# Patient Record
Sex: MALE | Race: WHITE | NOT HISPANIC OR LATINO | Employment: OTHER | URBAN - METROPOLITAN AREA
[De-identification: names, ages, dates, MRNs, and addresses within clinical notes are randomized per-mention and may not be internally consistent; named-entity substitution may affect disease eponyms.]

---

## 2017-01-19 PROBLEM — R03.0 ELEVATED BLOOD PRESSURE READING: Status: ACTIVE | Noted: 2017-01-19

## 2017-03-29 ENCOUNTER — OFFICE VISIT (OUTPATIENT)
Dept: PSYCHIATRY | Facility: CLINIC | Age: 67
End: 2017-03-29
Payer: MEDICARE

## 2017-03-29 VITALS
WEIGHT: 136 LBS | HEART RATE: 73 BPM | SYSTOLIC BLOOD PRESSURE: 157 MMHG | HEIGHT: 67 IN | DIASTOLIC BLOOD PRESSURE: 78 MMHG | BODY MASS INDEX: 21.35 KG/M2

## 2017-03-29 DIAGNOSIS — F41.1 GENERALIZED ANXIETY DISORDER: ICD-10-CM

## 2017-03-29 DIAGNOSIS — F41.9 ANXIETY: ICD-10-CM

## 2017-03-29 DIAGNOSIS — F32.89 DEPRESSIVE DISORDER, NOT ELSEWHERE CLASSIFIED: Primary | ICD-10-CM

## 2017-03-29 PROCEDURE — 99214 OFFICE O/P EST MOD 30 MIN: CPT | Mod: ,,, | Performed by: NURSE PRACTITIONER

## 2017-03-29 PROCEDURE — 99999 PR PBB SHADOW E&M-EST. PATIENT-LVL III: CPT | Mod: PBBFAC,,, | Performed by: NURSE PRACTITIONER

## 2017-03-29 PROCEDURE — 99213 OFFICE O/P EST LOW 20 MIN: CPT | Mod: PBBFAC | Performed by: NURSE PRACTITIONER

## 2017-03-29 PROCEDURE — 90833 PSYTX W PT W E/M 30 MIN: CPT | Mod: ,,, | Performed by: NURSE PRACTITIONER

## 2017-03-29 RX ORDER — ALPRAZOLAM 1 MG/1
1 TABLET ORAL 3 TIMES DAILY
Qty: 90 TABLET | Refills: 5 | Status: ON HOLD | OUTPATIENT
Start: 2017-03-29 | End: 2017-09-11

## 2017-03-29 NOTE — MR AVS SNAPSHOT
[facility] - Psychiatry  [address]  [facility] [address]  Phone: [phone]  Fax: [phone]                  Chaz Stuart   3/29/2017 8:00 AM   Office Visit    Description:  Male : 1950   Provider:  Dario Newberry NP   Department:  [facility] - Psychiatry           Reason for Visit     Mood           Diagnoses this Visit        Comments    Depressive disorder, not elsewhere classified    -  Primary     Generalized anxiety disorder         Anxiety                To Do List           Goals (5 Years of Data)     None       These Medications        Disp Refills Start End    alprazolam (XANAX) 1 MG tablet 90 tablet 5 3/29/2017     Take 1 tablet (1 mg total) by mouth 3 (three) times daily. - Oral    Pharmacy: [facility] [address] [address] #: [phone]         [facility] On Call     [facility] On Call Nurse Care Line -  Assistance  Registered nurses in the [facility] On Call Center provide clinical advisement, health education, appointment booking, and other advisory services.  Call for this free service at [phone].             Medications           Message regarding Medications     Verify the changes and/or additions to your medication regime listed below are the same as discussed with your clinician today.  If any of these changes or additions are incorrect, please notify your healthcare provider.             Verify that the below list of medications is an accurate representation of the medications you are currently taking.  If none reported, the list may be blank. If incorrect, please contact your healthcare provider. Carry this list with you in case of emergency.           Current Medications     albuterol (VENTOLIN HFA) 90 mcg/actuation inhaler Inhale 2 puffs into the lungs every 6 (six) hours as needed.    albuterol-ipratropium 2.5mg-0.5mg/3mL (DUO-NEB) 0.5 mg-3 mg(2.5 mg base)/3 mL nebulizer solution Take 3 mLs by nebulization  "every 6 (six) hours as needed for Wheezing or Shortness of Breath.    alprazolam (XANAX) 1 MG tablet Take 1 tablet (1 mg total) by mouth 3 (three) times daily.    aspirin (ECOTRIN) 81 MG EC tablet Take 1 tablet (81 mg total) by mouth once daily.    atorvastatin (LIPITOR) 20 MG tablet Take 1 tablet (20 mg total) by mouth every evening.    cetirizine (ZYRTEC) 10 MG tablet Take 1 tablet (10 mg total) by mouth once daily.    dutasteride (AVODART) 0.5 mg capsule Take 1 capsule (0.5 mg total) by mouth once daily.    SYMBICORT 160-4.5 mcg/actuation HFAA Inhale 2 puffs into the lungs 2 (two) times daily.           Clinical Reference Information           Your Vitals Were     BP Pulse Height Weight BMI    157/78 73 5' 7" (1.702 m) 61.7 kg (136 lb) 21.3 kg/m2      Blood Pressure          Most Recent Value    BP  (!)  157/78      Allergies as of 3/29/2017     No Known Allergies      Immunizations Administered on Date of Encounter - 3/29/2017     None      Instructions    [facility] - DEPARTMENT OF PSYCHIATRY   PATIENT INFORMATION    We appreciate the opportunity to participate in your medical care and hope the following protocols will make it easier for you to receive quality treatment in our department.    1. PUNCTUALITY: Your appointment is scheduled for a fixed amount of time.  To get the benefit of your appointment, please arrive early enough to allow time for traffic, parking and registration.  If you are late for your appointment, you may have to reschedule.  Please make every effort to be on time.      2. PAYMENT FOR SERVICES:   Payments are expected at the time of service.  Please contact [phone] if you need to resolve issues involving your account at [facility] or to set up a payment plan.    3. CANCELLATION / MISSED APPOINTMENTS:   In order to receive quality care, all appointments must be kept.  Appointment may be cancelled at least 24 hours before your appointment time. If you do not give at least " 24-hour notice of cancellation a fee may be billed directly to the patient.  Please note that insurance does not cover no-show charges, so you will be billed directly.  If you are consistently late, cancel, or do not show for your appointments, our department reserves the right to terminate treatment     MESSAGES- In general you can reach the department by calling [phone], between 8:00 a.m. and 5:00 p.m., Monday through Friday.  You can also use the [facility] Patient Portal.     AFTER HOURS, WEEKEND OR HOLIDAYS- For urgent questions after hours, weekends and holidays, calling the department number [phone] will connect you with a representative.  EMERGENCY-  In case of a crisis when there is a concern of harm to self or others, call 911 or the office [phone] between 8:00 a.m. and 5:00 p.m., Monday through Friday or go to the [facility].    4. PRESCRIPTION REFILLS:  Prescription refills must be done at your physician office visit, You will be given a sufficient number of refills to last until your next appointment, you must come to appointments for prescriptions.   No additional refills will be approved beyond the original treatment plan. Again, please note that no additional prescriptions will be approved per patient request.     5. FOLLOW UP APPOINTMENTS:  Follow-up appointments can be made in person at the Psychiatry Appointment Desk, online through the [facility] Patient Portal, or by calling [phone], from 8am to 5pm, between Monday and Friday.  Patients are responsible for scheduling their own follow-up appointment,  It  Is recommended you schedule your appointment before leaving the clinic.    6. Providers are NOT ABLE to schedule appointments; all appointments must be made through the appointment department or through [facility] Patient Portal.           PATIENTS MAY EXPERIENCE SYMPTOMS OF WITHDRAWAL IF THEY RUN OUT OF MEDICATIONS.  THE PATIENT WILL ASSUME ALL RESPONSIBILITIES  OF ANY OUTCOMES WHEN THERE IS AN ISSUE WITH NONCOMPLIANCE WITH FOLLOW-UP APPOINTMENTS AND MEDICATIONS.        THERE IS TO BE NO USE OF ALCOHOL AND/OR ILLEGAL SUBSTANCES    PATIENT ARE TO GO TO THE CLOSEST EMERGENCY ROOM IF FEELING A THREAT TO THEMSELVES OR OTHER OR IF GRAVELY DISABLED    TELL US HOW WE ARE DOING.  PLEASE COMPLETE THE PATIENT SATISFACTION SURVERY  Revised December 2016         Language Assistance Services     ATTENTION: Language assistance services are available, free of charge. Please call [phone].      ATENCIÓN: Si habla español, tiene a lamb disposición servicios gratuitos de asistencia lingüística. Llame al [phone].     CHÚ Ý: N?u b?n nói Ti?ng Vi?t, có các d?ch v? h? tr? ngôn ng? mi?n phí dành cho b?n. G?i s? [phone].         [facility] complies with applicable Federal civil rights laws and does not discriminate on the basis of race, color, national origin, age, disability, or sex.

## 2017-03-29 NOTE — PATIENT INSTRUCTIONS
[facility] - DEPARTMENT OF PSYCHIATRY   PATIENT INFORMATION    We appreciate the opportunity to participate in your medical care and hope the following protocols will make it easier for you to receive quality treatment in our department.    1. PUNCTUALITY: Your appointment is scheduled for a fixed amount of time.  To get the benefit of your appointment, please arrive early enough to allow time for traffic, parking and registration.  If you are late for your appointment, you may have to reschedule.  Please make every effort to be on time.      2. PAYMENT FOR SERVICES:   Payments are expected at the time of service.  Please contact [phone] if you need to resolve issues involving your account at [facility] or to set up a payment plan.    3. CANCELLATION / MISSED APPOINTMENTS:   In order to receive quality care, all appointments must be kept.  Appointment may be cancelled at least 24 hours before your appointment time. If you do not give at least 24-hour notice of cancellation a fee may be billed directly to the patient.  Please note that insurance does not cover no-show charges, so you will be billed directly.  If you are consistently late, cancel, or do not show for your appointments, our department reserves the right to terminate treatment     MESSAGES- In general you can reach the department by calling [phone], between 8:00 a.m. and 5:00 p.m., Monday through Friday.  You can also use the [facility] Patient Portal.     AFTER HOURS, WEEKEND OR HOLIDAYS- For urgent questions after hours, weekends and holidays, calling the department number [phone] will connect you with a representative.  EMERGENCY-  In case of a crisis when there is a concern of harm to self or others, call 911 or the office [phone] between 8:00 a.m. and 5:00 p.m., Monday through Friday or go to the [facility] Emergency Room.    4. PRESCRIPTION REFILLS:  Prescription refills must be done at your physician office visit, You  will be given a sufficient number of refills to last until your next appointment, you must come to appointments for prescriptions.   No additional refills will be approved beyond the original treatment plan. Again, please note that no additional prescriptions will be approved per patient request.     5. FOLLOW UP APPOINTMENTS:  Follow-up appointments can be made in person at the Psychiatry Appointment Desk, online through the [facility] Patient Portal, or by calling [phone], from 8am to 5pm, between Monday and Friday.  Patients are responsible for scheduling their own follow-up appointment,  It  Is recommended you schedule your appointment before leaving the clinic.    6. Providers are NOT ABLE to schedule appointments; all appointments must be made through the appointment department or through [facility].           PATIENTS MAY EXPERIENCE SYMPTOMS OF WITHDRAWAL IF THEY RUN OUT OF MEDICATIONS.  THE PATIENT WILL ASSUME ALL RESPONSIBILITIES OF ANY OUTCOMES WHEN THERE IS AN ISSUE WITH NONCOMPLIANCE WITH FOLLOW-UP APPOINTMENTS AND MEDICATIONS.        THERE IS TO BE NO USE OF ALCOHOL AND/OR ILLEGAL SUBSTANCES    PATIENT ARE TO GO TO THE CLOSEST EMERGENCY ROOM IF FEELING A THREAT TO THEMSELVES OR OTHER OR IF GRAVELY DISABLED    TELL US HOW WE ARE DOING.  PLEASE COMPLETE THE PATIENT SATISFACTION SURVERY  Revised December 2016

## 2017-03-29 NOTE — PROGRESS NOTES
"Outpatient Psychiatry Follow-Up Visit (MD/NP)    3/29/2017    Clinical Status of Patient:  Outpatient (Ambulatory)    Chief Complaint:  Chaz Stuart is a 66 y.o. male who presents today for follow-up of depression and anxiety.  Met with patient.      Interval History and Content of Current Session:  Interim Events/Subjective Report/Content of Current Session:     Last seen Oct 2016, chart reviewed, mult medical issues     reviewed    Xanax 1mg po TID    "Im pretty good", socializing with neighbors, walking with his daughter.  Denies depression, anxiety, and irritability. Encouraged patient to only take as needed.  Gait steady, no O2.        Psychotherapy:  · Target symptoms: depression, anxiety   · Why chosen therapy is appropriate versus another modality: relevant to diagnosis  · Outcome monitoring methods: self-report  · Therapeutic intervention type: supportive psychotherapy  · Topics discussed/themes: symptom recognition  · The patient's response to the intervention is accepting. The patient's progress toward treatment goals is good.   · Duration of intervention: 16 minutes.    Review of Systems     GENERAL: No weight gain or loss   SKIN: No rashes or lacerations, generalized itching  HEAD: No headaches   EYES: No exophthalmos, jaundice or blindness   EARS: No dizziness, tinnitus or hearing loss   NOSE: No changes in smell   MOUTH & THROAT: No dyskinetic movements or obvious goiter   CHEST: No shortness of breath, hyperventilation, mild cough   CARDIOVASCULAR: No tachycardia or chest pain   ABDOMEN: No nausea, vomiting, pain, constipation or diarrhea   URINARY: No frequency, dysuria or sexual dysfunction   ENDOCRINE: No polydipsia, polyuria   MUSCULOSKELETAL: denies pain  NEUROLOGIC: No weakness, sensory changes, seizures, confusion, memory loss, tremor or other abnormal movements      Psychiatric Review Of Systems:  sleep: yes  appetite changes: no  weight changes: no  energy/anergy: " "yes  interest/pleasure/anhedonia: yes  somatic symptoms: no  libido: yes  anxiety/panic: no      Past Medical, Family and Social History: The patient's past medical, family and social history have been reviewed and updated as appropriate within the electronic medical record - see encounter notes.    Compliance: yes    Side effects: None    Risk Parameters:  Patient reports no suicidal ideation  Patient reports no homicidal ideation  Patient reports no self-injurious behavior  Patient reports no violent behavior    Exam (detailed: at least 9 elements; comprehensive: all 15 elements)   Constitutional  Vitals:  Most recent vital signs, dated less than 90 days prior to this appointment, were reviewed.   Vitals:    03/29/17 0743   BP: (!) 157/78   Pulse: 73   Weight: 61.7 kg (136 lb)   Height: 5' 7" (1.702 m)        General:  unremarkable, age appropriate, casually dressed, neatly groomed     Musculoskeletal  Muscle Strength/Tone:  no dyskinesia, no dystonia, no tremor, no tic   Gait & Station:  non-ataxic     Psychiatric  Speech:  no latency; no press   Mood & Affect:  euthymic  congruent and appropriate   Thought Process:  normal and logical   Associations:  intact   Thought Content:  normal, no suicidality, no homicidality, delusions, or paranoia   Insight:  has awareness of illness   Judgement: behavior is adequate to circumstances   Orientation:  grossly intact   Memory: intact for content of interview   Language: grossly intact   Attention Span & Concentration:  able to focus   Fund of Knowledge:  intact and appropriate to age and level of education     Assessment and Diagnosis   Status/Progress: Based on the examination today, the patient's problem(s) is/are well controlled.  New problems have not been presented today.   Co-morbidities are complicating management of the primary condition.  There are no active rule-out diagnoses for this patient at this time.     General Impression:       ICD-10-CM ICD-9-CM   1. " Depressive disorder, not elsewhere classified F32.9 311   2. Generalized anxiety disorder F41.1 300.02       Intervention/Counseling/Treatment Plan   · Medication Management: Continue current medications. The risks and benefits of medication were discussed with the patient.   · Xanax 1mg po TID  · Patient travels approx 2 hours to get to appt    Return to Clinic: 6 months

## 2017-05-09 PROBLEM — Z51.89 THERAPEUTIC PROCEDURE: Status: ACTIVE | Noted: 2017-05-09

## 2017-05-23 PROBLEM — D75.839 THROMBOCYTHEMIA: Status: ACTIVE | Noted: 2017-05-23

## 2017-05-23 PROBLEM — R79.89 ELEVATED LFTS: Status: ACTIVE | Noted: 2017-05-23

## 2017-05-23 PROBLEM — D72.829 LEUKOCYTOSIS: Status: ACTIVE | Noted: 2017-05-23

## 2017-05-31 PROBLEM — D45 POLYCYTHEMIA VERA: Status: ACTIVE | Noted: 2017-05-31

## 2017-05-31 PROBLEM — D47.1 MPN (MYELOPROLIFERATIVE NEOPLASM): Status: ACTIVE | Noted: 2017-05-31

## 2017-05-31 PROBLEM — I10 HTN, GOAL BELOW 140/90: Status: ACTIVE | Noted: 2017-05-31

## 2017-06-21 PROBLEM — E86.0 DEHYDRATION: Status: ACTIVE | Noted: 2017-06-21

## 2017-07-05 PROBLEM — R06.02 SOB (SHORTNESS OF BREATH): Status: ACTIVE | Noted: 2017-07-05

## 2017-07-05 PROBLEM — Z09 CHEMOTHERAPY FOLLOW-UP EXAMINATION: Status: ACTIVE | Noted: 2017-07-05

## 2017-08-28 PROBLEM — J44.9 CHRONIC OBSTRUCTIVE PULMONARY DISEASE: Status: ACTIVE | Noted: 2017-08-28

## 2017-08-28 PROBLEM — S69.91XA INJURY OF RIGHT HAND: Status: ACTIVE | Noted: 2017-08-28

## 2017-08-28 PROBLEM — S69.91XA INJURY OF FINGER OF RIGHT HAND: Status: ACTIVE | Noted: 2017-08-28

## 2017-08-29 ENCOUNTER — HOSPITAL ENCOUNTER (INPATIENT)
Facility: HOSPITAL | Age: 67
LOS: 12 days | Discharge: HOME | DRG: 982 | End: 2017-09-11
Attending: EMERGENCY MEDICINE | Admitting: EMERGENCY MEDICINE
Payer: MEDICARE

## 2017-08-29 DIAGNOSIS — R65.10 SIRS (SYSTEMIC INFLAMMATORY RESPONSE SYNDROME): ICD-10-CM

## 2017-08-29 DIAGNOSIS — R79.89 ELEVATED LFTS: ICD-10-CM

## 2017-08-29 DIAGNOSIS — J44.1 COPD EXACERBATION: Primary | ICD-10-CM

## 2017-08-29 DIAGNOSIS — K59.01 SLOW TRANSIT CONSTIPATION: ICD-10-CM

## 2017-08-29 DIAGNOSIS — I10 HTN, GOAL BELOW 140/90: ICD-10-CM

## 2017-08-29 DIAGNOSIS — S69.91XS INJURY OF FINGER OF RIGHT HAND, SEQUELA: ICD-10-CM

## 2017-08-29 DIAGNOSIS — F41.9 ANXIETY: ICD-10-CM

## 2017-08-29 DIAGNOSIS — R74.8 ELEVATED LIVER ENZYMES: ICD-10-CM

## 2017-08-29 DIAGNOSIS — J44.9 CHRONIC OBSTRUCTIVE PULMONARY DISEASE, UNSPECIFIED COPD TYPE: ICD-10-CM

## 2017-08-29 LAB
ALBUMIN SERPL BCP-MCNC: 3.3 G/DL
ALLENS TEST: ABNORMAL
ALP SERPL-CCNC: 446 U/L
ALT SERPL W/O P-5'-P-CCNC: 293 U/L
ANION GAP SERPL CALC-SCNC: 12 MMOL/L
ANISOCYTOSIS BLD QL SMEAR: SLIGHT
AST SERPL-CCNC: 270 U/L
BASOPHILS # BLD AUTO: 0.03 K/UL
BASOPHILS NFR BLD: 0.2 %
BILIRUB SERPL-MCNC: 1.5 MG/DL
BNP SERPL-MCNC: 50 PG/ML
BUN SERPL-MCNC: 20 MG/DL
CALCIUM SERPL-MCNC: 9.7 MG/DL
CHLORIDE SERPL-SCNC: 103 MMOL/L
CO2 SERPL-SCNC: 28 MMOL/L
CREAT SERPL-MCNC: 0.9 MG/DL
DELSYS: ABNORMAL
DIFFERENTIAL METHOD: ABNORMAL
EOSINOPHIL # BLD AUTO: 0.1 K/UL
EOSINOPHIL NFR BLD: 0.4 %
ERYTHROCYTE [DISTWIDTH] IN BLOOD BY AUTOMATED COUNT: 20 %
EST. GFR  (AFRICAN AMERICAN): >60 ML/MIN/1.73 M^2
EST. GFR  (NON AFRICAN AMERICAN): >60 ML/MIN/1.73 M^2
GLUCOSE SERPL-MCNC: 128 MG/DL
HCO3 UR-SCNC: 30.5 MMOL/L (ref 24–28)
HCT VFR BLD AUTO: 32.1 %
HGB BLD-MCNC: 9.5 G/DL
HYPOCHROMIA BLD QL SMEAR: ABNORMAL
INR PPP: 1.1
LIPASE SERPL-CCNC: 26 U/L
LYMPHOCYTES # BLD AUTO: 0.4 K/UL
LYMPHOCYTES NFR BLD: 2.6 %
MCH RBC QN AUTO: 22.1 PG
MCHC RBC AUTO-ENTMCNC: 29.6 G/DL
MCV RBC AUTO: 75 FL
MONOCYTES # BLD AUTO: 1.1 K/UL
MONOCYTES NFR BLD: 8 %
NEUTROPHILS # BLD AUTO: 12.1 K/UL
NEUTROPHILS NFR BLD: 89.6 %
OVALOCYTES BLD QL SMEAR: ABNORMAL
PCO2 BLDA: 41.1 MMHG (ref 35–45)
PH SMN: 7.48 [PH] (ref 7.35–7.45)
PLATELET # BLD AUTO: 165 K/UL
PLATELET BLD QL SMEAR: ABNORMAL
PMV BLD AUTO: 9.4 FL
PO2 BLDA: 75 MMHG (ref 80–100)
POC BE: 6 MMOL/L
POC SATURATED O2: 96 % (ref 95–100)
POC TCO2: 32 MMOL/L (ref 23–27)
POIKILOCYTOSIS BLD QL SMEAR: SLIGHT
POLYCHROMASIA BLD QL SMEAR: ABNORMAL
POTASSIUM SERPL-SCNC: 3.4 MMOL/L
PROT SERPL-MCNC: 7 G/DL
PROTHROMBIN TIME: 11.2 SEC
RBC # BLD AUTO: 4.29 M/UL
SAMPLE: ABNORMAL
SITE: ABNORMAL
SODIUM SERPL-SCNC: 143 MMOL/L
STOMATOCYTES BLD QL SMEAR: PRESENT
TARGETS BLD QL SMEAR: ABNORMAL
TROPONIN I SERPL DL<=0.01 NG/ML-MCNC: <0.006 NG/ML
TROPONIN I SERPL DL<=0.01 NG/ML-MCNC: <0.006 NG/ML
WBC # BLD AUTO: 13.65 K/UL

## 2017-08-29 PROCEDURE — 84484 ASSAY OF TROPONIN QUANT: CPT

## 2017-08-29 PROCEDURE — 83880 ASSAY OF NATRIURETIC PEPTIDE: CPT

## 2017-08-29 PROCEDURE — 80053 COMPREHEN METABOLIC PANEL: CPT

## 2017-08-29 PROCEDURE — 25000003 PHARM REV CODE 250: Performed by: EMERGENCY MEDICINE

## 2017-08-29 PROCEDURE — 36415 COLL VENOUS BLD VENIPUNCTURE: CPT

## 2017-08-29 PROCEDURE — 82803 BLOOD GASES ANY COMBINATION: CPT

## 2017-08-29 PROCEDURE — 85610 PROTHROMBIN TIME: CPT

## 2017-08-29 PROCEDURE — 80074 ACUTE HEPATITIS PANEL: CPT

## 2017-08-29 PROCEDURE — 96374 THER/PROPH/DIAG INJ IV PUSH: CPT

## 2017-08-29 PROCEDURE — 99285 EMERGENCY DEPT VISIT HI MDM: CPT | Mod: 25

## 2017-08-29 PROCEDURE — 99900035 HC TECH TIME PER 15 MIN (STAT)

## 2017-08-29 PROCEDURE — 63600175 PHARM REV CODE 636 W HCPCS: Performed by: EMERGENCY MEDICINE

## 2017-08-29 PROCEDURE — 93005 ELECTROCARDIOGRAM TRACING: CPT

## 2017-08-29 PROCEDURE — G0378 HOSPITAL OBSERVATION PER HR: HCPCS

## 2017-08-29 PROCEDURE — 25000242 PHARM REV CODE 250 ALT 637 W/ HCPCS: Performed by: EMERGENCY MEDICINE

## 2017-08-29 PROCEDURE — 63600175 PHARM REV CODE 636 W HCPCS: Performed by: PHYSICIAN ASSISTANT

## 2017-08-29 PROCEDURE — 25000242 PHARM REV CODE 250 ALT 637 W/ HCPCS: Performed by: PHYSICIAN ASSISTANT

## 2017-08-29 PROCEDURE — 36600 WITHDRAWAL OF ARTERIAL BLOOD: CPT

## 2017-08-29 PROCEDURE — 94761 N-INVAS EAR/PLS OXIMETRY MLT: CPT

## 2017-08-29 PROCEDURE — 94644 CONT INHLJ TX 1ST HOUR: CPT

## 2017-08-29 PROCEDURE — 94640 AIRWAY INHALATION TREATMENT: CPT

## 2017-08-29 PROCEDURE — 83690 ASSAY OF LIPASE: CPT

## 2017-08-29 PROCEDURE — 85025 COMPLETE CBC W/AUTO DIFF WBC: CPT

## 2017-08-29 PROCEDURE — 93010 ELECTROCARDIOGRAM REPORT: CPT | Mod: ,,, | Performed by: INTERNAL MEDICINE

## 2017-08-29 PROCEDURE — 27000221 HC OXYGEN, UP TO 24 HOURS

## 2017-08-29 RX ORDER — IPRATROPIUM BROMIDE AND ALBUTEROL SULFATE 2.5; .5 MG/3ML; MG/3ML
3 SOLUTION RESPIRATORY (INHALATION) EVERY 4 HOURS
Status: DISCONTINUED | OUTPATIENT
Start: 2017-08-30 | End: 2017-09-11 | Stop reason: HOSPADM

## 2017-08-29 RX ORDER — METHYLPREDNISOLONE SOD SUCC 125 MG
125 VIAL (EA) INJECTION
Status: COMPLETED | OUTPATIENT
Start: 2017-08-29 | End: 2017-08-29

## 2017-08-29 RX ORDER — ENOXAPARIN SODIUM 100 MG/ML
40 INJECTION SUBCUTANEOUS EVERY 24 HOURS
Status: DISCONTINUED | OUTPATIENT
Start: 2017-08-29 | End: 2017-09-11 | Stop reason: HOSPADM

## 2017-08-29 RX ORDER — AMLODIPINE BESYLATE 5 MG/1
5 TABLET ORAL DAILY
Status: DISCONTINUED | OUTPATIENT
Start: 2017-08-30 | End: 2017-09-09

## 2017-08-29 RX ORDER — METHYLPREDNISOLONE SOD SUCC 125 MG
125 VIAL (EA) INJECTION EVERY 8 HOURS
Status: DISCONTINUED | OUTPATIENT
Start: 2017-08-29 | End: 2017-09-03

## 2017-08-29 RX ORDER — PANTOPRAZOLE SODIUM 40 MG/10ML
40 INJECTION, POWDER, LYOPHILIZED, FOR SOLUTION INTRAVENOUS DAILY
Status: DISCONTINUED | OUTPATIENT
Start: 2017-08-30 | End: 2017-09-11 | Stop reason: HOSPADM

## 2017-08-29 RX ORDER — ALBUTEROL SULFATE 2.5 MG/.5ML
10 SOLUTION RESPIRATORY (INHALATION)
Status: COMPLETED | OUTPATIENT
Start: 2017-08-29 | End: 2017-08-29

## 2017-08-29 RX ORDER — ALPRAZOLAM 0.5 MG/1
1 TABLET ORAL 3 TIMES DAILY
Status: DISCONTINUED | OUTPATIENT
Start: 2017-08-29 | End: 2017-09-11 | Stop reason: HOSPADM

## 2017-08-29 RX ORDER — MORPHINE SULFATE 10 MG/ML
2 INJECTION INTRAMUSCULAR; INTRAVENOUS; SUBCUTANEOUS EVERY 4 HOURS PRN
Status: DISCONTINUED | OUTPATIENT
Start: 2017-08-29 | End: 2017-09-11 | Stop reason: HOSPADM

## 2017-08-29 RX ORDER — IPRATROPIUM BROMIDE AND ALBUTEROL SULFATE 2.5; .5 MG/3ML; MG/3ML
3 SOLUTION RESPIRATORY (INHALATION)
Status: COMPLETED | OUTPATIENT
Start: 2017-08-29 | End: 2017-08-29

## 2017-08-29 RX ORDER — RAMELTEON 8 MG/1
8 TABLET ORAL NIGHTLY PRN
Status: DISCONTINUED | OUTPATIENT
Start: 2017-08-29 | End: 2017-09-11 | Stop reason: HOSPADM

## 2017-08-29 RX ORDER — ASPIRIN 81 MG/1
81 TABLET ORAL DAILY
Status: DISCONTINUED | OUTPATIENT
Start: 2017-08-30 | End: 2017-09-11 | Stop reason: HOSPADM

## 2017-08-29 RX ORDER — IBUPROFEN 600 MG/1
600 TABLET ORAL EVERY 8 HOURS PRN
Status: DISCONTINUED | OUTPATIENT
Start: 2017-08-29 | End: 2017-08-30

## 2017-08-29 RX ORDER — SODIUM CHLORIDE 0.9 % (FLUSH) 0.9 %
3 SYRINGE (ML) INJECTION EVERY 8 HOURS
Status: DISCONTINUED | OUTPATIENT
Start: 2017-08-29 | End: 2017-09-11 | Stop reason: HOSPADM

## 2017-08-29 RX ORDER — ONDANSETRON 2 MG/ML
4 INJECTION INTRAMUSCULAR; INTRAVENOUS EVERY 8 HOURS PRN
Status: DISCONTINUED | OUTPATIENT
Start: 2017-08-29 | End: 2017-09-11 | Stop reason: HOSPADM

## 2017-08-29 RX ORDER — SODIUM CHLORIDE 9 MG/ML
INJECTION, SOLUTION INTRAVENOUS CONTINUOUS
Status: DISCONTINUED | OUTPATIENT
Start: 2017-08-29 | End: 2017-09-04

## 2017-08-29 RX ORDER — MORPHINE SULFATE 10 MG/ML
6 INJECTION INTRAMUSCULAR; INTRAVENOUS; SUBCUTANEOUS EVERY 4 HOURS PRN
Status: DISCONTINUED | OUTPATIENT
Start: 2017-08-29 | End: 2017-09-11 | Stop reason: HOSPADM

## 2017-08-29 RX ORDER — IPRATROPIUM BROMIDE 0.5 MG/2.5ML
500 SOLUTION RESPIRATORY (INHALATION)
Status: COMPLETED | OUTPATIENT
Start: 2017-08-29 | End: 2017-08-29

## 2017-08-29 RX ADMIN — AZITHROMYCIN MONOHYDRATE 500 MG: 500 INJECTION, POWDER, LYOPHILIZED, FOR SOLUTION INTRAVENOUS at 09:08

## 2017-08-29 RX ADMIN — SODIUM CHLORIDE: 0.9 INJECTION, SOLUTION INTRAVENOUS at 09:08

## 2017-08-29 RX ADMIN — IPRATROPIUM BROMIDE AND ALBUTEROL SULFATE 3 ML: .5; 3 SOLUTION RESPIRATORY (INHALATION) at 08:08

## 2017-08-29 RX ADMIN — METHYLPREDNISOLONE SODIUM SUCCINATE 125 MG: 125 INJECTION, POWDER, FOR SOLUTION INTRAMUSCULAR; INTRAVENOUS at 07:08

## 2017-08-29 RX ADMIN — ALBUTEROL SULFATE 10 MG: 2.5 SOLUTION RESPIRATORY (INHALATION) at 05:08

## 2017-08-29 RX ADMIN — IPRATROPIUM BROMIDE AND ALBUTEROL SULFATE 3 ML: .5; 3 SOLUTION RESPIRATORY (INHALATION) at 09:08

## 2017-08-29 RX ADMIN — ENOXAPARIN SODIUM 40 MG: 100 INJECTION SUBCUTANEOUS at 09:08

## 2017-08-29 RX ADMIN — IPRATROPIUM BROMIDE 500 MCG: 0.5 SOLUTION RESPIRATORY (INHALATION) at 05:08

## 2017-08-29 RX ADMIN — CEFTRIAXONE 1 G: 1 INJECTION, SOLUTION INTRAVENOUS at 09:08

## 2017-08-29 RX ADMIN — ALPRAZOLAM 1 MG: 0.5 TABLET ORAL at 09:08

## 2017-08-29 NOTE — ED TRIAGE NOTES
Pt presents to the ED with chest pain which is described as punching  in his chest. Pt reports fever, chills, neck pain and mid-sternal chest pain. Pt reports SOB with exertion as well as during rest. Pt was scheduled for surgery today however when pt arrived anesthesia did not clear him to have surgery at ambulatory center. Pt then developed the chest pain and came to the ED. PT placed on monitor. 12 lead obtained. Pt was in the ED two days ago with SOB.

## 2017-08-30 LAB
ANION GAP SERPL CALC-SCNC: 10 MMOL/L
BASOPHILS # BLD AUTO: 0.01 K/UL
BASOPHILS NFR BLD: 0.1 %
BUN SERPL-MCNC: 19 MG/DL
CALCIUM SERPL-MCNC: 8.5 MG/DL
CHLORIDE SERPL-SCNC: 105 MMOL/L
CO2 SERPL-SCNC: 25 MMOL/L
CREAT SERPL-MCNC: 0.9 MG/DL
DIFFERENTIAL METHOD: ABNORMAL
EOSINOPHIL # BLD AUTO: 0 K/UL
EOSINOPHIL NFR BLD: 0 %
ERYTHROCYTE [DISTWIDTH] IN BLOOD BY AUTOMATED COUNT: 19.7 %
EST. GFR  (AFRICAN AMERICAN): >60 ML/MIN/1.73 M^2
EST. GFR  (NON AFRICAN AMERICAN): >60 ML/MIN/1.73 M^2
GLUCOSE SERPL-MCNC: 220 MG/DL
HAV IGM SERPL QL IA: NEGATIVE
HBV CORE IGM SERPL QL IA: NEGATIVE
HBV SURFACE AG SERPL QL IA: NEGATIVE
HCT VFR BLD AUTO: 27.3 %
HCV AB SERPL QL IA: NEGATIVE
HGB BLD-MCNC: 8.1 G/DL
LYMPHOCYTES # BLD AUTO: 0.4 K/UL
LYMPHOCYTES NFR BLD: 3 %
MCH RBC QN AUTO: 22.3 PG
MCHC RBC AUTO-ENTMCNC: 29.7 G/DL
MCV RBC AUTO: 75 FL
MONOCYTES # BLD AUTO: 0.2 K/UL
MONOCYTES NFR BLD: 1.5 %
NEUTROPHILS # BLD AUTO: 11.7 K/UL
NEUTROPHILS NFR BLD: 95.4 %
PLATELET # BLD AUTO: 166 K/UL
PMV BLD AUTO: 9.2 FL
POTASSIUM SERPL-SCNC: 3.7 MMOL/L
RBC # BLD AUTO: 3.63 M/UL
SODIUM SERPL-SCNC: 140 MMOL/L
TROPONIN I SERPL DL<=0.01 NG/ML-MCNC: <0.006 NG/ML
WBC # BLD AUTO: 12.29 K/UL

## 2017-08-30 PROCEDURE — 80048 BASIC METABOLIC PNL TOTAL CA: CPT

## 2017-08-30 PROCEDURE — 25000242 PHARM REV CODE 250 ALT 637 W/ HCPCS: Performed by: EMERGENCY MEDICINE

## 2017-08-30 PROCEDURE — 36415 COLL VENOUS BLD VENIPUNCTURE: CPT

## 2017-08-30 PROCEDURE — A4216 STERILE WATER/SALINE, 10 ML: HCPCS | Performed by: EMERGENCY MEDICINE

## 2017-08-30 PROCEDURE — 21400001 HC TELEMETRY ROOM

## 2017-08-30 PROCEDURE — 27000221 HC OXYGEN, UP TO 24 HOURS

## 2017-08-30 PROCEDURE — C9113 INJ PANTOPRAZOLE SODIUM, VIA: HCPCS | Performed by: EMERGENCY MEDICINE

## 2017-08-30 PROCEDURE — 85025 COMPLETE CBC W/AUTO DIFF WBC: CPT

## 2017-08-30 PROCEDURE — 94640 AIRWAY INHALATION TREATMENT: CPT

## 2017-08-30 PROCEDURE — 25000003 PHARM REV CODE 250: Performed by: EMERGENCY MEDICINE

## 2017-08-30 PROCEDURE — 63600175 PHARM REV CODE 636 W HCPCS: Performed by: EMERGENCY MEDICINE

## 2017-08-30 PROCEDURE — 84484 ASSAY OF TROPONIN QUANT: CPT

## 2017-08-30 RX ADMIN — Medication 3 ML: at 09:08

## 2017-08-30 RX ADMIN — RAMELTEON 8 MG: 8 TABLET, FILM COATED ORAL at 09:08

## 2017-08-30 RX ADMIN — MORPHINE SULFATE 6 MG: 10 INJECTION INTRAVENOUS at 08:08

## 2017-08-30 RX ADMIN — IPRATROPIUM BROMIDE AND ALBUTEROL SULFATE 3 ML: .5; 3 SOLUTION RESPIRATORY (INHALATION) at 08:08

## 2017-08-30 RX ADMIN — ALPRAZOLAM 1 MG: 0.5 TABLET ORAL at 03:08

## 2017-08-30 RX ADMIN — METHYLPREDNISOLONE SODIUM SUCCINATE 125 MG: 125 INJECTION, POWDER, FOR SOLUTION INTRAMUSCULAR; INTRAVENOUS at 03:08

## 2017-08-30 RX ADMIN — IPRATROPIUM BROMIDE AND ALBUTEROL SULFATE 3 ML: .5; 3 SOLUTION RESPIRATORY (INHALATION) at 12:08

## 2017-08-30 RX ADMIN — SODIUM CHLORIDE: 0.9 INJECTION, SOLUTION INTRAVENOUS at 09:08

## 2017-08-30 RX ADMIN — IPRATROPIUM BROMIDE AND ALBUTEROL SULFATE 3 ML: .5; 3 SOLUTION RESPIRATORY (INHALATION) at 03:08

## 2017-08-30 RX ADMIN — ASPIRIN 81 MG: 81 TABLET, COATED ORAL at 08:08

## 2017-08-30 RX ADMIN — CEFTRIAXONE 1 G: 1 INJECTION, SOLUTION INTRAVENOUS at 08:08

## 2017-08-30 RX ADMIN — ENOXAPARIN SODIUM 40 MG: 100 INJECTION SUBCUTANEOUS at 05:08

## 2017-08-30 RX ADMIN — MORPHINE SULFATE 2 MG: 10 INJECTION INTRAVENOUS at 07:08

## 2017-08-30 RX ADMIN — METHYLPREDNISOLONE SODIUM SUCCINATE 125 MG: 125 INJECTION, POWDER, FOR SOLUTION INTRAMUSCULAR; INTRAVENOUS at 04:08

## 2017-08-30 RX ADMIN — PANTOPRAZOLE SODIUM 40 MG: 40 INJECTION, POWDER, FOR SOLUTION INTRAVENOUS at 03:08

## 2017-08-30 RX ADMIN — IPRATROPIUM BROMIDE AND ALBUTEROL SULFATE 3 ML: .5; 3 SOLUTION RESPIRATORY (INHALATION) at 11:08

## 2017-08-30 RX ADMIN — AMLODIPINE BESYLATE 5 MG: 5 TABLET ORAL at 08:08

## 2017-08-30 RX ADMIN — AZITHROMYCIN MONOHYDRATE 500 MG: 500 INJECTION, POWDER, LYOPHILIZED, FOR SOLUTION INTRAVENOUS at 09:08

## 2017-08-30 RX ADMIN — METHYLPREDNISOLONE SODIUM SUCCINATE 125 MG: 125 INJECTION, POWDER, FOR SOLUTION INTRAMUSCULAR; INTRAVENOUS at 09:08

## 2017-08-30 RX ADMIN — ONDANSETRON 4 MG: 2 INJECTION INTRAMUSCULAR; INTRAVENOUS at 07:08

## 2017-08-30 RX ADMIN — ALPRAZOLAM 1 MG: 0.5 TABLET ORAL at 04:08

## 2017-08-30 RX ADMIN — ALPRAZOLAM 1 MG: 0.5 TABLET ORAL at 09:08

## 2017-08-30 RX ADMIN — SODIUM CHLORIDE: 0.9 INJECTION, SOLUTION INTRAVENOUS at 08:08

## 2017-08-30 NOTE — NURSING
Called to room pt found sitting up at bedside c/o respiratory difficulty. Call placed to respiratory tech for treatment. Family member in room requesting for a lung doctor to see pt.

## 2017-08-30 NOTE — PLAN OF CARE
Problem: Fall Risk (Adult)  Goal: Absence of Falls  Patient will demonstrate the desired outcomes by discharge/transition of care.   Outcome: Ongoing (interventions implemented as appropriate)   08/29/17 2324   Fall Risk (Adult)   Absence of Falls making progress toward outcome       Problem: Patient Care Overview  Goal: Plan of Care Review  Outcome: Ongoing (interventions implemented as appropriate)   08/29/17 2324   Coping/Psychosocial   Plan Of Care Reviewed With patient;daughter;son       Problem: ARDS (Acute Resp Distress Syndrome) (Adult)  Goal: Signs and Symptoms of Listed Potential Problems Will be Absent, Minimized or Managed (ARDS)  Signs and symptoms of listed potential problems will be absent, minimized or managed by discharge/transition of care (reference ARDS (Acute Resp Distress Syndrome) (Adult) CPG).  Outcome: Ongoing (interventions implemented as appropriate)   08/29/17 2324   ARDS (Acute Resp Distress Syndrome)   Problems Assessed (Acute Respiratory Distress Syndrome) all   Problems Present (Acute Respiratory Distress Syndrome) none       Problem: Pain, Acute (Adult)  Goal: Acceptable Pain Control/Comfort Level  Patient will demonstrate the desired outcomes by discharge/transition of care.  Outcome: Ongoing (interventions implemented as appropriate)   08/29/17 2324   Pain, Acute (Adult)   Acceptable Pain Control/Comfort Level making progress toward outcome

## 2017-08-30 NOTE — PROGRESS NOTES
WRITTEN HEALTHCARE DISCHARGE INFORMATION     Things that YOU are responsible for to Manage Your Care At Home:  1. Getting your prescriptions filled.  2. Taking you medications as directed. DO NOT MISS ANY DOSES!  3. Going to your follow-up doctor appointments. This is important because it allows the doctor to monitor your progress and to determine if any changes need to be made to your treatment plan.    If you are unable to make your follow up appointments, please call the number listed and reschedule this appointment.     After discharge, if you need assistance, you can call [facility] On Call Nurse Care Line for 24/7 assistance at [phone]    Thank you for choosing [facility] and allowing us to care for you.   From your care management team: Fransisca ANN,RSW & Jennifer KASPER RN,TN [phone] or [phone]     You should receive a call from [facility] Discharge Department within 48-72 hours to help manage your care after discharge. Please try to make sure that you answer your phone for this important phone call.     Follow-up Information     Rk Welsh MD On 9/12/2017.    Specialties:  Internal Medicine, Oncology, Hematology and Oncology  Why:  For Appointment on Tuesday at 2:40pm.   Contact information:  [facility]  [address] [place] [id number] [phone]

## 2017-08-30 NOTE — H&P
[facility]  Hematology/Oncology  H&P    Patient Name: Chaz Stuart  MRN: [id number]  Admission Date: 8/29/2017  Code Status: Full Code   Attending Provider: Rk Welsh MD  Primary Care Physician: Nika Heredia MD (Inactive)  Principal Problem:<principal problem not specified>    Subjective:     HPI:     Mr. Stuart is a pleasant 68 YO gentleman with Myeloproliferative neoplasm, Polycythemia Vera, oxygen dependent COPD and most recent injuries to right hand from a . Pt had a recent COPD exacerbation and was seen at [facility] ED- tx and released. For the last 3 days, not feeling well. Has chest discomfort and nausea. He began to have fever at home associated with worsening sob and chest pressure. Pt was scheduled for hand surgery and did not get clearance from anesthesia to get and outpatient procedure thus re-scheduled for tomorrow    Since admission, states feeling little better. Denies cough but has chest congestion. Denies any sick contact.     Oncology Treatment Plan:   [No treatment plan]    Medications:  Continuous Infusions:   sodium chloride 0.9% 100 mL/hr at 08/29/17 2157     Scheduled Meds:   albuterol-ipratropium 2.5mg-0.5mg/3mL  3 mL Nebulization Q4H    alprazolam  1 mg Oral TID    amlodipine  5 mg Oral Daily    aspirin  81 mg Oral Daily    azithromycin  500 mg Intravenous Q24H    cefTRIAXone (ROCEPHIN) IVPB  1 g Intravenous Q24H    enoxaparin  40 mg Subcutaneous Daily    methylPREDNISolone sodium succinate  125 mg Intravenous Q8H    pantoprazole  40 mg Intravenous Daily    sodium chloride 0.9%  3 mL Intravenous Q8H     PRN Meds:ibuprofen, morphine, morphine, ondansetron, promethazine (PHENERGAN) IVPB, ramelteon     Review of patient's allergies indicates:  No Known Allergies     Past Medical History:   Diagnosis Date    Anxiety     COPD (chronic obstructive pulmonary disease)     DDD (degenerative disc disease), cervical     Emphysema/COPD     GERD  (gastroesophageal reflux disease)     Hypertension      Past Surgical History:   Procedure Laterality Date    BACK SURGERY      CERVICAL SPINE SURGERY  Fusion    CHOLECYSTECTOMY      COLON SURGERY       Family History     Problem Relation (Age of Onset)    Diabetes Mother, Father    Heart disease Mother    Hypertension Father    Stroke Father        Social History Main Topics    Smoking status: Former Smoker    Smokeless tobacco: Never Used    Alcohol use No    Drug use: No    Sexual activity: No       Review of Systems     Review of Systems   Constitutional: fatigue and fever  Eyes: no visual changes   ENT: no nasal congestion. Denies sore throat with odynophagia   Respiratory: see HPI  Cardiovascular: see HPI   Gastrointestinal: + nusea   Hematologic/Lymphatic: see HPI  Musculoskeletal: ROM of right hand limited   Neurological: no seizures or tremors, gait or balance prblems  Skin: No rashes or lesions  Psych: Denies any anxiety, depression or insomnia      Objective:     Vital Signs (Most Recent):  Temp: 98.1 °F (36.7 °C) (08/30/17 0616)  Pulse: 84 (08/30/17 0616)  Resp: 20 (08/30/17 0616)  BP: (!) 109/55 (08/30/17 0616)  SpO2: 99 % (08/30/17 0616) Vital Signs (24h Range):  Temp:  [97.7 °F (36.5 °C)-99 °F (37.2 °C)] 98.1 °F (36.7 °C)  Pulse:  [] 84  Resp:  [18-20] 20  SpO2:  [96 %-100 %] 99 %  BP: (109-154)/(55-74) 109/55     Weight: 57.3 kg (126 lb 6.4 oz)  Body mass index is 19.8 kg/m².  Body surface area is 1.65 meters squared.      Intake/Output Summary (Last 24 hours) at 08/30/17 0706  Last data filed at 08/30/17 0616   Gross per 24 hour   Intake          1441.67 ml   Output                0 ml   Net          1441.67 ml       Physical Exam     General: NAD, resting in bed   Head: normocephalic, atraumatic   Eyes: conjunctivae pink, anicteric sclera.  Throat: No erythema or post nasal discharge   Neck: supple, no LAD   Lungs: expiratory wheezing bilaterally   Heart: S1, S2, RRR   Abdomen: + BS  x 4 quadrants, soft, mild epigastric tenderness    Extremities: no cyanosis or edema.   Skin: turgor normal, no erythema or rashes. No abnormal moles  MS:  right hand in bandage. Reviewed pics by family during office visit - injuries to finger 1-3  Neuro: A&O x 3    Psy: calm        Significant Labs:   CBC:   Recent Labs  Lab 08/29/17  1711 08/30/17  0224   WBC 13.65* 12.29   HGB 9.5* 8.1*   HCT 32.1* 27.3*    166   , CMP:   Recent Labs  Lab 08/29/17  1711 08/30/17  0223    140   K 3.4* 3.7    105   CO2 28 25   * 220*   BUN 20 19   CREATININE 0.9 0.9   CALCIUM 9.7 8.5*   PROT 7.0  --    ALBUMIN 3.3*  --    BILITOT 1.5*  --    ALKPHOS 446*  --    *  --    *  --    ANIONGAP 12 10   EGFRNONAA >60 >60   , Coagulation:   Recent Labs  Lab 08/29/17  1734   INR 1.1   , Haptoglobin: No results for input(s): HAPTOGLOBIN in the last 48 hours., Immunology: No results for input(s): SPEP, JOSE, KOREY, FREELAMBDALI in the last 48 hours., LDH: No results for input(s): LDHCSF, BFSOURCE in the last 48 hours., Reticulocytes: No results for input(s): RETIC in the last 48 hours., Tumor Markers: No results for input(s): PSA, CEA, , AFPTM, [id number],  in the last 48 hours.    Invalid input(s): ALGTM, Uric Acid No results for input(s): URICACID in the last 48 hours. and Urine Studies: No results for input(s): COLORU, APPEARANCEUA, PHUR, SPECGRAV, PROTEINUA, GLUCUA, KETONESU, BILIRUBINUA, OCCULTUA, NITRITE, UROBILINOGEN, LEUKOCYTESUR, RBCUA, WBCUA, BACTERIA, SQUAMEPITHEL, HYALINECASTS in the last 48 hours.    Invalid input(s): WRIGHTSUR    Diagnostic Results:      Current Facility-Administered Medications   Medication Dose Route Frequency Provider Last Rate Last Dose    0.9%  NaCl infusion   Intravenous Continuous Armand A. Wierzbicki,  mL/hr at 08/30/17 0936      albuterol-ipratropium 2.5mg-0.5mg/3mL nebulizer solution 3 mL  3 mL Nebulization Q4H Armand Mcmullen MD   3 mL at 08/30/17  1518    alprazolam tablet 1 mg  1 mg Oral TID Armand Mcmullen MD   1 mg at 08/30/17 1544    amlodipine tablet 5 mg  5 mg Oral Daily Armand Mcmullen MD   5 mg at 08/30/17 0823    aspirin EC tablet 81 mg  81 mg Oral Daily Armand Mcmullen MD   81 mg at 08/30/17 0823    azithromycin 500 mg in dextrose 5 % 250 mL IVPB (ready to mix system)  500 mg Intravenous Q24H Armand Mcmullen  mL/hr at 08/29/17 2130 500 mg at 08/29/17 2130    cefTRIAXone (ROCEPHIN) 1 g in dextrose 5 % 50 mL IVPB  1 g Intravenous Q24H Armand Mcmullen MD   1 g at 08/29/17 2156    enoxaparin injection 40 mg  40 mg Subcutaneous Daily Armand Mcmullen MD   40 mg at 08/30/17 1742    ibuprofen tablet 600 mg  600 mg Oral Q8H PRN Armand Mcmullen MD        methylPREDNISolone sodium succinate injection 125 mg  125 mg Intravenous Q8H Armand Mcmullen MD   125 mg at 08/30/17 1542    morphine injection 2 mg  2 mg Intravenous Q4H PRN Armand Mcmullen MD   2 mg at 08/30/17 1915    morphine injection 6 mg  6 mg Intravenous Q4H PRN Armand Mcmullen MD        ondansetron injection 4 mg  4 mg Intravenous Q8H PRN Armand Mcmullen MD   4 mg at 08/30/17 1931    pantoprazole injection 40 mg  40 mg Intravenous Daily Armand Mcmullen MD   40 mg at 08/30/17 1543    promethazine (PHENERGAN) 6.25 mg in dextrose 5 % 50 mL IVPB  6.25 mg Intravenous Q6H PRN Armand Mcmullen MD        ramelteon tablet 8 mg  8 mg Oral Nightly PRN Armand Mcmullen MD        sodium chloride 0.9% flush 3 mL  3 mL Intravenous Q8H Armand Mcmullen MD             Assessment/Plan:     Active Diagnoses:    Diagnosis Date Noted POA    COPD exacerbation [J44.1] 08/29/2017 Yes      Problems Resolved During this Admission:    Diagnosis Date Noted Date Resolved POA     1. Acute exacerbation of COPD: pt has significant symptoms and outpatient tx failed   - started IV steroid, Rocephin and Zithromax   - Duoneb   - pulmonary  consult     2. Fever and Leukocytosis/SIRS: multifactorial in nature- hand injury can be a source of infection   - will have Dr. Monson see pt    - on Rocephin for now    3. MPN: Polycythemia Vera: currently on Hydrea   - will hold off on hydrea for now     4. Abnormal LFT: US of liver done   - will have GI see pt   - check acute hep panel    - ? Medication induced liver toxicity?     5. Elevated glucose: mostly due to IV steroids      6. Nausea: protonix and Zofra    -dc motrin    7. DVT  Prophylaxis: Lovenox     Rk Welsh M.D  Internal Medicine & Geriatric Medicine  Hematology & Oncology  Palliative Medicine    [address], [address]  [place] [id number] [phone] (Office)  [phone] (Fax)          Rk Welsh MD  Hematology/Oncology  [facility]

## 2017-08-30 NOTE — PROGRESS NOTES
"SW met with pt at bedside. SW reviewed discharge education sheet "  " with pt. Pt voiced understanding. Teachback method applied with pt. SW also reviewed with pt what she is responsible for in order to manage her health at home.     1) Getting medications taking from pharmacy.  2) Taking medications as prescribed.  3) Making Appointments that are scheduled.    Pt verbalized understanding.     "

## 2017-08-30 NOTE — NURSING
Call placed to Dr Chandra regarding family request, spoke with nurse in Dr Chandra office. Awaiting return call.

## 2017-08-30 NOTE — ED PROVIDER NOTES
"Encounter Date: 8/29/2017    SCRIBE #1 NOTE: I, Cheryl Garay, am scribing for, and in the presence of,  Armand Mcmullen MD. I have scribed the following portions of the note - Other sections scribed: HPI and ROS .       History     Chief Complaint   Patient presents with    Headache     Pt. with PMH of COPD and polycythemia presents with shortness of breath, chest pain and headache for several days. Pt. reportedly has been on steroids and breathing treatments with no relief. Pt. was supposed to have hand surgery at [facility] but was unable to go.     Shortness of Breath     CC: Chest Pain and Shortness of Breath.  History obtained from patient.  Pt arrived via personal transportation.     HPI: This 67 y.o. Male, who has COPD, Anxiety, DDD, Emphysema, COPD, GERD, and HTN, presents to the ED for evaluation of chest pain with associated nausea and weakness that began 3 days ago. In description of chest pain, the patient states, "It feels like someone just punched me." Chest pain is constant and moderate. He denies that chest pain has any exacerbating factors. Family also notes that the patient had a temperature of 101.2 at 3:30 pm and was given Motrin at this time. The patient was evaluated in this ED on 8/27/17 with SOB and was diagnosed with acute exacerbation of COPD. He was prescribed steroid regimen and was given Solu-Medrol in the ED. Family was informed that the patient's breathing was being effected by taking Xanax and pain medication at the same time, which he has discontinued since ED visit. The patient injured his R hand recently with a  and was scheduled to have surgery with hand specialist at [facility] this morning; however, he was not cleared by anesthesiology for surgery. The patient denies that wound on hand is possible source of infection and notes that his dressing was changed this morning. The patient also saw Dr. Rk Welsh yesterday and had an EKG performed with no concerning " findings, according to daughter. He has attempted no treatment of chest pain. No alleviating factors. He denies vomiting, diarrhea, syncope, or dizziness. He reports no further symptoms. Family reports that the patient has been laying in bed for the past few days, but otherwise report no further symptoms. The patient is on 0.5 L home O2 therapy.           The history is provided by the patient and a relative. No  was used.     Review of patient's allergies indicates:  No Known Allergies  Past Medical History:   Diagnosis Date    Anxiety     COPD (chronic obstructive pulmonary disease)     DDD (degenerative disc disease), cervical     Emphysema/COPD     GERD (gastroesophageal reflux disease)     Hypertension      Past Surgical History:   Procedure Laterality Date    BACK SURGERY      CERVICAL SPINE SURGERY  Fusion    CHOLECYSTECTOMY      COLON SURGERY       Family History   Problem Relation Age of Onset    Diabetes Mother     Heart disease Mother     Diabetes Father     Hypertension Father     Stroke Father     ADD / ADHD Neg Hx     Alcohol abuse Neg Hx     Anxiety disorder Neg Hx     Bipolar disorder Neg Hx     Dementia Neg Hx     Depression Neg Hx     Drug abuse Neg Hx     OCD Neg Hx     Paranoid behavior Neg Hx     Physical abuse Neg Hx     Schizophrenia Neg Hx     Seizures Neg Hx     Self injury Neg Hx     Sexual abuse Neg Hx     Suicide Neg Hx     Melanoma Neg Hx      Social History   Substance Use Topics    Smoking status: Former Smoker    Smokeless tobacco: Never Used    Alcohol use No     Review of Systems   Constitutional: Positive for fatigue and fever.   HENT: Negative for congestion and sore throat.    Eyes: Negative for redness and visual disturbance.   Respiratory: Positive for shortness of breath.    Cardiovascular: Positive for chest pain.   Gastrointestinal: Positive for nausea. Negative for vomiting.   Genitourinary: Negative for decreased urine  volume, difficulty urinating and dysuria.   Skin: Negative for rash.   Neurological: Positive for weakness. Negative for dizziness and syncope.   Psychiatric/Behavioral: Negative for confusion.       Physical Exam     Initial Vitals [08/29/17 1635]   BP Pulse Resp Temp SpO2   (!) 154/70 (!) 111 19 99 °F (37.2 °C) 96 %      MAP       98         Physical Exam    Nursing note and vitals reviewed.  Constitutional: He appears well-developed and well-nourished. He appears distressed.   HENT:   Head: Atraumatic.   Eyes: EOM are normal. Pupils are equal, round, and reactive to light. No scleral icterus.   Neck: Normal range of motion. Neck supple. No JVD present.   Cardiovascular: Normal heart sounds and intact distal pulses. Exam reveals no gallop and no friction rub.    No murmur heard.     Pulmonary/Chest: He is in respiratory distress. He has wheezes.   Retractions and accessory muscle use   Abdominal: Soft. Bowel sounds are normal. He exhibits no distension and no mass. There is no tenderness. There is no rebound and no guarding.   Musculoskeletal: Normal range of motion. He exhibits no edema.   Lymphadenopathy:     He has no cervical adenopathy.   Neurological: He is alert and oriented to person, place, and time. He has normal strength.   Skin: Skin is warm and dry. Capillary refill takes less than 2 seconds.   Psychiatric: He has a normal mood and affect. Thought content normal.         ED Course   Procedures  Labs Reviewed   CBC W/ AUTO DIFFERENTIAL - Abnormal; Notable for the following:        Result Value    WBC 13.65 (*)     RBC 4.29 (*)     Hemoglobin 9.5 (*)     Hematocrit 32.1 (*)     MCV 75 (*)     MCH 22.1 (*)     MCHC 29.6 (*)     RDW 20.0 (*)     Gran # 12.1 (*)     Lymph # 0.4 (*)     Mono # 1.1 (*)     Gran% 89.6 (*)     Lymph% 2.6 (*)     Platelet Estimate Decreased (*)     All other components within normal limits   COMPREHENSIVE METABOLIC PANEL - Abnormal; Notable for the following:      Potassium 3.4 (*)     Glucose 128 (*)     Albumin 3.3 (*)     Total Bilirubin 1.5 (*)     Alkaline Phosphatase 446 (*)      (*)      (*)     All other components within normal limits   ISTAT PROCEDURE - Abnormal; Notable for the following:     POC PH 7.478 (*)     POC PO2 75 (*)     POC HCO3 30.5 (*)     POC TCO2 32 (*)     All other components within normal limits   TROPONIN I   B-TYPE NATRIURETIC PEPTIDE        Discussed with Dr. Rk Welsh who requests placing the patient in the hospital for continued COPD treated due to failed outpatient treatment.                 Scribe Attestation:   Scribe #1: I performed the above scribed service and the documentation accurately describes the services I performed. I attest to the accuracy of the note.    Attending Attestation:           Physician Attestation for Scribe:  Physician Attestation Statement for Scribe #1: I, Armand Mcmullen MD, reviewed documentation, as scribed by Cheryl Garay  in my presence, and it is both accurate and complete.                 ED Course     Clinical Impression:   The primary encounter diagnosis was COPD exacerbation. A diagnosis of Elevated liver enzymes was also pertinent to this visit.                           Armand Mcmullen MD  08/30/17 0301

## 2017-08-30 NOTE — NURSING
Pt arrived on the unit via stretcher accompanied by transport and family.  Pt ambulated to the bed with a steady gait.  O2 on @ 3L NC.  Pt c/o being dyspnic on exertion.  Pt states he has been losing weight d/t poor appetite from respiratory status.  Pt's right hand in a cast.  Pt awaiting surgery to his fingers, which he injured while working on his .  Pt denies pain to his wrist.  NS started to left FA IV.  Pt's family brought him dinner, which pt did eat without difficulty.  Plan of care discussed with pt.  Advised pt he will need to call for assistance.  Call light in reach, bed alarm on, pt close to the nursing station.

## 2017-08-30 NOTE — PLAN OF CARE
[facility] [place] [address]  [address]  [place] [address]  Phone: [phone] Fax: [phone]    [id number] - [place] - [address]  [address] [address]  Phone: [phone] Fax: [phone]       08/30/17 1048   Discharge Assessment   Assessment Type Discharge Planning Assessment   Confirmed/corrected address and phone number on facesheet? Yes   Assessment information obtained from? Patient   Prior to hospitilization cognitive status: Alert/Oriented   Prior to hospitalization functional status: Independent   Current cognitive status: Alert/Oriented   Current Functional Status: Independent   Lives With alone  (Adult Children lives around Patient)   Able to Return to Prior Arrangements yes   Is patient able to care for self after discharge? Yes   Who are your caregiver(s) and their phone number(s)? 7 children and siblings   Patient's perception of discharge disposition home or selfcare   Readmission Within The Last 30 Days no previous admission in last 30 days   Equipment Currently Used at Home oxygen;nebulizer   Do you have any problems affording any of your prescribed medications? No   Is the patient taking medications as prescribed? yes   Does the patient have transportation home? Yes   Transportation Available car;family or friend will provide   Discharge Plan A Home with family   Discharge Plan B Home with family   Patient/Family In Agreement With Plan yes   Does the patient have transportation to healthcare appointments? Yes

## 2017-08-31 LAB
HAV IGM SERPL QL IA: NEGATIVE
HBV CORE IGM SERPL QL IA: NEGATIVE
HBV SURFACE AG SERPL QL IA: NEGATIVE
HCV AB SERPL QL IA: NEGATIVE

## 2017-08-31 PROCEDURE — 86256 FLUORESCENT ANTIBODY TITER: CPT

## 2017-08-31 PROCEDURE — 25000003 PHARM REV CODE 250: Performed by: EMERGENCY MEDICINE

## 2017-08-31 PROCEDURE — 21400001 HC TELEMETRY ROOM

## 2017-08-31 PROCEDURE — 63600175 PHARM REV CODE 636 W HCPCS: Performed by: EMERGENCY MEDICINE

## 2017-08-31 PROCEDURE — 25000003 PHARM REV CODE 250: Performed by: INTERNAL MEDICINE

## 2017-08-31 PROCEDURE — 25000242 PHARM REV CODE 250 ALT 637 W/ HCPCS: Performed by: EMERGENCY MEDICINE

## 2017-08-31 PROCEDURE — 80074 ACUTE HEPATITIS PANEL: CPT

## 2017-08-31 PROCEDURE — C9113 INJ PANTOPRAZOLE SODIUM, VIA: HCPCS | Performed by: EMERGENCY MEDICINE

## 2017-08-31 PROCEDURE — A4216 STERILE WATER/SALINE, 10 ML: HCPCS | Performed by: EMERGENCY MEDICINE

## 2017-08-31 PROCEDURE — 86038 ANTINUCLEAR ANTIBODIES: CPT

## 2017-08-31 PROCEDURE — 27000221 HC OXYGEN, UP TO 24 HOURS

## 2017-08-31 PROCEDURE — 36415 COLL VENOUS BLD VENIPUNCTURE: CPT

## 2017-08-31 PROCEDURE — 94761 N-INVAS EAR/PLS OXIMETRY MLT: CPT

## 2017-08-31 PROCEDURE — 94640 AIRWAY INHALATION TREATMENT: CPT

## 2017-08-31 RX ORDER — LACTULOSE 10 G/15ML
30 SOLUTION ORAL 2 TIMES DAILY PRN
Status: DISCONTINUED | OUTPATIENT
Start: 2017-08-31 | End: 2017-09-11 | Stop reason: HOSPADM

## 2017-08-31 RX ORDER — POLYETHYLENE GLYCOL 3350 17 G/17G
17 POWDER, FOR SOLUTION ORAL DAILY
Status: DISCONTINUED | OUTPATIENT
Start: 2017-09-01 | End: 2017-09-11 | Stop reason: HOSPADM

## 2017-08-31 RX ADMIN — RAMELTEON 8 MG: 8 TABLET, FILM COATED ORAL at 10:08

## 2017-08-31 RX ADMIN — AMLODIPINE BESYLATE 5 MG: 5 TABLET ORAL at 08:08

## 2017-08-31 RX ADMIN — Medication 3 ML: at 10:08

## 2017-08-31 RX ADMIN — MORPHINE SULFATE 2 MG: 10 INJECTION INTRAVENOUS at 01:08

## 2017-08-31 RX ADMIN — PANTOPRAZOLE SODIUM 40 MG: 40 INJECTION, POWDER, FOR SOLUTION INTRAVENOUS at 08:08

## 2017-08-31 RX ADMIN — METHYLPREDNISOLONE SODIUM SUCCINATE 125 MG: 125 INJECTION, POWDER, FOR SOLUTION INTRAMUSCULAR; INTRAVENOUS at 02:08

## 2017-08-31 RX ADMIN — IPRATROPIUM BROMIDE AND ALBUTEROL SULFATE 3 ML: .5; 3 SOLUTION RESPIRATORY (INHALATION) at 07:08

## 2017-08-31 RX ADMIN — Medication 3 ML: at 05:08

## 2017-08-31 RX ADMIN — SODIUM CHLORIDE: 0.9 INJECTION, SOLUTION INTRAVENOUS at 08:08

## 2017-08-31 RX ADMIN — ENOXAPARIN SODIUM 40 MG: 100 INJECTION SUBCUTANEOUS at 05:08

## 2017-08-31 RX ADMIN — IPRATROPIUM BROMIDE AND ALBUTEROL SULFATE 3 ML: .5; 3 SOLUTION RESPIRATORY (INHALATION) at 05:08

## 2017-08-31 RX ADMIN — MORPHINE SULFATE 2 MG: 10 INJECTION INTRAVENOUS at 05:08

## 2017-08-31 RX ADMIN — ALPRAZOLAM 1 MG: 0.5 TABLET ORAL at 10:08

## 2017-08-31 RX ADMIN — ASPIRIN 81 MG: 81 TABLET, COATED ORAL at 08:08

## 2017-08-31 RX ADMIN — IPRATROPIUM BROMIDE AND ALBUTEROL SULFATE 3 ML: .5; 3 SOLUTION RESPIRATORY (INHALATION) at 11:08

## 2017-08-31 RX ADMIN — SODIUM CHLORIDE: 0.9 INJECTION, SOLUTION INTRAVENOUS at 05:08

## 2017-08-31 RX ADMIN — MORPHINE SULFATE 2 MG: 10 INJECTION INTRAVENOUS at 11:08

## 2017-08-31 RX ADMIN — IPRATROPIUM BROMIDE AND ALBUTEROL SULFATE 3 ML: .5; 3 SOLUTION RESPIRATORY (INHALATION) at 03:08

## 2017-08-31 RX ADMIN — LACTULOSE 30 G: 20 SOLUTION ORAL at 08:08

## 2017-08-31 RX ADMIN — IPRATROPIUM BROMIDE AND ALBUTEROL SULFATE 3 ML: .5; 3 SOLUTION RESPIRATORY (INHALATION) at 08:08

## 2017-08-31 RX ADMIN — IPRATROPIUM BROMIDE AND ALBUTEROL SULFATE 3 ML: .5; 3 SOLUTION RESPIRATORY (INHALATION) at 01:08

## 2017-08-31 RX ADMIN — ONDANSETRON 4 MG: 2 INJECTION INTRAMUSCULAR; INTRAVENOUS at 10:08

## 2017-08-31 RX ADMIN — ALPRAZOLAM 1 MG: 0.5 TABLET ORAL at 02:08

## 2017-08-31 RX ADMIN — AZITHROMYCIN MONOHYDRATE 500 MG: 500 INJECTION, POWDER, LYOPHILIZED, FOR SOLUTION INTRAVENOUS at 09:08

## 2017-08-31 RX ADMIN — MORPHINE SULFATE 2 MG: 10 INJECTION INTRAVENOUS at 07:08

## 2017-08-31 RX ADMIN — METHYLPREDNISOLONE SODIUM SUCCINATE 125 MG: 125 INJECTION, POWDER, FOR SOLUTION INTRAMUSCULAR; INTRAVENOUS at 06:08

## 2017-08-31 RX ADMIN — IPRATROPIUM BROMIDE AND ALBUTEROL SULFATE 3 ML: .5; 3 SOLUTION RESPIRATORY (INHALATION) at 12:08

## 2017-08-31 RX ADMIN — ONDANSETRON 4 MG: 2 INJECTION INTRAMUSCULAR; INTRAVENOUS at 08:08

## 2017-08-31 RX ADMIN — ALPRAZOLAM 1 MG: 0.5 TABLET ORAL at 06:08

## 2017-08-31 RX ADMIN — METHYLPREDNISOLONE SODIUM SUCCINATE 125 MG: 125 INJECTION, POWDER, FOR SOLUTION INTRAMUSCULAR; INTRAVENOUS at 10:08

## 2017-08-31 RX ADMIN — CEFTRIAXONE 1 G: 1 INJECTION, SOLUTION INTRAVENOUS at 08:08

## 2017-08-31 NOTE — PLAN OF CARE
Problem: Fall Risk (Adult)  Goal: Absence of Falls  Patient will demonstrate the desired outcomes by discharge/transition of care.   Outcome: Ongoing (interventions implemented as appropriate)   08/31/17 2491   Fall Risk (Adult)   Absence of Falls making progress toward outcome     Hourly rounding performed throughout shift to assess and address pt needs; pt safety maintained; bed in lowest position; call light and personal belongings within reach; bed alarm activated.

## 2017-08-31 NOTE — CONSULTS
HISTORY OF PRESENT ILLNESS:  Selin is a 67-year-old male, admitted to [facility]   to the ER on 08/29/2017.  The patient was admitted due to increasing shortness   of breath, trouble breathing.  He was running fever.  He injured the right hand   on Friday of last week.  He went to the [place] ER.  He was told there were   no doctors at [facility] to handle this and was sent to [facility] to Dr. Conrad.  Apparently, in the ER they cleaned and sutured lacerations.  Dr. Conrad   planned to do surgery on him.  The patient did not get clearance for   anesthesia.  He is admitted here for the exacerbation of COPD and fever.    I was asked to see the patient.  The right hand is in a splint.  This was   removed.  He has lacerations to the right thumb, index, and long fingers.  All   these are clean and dry.  There is no evidence of necrosis.  No x-rays were   taken here.  Plan is to get x-rays of the hand.  I will let Dr. Monson know the   patient is here.  The hand is not a source of infection based on its appearance.      RLS/HN  dd: 08/31/2017 12:53:09 (CDT)  td: 08/31/2017 20:40:44 (CDT)  Doc ID   #[id number]  Job ID #[id number]    CC:

## 2017-08-31 NOTE — PLAN OF CARE
Problem: ARDS (Acute Resp Distress Syndrome) (Adult)  Goal: Signs and Symptoms of Listed Potential Problems Will be Absent, Minimized or Managed (ARDS)  Signs and symptoms of listed potential problems will be absent, minimized or managed by discharge/transition of care (reference ARDS (Acute Resp Distress Syndrome) (Adult) CPG).   Outcome: Ongoing (interventions implemented as appropriate)   08/31/17 0649   ARDS (Acute Resp Distress Syndrome)   Problems Assessed (Acute Respiratory Distress Syndrome) all   Problems Present (Acute Respiratory Distress Syndrome) none     Pt on humidified oxygen at 2 LPM via nasal cannula; no distress noted; no c/o of dyspnea this shift; even unlabored respirations noted.

## 2017-08-31 NOTE — PLAN OF CARE
Pt stated that he started to feel worse on yesterday but better now. SW informed pt that he has a couple of MDs that will see him on today. Pt voiced understanding. Pt has GI, Ortho and Pulm consulted due to hand injury possibly causing infection.      08/31/17 0929   Discharge Reassessment   Assessment Type Discharge Planning Reassessment   Provided patient/caregiver education on the expected discharge date and the discharge plan Yes   Do you have any problems affording any of your prescribed medications? No   Discharge Plan A Home with family   Discharge Plan B Home with family   Patient choice form signed by patient/caregiver N/A   Can the patient answer the patient profile reliably? Yes, cognitively intact   How does the patient rate their overall health at the present time? Fair   Describe the patient's ability to walk at the present time. No restrictions   How often would a person be available to care for the patient? Often   Number of comorbid conditions (as recorded on the chart) None

## 2017-08-31 NOTE — CONSULTS
Reason for Consult:  Abnormal LFT's    HPI:  Pt is a 67 y.o. male who was admitted on 8/29/17 and is currently being treated for COPD exacerbation.  Now with mild to moderate elevation of LFT's, acute, about 3-4 days, approximately.  No prior Hx of liver disease.  Some associated abd. Discomfort/bloating and constipation.  No N/V, wt. Loss.  No dysphagia, GERD sx's, yellowing of eyes/skin.  No diarrhea/constipation.  No blood in his stool nor black/tarry stools.      Pt. Had a C-scope in 2011     Past Medical History:   Diagnosis Date    Anxiety     COPD (chronic obstructive pulmonary disease)     DDD (degenerative disc disease), cervical     Emphysema/COPD     GERD (gastroesophageal reflux disease)     Hypertension        Past Surgical History:   Procedure Laterality Date    BACK SURGERY      CERVICAL SPINE SURGERY  Fusion    CHOLECYSTECTOMY      COLON SURGERY         Family History   Problem Relation Age of Onset    Diabetes Mother     Heart disease Mother     Diabetes Father     Hypertension Father     Stroke Father     ADD / ADHD Neg Hx     Alcohol abuse Neg Hx     Anxiety disorder Neg Hx     Bipolar disorder Neg Hx     Dementia Neg Hx     Depression Neg Hx     Drug abuse Neg Hx     OCD Neg Hx     Paranoid behavior Neg Hx     Physical abuse Neg Hx     Schizophrenia Neg Hx     Seizures Neg Hx     Self injury Neg Hx     Sexual abuse Neg Hx     Suicide Neg Hx     Melanoma Neg Hx        Social History     Social History    Marital status: Single     Spouse name: N/A    Number of children: 6    Years of education: N/A     Occupational History    Not on file.     Social History Main Topics    Smoking status: Former Smoker    Smokeless tobacco: Never Used    Alcohol use No    Drug use: No    Sexual activity: No     Other Topics Concern    Caffeine Use Yes    Financial Status: Disabled Yes    Firearms: Does Patient Have Access To A Firearm? Yes     locked gun cab    Home  Situation: Lives Alone Yes    Education: Unfinished High School Yes     8th grade     Service No    Spirituality: Active Participation No    Spirituality: Organized Caodaism No    Spirituality: Private Participation No    Legal: Arrest History No     Social History Narrative    No narrative on file       Endoscopic History:  C-scope, 2011, Diverticulosis with stricture, subsequent partial colectomy    Review of patient's allergies indicates:  No Known Allergies    No current facility-administered medications on file prior to encounter.      Current Outpatient Prescriptions on File Prior to Encounter   Medication Sig Dispense Refill    albuterol (VENTOLIN HFA) 90 mcg/actuation inhaler Inhale 2 puffs into the lungs every 6 (six) hours as needed. 1 Inhaler 6    albuterol-ipratropium 2.5mg-0.5mg/3mL (DUO-NEB) 0.5 mg-3 mg(2.5 mg base)/3 mL nebulizer solution Take 3 mLs by nebulization every 4 (four) hours as needed for Wheezing or Shortness of Breath. 1 vial 1    alprazolam (XANAX) 1 MG tablet Take 1 tablet (1 mg total) by mouth 3 (three) times daily. 90 tablet 5    amlodipine (NORVASC) 5 MG tablet Take 1 tablet (5 mg total) by mouth once daily. 90 tablet 2    aspirin (ECOTRIN) 81 MG EC tablet Take 1 tablet (81 mg total) by mouth once daily. 150 tablet 6    SYMBICORT 160-4.5 mcg/actuation HFAA Inhale 2 puffs into the lungs 2 (two) times daily. 10.2 g 6    dutasteride (AVODART) 0.5 mg capsule Take 1 capsule (0.5 mg total) by mouth once daily. 90 capsule 3    hydrocodone-acetaminophen 5-325mg (NORCO) 5-325 mg per tablet Take 1 tablet by mouth every 6 (six) hours as needed for Pain.      hydroxyurea (HYDREA) 500 mg Cap Take 1 capsule (500 mg total) by mouth once daily. 90 capsule 1     Scheduled Meds:   albuterol-ipratropium 2.5mg-0.5mg/3mL  3 mL Nebulization Q4H    alprazolam  1 mg Oral TID    amlodipine  5 mg Oral Daily    aspirin  81 mg Oral Daily    azithromycin  500 mg Intravenous Q24H     cefTRIAXone (ROCEPHIN) IVPB  1 g Intravenous Q24H    enoxaparin  40 mg Subcutaneous Daily    methylPREDNISolone sodium succinate  125 mg Intravenous Q8H    pantoprazole  40 mg Intravenous Daily    [START ON 9/1/2017] polyethylene glycol  17 g Oral Daily    sodium chloride 0.9%  3 mL Intravenous Q8H     Continuous Infusions:   sodium chloride 0.9% 100 mL/hr at 08/31/17 0823     PRN Meds:.lactulose, morphine, morphine, ondansetron, promethazine (PHENERGAN) IVPB, ramelteon    Review of Systems:  CONSTITUTIONAL: Negative for weakness, fever, weight loss, weight gain.  HEENT: Eyes: Negative for double/blurred vision. Ears: Negative pain or loss of hearing. Nose:Negative for nasal congestion. Negative for rhinorrhea Mouth: Negative for dry mouth/pain Throat: Negative for masses or hoarseness.  CARDIOVASCULAR: Negative for chest pain or palpitations.  RESPIRATORY: + for SOB, no wheezes  GASTROINTESTINAL: See HPI  GENITOURINARY: Negative for dysuria/hematuria.  MUSCULOSKELETAL: Negative for osteoarthritis, muscle pain.  SKIN: Negative for rashes/lesions.  NEUROLOGIC: Negative for headaches, numbness/tingling.  ENDOCRINE: Negative for diabetes or thyroid abnormalities.  HEMATOLOGIC: Negative for anemia or blood dyscrasias.    Patient Vitals for the past 24 hrs:   BP Temp Temp src Pulse Resp SpO2   08/31/17 1225 - - - 79 20 (!) 91 %   08/31/17 1155 (!) 145/70 98.3 °F (36.8 °C) Oral 82 18 97 %   08/31/17 0835 - - - 80 20 98 %   08/31/17 0801 123/64 97.6 °F (36.4 °C) Oral 83 19 95 %   08/31/17 0515 - - - 77 18 97 %   08/31/17 0400 (!) 142/63 98 °F (36.7 °C) - 78 18 -   08/31/17 0155 - - - 90 18 97 %   08/31/17 0001 (!) 118/59 98.1 °F (36.7 °C) Oral 86 19 -   08/30/17 2054 - - - 82 18 (!) 94 %   08/30/17 2000 133/65 97.7 °F (36.5 °C) Oral 82 20 (!) 94 %   08/30/17 1938 - - - 81 - -   08/30/17 1647 (!) 143/67 98.1 °F (36.7 °C) Oral 91 20 (!) 94 %       Gen: Well developed, well nourished, no apparent distress,  cooperative  HEENT: Anicteric, PERRLA, normocephalic, neck symmetrical  CV: S1, S2, no murmers/rubs, non-displaced PMI  Lungs: CTA-B, normal excursion  Abd: Soft, NT, mild distension, normal BS's, no HSM  Ext: No c/c/e, 1+ DP pulses to BLE's  Neuro: CN II-XII grossly intact, no asterixis.  Skin: No rashes/lesions, normal texture  Psych: AA&O x 4, normal affect    Labs:    Recent Results (from the past 336 hour(s))   CBC auto differential    Collection Time: 08/30/17  2:24 AM   Result Value Ref Range    WBC 12.29 3.90 - 12.70 K/uL    Hemoglobin 8.1 (L) 14.0 - 18.0 g/dL    Hematocrit 27.3 (L) 40.0 - 54.0 %    Platelets 166 150 - 350 K/uL   CBC auto differential    Collection Time: 08/29/17  5:11 PM   Result Value Ref Range    WBC 13.65 (H) 3.90 - 12.70 K/uL    Hemoglobin 9.5 (L) 14.0 - 18.0 g/dL    Hematocrit 32.1 (L) 40.0 - 54.0 %    Platelets 165 150 - 350 K/uL   CBC auto differential    Collection Time: 08/27/17  2:16 PM   Result Value Ref Range    WBC 7.60 3.90 - 12.70 K/uL    Hemoglobin 8.7 (L) 14.0 - 18.0 g/dL    Hematocrit 29.1 (L) 40.0 - 54.0 %    Platelets 110 (L) 150 - 350 K/uL     Negative Hep A/B/C    Imaging:  U/S:  1.  Mild scattered increased periportal echogenicity, nonspecific finding which could be potentially seen in the setting of hepatitis or pneumobilia.    2.  Postsurgical changes of cholecystectomy.  No evidence of biliary ductal dilatation.    Assessment:  Pt. Is a 67 y.o. male with:  1. Abnormal LFT's - Hepatocellular pattern.  Unclear etiology.  Possibly med vs. Infectious induced inflammation.  Viral Hep serologies are negative.  U/S shows no biliary obstruction.  2. COPD exacerbation.  3. SIRS  4. Hx of Polycythemia Vera, DDD, Anxiety, HTN....    Recommendations:  1. Monitor for sx's.  2. Continue with treatment of other medical issues.  3. Follow LFT's.  4. If remains elevated, consider evaluation for Autoimmune Hepatitis.    5. Will follow with you.        I would like to take this  opportunity to thank you for this consult.  If you have any questions or concerns, please do not hesitate to contact me.

## 2017-08-31 NOTE — CONSULTS
Consult Note  Pulmonology    Consult Requested By: Rk Welsh MD  Reason for Consult: dyspnea    SUBJECTIVE: acute respiratory failure     History of Present Illness:  Pt is known to us; has severe but stable copd .    Review of patient's allergies indicates:  No Known Allergies    Past Medical History:   Diagnosis Date    Anxiety     COPD (chronic obstructive pulmonary disease)     DDD (degenerative disc disease), cervical     Emphysema/COPD     GERD (gastroesophageal reflux disease)     Hypertension      Past Surgical History:   Procedure Laterality Date    BACK SURGERY      CERVICAL SPINE SURGERY  Fusion    CHOLECYSTECTOMY      COLON SURGERY       Family History   Problem Relation Age of Onset    Diabetes Mother     Heart disease Mother     Diabetes Father     Hypertension Father     Stroke Father     ADD / ADHD Neg Hx     Alcohol abuse Neg Hx     Anxiety disorder Neg Hx     Bipolar disorder Neg Hx     Dementia Neg Hx     Depression Neg Hx     Drug abuse Neg Hx     OCD Neg Hx     Paranoid behavior Neg Hx     Physical abuse Neg Hx     Schizophrenia Neg Hx     Seizures Neg Hx     Self injury Neg Hx     Sexual abuse Neg Hx     Suicide Neg Hx     Melanoma Neg Hx           Review of Systems:  No headaches  No diploplia  No dysphagia  No chest pains   No nausea or vomiting   No arthralgia      OBJECTIVE:     Vital Signs (Most Recent)  Temp: 98.3 °F (36.8 °C) (08/31/17 1155)  Pulse: 82 (08/31/17 1155)  Resp: 18 (08/31/17 1155)  BP: (!) 145/70 (08/31/17 1155)  SpO2: 97 % (08/31/17 1155)    Vital Signs Range (Last 24H):  Temp:  [97.6 °F (36.4 °C)-98.3 °F (36.8 °C)]   Pulse:  [77-91]   Resp:  [18-20]   BP: (118-145)/(59-70)   SpO2:  [94 %-98 %]     Physical Exam:  General:on nc  Neck: no jugular venous distention,  Lungs: bilateral rhonchis and rales  Heart: regular rate and rhythm and no murmur  Abdomen: soft, non-tender non-distended; bowel sounds normal  Extremities: no cyanosis or  edema, or clubbing  Neurologic:comfortable    Laboratory:  No results found for this or any previous visit (from the past 24 hour(s)).  CBC:   Recent Labs  Lab 08/30/17  0224   WBC 12.29   RBC 3.63*   HGB 8.1*   HCT 27.3*      MCV 75*   MCH 22.3*   MCHC 29.7*     BMP:   Recent Labs  Lab 08/30/17  0223   *      K 3.7      CO2 25   BUN 19   CREATININE 0.9   CALCIUM 8.5*     CMP:   Recent Labs  Lab 08/29/17  1711 08/30/17  0223   * 220*   CALCIUM 9.7 8.5*   ALBUMIN 3.3*  --    PROT 7.0  --     140   K 3.4* 3.7   CO2 28 25    105   BUN 20 19   CREATININE 0.9 0.9   ALKPHOS 446*  --    *  --    *  --    BILITOT 1.5*  --      LFTs:   Recent Labs  Lab 08/29/17  1711   *   *   ALKPHOS 446*   BILITOT 1.5*   PROT 7.0   ALBUMIN 3.3*     Coagulation:   Recent Labs  Lab 08/29/17  1734   INR 1.1   ABGs:   Recent Labs  Lab 08/29/17  1728   PH 7.478*   PCO2 41.1   PO2 75*   HCO3 30.5*   POCSATURATED 96   BE 6          Diagnostic Results:    FINDINGS: No new lung opacities.  No pneumothorax. No change in the cardiopulmonary status of the patient when compared to the prior.      ASSESSMENT/PLAN:     1. COPD exacerbation    2. Elevated liver enzymes    3. Elevated LFT    4. Injury of finger of right hand, sequela        Plan:  Pt is stable ; hand surgery is urgent ; I would proceed with surgery as requested

## 2017-08-31 NOTE — PROGRESS NOTES
Progress Note    Admit Date: 8/29/2017   LOS: 1 day     SUBJECTIVE:     Mr. Stuart is a pleasant 66 YO gentleman with Myeloproliferative neoplasm, Polycythemia Vera, oxygen dependent COPD and most recent injuries to right hand from a . Pt had a recent COPD exacerbation and was seen at [facility] ED- tx and released. For the last 3 days, not feeling well. Has chest discomfort and nausea. He began to have fever at home associated with worsening sob and chest pressure. Pt was scheduled for hand surgery and did not get clearance from anesthesia to get and outpatient procedure thus re-scheduled for tomorrow     Since admission, states feeling little better. Denies cough but has chest congestion. Denies any sick contact.    Follow-up For:  COPD exacerbation     08/31/2017: continue to have moderate sob. + Abdominal distention and constipation      Scheduled Meds:   albuterol-ipratropium 2.5mg-0.5mg/3mL  3 mL Nebulization Q4H    alprazolam  1 mg Oral TID    amlodipine  5 mg Oral Daily    aspirin  81 mg Oral Daily    azithromycin  500 mg Intravenous Q24H    cefTRIAXone (ROCEPHIN) IVPB  1 g Intravenous Q24H    enoxaparin  40 mg Subcutaneous Daily    methylPREDNISolone sodium succinate  125 mg Intravenous Q8H    pantoprazole  40 mg Intravenous Daily    sodium chloride 0.9%  3 mL Intravenous Q8H     Continuous Infusions:   sodium chloride 0.9% 100 mL/hr at 08/30/17 2004     PRN Meds:morphine, morphine, ondansetron, promethazine (PHENERGAN) IVPB, ramelteon    Review of patient's allergies indicates:  No Known Allergies    Review of Systems  Constitutional: fatigue and fever  Eyes: no visual changes   ENT: no nasal congestion. Denies sore throat with odynophagia   Respiratory: see HPI  Cardiovascular: see HPI   Gastrointestinal: + nusea   Hematologic/Lymphatic: see HPI  Musculoskeletal: ROM of right hand limited   Neurological: no seizures or tremors, gait or balance prblems  Skin: No rashes or lesions  Psych:  Denies any anxiety, depression or insomnia      OBJECTIVE:     Vital Signs (Most Recent)  Temp: 98 °F (36.7 °C) (08/31/17 0400)  Pulse: 77 (08/31/17 0515)  Resp: 18 (08/31/17 0515)  BP: (!) 142/63 (08/31/17 0400)  SpO2: 97 % (08/31/17 0515)    Vital Signs Range (Last 24H):  Temp:  [97.7 °F (36.5 °C)-98.9 °F (37.2 °C)]   Pulse:  [73-91]   Resp:  [18-20]   BP: ()/(52-67)   SpO2:  [94 %-98 %]     I & O (Last 24H):  Intake/Output Summary (Last 24 hours) at 08/31/17 0645  Last data filed at 08/31/17 0503   Gross per 24 hour   Intake              333 ml   Output             1000 ml   Net             -667 ml     Physical Exam:    General: NAD, resting in bed. Family at the bedside   Head: normocephalic, atraumatic   Eyes: conjunctivae pink, anicteric sclera.  Throat: No erythema or post nasal discharge   Neck: supple, no LAD   Lungs: expiratory wheezing bilaterally   Heart: S1, S2, RRR   Abdomen: + BS x 4 quadrants, slightly distended, mild epigastric tenderness    Extremities: no cyanosis or edema.   Skin: turgor normal, no erythema or rashes. No abnormal moles  MS:  right hand in bandage. Reviewed pics by family during office visit - injuries to finger 1-3  Neuro: A&O x 3    Psy: calm      Laboratory:  CBC:   Recent Labs  Lab 08/30/17 0224   WBC 12.29   RBC 3.63*   HGB 8.1*   HCT 27.3*      MCV 75*   MCH 22.3*   MCHC 29.7*     CMP:   Recent Labs  Lab 08/29/17  1711 08/30/17 0223   * 220*   CALCIUM 9.7 8.5*   ALBUMIN 3.3*  --    PROT 7.0  --     140   K 3.4* 3.7   CO2 28 25    105   BUN 20 19   CREATININE 0.9 0.9   ALKPHOS 446*  --    *  --    *  --    BILITOT 1.5*  --      LFTs:   Recent Labs  Lab 08/29/17  1711   *   *   ALKPHOS 446*   BILITOT 1.5*   PROT 7.0   ALBUMIN 3.3*     Coagulation:   Recent Labs  Lab 08/29/17  1734   LABPROT 11.2   INR 1.1     Cardiac markers:   Recent Labs  Lab 08/30/17  0224   TROPONINI <0.006     ABGs:   Recent Labs  Lab  08/29/17  1728   PH 7.478*   PCO2 41.1   PO2 75*   HCO3 30.5*   POCSATURATED 96   BE 6     Microbiology Results (last 7 days)     ** No results found for the last 168 hours. **        Specimen (12h ago through future)    None        No results for input(s): COLORU, CLARITYU, SPECGRAV, PHUR, PROTEINUA, GLUCOSEU, BILIRUBINCON, BLOODU, WBCU, RBCU, BACTERIA, MUCUS, NITRITE, LEUKOCYTESUR, UROBILINOGEN, HYALINECASTS in the last 168 hours.      Diagnostic Results:      ASSESSMENT/PLAN:     Active Hospital Problems    Diagnosis  POA    *COPD exacerbation [J44.1]  Yes      Resolved Hospital Problems    Diagnosis Date Resolved POA   No resolved problems to display.       1. Acute exacerbation of COPD: pt has significant symptoms and outpatient tx failed                        - started IV steroid, Rocephin and Zithromax                        - Duoneb                        - pulmonary consulted    - mild improvement      2. Fever and Leukocytosis/SIRS: multifactorial in nature- hand injury can be a source of infection                          - on Rocephin for now      3. MPN: Polycythemia Vera: currently on Hydrea                        - will hold off on hydrea for now      4. Abnormal LFT: US of liver done                        - GI consult pending                         - acute hep panel                         - ? Medication induced liver toxicity?      5. Elevated glucose: mostly due to IV steroids        6. Nausea: protonix and Zofra                         -dc motrin     7. DVT  Prophylaxis: Lovenox     8. Constipation: start Lactulose     9. Hand injury- 3 fingers are damaged and awaiting surgical intervention   - pt and family wants Dr. Monson to see    - discussed the case in detail with Dr. Monson, hopefully to OR on Fri    Rk Welsh M.D  Internal Medicine & Geriatric Medicine  Hematology & Oncology  Palliative Medicine    [address], [address]  [place] [address] [phone]  (Office)  [phone] (Fax)

## 2017-09-01 LAB
ALBUMIN SERPL BCP-MCNC: 2.6 G/DL
ALP SERPL-CCNC: 180 U/L
ALT SERPL W/O P-5'-P-CCNC: 108 U/L
ANA SER QL IF: NORMAL
AST SERPL-CCNC: 21 U/L
BILIRUB DIRECT SERPL-MCNC: 0.3 MG/DL
BILIRUB SERPL-MCNC: 0.4 MG/DL
PROT SERPL-MCNC: 5.5 G/DL

## 2017-09-01 PROCEDURE — 94761 N-INVAS EAR/PLS OXIMETRY MLT: CPT

## 2017-09-01 PROCEDURE — C9113 INJ PANTOPRAZOLE SODIUM, VIA: HCPCS | Performed by: EMERGENCY MEDICINE

## 2017-09-01 PROCEDURE — 94640 AIRWAY INHALATION TREATMENT: CPT

## 2017-09-01 PROCEDURE — 21400001 HC TELEMETRY ROOM

## 2017-09-01 PROCEDURE — 63600175 PHARM REV CODE 636 W HCPCS: Performed by: EMERGENCY MEDICINE

## 2017-09-01 PROCEDURE — 27000221 HC OXYGEN, UP TO 24 HOURS

## 2017-09-01 PROCEDURE — 25000003 PHARM REV CODE 250: Performed by: EMERGENCY MEDICINE

## 2017-09-01 PROCEDURE — 25000242 PHARM REV CODE 250 ALT 637 W/ HCPCS: Performed by: EMERGENCY MEDICINE

## 2017-09-01 PROCEDURE — 36415 COLL VENOUS BLD VENIPUNCTURE: CPT

## 2017-09-01 PROCEDURE — 80076 HEPATIC FUNCTION PANEL: CPT

## 2017-09-01 PROCEDURE — A4216 STERILE WATER/SALINE, 10 ML: HCPCS | Performed by: EMERGENCY MEDICINE

## 2017-09-01 PROCEDURE — 25000003 PHARM REV CODE 250: Performed by: INTERNAL MEDICINE

## 2017-09-01 RX ADMIN — Medication 3 ML: at 01:09

## 2017-09-01 RX ADMIN — IPRATROPIUM BROMIDE AND ALBUTEROL SULFATE 3 ML: .5; 3 SOLUTION RESPIRATORY (INHALATION) at 04:09

## 2017-09-01 RX ADMIN — AMLODIPINE BESYLATE 5 MG: 5 TABLET ORAL at 08:09

## 2017-09-01 RX ADMIN — POLYETHYLENE GLYCOL 3350 17 G: 17 POWDER, FOR SOLUTION ORAL at 08:09

## 2017-09-01 RX ADMIN — METHYLPREDNISOLONE SODIUM SUCCINATE 125 MG: 125 INJECTION, POWDER, FOR SOLUTION INTRAMUSCULAR; INTRAVENOUS at 09:09

## 2017-09-01 RX ADMIN — AZITHROMYCIN MONOHYDRATE 500 MG: 500 INJECTION, POWDER, LYOPHILIZED, FOR SOLUTION INTRAVENOUS at 09:09

## 2017-09-01 RX ADMIN — SODIUM CHLORIDE: 0.9 INJECTION, SOLUTION INTRAVENOUS at 03:09

## 2017-09-01 RX ADMIN — Medication 3 ML: at 06:09

## 2017-09-01 RX ADMIN — MORPHINE SULFATE 2 MG: 10 INJECTION INTRAVENOUS at 03:09

## 2017-09-01 RX ADMIN — ASPIRIN 81 MG: 81 TABLET, COATED ORAL at 08:09

## 2017-09-01 RX ADMIN — IPRATROPIUM BROMIDE AND ALBUTEROL SULFATE 3 ML: .5; 3 SOLUTION RESPIRATORY (INHALATION) at 11:09

## 2017-09-01 RX ADMIN — ENOXAPARIN SODIUM 40 MG: 100 INJECTION SUBCUTANEOUS at 04:09

## 2017-09-01 RX ADMIN — CEFTRIAXONE 1 G: 1 INJECTION, SOLUTION INTRAVENOUS at 08:09

## 2017-09-01 RX ADMIN — ALPRAZOLAM 1 MG: 0.5 TABLET ORAL at 06:09

## 2017-09-01 RX ADMIN — IPRATROPIUM BROMIDE AND ALBUTEROL SULFATE 3 ML: .5; 3 SOLUTION RESPIRATORY (INHALATION) at 12:09

## 2017-09-01 RX ADMIN — METHYLPREDNISOLONE SODIUM SUCCINATE 125 MG: 125 INJECTION, POWDER, FOR SOLUTION INTRAMUSCULAR; INTRAVENOUS at 06:09

## 2017-09-01 RX ADMIN — PANTOPRAZOLE SODIUM 40 MG: 40 INJECTION, POWDER, FOR SOLUTION INTRAVENOUS at 08:09

## 2017-09-01 RX ADMIN — ALPRAZOLAM 1 MG: 0.5 TABLET ORAL at 01:09

## 2017-09-01 RX ADMIN — ALPRAZOLAM 1 MG: 0.5 TABLET ORAL at 09:09

## 2017-09-01 RX ADMIN — METHYLPREDNISOLONE SODIUM SUCCINATE 125 MG: 125 INJECTION, POWDER, FOR SOLUTION INTRAMUSCULAR; INTRAVENOUS at 01:09

## 2017-09-01 RX ADMIN — IPRATROPIUM BROMIDE AND ALBUTEROL SULFATE 3 ML: .5; 3 SOLUTION RESPIRATORY (INHALATION) at 07:09

## 2017-09-01 RX ADMIN — Medication 3 ML: at 09:09

## 2017-09-01 NOTE — PROGRESS NOTES
Chief Complaint / Reason for Consult: Abnormal LFT's    No abd. Pain, N/V    ROS: No CP, SOB, F/C    Patient Vitals for the past 24 hrs:   BP Temp Temp src Pulse Resp SpO2   09/01/17 1214 - - - 88 (!) 24 (!) 92 %   09/01/17 0816 127/63 98.5 °F (36.9 °C) Oral 84 18 96 %   09/01/17 0756 - - - 86 (!) 22 95 %   09/01/17 0428 - - - 81 18 99 %   09/01/17 0000 131/74 97.7 °F (36.5 °C) Oral 80 (!) 22 98 %   08/31/17 2329 - - - 80 18 100 %   08/31/17 2004 - - - 86 - -   08/31/17 2000 127/61 - - (!) 58 17 (!) 91 %   08/31/17 1900 - - - 81 20 (!) 94 %   08/31/17 1621 131/64 97.5 °F (36.4 °C) Oral 91 20 95 %   08/31/17 1557 - - - 92 20 (!) 90 %       Physical Exam:  Gen - Well developed, well nourished, no apparent distress  HEENT - Anicteric  CV - S1, S2, no murmurs/rubs  Lungs - CTA-B, normal excursion  Abd - Soft, NT, ND, normal BS's, no HSM.  Ext - No c/c/e  Neuro - No asterixis    Labs:    Recent Labs  Lab 09/01/17  0457   PROT 5.5*   ALKPHOS 180*   *   AST 21   BILITOT 0.4     Viral Hep serologies - Negative    Imaging:  Reviewed U/S    Assessment:  This patient is a 67 y.o. male with:   1. Abnormal LFT's - Hepatocellular pattern, improving.  Unclear etiology.  Possibly med vs. Infectious induced inflammation.  Viral Hep serologies are negative.  U/S shows no biliary obstruction.    2. COPD exacerbation.  3. SIRS  4. Hx of Polycythemia Vera, DDD, Anxiety, HTN....    Recommendations:  1. Monitor for sx's.  2. Continue with treatment of other medical issues.  3. Follow LFT's.  4. If remains elevated, consider evaluation for Autoimmune Hepatitis.    5. Will s/o for now.  Please reconsult, PRN.  Thanks.

## 2017-09-01 NOTE — PROGRESS NOTES
Right hand dressing changed.  Cleaned with wound cleanser and absorbant gauze applied to patient fingers and thumb.  Splints placed on right index finger and right middle finger.  Covered with coban.

## 2017-09-01 NOTE — PROGRESS NOTES
Progress Note    Admit Date: 8/29/2017   LOS: 2 days     SUBJECTIVE:     Mr. Stuart is a pleasant 66 YO gentleman with Myeloproliferative neoplasm, Polycythemia Vera, oxygen dependent COPD and most recent injuries to right hand from a . Pt had a recent COPD exacerbation and was seen at [facility] ED- tx and released. For the last 3 days, not feeling well. Has chest discomfort and nausea. He began to have fever at home associated with worsening sob and chest pressure. Pt was scheduled for hand surgery and did not get clearance from anesthesia to get and outpatient procedure thus re-scheduled for tomorrow     Since admission, states feeling little better. Denies cough but has chest congestion. Denies any sick contact.    Follow-up For:  COPD exacerbation     09/01/2017: feeling better. Had BM. Less SOB    08/31/2017: continue to have moderate sob. + Abdominal distention and constipation      Scheduled Meds:   albuterol-ipratropium 2.5mg-0.5mg/3mL  3 mL Nebulization Q4H    alprazolam  1 mg Oral TID    amlodipine  5 mg Oral Daily    aspirin  81 mg Oral Daily    azithromycin  500 mg Intravenous Q24H    cefTRIAXone (ROCEPHIN) IVPB  1 g Intravenous Q24H    enoxaparin  40 mg Subcutaneous Daily    methylPREDNISolone sodium succinate  125 mg Intravenous Q8H    pantoprazole  40 mg Intravenous Daily    polyethylene glycol  17 g Oral Daily    sodium chloride 0.9%  3 mL Intravenous Q8H     Continuous Infusions:   sodium chloride 0.9% 100 mL/hr at 08/31/17 1741     PRN Meds:lactulose, morphine, morphine, ondansetron, promethazine (PHENERGAN) IVPB, ramelteon    Review of patient's allergies indicates:  No Known Allergies    Review of Systems  Constitutional: fatigue and fever  Eyes: no visual changes   ENT: no nasal congestion. Denies sore throat with odynophagia   Respiratory: see HPI  Cardiovascular: see HPI   Gastrointestinal: + nusea   Hematologic/Lymphatic: see HPI  Musculoskeletal: ROM of right hand limited    Neurological: no seizures or tremors, gait or balance prblems  Skin: No rashes or lesions  Psych: Denies any anxiety, depression or insomnia      OBJECTIVE:     Vital Signs (Most Recent)  Temp: 97.7 °F (36.5 °C) (09/01/17 0000)  Pulse: 81 (09/01/17 0428)  Resp: 18 (09/01/17 0428)  BP: 131/74 (09/01/17 0000)  SpO2: 99 % (09/01/17 0428)    Vital Signs Range (Last 24H):  Temp:  [97.5 °F (36.4 °C)-98.3 °F (36.8 °C)]   Pulse:  [58-92]   Resp:  [17-22]   BP: (123-145)/(61-74)   SpO2:  [90 %-100 %]     I & O (Last 24H):    Intake/Output Summary (Last 24 hours) at 09/01/17 0735  Last data filed at 09/01/17 0400   Gross per 24 hour   Intake             2420 ml   Output             1200 ml   Net             1220 ml     Physical Exam:    General: NAD, resting in bed. Sister at the bedside   Head: normocephalic, atraumatic   Eyes: conjunctivae pink, anicteric sclera.  Throat: No erythema or post nasal discharge   Neck: supple, no LAD   Lungs: mild expiratory wheezing bilaterally   Heart: S1, S2, RRR   Abdomen: + BS x 4 quadrants, slightly distended, mild epigastric tenderness    Extremities: no cyanosis or edema.   Skin: turgor normal, no erythema or rashes. No abnormal moles  MS:  right hand in bandage. Reviewed pics by family during office visit - injuries to finger 1-3  Neuro: A&O x 3    Psy: calm      Laboratory:  CBC:     Recent Labs  Lab 08/30/17 0224   WBC 12.29   RBC 3.63*   HGB 8.1*   HCT 27.3*      MCV 75*   MCH 22.3*   MCHC 29.7*     CMP:     Recent Labs  Lab 08/29/17  1711 08/30/17 0223   * 220*   CALCIUM 9.7 8.5*   ALBUMIN 3.3*  --    PROT 7.0  --     140   K 3.4* 3.7   CO2 28 25    105   BUN 20 19   CREATININE 0.9 0.9   ALKPHOS 446*  --    *  --    *  --    BILITOT 1.5*  --      LFTs:     Recent Labs  Lab 08/29/17  1711   *   *   ALKPHOS 446*   BILITOT 1.5*   PROT 7.0   ALBUMIN 3.3*     Coagulation:     Recent Labs  Lab 08/29/17  1734   LABPROT 11.2   INR  1.1     Cardiac markers:     Recent Labs  Lab 08/30/17  0224   TROPONINI <0.006     ABGs:     Recent Labs  Lab 08/29/17  1728   PH 7.478*   PCO2 41.1   PO2 75*   HCO3 30.5*   POCSATURATED 96   BE 6     Microbiology Results (last 7 days)     ** No results found for the last 168 hours. **        Specimen (12h ago through future)    None        No results for input(s): COLORU, CLARITYU, SPECGRAV, PHUR, PROTEINUA, GLUCOSEU, BILIRUBINCON, BLOODU, WBCU, RBCU, BACTERIA, MUCUS, NITRITE, LEUKOCYTESUR, UROBILINOGEN, HYALINECASTS in the last 168 hours.      Diagnostic Results:      ASSESSMENT/PLAN:     Active Hospital Problems    Diagnosis  POA    *COPD exacerbation [J44.1]  Yes      Resolved Hospital Problems    Diagnosis Date Resolved POA   No resolved problems to display.       1. Acute exacerbation of COPD: pt has significant symptoms and outpatient tx failed                        - started IV steroid, Rocephin and Zithromax                        - Duoneb                        - pulmonary consulted    - continue to improve slowly      2. Fever and Leukocytosis/SIRS: multifactorial in nature- hand injury can be a source of infection                          - on Rocephin       3. MPN: Polycythemia Vera: currently on Hydrea                        - will hold off on hydrea      4. Abnormal LFT: US of liver done                        - GI consult pending                         - acute hep panel negative                          - ? Medication induced liver toxicity?    - improving steadily      5. Elevated glucose: mostly due to IV steroids        6. Nausea: protonix and Zofra                         -dc motrin     7. DVT  Prophylaxis: Lovenox     8. Constipation: started Lactulose     9. Hand injury- 3 fingers are damaged and awaiting surgical intervention   - pt and family wants Dr. Monson to see    - discussed the case in detail with Dr. Monson, hopefully to OR alona Welsh M.D  Internal Medicine &  Geriatric Medicine  Hematology & Oncology  Palliative Medicine    [address], [address]  [place] [address]  [phone] (Office)  [phone] (Fax)

## 2017-09-01 NOTE — PLAN OF CARE
Problem: Fall Risk (Adult)  Goal: Absence of Falls  Patient will demonstrate the desired outcomes by discharge/transition of care.   Outcome: Ongoing (interventions implemented as appropriate)   09/01/17 0600   Fall Risk (Adult)   Absence of Falls making progress toward outcome     Hourly rounding performed; pt safety maintained.

## 2017-09-01 NOTE — PLAN OF CARE
08/31/17 1900   Patient Assessment/Suction   Level of Consciousness (AVPU) alert   Respiratory Effort Normal;Unlabored   Expansion/Accessory Muscles/Retractions no use of accessory muscles   All Lung Fields Breath Sounds equal bilaterally;diminished   PRE-TX-O2-ETCO2   O2 Device (Oxygen Therapy) nasal cannula w/ humidification   Flow (L/min) 4   SpO2 (!) 94 %   Pulse 81   Resp 20   Aerosol Therapy   $ Aerosol Therapy Charges Aerosol Treatment   Respiratory Treatment Status given   SVN/Inhaler Treatment Route mask;with air   Position During Treatment HOB at 30 degrees   Patient Tolerance good   Post-Treatment   Post-treatment Heart Rate (beats/min) 82   Post-treatment Resp Rate (breaths/min) 20   All Fields Breath Sounds unchanged   Pt tolerates NC and treatments well.

## 2017-09-01 NOTE — PLAN OF CARE
Problem: Infection, Risk/Actual (Adult)  Intervention: Manage Suspected/Actual Infection   09/01/17 1536   Prevent/Manage Colorectal Surgical Infection   Fever Reduction/Comfort Measures aerosol temperature decreased     Intervention: Prevent Infection/Maximize Resistance   09/01/17 1536   Respiratory Interventions   Airway/Ventilation Management airway patency maintained       Goal: Identify Related Risk Factors and Signs and Symptoms  Related risk factors and signs and symptoms are identified upon initiation of Human Response Clinical Practice Guideline (CPG)   Outcome: Ongoing (interventions implemented as appropriate)   09/01/17 1536   Infection, Risk/Actual   Related Risk Factors (Infection, Risk/Actual) chronic illness/condition     Goal: Infection Prevention/Resolution  Patient will demonstrate the desired outcomes by discharge/transition of care.   Outcome: Ongoing (interventions implemented as appropriate)   09/01/17 1536   Infection, Risk/Actual (Adult)   Infection Prevention/Resolution making progress toward outcome

## 2017-09-01 NOTE — PROGRESS NOTES
Pt sitting on bed, comfortable          Temp:  [97.5 °F (36.4 °C)-98.5 °F (36.9 °C)] 98.5 °F (36.9 °C)  Pulse:  [58-92] 88  Resp:  [17-24] 24  SpO2:  [90 %-100 %] 92 %  BP: (127-131)/(61-74) 127/63        PE:    R hand dressing removed    Thumb has laceration to pulp, sutured with some marginal necrosis and scab, no pus no erythema, alignment looks good, stable    IF - laceration about DIP joint, volar and dorsal, no pus, finger tip is unstable    MF laceration down radial side and dorsum of P2, unstable, DIP flexed some, wound is a little soupy but no pus seen,    Fingertips pink      No results for input(s): WBC, RBC, HGB, HCT, PLT, MCV, MCH, MCHC in the last 24 hours.      Current Facility-Administered Medications:     0.9%  NaCl infusion, , Intravenous, Continuous, Armand Mcmullen MD, Last Rate: 100 mL/hr at 08/31/17 1741    albuterol-ipratropium 2.5mg-0.5mg/3mL nebulizer solution 3 mL, 3 mL, Nebulization, Q4H, Armand Mcmullen MD, 3 mL at 09/01/17 1214    alprazolam tablet 1 mg, 1 mg, Oral, TID, Armand Mcmullen MD, 1 mg at 09/01/17 0622    amlodipine tablet 5 mg, 5 mg, Oral, Daily, Armand Mcmullen MD, 5 mg at 09/01/17 0838    aspirin EC tablet 81 mg, 81 mg, Oral, Daily, Armand Mcmullen MD, 81 mg at 09/01/17 0838    azithromycin 500 mg in dextrose 5 % 250 mL IVPB (ready to mix system), 500 mg, Intravenous, Q24H, Armand Mcmullen MD, Last Rate: 250 mL/hr at 08/31/17 2119, 500 mg at 08/31/17 2119    cefTRIAXone (ROCEPHIN) 1 g in dextrose 5 % 50 mL IVPB, 1 g, Intravenous, Q24H, Armand Mcmullen MD, 1 g at 08/31/17 2040    enoxaparin injection 40 mg, 40 mg, Subcutaneous, Daily, Armand Mcmullen MD, 40 mg at 08/31/17 1741    lactulose 20 gram/30 mL solution Soln 30 g, 30 g, Oral, BID PRN, Rk Welsh MD, 30 g at 08/31/17 0822    methylPREDNISolone sodium succinate injection 125 mg, 125 mg, Intravenous, Q8H, Armand Mcmullen MD, 125 mg at 09/01/17 0666     morphine injection 2 mg, 2 mg, Intravenous, Q4H PRN, Armand Mcmullen MD, 2 mg at 09/01/17 0346    morphine injection 6 mg, 6 mg, Intravenous, Q4H PRN, Armand Mcmullen MD, 6 mg at 08/30/17 2033    ondansetron injection 4 mg, 4 mg, Intravenous, Q8H PRN, Armand Mcmullen MD, 4 mg at 08/31/17 2215    pantoprazole injection 40 mg, 40 mg, Intravenous, Daily, Armand Mcmullen MD, 40 mg at 09/01/17 0838    polyethylene glycol packet 17 g, 17 g, Oral, Daily, Jefry Silva MD, 17 g at 09/01/17 0838    promethazine (PHENERGAN) 6.25 mg in dextrose 5 % 50 mL IVPB, 6.25 mg, Intravenous, Q6H PRN, Armand Mcmullen MD    ramelteon tablet 8 mg, 8 mg, Oral, Nightly PRN, Armand Mcmullen MD, 8 mg at 08/31/17 2205    sodium chloride 0.9% flush 3 mL, 3 mL, Intravenous, Q8H, Armand Mcmullen MD, 3 mL at 09/01/17 0652      A/P:    66 y/o M s/p R hand crush injury in mower engine flywheel one week ago    Admitted for COPD exacaerbation    Xrays - R IF P3 fx, R MF comminuted P2 fx, alignment overall is not bad    I think he would benefit from alignment and stabilization of his IF and MF most likely with k-wires    Will plan for surgery Tues if COPD stabilized.    Dressing changes to R hand

## 2017-09-02 PROCEDURE — 63600175 PHARM REV CODE 636 W HCPCS: Performed by: EMERGENCY MEDICINE

## 2017-09-02 PROCEDURE — 94761 N-INVAS EAR/PLS OXIMETRY MLT: CPT

## 2017-09-02 PROCEDURE — 25000242 PHARM REV CODE 250 ALT 637 W/ HCPCS: Performed by: EMERGENCY MEDICINE

## 2017-09-02 PROCEDURE — 27000221 HC OXYGEN, UP TO 24 HOURS

## 2017-09-02 PROCEDURE — A4216 STERILE WATER/SALINE, 10 ML: HCPCS | Performed by: EMERGENCY MEDICINE

## 2017-09-02 PROCEDURE — C9113 INJ PANTOPRAZOLE SODIUM, VIA: HCPCS | Performed by: EMERGENCY MEDICINE

## 2017-09-02 PROCEDURE — 25000003 PHARM REV CODE 250: Performed by: EMERGENCY MEDICINE

## 2017-09-02 PROCEDURE — 94640 AIRWAY INHALATION TREATMENT: CPT

## 2017-09-02 PROCEDURE — 21400001 HC TELEMETRY ROOM

## 2017-09-02 RX ADMIN — ASPIRIN 81 MG: 81 TABLET, COATED ORAL at 01:09

## 2017-09-02 RX ADMIN — MORPHINE SULFATE 2 MG: 10 INJECTION INTRAVENOUS at 04:09

## 2017-09-02 RX ADMIN — IPRATROPIUM BROMIDE AND ALBUTEROL SULFATE 3 ML: .5; 3 SOLUTION RESPIRATORY (INHALATION) at 11:09

## 2017-09-02 RX ADMIN — IPRATROPIUM BROMIDE AND ALBUTEROL SULFATE 3 ML: .5; 3 SOLUTION RESPIRATORY (INHALATION) at 03:09

## 2017-09-02 RX ADMIN — ALPRAZOLAM 1 MG: 0.5 TABLET ORAL at 05:09

## 2017-09-02 RX ADMIN — ONDANSETRON 4 MG: 2 INJECTION INTRAMUSCULAR; INTRAVENOUS at 12:09

## 2017-09-02 RX ADMIN — ALPRAZOLAM 1 MG: 0.5 TABLET ORAL at 01:09

## 2017-09-02 RX ADMIN — CEFTRIAXONE 1 G: 1 INJECTION, SOLUTION INTRAVENOUS at 09:09

## 2017-09-02 RX ADMIN — Medication 3 ML: at 09:09

## 2017-09-02 RX ADMIN — IPRATROPIUM BROMIDE AND ALBUTEROL SULFATE 3 ML: .5; 3 SOLUTION RESPIRATORY (INHALATION) at 07:09

## 2017-09-02 RX ADMIN — ENOXAPARIN SODIUM 40 MG: 100 INJECTION SUBCUTANEOUS at 06:09

## 2017-09-02 RX ADMIN — AZITHROMYCIN MONOHYDRATE 500 MG: 500 INJECTION, POWDER, LYOPHILIZED, FOR SOLUTION INTRAVENOUS at 09:09

## 2017-09-02 RX ADMIN — MORPHINE SULFATE 6 MG: 10 INJECTION INTRAVENOUS at 10:09

## 2017-09-02 RX ADMIN — SODIUM CHLORIDE: 0.9 INJECTION, SOLUTION INTRAVENOUS at 01:09

## 2017-09-02 RX ADMIN — ALPRAZOLAM 1 MG: 0.5 TABLET ORAL at 09:09

## 2017-09-02 RX ADMIN — METHYLPREDNISOLONE SODIUM SUCCINATE 125 MG: 125 INJECTION, POWDER, FOR SOLUTION INTRAMUSCULAR; INTRAVENOUS at 01:09

## 2017-09-02 RX ADMIN — METHYLPREDNISOLONE SODIUM SUCCINATE 125 MG: 125 INJECTION, POWDER, FOR SOLUTION INTRAMUSCULAR; INTRAVENOUS at 09:09

## 2017-09-02 RX ADMIN — METHYLPREDNISOLONE SODIUM SUCCINATE 125 MG: 125 INJECTION, POWDER, FOR SOLUTION INTRAMUSCULAR; INTRAVENOUS at 05:09

## 2017-09-02 RX ADMIN — Medication 3 ML: at 05:09

## 2017-09-02 RX ADMIN — AMLODIPINE BESYLATE 5 MG: 5 TABLET ORAL at 10:09

## 2017-09-02 RX ADMIN — PANTOPRAZOLE SODIUM 40 MG: 40 INJECTION, POWDER, FOR SOLUTION INTRAVENOUS at 10:09

## 2017-09-02 RX ADMIN — Medication 3 ML: at 01:09

## 2017-09-02 NOTE — PLAN OF CARE
Problem: Fall Risk (Adult)  Goal: Absence of Falls  Patient will demonstrate the desired outcomes by discharge/transition of care.    09/01/17 0600   Fall Risk (Adult)   Absence of Falls making progress toward outcome    bed low   Call light in reach bedside table at bed  Within reach.

## 2017-09-02 NOTE — PLAN OF CARE
Problem: ARDS (Acute Resp Distress Syndrome) (Adult)  Goal: Signs and Symptoms of Listed Potential Problems Will be Absent, Minimized or Managed (ARDS)  Signs and symptoms of listed potential problems will be absent, minimized or managed by discharge/transition of care (reference ARDS (Acute Resp Distress Syndrome) (Adult) CPG).   Outcome: Ongoing (interventions implemented as appropriate)   08/31/17 0649 09/02/17 0316   ARDS (Acute Resp Distress Syndrome)   Problems Assessed (Acute Respiratory Distress Syndrome) all --    Problems Present (Acute Respiratory Distress Syndrome) --  situational response

## 2017-09-02 NOTE — PROGRESS NOTES
Ortho Daily Progress Note    Chaz Stuart is a 67 y.o. male admitted on 8/29/2017      Chief Complaint/Reason for admission: Headache (Pt. with PMH of COPD and polycythemia presents with shortness of breath, chest pain and headache for several days. Pt. reportedly has been on steroids and breathing treatments with no relief. Pt. was supposed to have hand surgery at [facility] but was unable to go. ) and Shortness of Breath       Hospital Day: 3  Post Op Day: * No surgery date entered *     The patient was seen and examined this morning at the bedside. Patient reports no acute issues overnight.  Patient reports that pain is adequately controlled.    _______________    Vitals:    09/02/17 0338 09/02/17 0400 09/02/17 0720 09/02/17 1137   BP:  (!) 146/69 135/65 (!) 181/86   Pulse: 77 82 74 78   Resp: 20 (!) 22 (!) 21 18   Temp:  97.5 °F (36.4 °C) 98.3 °F (36.8 °C) 98.1 °F (36.7 °C)   TempSrc:  Oral  Oral   SpO2: 97%  96% 95%   Weight:  68 kg (150 lb)     Height:           Vital Signs (Most Recent)  Temp: 98.1 °F (36.7 °C) (09/02/17 1137)  Pulse: 78 (09/02/17 1137)  Resp: 18 (09/02/17 1137)  BP: (!) 181/86 (09/02/17 1137)  SpO2: 95 % (09/02/17 1137)    Vital Signs Range (Last 24H):  Temp:  [97.5 °F (36.4 °C)-98.3 °F (36.8 °C)]   Pulse:  [74-91]   Resp:  [18-24]   BP: (135-181)/(65-86)   SpO2:  [93 %-98 %]       Physical:    AAOx3   dressing clean/dry/intact  NVI Distally  Palpable distal pulses  CR<3sec      No results for input(s): K, MG, PHOS, CALCIUM, WBC, HGB, HCT, PLT, INR in the last 72 hours.    I/O last 3 completed shifts:  In: 4958.3 [P.O.:1260; I.V.:3098.3; IV Piggyback:600]  Out: 1300 [Urine:1300]          Assessment:  Pt awaiting Right hand operative intervention with Dr. Monson. Planned for Tues.               Plan:    Continue pulmonary toilet  Continue breathing treatments      Ishmael Thornton MD  [facility]

## 2017-09-02 NOTE — PROGRESS NOTES
Resting quietly without complaints. Dressing intact to right hand.no complaints of pain. NS infusing at 100/hr to left. No redness no swelling. Assessment completed.

## 2017-09-02 NOTE — PROGRESS NOTES
Progress Note    Admit Date: 8/29/2017   LOS: 3 days     SUBJECTIVE:     Mr. Stuart is a pleasant 66 YO gentleman with Myeloproliferative neoplasm, Polycythemia Vera, oxygen dependent COPD and most recent injuries to right hand from a . Pt had a recent COPD exacerbation and was seen at [facility] ED- tx and released. For the last 3 days, not feeling well. Has chest discomfort and nausea. He began to have fever at home associated with worsening sob and chest pressure. Pt was scheduled for hand surgery and did not get clearance from anesthesia to get and outpatient procedure thus re-scheduled for tomorrow     Since admission, states feeling little better. Denies cough but has chest congestion. Denies any sick contact.    Follow-up For:  COPD exacerbation     09/02/2017: chest congestion better.   09/01/2017: feeling better. Had BM. Less SOB    08/31/2017: continue to have moderate sob. + Abdominal distention and constipation      Scheduled Meds:   albuterol-ipratropium 2.5mg-0.5mg/3mL  3 mL Nebulization Q4H    alprazolam  1 mg Oral TID    amlodipine  5 mg Oral Daily    aspirin  81 mg Oral Daily    azithromycin  500 mg Intravenous Q24H    cefTRIAXone (ROCEPHIN) IVPB  1 g Intravenous Q24H    enoxaparin  40 mg Subcutaneous Daily    methylPREDNISolone sodium succinate  125 mg Intravenous Q8H    pantoprazole  40 mg Intravenous Daily    polyethylene glycol  17 g Oral Daily    sodium chloride 0.9%  3 mL Intravenous Q8H     Continuous Infusions:   sodium chloride 0.9% 100 mL/hr at 09/01/17 1510     PRN Meds:lactulose, morphine, morphine, ondansetron, promethazine (PHENERGAN) IVPB, ramelteon    Review of patient's allergies indicates:  No Known Allergies    Review of Systems  Constitutional: fatigue and fever  Eyes: no visual changes   ENT: no nasal congestion. Denies sore throat with odynophagia   Respiratory: see HPI  Cardiovascular: see HPI   Gastrointestinal: + nusea   Hematologic/Lymphatic: see  HPI  Musculoskeletal: ROM of right hand limited   Neurological: no seizures or tremors, gait or balance prblems  Skin: No rashes or lesions  Psych: Denies any anxiety, depression or insomnia      OBJECTIVE:     Vital Signs (Most Recent)  Temp: 97.6 °F (36.4 °C) (09/02/17 0143)  Pulse: 77 (09/02/17 0338)  Resp: 20 (09/02/17 0338)  BP: (!) 154/70 (09/02/17 0143)  SpO2: 97 % (09/02/17 0338)    Vital Signs Range (Last 24H):  Temp:  [97.6 °F (36.4 °C)-98.5 °F (36.9 °C)]   Pulse:  [77-91]   Resp:  [18-24]   BP: (127-173)/(63-78)   SpO2:  [92 %-98 %]     I & O (Last 24H):    Intake/Output Summary (Last 24 hours) at 09/02/17 0659  Last data filed at 09/02/17 0640   Gross per 24 hour   Intake          3658.34 ml   Output             1300 ml   Net          2358.34 ml     Physical Exam:    General: NAD, resting in bed.  Head: normocephalic, atraumatic   Eyes: conjunctivae pink, anicteric sclera.  Throat: No erythema or post nasal discharge   Neck: supple, no LAD   Lungs: some expiratory wheezing bilaterally   Heart: S1, S2, RRR   Abdomen: + BS x 4 quadrants, slightly distended, mild epigastric tenderness    Extremities: no cyanosis or edema.   Skin: turgor normal, no erythema or rashes. No abnormal moles  MS:  right hand in bandage.   Neuro: A&O x 3    Psy: calm      Laboratory:  CBC:     Recent Labs  Lab 08/30/17 0224   WBC 12.29   RBC 3.63*   HGB 8.1*   HCT 27.3*      MCV 75*   MCH 22.3*   MCHC 29.7*     CMP:     Recent Labs  Lab 08/30/17 0223 09/01/17  0457   *  --    CALCIUM 8.5*  --    ALBUMIN  --  2.6*   PROT  --  5.5*     --    K 3.7  --    CO2 25  --      --    BUN 19  --    CREATININE 0.9  --    ALKPHOS  --  180*   ALT  --  108*   AST  --  21   BILITOT  --  0.4     LFTs:     Recent Labs  Lab 09/01/17  0457   *   AST 21   ALKPHOS 180*   BILITOT 0.4   PROT 5.5*   ALBUMIN 2.6*     Coagulation:     Recent Labs  Lab 08/29/17  1734   LABPROT 11.2   INR 1.1     Cardiac markers:     Recent  Labs  Lab 08/30/17  0224   TROPONINI <0.006     ABGs:     Recent Labs  Lab 08/29/17  1728   PH 7.478*   PCO2 41.1   PO2 75*   HCO3 30.5*   POCSATURATED 96   BE 6     Microbiology Results (last 7 days)     ** No results found for the last 168 hours. **        Specimen (12h ago through future)    None        No results for input(s): COLORU, CLARITYU, SPECGRAV, PHUR, PROTEINUA, GLUCOSEU, BILIRUBINCON, BLOODU, WBCU, RBCU, BACTERIA, MUCUS, NITRITE, LEUKOCYTESUR, UROBILINOGEN, HYALINECASTS in the last 168 hours.      Diagnostic Results:      ASSESSMENT/PLAN:     Active Hospital Problems    Diagnosis  POA    *COPD exacerbation [J44.1]  Yes      Resolved Hospital Problems    Diagnosis Date Resolved POA   No resolved problems to display.       1. Acute exacerbation of COPD: pt has significant symptoms and outpatient tx failed                        - started IV steroid, Rocephin and Zithromax                        - Duoneb                        - pulmonary consulted    - continue to improve slowly      2. Fever and Leukocytosis/SIRS: multifactorial in nature- hand injury can be a source of infection                          - on Rocephin       3. MPN: Polycythemia Vera: currently on Hydrea                        - will hold off on hydrea      4. Abnormal LFT: US of liver done                        - GI consult pending                         - acute hep panel negative                          - ? Medication induced liver toxicity?    - improving steadily    - no n/v     5. Elevated glucose: mostly due to IV steroids        6. Nausea: protonix and Zofra                         -dc motrin     7. DVT  Prophylaxis: Lovenox     8. Constipation: started Lactulose and having good BM    9. Hand injury- 3 fingers are damaged and awaiting surgical intervention   - pt and family wants Dr. Monson to see    - discussed the case in detail with Dr. Monson and he evaluated pt on Fri   - plan for OR Tue for finger repair     Rk  EVANS Welsh  Internal Medicine & Geriatric Medicine  Hematology & Oncology  Palliative Medicine    [address], [address]  [place], [place] [address] [phone] (Office)  [phone] (Fax)

## 2017-09-03 LAB
ALBUMIN SERPL BCP-MCNC: 2.7 G/DL
ALP SERPL-CCNC: 129 U/L
ALT SERPL W/O P-5'-P-CCNC: 78 U/L
ANION GAP SERPL CALC-SCNC: 10 MMOL/L
AST SERPL-CCNC: 17 U/L
BILIRUB SERPL-MCNC: 0.4 MG/DL
BUN SERPL-MCNC: 15 MG/DL
CALCIUM SERPL-MCNC: 8.3 MG/DL
CHLORIDE SERPL-SCNC: 99 MMOL/L
CO2 SERPL-SCNC: 35 MMOL/L
CREAT SERPL-MCNC: 0.7 MG/DL
ERYTHROCYTE [DISTWIDTH] IN BLOOD BY AUTOMATED COUNT: 20 %
EST. GFR  (AFRICAN AMERICAN): >60 ML/MIN/1.73 M^2
EST. GFR  (NON AFRICAN AMERICAN): >60 ML/MIN/1.73 M^2
GLUCOSE SERPL-MCNC: 172 MG/DL
HCT VFR BLD AUTO: 32.5 %
HGB BLD-MCNC: 9.2 G/DL
MCH RBC QN AUTO: 22 PG
MCHC RBC AUTO-ENTMCNC: 28.3 G/DL
MCV RBC AUTO: 78 FL
PLATELET # BLD AUTO: 638 K/UL
PLATELET BLD QL SMEAR: ABNORMAL
PMV BLD AUTO: 10.2 FL
POTASSIUM SERPL-SCNC: 3.9 MMOL/L
PROT SERPL-MCNC: 5.6 G/DL
RBC # BLD AUTO: 4.18 M/UL
SODIUM SERPL-SCNC: 144 MMOL/L
WBC # BLD AUTO: 18.75 K/UL

## 2017-09-03 PROCEDURE — 25000003 PHARM REV CODE 250: Performed by: EMERGENCY MEDICINE

## 2017-09-03 PROCEDURE — 25000242 PHARM REV CODE 250 ALT 637 W/ HCPCS: Performed by: EMERGENCY MEDICINE

## 2017-09-03 PROCEDURE — 94640 AIRWAY INHALATION TREATMENT: CPT

## 2017-09-03 PROCEDURE — 27000221 HC OXYGEN, UP TO 24 HOURS

## 2017-09-03 PROCEDURE — 80053 COMPREHEN METABOLIC PANEL: CPT

## 2017-09-03 PROCEDURE — A4216 STERILE WATER/SALINE, 10 ML: HCPCS | Performed by: EMERGENCY MEDICINE

## 2017-09-03 PROCEDURE — 63600175 PHARM REV CODE 636 W HCPCS: Performed by: EMERGENCY MEDICINE

## 2017-09-03 PROCEDURE — 36415 COLL VENOUS BLD VENIPUNCTURE: CPT

## 2017-09-03 PROCEDURE — 63600175 PHARM REV CODE 636 W HCPCS: Performed by: INTERNAL MEDICINE

## 2017-09-03 PROCEDURE — 21400001 HC TELEMETRY ROOM

## 2017-09-03 PROCEDURE — 94761 N-INVAS EAR/PLS OXIMETRY MLT: CPT

## 2017-09-03 PROCEDURE — 85027 COMPLETE CBC AUTOMATED: CPT

## 2017-09-03 PROCEDURE — C9113 INJ PANTOPRAZOLE SODIUM, VIA: HCPCS | Performed by: EMERGENCY MEDICINE

## 2017-09-03 RX ORDER — METHYLPREDNISOLONE SOD SUCC 125 MG
80 VIAL (EA) INJECTION EVERY 8 HOURS
Status: DISCONTINUED | OUTPATIENT
Start: 2017-09-03 | End: 2017-09-09

## 2017-09-03 RX ADMIN — IPRATROPIUM BROMIDE AND ALBUTEROL SULFATE 3 ML: .5; 3 SOLUTION RESPIRATORY (INHALATION) at 07:09

## 2017-09-03 RX ADMIN — METHYLPREDNISOLONE SODIUM SUCCINATE 125 MG: 125 INJECTION, POWDER, FOR SOLUTION INTRAMUSCULAR; INTRAVENOUS at 02:09

## 2017-09-03 RX ADMIN — IPRATROPIUM BROMIDE AND ALBUTEROL SULFATE 3 ML: .5; 3 SOLUTION RESPIRATORY (INHALATION) at 11:09

## 2017-09-03 RX ADMIN — METHYLPREDNISOLONE SODIUM SUCCINATE 80 MG: 125 INJECTION, POWDER, FOR SOLUTION INTRAMUSCULAR; INTRAVENOUS at 09:09

## 2017-09-03 RX ADMIN — AMLODIPINE BESYLATE 5 MG: 5 TABLET ORAL at 08:09

## 2017-09-03 RX ADMIN — CEFTRIAXONE 1 G: 1 INJECTION, SOLUTION INTRAVENOUS at 09:09

## 2017-09-03 RX ADMIN — MORPHINE SULFATE 2 MG: 10 INJECTION INTRAVENOUS at 06:09

## 2017-09-03 RX ADMIN — Medication 3 ML: at 06:09

## 2017-09-03 RX ADMIN — Medication 3 ML: at 02:09

## 2017-09-03 RX ADMIN — PANTOPRAZOLE SODIUM 40 MG: 40 INJECTION, POWDER, FOR SOLUTION INTRAVENOUS at 08:09

## 2017-09-03 RX ADMIN — ALPRAZOLAM 1 MG: 0.5 TABLET ORAL at 06:09

## 2017-09-03 RX ADMIN — IPRATROPIUM BROMIDE AND ALBUTEROL SULFATE 3 ML: .5; 3 SOLUTION RESPIRATORY (INHALATION) at 03:09

## 2017-09-03 RX ADMIN — MORPHINE SULFATE 2 MG: 10 INJECTION INTRAVENOUS at 02:09

## 2017-09-03 RX ADMIN — ENOXAPARIN SODIUM 40 MG: 100 INJECTION SUBCUTANEOUS at 05:09

## 2017-09-03 RX ADMIN — ASPIRIN 81 MG: 81 TABLET, COATED ORAL at 08:09

## 2017-09-03 RX ADMIN — METHYLPREDNISOLONE SODIUM SUCCINATE 125 MG: 125 INJECTION, POWDER, FOR SOLUTION INTRAMUSCULAR; INTRAVENOUS at 06:09

## 2017-09-03 RX ADMIN — Medication 3 ML: at 09:09

## 2017-09-03 NOTE — PROGRESS NOTES
Progress Note    Admit Date: 8/29/2017   LOS: 4 days     SUBJECTIVE:     Mr. Stuart is a pleasant 66 YO gentleman with Myeloproliferative neoplasm, Polycythemia Vera, oxygen dependent COPD and most recent injuries to right hand from a . Pt had a recent COPD exacerbation and was seen at [facility] ED- tx and released. For the last 3 days, not feeling well. Has chest discomfort and nausea. He began to have fever at home associated with worsening sob and chest pressure. Pt was scheduled for hand surgery and did not get clearance from anesthesia to get and outpatient procedure thus re-scheduled for tomorrow     Since admission, states feeling little better. Denies cough but has chest congestion. Denies any sick contact.    Follow-up For:  COPD exacerbation     09/03/2017: Denies fever or chills.   09/02/2017: chest congestion better.   09/01/2017: feeling better. Had BM. Less SOB    08/31/2017: continue to have moderate sob. + Abdominal distention and constipation      Scheduled Meds:   albuterol-ipratropium 2.5mg-0.5mg/3mL  3 mL Nebulization Q4H    alprazolam  1 mg Oral TID    amlodipine  5 mg Oral Daily    aspirin  81 mg Oral Daily    azithromycin  500 mg Intravenous Q24H    cefTRIAXone (ROCEPHIN) IVPB  1 g Intravenous Q24H    enoxaparin  40 mg Subcutaneous Daily    methylPREDNISolone sodium succinate  125 mg Intravenous Q8H    pantoprazole  40 mg Intravenous Daily    polyethylene glycol  17 g Oral Daily    sodium chloride 0.9%  3 mL Intravenous Q8H     Continuous Infusions:   sodium chloride 0.9% 100 mL/hr at 09/02/17 1309     PRN Meds:lactulose, morphine, morphine, ondansetron, promethazine (PHENERGAN) IVPB, ramelteon    Review of patient's allergies indicates:  No Known Allergies    Review of Systems    Constitutional: fatigue. No fever   Eyes: no visual changes   ENT: no nasal congestion. Denies sore throat with odynophagia   Respiratory: see HPI  Cardiovascular: see HPI   Gastrointestinal: + nusea    Hematologic/Lymphatic: see HPI  Musculoskeletal: ROM of right hand limited   Neurological: no seizures or tremors, gait or balance prblems  Skin: No rashes or lesions  Psych: Denies any anxiety, depression or insomnia      OBJECTIVE:     Vital Signs (Most Recent)  Temp: 97.6 °F (36.4 °C) (09/03/17 0400)  Pulse: 80 (09/03/17 0400)  Resp: 18 (09/03/17 0400)  BP: (!) 145/66 (09/03/17 0400)  SpO2: 98 % (09/03/17 0400)    Vital Signs Range (Last 24H):  Temp:  [97.6 °F (36.4 °C)-98.8 °F (37.1 °C)]   Pulse:  [69-95]   Resp:  [18-22]   BP: (135-181)/(65-86)   SpO2:  [92 %-98 %]     I & O (Last 24H):    Intake/Output Summary (Last 24 hours) at 09/03/17 0624  Last data filed at 09/03/17 0000   Gross per 24 hour   Intake          1526.67 ml   Output             1550 ml   Net           -23.33 ml     Physical Exam:    General: NAD, resting in bed.  Head: normocephalic, atraumatic   Eyes: conjunctivae pink, anicteric sclera.  Throat: No erythema or post nasal discharge   Neck: supple, no LAD   Lungs: Decreased air entry bilaterally   Heart: S1, S2, RRR   Abdomen: + BS x 4 quadrants, slightly distended, mild epigastric tenderness    Extremities: no cyanosis or edema.   Skin: turgor normal, no erythema or rashes. No abnormal moles  MS:  right hand in bandage.   Neuro: A&O x 3    Psy: calm      Laboratory:  CBC:     Recent Labs  Lab 08/30/17 0224   WBC 12.29   RBC 3.63*   HGB 8.1*   HCT 27.3*      MCV 75*   MCH 22.3*   MCHC 29.7*     CMP:     Recent Labs  Lab 08/30/17 0223 09/01/17 0457   *  --    CALCIUM 8.5*  --    ALBUMIN  --  2.6*   PROT  --  5.5*     --    K 3.7  --    CO2 25  --      --    BUN 19  --    CREATININE 0.9  --    ALKPHOS  --  180*   ALT  --  108*   AST  --  21   BILITOT  --  0.4     LFTs:     Recent Labs  Lab 09/01/17  0457   *   AST 21   ALKPHOS 180*   BILITOT 0.4   PROT 5.5*   ALBUMIN 2.6*     Coagulation:     Recent Labs  Lab 08/29/17  1734   LABPROT 11.2   INR 1.1      Cardiac markers:     Recent Labs  Lab 08/30/17  0224   TROPONINI <0.006     ABGs:     Recent Labs  Lab 08/29/17  1728   PH 7.478*   PCO2 41.1   PO2 75*   HCO3 30.5*   POCSATURATED 96   BE 6     Microbiology Results (last 7 days)     ** No results found for the last 168 hours. **        Specimen (12h ago through future)    None        No results for input(s): COLORU, CLARITYU, SPECGRAV, PHUR, PROTEINUA, GLUCOSEU, BILIRUBINCON, BLOODU, WBCU, RBCU, BACTERIA, MUCUS, NITRITE, LEUKOCYTESUR, UROBILINOGEN, HYALINECASTS in the last 168 hours.      Diagnostic Results:      ASSESSMENT/PLAN:     Active Hospital Problems    Diagnosis  POA    *COPD exacerbation [J44.1]  Yes      Resolved Hospital Problems    Diagnosis Date Resolved POA   No resolved problems to display.       1. Acute exacerbation of COPD: pt has significant symptoms and outpatient tx failed                        - started IV steroid, Rocephin and Zithromax                        - Duoneb                        - pulmonary consulted    - continue to improve slowly    - Dc zithromax, decrease steroid dose      2. Fever and Leukocytosis/SIRS: multifactorial in nature- hand injury can be a source of infection                          - on Rocephin    - resolved       3. MPN: Polycythemia Vera: currently on Hydrea                        - will hold off on hydrea      4. Abnormal LFT: US of liver done                        - GI consult pending                         - acute hep panel negative                          - ? Medication induced liver toxicity?    - improving steadily    - no n/v     5. Elevated glucose: mostly due to IV steroids        6. Nausea: protonix and Zofra                         -dc motrin     7. DVT  Prophylaxis: Lovenox     8. Constipation: started Lactulose and having good BM    9. Hand injury- 3 fingers are damaged and awaiting surgical intervention   - pt and family wants Dr. Monson to see    - discussed the case in detail with   Ermias and he evaluated pt on Fri   - plan for OR Tue for finger repair     Rk Welsh M.D  Internal Medicine & Geriatric Medicine  Hematology & Oncology  Palliative Medicine    [address] [facility], [address]  [place] [id number] [phone] (Office)  [phone] (Fax)

## 2017-09-03 NOTE — PLAN OF CARE
Problem: Fall Risk (Adult)  Goal: Absence of Falls  Patient will demonstrate the desired outcomes by discharge/transition of care.    09/03/17 1415   Fall Risk (Adult)   Absence of Falls achieves outcome   siderails x2 bed low

## 2017-09-03 NOTE — PROGRESS NOTES
Awake and alert with no complaints. Patient' has SOB when assisting with ADL.. Skin intact no edema  oriented to surrounding.bed low call light in place. Assessment completed.

## 2017-09-03 NOTE — PLAN OF CARE
Problem: Fall Risk (Adult)  Goal: Absence of Falls  Patient will demonstrate the desired outcomes by discharge/transition of care.   Outcome: Ongoing (interventions implemented as appropriate)  Patient remains free of falls, call bell within reach, bed alarm on.    Problem: Pain, Acute (Adult)  Goal: Acceptable Pain Control/Comfort Level  Patient will demonstrate the desired outcomes by discharge/transition of care.   Outcome: Ongoing (interventions implemented as appropriate)  Patient covered with pain medication as needed.

## 2017-09-04 ENCOUNTER — ANESTHESIA EVENT (OUTPATIENT)
Dept: SURGERY | Facility: HOSPITAL | Age: 67
DRG: 982 | End: 2017-09-04
Payer: MEDICARE

## 2017-09-04 LAB
ALBUMIN SERPL BCP-MCNC: 3.1 G/DL
ALP SERPL-CCNC: 156 U/L
ALT SERPL W/O P-5'-P-CCNC: 71 U/L
ANION GAP SERPL CALC-SCNC: 10 MMOL/L
AST SERPL-CCNC: 17 U/L
BILIRUB SERPL-MCNC: 0.5 MG/DL
BUN SERPL-MCNC: 19 MG/DL
CALCIUM SERPL-MCNC: 8.6 MG/DL
CHLORIDE SERPL-SCNC: 96 MMOL/L
CO2 SERPL-SCNC: 39 MMOL/L
CREAT SERPL-MCNC: 0.7 MG/DL
ERYTHROCYTE [DISTWIDTH] IN BLOOD BY AUTOMATED COUNT: 19.3 %
EST. GFR  (AFRICAN AMERICAN): >60 ML/MIN/1.73 M^2
EST. GFR  (NON AFRICAN AMERICAN): >60 ML/MIN/1.73 M^2
GLUCOSE SERPL-MCNC: 169 MG/DL
HCT VFR BLD AUTO: 36.1 %
HGB BLD-MCNC: 10.1 G/DL
MCH RBC QN AUTO: 21.7 PG
MCHC RBC AUTO-ENTMCNC: 28 G/DL
MCV RBC AUTO: 78 FL
PLATELET # BLD AUTO: 704 K/UL
PMV BLD AUTO: 9.7 FL
POTASSIUM SERPL-SCNC: 3.6 MMOL/L
PROT SERPL-MCNC: 6.4 G/DL
RBC # BLD AUTO: 4.65 M/UL
SODIUM SERPL-SCNC: 145 MMOL/L
WBC # BLD AUTO: 23.4 K/UL

## 2017-09-04 PROCEDURE — 36415 COLL VENOUS BLD VENIPUNCTURE: CPT

## 2017-09-04 PROCEDURE — 25000242 PHARM REV CODE 250 ALT 637 W/ HCPCS: Performed by: EMERGENCY MEDICINE

## 2017-09-04 PROCEDURE — 94761 N-INVAS EAR/PLS OXIMETRY MLT: CPT

## 2017-09-04 PROCEDURE — 25000003 PHARM REV CODE 250: Performed by: HOSPITALIST

## 2017-09-04 PROCEDURE — 85027 COMPLETE CBC AUTOMATED: CPT

## 2017-09-04 PROCEDURE — 63600175 PHARM REV CODE 636 W HCPCS: Performed by: INTERNAL MEDICINE

## 2017-09-04 PROCEDURE — 80053 COMPREHEN METABOLIC PANEL: CPT

## 2017-09-04 PROCEDURE — 27000221 HC OXYGEN, UP TO 24 HOURS

## 2017-09-04 PROCEDURE — 63600175 PHARM REV CODE 636 W HCPCS: Performed by: EMERGENCY MEDICINE

## 2017-09-04 PROCEDURE — A4216 STERILE WATER/SALINE, 10 ML: HCPCS | Performed by: EMERGENCY MEDICINE

## 2017-09-04 PROCEDURE — 21400001 HC TELEMETRY ROOM

## 2017-09-04 PROCEDURE — 94640 AIRWAY INHALATION TREATMENT: CPT

## 2017-09-04 PROCEDURE — 25000003 PHARM REV CODE 250: Performed by: EMERGENCY MEDICINE

## 2017-09-04 PROCEDURE — 25000003 PHARM REV CODE 250: Performed by: INTERNAL MEDICINE

## 2017-09-04 PROCEDURE — C9113 INJ PANTOPRAZOLE SODIUM, VIA: HCPCS | Performed by: EMERGENCY MEDICINE

## 2017-09-04 RX ORDER — DOXYCYCLINE HYCLATE 100 MG
100 TABLET ORAL EVERY 12 HOURS
Status: DISCONTINUED | OUTPATIENT
Start: 2017-09-04 | End: 2017-09-11 | Stop reason: HOSPADM

## 2017-09-04 RX ORDER — FUROSEMIDE 10 MG/ML
40 INJECTION INTRAMUSCULAR; INTRAVENOUS ONCE
Status: COMPLETED | OUTPATIENT
Start: 2017-09-04 | End: 2017-09-04

## 2017-09-04 RX ORDER — POTASSIUM CHLORIDE 20 MEQ/15ML
40 SOLUTION ORAL ONCE
Status: COMPLETED | OUTPATIENT
Start: 2017-09-04 | End: 2017-09-04

## 2017-09-04 RX ORDER — METOPROLOL TARTRATE 1 MG/ML
5 INJECTION, SOLUTION INTRAVENOUS EVERY 5 MIN PRN
Status: COMPLETED | OUTPATIENT
Start: 2017-09-04 | End: 2017-09-06

## 2017-09-04 RX ADMIN — AMLODIPINE BESYLATE 5 MG: 5 TABLET ORAL at 08:09

## 2017-09-04 RX ADMIN — IPRATROPIUM BROMIDE AND ALBUTEROL SULFATE 3 ML: .5; 3 SOLUTION RESPIRATORY (INHALATION) at 11:09

## 2017-09-04 RX ADMIN — METHYLPREDNISOLONE SODIUM SUCCINATE 80 MG: 125 INJECTION, POWDER, FOR SOLUTION INTRAMUSCULAR; INTRAVENOUS at 02:09

## 2017-09-04 RX ADMIN — CEFTRIAXONE 1 G: 1 INJECTION, SOLUTION INTRAVENOUS at 08:09

## 2017-09-04 RX ADMIN — METHYLPREDNISOLONE SODIUM SUCCINATE 80 MG: 125 INJECTION, POWDER, FOR SOLUTION INTRAMUSCULAR; INTRAVENOUS at 05:09

## 2017-09-04 RX ADMIN — ENOXAPARIN SODIUM 40 MG: 100 INJECTION SUBCUTANEOUS at 04:09

## 2017-09-04 RX ADMIN — ASPIRIN 81 MG: 81 TABLET, COATED ORAL at 08:09

## 2017-09-04 RX ADMIN — IPRATROPIUM BROMIDE AND ALBUTEROL SULFATE 3 ML: .5; 3 SOLUTION RESPIRATORY (INHALATION) at 07:09

## 2017-09-04 RX ADMIN — POTASSIUM CHLORIDE 40 MEQ: 20 SOLUTION ORAL at 12:09

## 2017-09-04 RX ADMIN — MORPHINE SULFATE 2 MG: 10 INJECTION INTRAVENOUS at 08:09

## 2017-09-04 RX ADMIN — MORPHINE SULFATE 2 MG: 10 INJECTION INTRAVENOUS at 04:09

## 2017-09-04 RX ADMIN — Medication 3 ML: at 02:09

## 2017-09-04 RX ADMIN — METHYLPREDNISOLONE SODIUM SUCCINATE 80 MG: 125 INJECTION, POWDER, FOR SOLUTION INTRAMUSCULAR; INTRAVENOUS at 08:09

## 2017-09-04 RX ADMIN — IPRATROPIUM BROMIDE AND ALBUTEROL SULFATE 3 ML: .5; 3 SOLUTION RESPIRATORY (INHALATION) at 03:09

## 2017-09-04 RX ADMIN — FUROSEMIDE 40 MG: 10 INJECTION, SOLUTION INTRAVENOUS at 12:09

## 2017-09-04 RX ADMIN — ALPRAZOLAM 1 MG: 0.5 TABLET ORAL at 12:09

## 2017-09-04 RX ADMIN — METOPROLOL TARTRATE 5 MG: 5 INJECTION INTRAVENOUS at 04:09

## 2017-09-04 RX ADMIN — MORPHINE SULFATE 2 MG: 10 INJECTION INTRAVENOUS at 09:09

## 2017-09-04 RX ADMIN — ONDANSETRON 4 MG: 2 INJECTION INTRAMUSCULAR; INTRAVENOUS at 08:09

## 2017-09-04 RX ADMIN — ALPRAZOLAM 1 MG: 0.5 TABLET ORAL at 08:09

## 2017-09-04 RX ADMIN — DOXYCYCLINE HYCLATE 100 MG: 100 TABLET, COATED ORAL at 08:09

## 2017-09-04 RX ADMIN — Medication 3 ML: at 09:09

## 2017-09-04 RX ADMIN — ALPRAZOLAM 1 MG: 0.5 TABLET ORAL at 05:09

## 2017-09-04 RX ADMIN — ALPRAZOLAM 1 MG: 0.5 TABLET ORAL at 02:09

## 2017-09-04 RX ADMIN — PANTOPRAZOLE SODIUM 40 MG: 40 INJECTION, POWDER, FOR SOLUTION INTRAVENOUS at 08:09

## 2017-09-04 RX ADMIN — Medication 3 ML: at 05:09

## 2017-09-04 NOTE — PLAN OF CARE
09/03/17 1901   Patient Assessment/Suction   Level of Consciousness (AVPU) alert   Respiratory Effort Short of breath   Expansion/Accessory Muscles/Retractions expansion symmetric   All Lung Fields Breath Sounds diminished   PRE-TX-O2-ETCO2   O2 Device (Oxygen Therapy) nasal cannula w/ humidification   Flow (L/min) 3   SpO2 97 %   Pulse 97   Resp (!) 22   Aerosol Therapy   $ Aerosol Therapy Charges Aerosol Treatment   Respiratory Treatment Status given   SVN/Inhaler Treatment Route mask;with air   Position During Treatment Sitting on edge of bed (dangling)   Patient Tolerance good   Post-Treatment   Post-treatment Heart Rate (beats/min) 96   Post-treatment Resp Rate (breaths/min) 20   All Fields Breath Sounds unchanged   Pt tolerates treatments well.

## 2017-09-04 NOTE — NURSING
Pt IV access 6 days old. IV access 1 attempt RN, 1 attempt charge nurse, unsuccessful. House supervisor notified

## 2017-09-04 NOTE — PLAN OF CARE
09/04/17 0724   Patient Assessment/Suction   Level of Consciousness (AVPU) alert   All Lung Fields Breath Sounds diminished   PRE-TX-O2-ETCO2   O2 Device (Oxygen Therapy) nasal cannula   $ Is the patient on Oxygen? Yes   Flow (L/min) 3   Oxygen Concentration (%) 32   SpO2 100 %   Pulse Oximetry Type Intermittent   $ Pulse Oximetry - Multiple Charge Pulse Oximetry - Multiple   Pulse 80   Resp 18   Aerosol Therapy   $ Aerosol Therapy Charges Aerosol Treatment   Respiratory Treatment Status given   SVN/Inhaler Treatment Route mask   Position During Treatment HOB at 45 degrees   Patient Tolerance good   Post-Treatment   Post-treatment Heart Rate (beats/min) 82   Post-treatment Resp Rate (breaths/min) 18   All Fields Breath Sounds unchanged   Ready to Wean/Extubation Screen   FIO2<=50 (chart decimal) 0.32

## 2017-09-04 NOTE — PROGRESS NOTES
Ortho Daily Progress Note    Chaz Stuart is a 67 y.o. male admitted on 8/29/2017      Chief Complaint/Reason for admission: Headache (Pt. with PMH of COPD and polycythemia presents with shortness of breath, chest pain and headache for several days. Pt. reportedly has been on steroids and breathing treatments with no relief. Pt. was supposed to have hand surgery at [facility] but was unable to go. ) and Shortness of Breath       Hospital Day: 5  Post Op Day: * No surgery date entered *     The patient was seen and examined this morning at the bedside. Patient reports no acute issues overnight.  Patient reports that pain is adequately controlled.    _______________    Vitals:    09/04/17 0400 09/04/17 0500 09/04/17 0724 09/04/17 0828   BP: (!) 195/83 (!) 161/76  131/68   Pulse: 86 83 80 83   Resp: (!) 22  18 19   Temp: 97.8 °F (36.6 °C)   97.8 °F (36.6 °C)   TempSrc: Oral   Oral   SpO2: 95%  100% 99%   Weight: 65.5 kg (144 lb 4.8 oz)      Height:           Vital Signs (Most Recent)  Temp: 97.8 °F (36.6 °C) (09/04/17 0828)  Pulse: 83 (09/04/17 0828)  Resp: 19 (09/04/17 0828)  BP: 131/68 (09/04/17 0828)  SpO2: 99 % (09/04/17 0828)    Vital Signs Range (Last 24H):  Temp:  [97.6 °F (36.4 °C)-98.4 °F (36.9 °C)]   Pulse:  [80-97]   Resp:  [18-24]   BP: (131-195)/(68-86)   SpO2:  [92 %-100 %]       Physical:    AAOx3  Oxygenation better today  Dressing changed  Some maceration to the fingers  Splint replaced dry dressing applied.    Recent Labs      09/03/17 0828 09/04/17 0617   K  3.9  3.6   CALCIUM  8.3*  8.6*   WBC  18.75*  23.40*   HGB  9.2*  10.1*   HCT  32.5*  36.1*   PLT  638*  704*       I/O last 3 completed shifts:  In: 1680 [P.O.:1680]  Out: 1601 [Urine:1600; Stool:1]          Assessment:  Right hand with multiple lacerations and fractures              Plan:    Right hand to be addressed by Dr. Monson tomorrow      Ishmael Thornton MD  [facility]

## 2017-09-04 NOTE — PROGRESS NOTES
Progress Note    Admit Date: 8/29/2017   LOS: 5 days     SUBJECTIVE:     Mr. Stuart is a pleasant 66 YO gentleman with Myeloproliferative neoplasm, Polycythemia Vera, oxygen dependent COPD and most recent injuries to right hand from a . Pt had a recent COPD exacerbation and was seen at [facility] ED- tx and released. For the last 3 days, not feeling well. Has chest discomfort and nausea. He began to have fever at home associated with worsening sob and chest pressure. Pt was scheduled for hand surgery and did not get clearance from anesthesia to get and outpatient procedure thus re-scheduled for tomorrow     Since admission, states feeling little better. Denies cough but has chest congestion. Denies any sick contact.    Follow-up For:  COPD exacerbation     09/04/2017: increasing sob. Have some reservation about getting surgery tomorrow   09/03/2017: Denies fever or chills.   09/02/2017: chest congestion better.   09/01/2017: feeling better. Had BM. Less SOB    08/31/2017: continue to have moderate sob. + Abdominal distention and constipation      Scheduled Meds:   albuterol-ipratropium 2.5mg-0.5mg/3mL  3 mL Nebulization Q4H    alprazolam  1 mg Oral TID    amlodipine  5 mg Oral Daily    aspirin  81 mg Oral Daily    cefTRIAXone (ROCEPHIN) IVPB  1 g Intravenous Q24H    enoxaparin  40 mg Subcutaneous Daily    methylPREDNISolone sodium succinate  80 mg Intravenous Q8H    pantoprazole  40 mg Intravenous Daily    polyethylene glycol  17 g Oral Daily    sodium chloride 0.9%  3 mL Intravenous Q8H     Continuous Infusions:     PRN Meds:lactulose, metoprolol, morphine, morphine, ondansetron, promethazine (PHENERGAN) IVPB, ramelteon    Review of patient's allergies indicates:  No Known Allergies    Review of Systems    Constitutional: fatigue. No fever   Eyes: no visual changes   ENT: no nasal congestion. Denies sore throat with odynophagia   Respiratory: see HPI. Feeling little worse today   Cardiovascular: see  HPI   Gastrointestinal: + nusea   Hematologic/Lymphatic: see HPI  Musculoskeletal: ROM of right hand limited   Neurological: no seizures or tremors, gait or balance prblems  Skin: No rashes or lesions  Psych: Denies any anxiety, depression or insomnia      OBJECTIVE:     Vital Signs (Most Recent)    Temp: 97.8 °F (36.6 °C) (09/04/17 0828)  Pulse: 83 (09/04/17 0828)  Resp: 19 (09/04/17 0828)  BP: 131/68 (09/04/17 0828)  SpO2: 99 % (09/04/17 0828)    Vital Signs Range (Last 24H):    Temp:  [97.6 °F (36.4 °C)-98.4 °F (36.9 °C)]   Pulse:  [80-97]   Resp:  [18-24]   BP: (131-195)/(68-86)   SpO2:  [92 %-100 %]     I & O (Last 24H):    Intake/Output Summary (Last 24 hours) at 09/04/17 0927  Last data filed at 09/04/17 0600   Gross per 24 hour   Intake             1440 ml   Output             1201 ml   Net              239 ml     Physical Exam:    General: NAD, resting in bed.  Head: normocephalic, atraumatic   Eyes: conjunctivae pink, anicteric sclera.  Throat: No erythema or post nasal discharge   Neck: supple, no LAD   Lungs: Decreased air entry bilaterally, mild expiratory wheezing   Heart: S1, S2, RRR   Abdomen: + BS x 4 quadrants, slightly distended, mild epigastric tenderness    Extremities: no cyanosis or edema.   Skin: turgor normal, no erythema or rashes. No abnormal moles  MS:  right hand in bandage.   Neuro: A&O x 3    Psy: calm      Laboratory:  CBC:     Recent Labs  Lab 09/04/17 0617   WBC 23.40*   RBC 4.65   HGB 10.1*   HCT 36.1*   *   MCV 78*   MCH 21.7*   MCHC 28.0*     CMP:     Recent Labs  Lab 09/04/17 0617   *   CALCIUM 8.6*   ALBUMIN 3.1*   PROT 6.4      K 3.6   CO2 39*   CL 96   BUN 19   CREATININE 0.7   ALKPHOS 156*   ALT 71*   AST 17   BILITOT 0.5     LFTs:     Recent Labs  Lab 09/04/17  0617   ALT 71*   AST 17   ALKPHOS 156*   BILITOT 0.5   PROT 6.4   ALBUMIN 3.1*     Coagulation:     Recent Labs  Lab 08/29/17  1734   LABPROT 11.2   INR 1.1     Cardiac markers:     Recent  Labs  Lab 08/30/17  0224   TROPONINI <0.006     ABGs:     Recent Labs  Lab 08/29/17  1728   PH 7.478*   PCO2 41.1   PO2 75*   HCO3 30.5*   POCSATURATED 96   BE 6     Microbiology Results (last 7 days)     ** No results found for the last 168 hours. **        Specimen (12h ago through future)    None        No results for input(s): COLORU, CLARITYU, SPECGRAV, PHUR, PROTEINUA, GLUCOSEU, BILIRUBINCON, BLOODU, WBCU, RBCU, BACTERIA, MUCUS, NITRITE, LEUKOCYTESUR, UROBILINOGEN, HYALINECASTS in the last 168 hours.      Diagnostic Results:      ASSESSMENT/PLAN:     Active Hospital Problems    Diagnosis  POA    *COPD exacerbation [J44.1]  Yes      Resolved Hospital Problems    Diagnosis Date Resolved POA   No resolved problems to display.       1. Acute exacerbation of COPD: pt has significant symptoms and outpatient tx failed                        - started IV steroid, Rocephin and Zithromax                        - Duoneb                        - pulmonary consulted    - continue to improve slowly    - Dc zithromax, decrease steroid dose    - give Lasix 40mg IV and K supplement      2. Fever and Leukocytosis/SIRS: multifactorial in nature- hand injury can be a source of infection                          - on Rocephin    - wbc trending up    - will add Doxy IV       3. MPN: Polycythemia Vera: currently on Hydrea                        - will hold off on hydrea      4. Abnormal LFT: US of liver done                        - GI consult pending                         - acute hep panel negative                          - ? Medication induced liver toxicity?    - improving steadily    - no n/v     5. Elevated glucose: mostly due to IV steroids        6. Nausea: protonix and Zofra                         -dc motrin     7. DVT  Prophylaxis: Lovenox     8. Constipation: started Lactulose and having good BM    9. Hand injury- 3 fingers are damaged and awaiting surgical intervention   - pt and family wants Dr. Monson to see    -  discussed the case in detail with Dr. Monson and he evaluated pt on Fri   - plan for OR tomorrow  for finger repair     Rk Welsh M.D  Internal Medicine & Geriatric Medicine  Hematology & Oncology  Palliative Medicine    [address] [facility] [address] [facility], [address]  [place], [place] [id number] [phone] (Office)  [phone] (Fax)

## 2017-09-04 NOTE — NURSING
Wound care dressing change completed per orders. Wrapped with ace wrap per MD orders. Pt tolerated well. Pt to have procedure on hand tomorrow.

## 2017-09-05 ENCOUNTER — ANESTHESIA (OUTPATIENT)
Dept: SURGERY | Facility: HOSPITAL | Age: 67
DRG: 982 | End: 2017-09-05
Payer: MEDICARE

## 2017-09-05 LAB
ALBUMIN SERPL BCP-MCNC: 2.9 G/DL
ALP SERPL-CCNC: 124 U/L
ALT SERPL W/O P-5'-P-CCNC: 75 U/L
ANION GAP SERPL CALC-SCNC: 10 MMOL/L
AST SERPL-CCNC: 25 U/L
BILIRUB SERPL-MCNC: 0.5 MG/DL
BUN SERPL-MCNC: 23 MG/DL
CALCIUM SERPL-MCNC: 8.8 MG/DL
CHLORIDE SERPL-SCNC: 95 MMOL/L
CO2 SERPL-SCNC: 40 MMOL/L
CREAT SERPL-MCNC: 0.7 MG/DL
ERYTHROCYTE [DISTWIDTH] IN BLOOD BY AUTOMATED COUNT: 19.1 %
EST. GFR  (AFRICAN AMERICAN): >60 ML/MIN/1.73 M^2
EST. GFR  (NON AFRICAN AMERICAN): >60 ML/MIN/1.73 M^2
GLUCOSE SERPL-MCNC: 127 MG/DL
HCT VFR BLD AUTO: 34.9 %
HGB BLD-MCNC: 9.6 G/DL
MCH RBC QN AUTO: 21.7 PG
MCHC RBC AUTO-ENTMCNC: 27.5 G/DL
MCV RBC AUTO: 79 FL
PLATELET # BLD AUTO: 709 K/UL
PMV BLD AUTO: 9.7 FL
POTASSIUM SERPL-SCNC: 3.2 MMOL/L
PROT SERPL-MCNC: 5.9 G/DL
RBC # BLD AUTO: 4.42 M/UL
SMOOTH MUSCLE AB TITR SER IF: NORMAL {TITER}
SODIUM SERPL-SCNC: 145 MMOL/L
WBC # BLD AUTO: 23.94 K/UL

## 2017-09-05 PROCEDURE — 63600175 PHARM REV CODE 636 W HCPCS: Performed by: EMERGENCY MEDICINE

## 2017-09-05 PROCEDURE — 63600175 PHARM REV CODE 636 W HCPCS: Performed by: NURSE ANESTHETIST, CERTIFIED REGISTERED

## 2017-09-05 PROCEDURE — 21400001 HC TELEMETRY ROOM

## 2017-09-05 PROCEDURE — 99900035 HC TECH TIME PER 15 MIN (STAT)

## 2017-09-05 PROCEDURE — D9220A PRA ANESTHESIA: Mod: ANES,,, | Performed by: ANESTHESIOLOGY

## 2017-09-05 PROCEDURE — 0PBT0ZZ EXCISION OF RIGHT FINGER PHALANX, OPEN APPROACH: ICD-10-PCS | Performed by: ORTHOPAEDIC SURGERY

## 2017-09-05 PROCEDURE — 63600175 PHARM REV CODE 636 W HCPCS: Performed by: ANESTHESIOLOGY

## 2017-09-05 PROCEDURE — 25000242 PHARM REV CODE 250 ALT 637 W/ HCPCS: Performed by: ANESTHESIOLOGY

## 2017-09-05 PROCEDURE — 0XQL0ZZ REPAIR RIGHT THUMB, OPEN APPROACH: ICD-10-PCS | Performed by: ORTHOPAEDIC SURGERY

## 2017-09-05 PROCEDURE — 25000242 PHARM REV CODE 250 ALT 637 W/ HCPCS: Performed by: EMERGENCY MEDICINE

## 2017-09-05 PROCEDURE — 94761 N-INVAS EAR/PLS OXIMETRY MLT: CPT

## 2017-09-05 PROCEDURE — 25000003 PHARM REV CODE 250: Performed by: INTERNAL MEDICINE

## 2017-09-05 PROCEDURE — 0PST04Z REPOSITION RIGHT FINGER PHALANX WITH INTERNAL FIXATION DEVICE, OPEN APPROACH: ICD-10-PCS | Performed by: ORTHOPAEDIC SURGERY

## 2017-09-05 PROCEDURE — D9220A PRA ANESTHESIA: Mod: CRNA,,, | Performed by: NURSE ANESTHETIST, CERTIFIED REGISTERED

## 2017-09-05 PROCEDURE — 76942 ECHO GUIDE FOR BIOPSY: CPT | Performed by: ANESTHESIOLOGY

## 2017-09-05 PROCEDURE — 37000008 HC ANESTHESIA 1ST 15 MINUTES: Performed by: ORTHOPAEDIC SURGERY

## 2017-09-05 PROCEDURE — 25000003 PHARM REV CODE 250: Performed by: EMERGENCY MEDICINE

## 2017-09-05 PROCEDURE — 36000709 HC OR TIME LEV III EA ADD 15 MIN: Performed by: ORTHOPAEDIC SURGERY

## 2017-09-05 PROCEDURE — 94664 DEMO&/EVAL PT USE INHALER: CPT

## 2017-09-05 PROCEDURE — 80053 COMPREHEN METABOLIC PANEL: CPT

## 2017-09-05 PROCEDURE — 71000033 HC RECOVERY, INTIAL HOUR: Performed by: ORTHOPAEDIC SURGERY

## 2017-09-05 PROCEDURE — 0JBJ0ZZ EXCISION OF RIGHT HAND SUBCUTANEOUS TISSUE AND FASCIA, OPEN APPROACH: ICD-10-PCS | Performed by: ORTHOPAEDIC SURGERY

## 2017-09-05 PROCEDURE — C9113 INJ PANTOPRAZOLE SODIUM, VIA: HCPCS | Performed by: EMERGENCY MEDICINE

## 2017-09-05 PROCEDURE — 25000003 PHARM REV CODE 250: Performed by: NURSE ANESTHETIST, CERTIFIED REGISTERED

## 2017-09-05 PROCEDURE — 71000039 HC RECOVERY, EACH ADD'L HOUR: Performed by: ORTHOPAEDIC SURGERY

## 2017-09-05 PROCEDURE — 36415 COLL VENOUS BLD VENIPUNCTURE: CPT

## 2017-09-05 PROCEDURE — 27000173 HC ACAPELLA DEVICE DH OR DM

## 2017-09-05 PROCEDURE — 36000708 HC OR TIME LEV III 1ST 15 MIN: Performed by: ORTHOPAEDIC SURGERY

## 2017-09-05 PROCEDURE — 76942 ECHO GUIDE FOR BIOPSY: CPT | Mod: 26,,, | Performed by: ANESTHESIOLOGY

## 2017-09-05 PROCEDURE — 85027 COMPLETE CBC AUTOMATED: CPT

## 2017-09-05 PROCEDURE — A4216 STERILE WATER/SALINE, 10 ML: HCPCS | Performed by: EMERGENCY MEDICINE

## 2017-09-05 PROCEDURE — 63600175 PHARM REV CODE 636 W HCPCS: Performed by: INTERNAL MEDICINE

## 2017-09-05 PROCEDURE — 94640 AIRWAY INHALATION TREATMENT: CPT

## 2017-09-05 PROCEDURE — 27000221 HC OXYGEN, UP TO 24 HOURS

## 2017-09-05 PROCEDURE — C1769 GUIDE WIRE: HCPCS | Performed by: ORTHOPAEDIC SURGERY

## 2017-09-05 PROCEDURE — 37000009 HC ANESTHESIA EA ADD 15 MINS: Performed by: ORTHOPAEDIC SURGERY

## 2017-09-05 DEVICE — WIRE K: Type: IMPLANTABLE DEVICE | Site: FINGER | Status: FUNCTIONAL

## 2017-09-05 DEVICE — K-WIRE STYLE 7 1.1X229MM: Type: IMPLANTABLE DEVICE | Site: FINGER | Status: FUNCTIONAL

## 2017-09-05 RX ORDER — METHYLPREDNISOLONE SODIUM SUCCINATE 125 MG/2ML
80 INJECTION INTRAMUSCULAR; INTRAVENOUS ONCE
Status: COMPLETED | OUTPATIENT
Start: 2017-09-05 | End: 2017-09-05

## 2017-09-05 RX ORDER — IPRATROPIUM BROMIDE AND ALBUTEROL SULFATE 2.5; .5 MG/3ML; MG/3ML
3 SOLUTION RESPIRATORY (INHALATION) ONCE
Status: COMPLETED | OUTPATIENT
Start: 2017-09-05 | End: 2017-09-05

## 2017-09-05 RX ORDER — CEFAZOLIN SODIUM 2 G/50ML
2 SOLUTION INTRAVENOUS
Status: COMPLETED | OUTPATIENT
Start: 2017-09-05 | End: 2017-09-05

## 2017-09-05 RX ORDER — SODIUM CHLORIDE 0.9 % (FLUSH) 0.9 %
3 SYRINGE (ML) INJECTION
Status: DISCONTINUED | OUTPATIENT
Start: 2017-09-05 | End: 2017-09-05 | Stop reason: HOSPADM

## 2017-09-05 RX ORDER — MIDAZOLAM HYDROCHLORIDE 1 MG/ML
INJECTION, SOLUTION INTRAMUSCULAR; INTRAVENOUS
Status: DISCONTINUED | OUTPATIENT
Start: 2017-09-05 | End: 2017-09-05

## 2017-09-05 RX ORDER — PROPOFOL 10 MG/ML
VIAL (ML) INTRAVENOUS CONTINUOUS PRN
Status: DISCONTINUED | OUTPATIENT
Start: 2017-09-05 | End: 2017-09-05

## 2017-09-05 RX ORDER — LIDOCAINE HCL/PF 100 MG/5ML
SYRINGE (ML) INTRAVENOUS
Status: DISCONTINUED | OUTPATIENT
Start: 2017-09-05 | End: 2017-09-05

## 2017-09-05 RX ORDER — FENTANYL CITRATE 50 UG/ML
INJECTION, SOLUTION INTRAMUSCULAR; INTRAVENOUS
Status: DISCONTINUED | OUTPATIENT
Start: 2017-09-05 | End: 2017-09-05

## 2017-09-05 RX ORDER — SODIUM CHLORIDE, SODIUM LACTATE, POTASSIUM CHLORIDE, CALCIUM CHLORIDE 600; 310; 30; 20 MG/100ML; MG/100ML; MG/100ML; MG/100ML
INJECTION, SOLUTION INTRAVENOUS CONTINUOUS PRN
Status: DISCONTINUED | OUTPATIENT
Start: 2017-09-05 | End: 2017-09-05

## 2017-09-05 RX ORDER — ACETAMINOPHEN 10 MG/ML
1000 INJECTION, SOLUTION INTRAVENOUS ONCE
Status: COMPLETED | OUTPATIENT
Start: 2017-09-05 | End: 2017-09-05

## 2017-09-05 RX ADMIN — METHYLPREDNISOLONE SODIUM SUCCINATE 80 MG: 125 INJECTION, POWDER, FOR SOLUTION INTRAMUSCULAR; INTRAVENOUS at 09:09

## 2017-09-05 RX ADMIN — PANTOPRAZOLE SODIUM 40 MG: 40 INJECTION, POWDER, FOR SOLUTION INTRAVENOUS at 09:09

## 2017-09-05 RX ADMIN — METHYLPREDNISOLONE SODIUM SUCCINATE 80 MG: 125 INJECTION, POWDER, LYOPHILIZED, FOR SOLUTION INTRAMUSCULAR; INTRAVENOUS at 03:09

## 2017-09-05 RX ADMIN — SODIUM CHLORIDE, SODIUM LACTATE, POTASSIUM CHLORIDE, AND CALCIUM CHLORIDE: .6; .31; .03; .02 INJECTION, SOLUTION INTRAVENOUS at 11:09

## 2017-09-05 RX ADMIN — DOXYCYCLINE HYCLATE 100 MG: 100 TABLET, COATED ORAL at 09:09

## 2017-09-05 RX ADMIN — MORPHINE SULFATE 2 MG: 10 INJECTION INTRAVENOUS at 09:09

## 2017-09-05 RX ADMIN — CEFAZOLIN SODIUM 2 G: 2 SOLUTION INTRAVENOUS at 12:09

## 2017-09-05 RX ADMIN — FENTANYL CITRATE 25 MCG: 50 INJECTION INTRAMUSCULAR; INTRAVENOUS at 01:09

## 2017-09-05 RX ADMIN — PROPOFOL 25 MCG/KG/MIN: 10 INJECTION, EMULSION INTRAVENOUS at 12:09

## 2017-09-05 RX ADMIN — LIDOCAINE HYDROCHLORIDE 70 MG: 20 INJECTION, SOLUTION INTRAVENOUS at 12:09

## 2017-09-05 RX ADMIN — IPRATROPIUM BROMIDE AND ALBUTEROL SULFATE 3 ML: .5; 3 SOLUTION RESPIRATORY (INHALATION) at 11:09

## 2017-09-05 RX ADMIN — ENOXAPARIN SODIUM 40 MG: 100 INJECTION SUBCUTANEOUS at 05:09

## 2017-09-05 RX ADMIN — IPRATROPIUM BROMIDE AND ALBUTEROL SULFATE 3 ML: .5; 3 SOLUTION RESPIRATORY (INHALATION) at 04:09

## 2017-09-05 RX ADMIN — METHYLPREDNISOLONE SODIUM SUCCINATE 80 MG: 125 INJECTION, POWDER, FOR SOLUTION INTRAMUSCULAR; INTRAVENOUS at 05:09

## 2017-09-05 RX ADMIN — ALPRAZOLAM 1 MG: 0.5 TABLET ORAL at 09:09

## 2017-09-05 RX ADMIN — IPRATROPIUM BROMIDE AND ALBUTEROL SULFATE 3 ML: .5; 3 SOLUTION RESPIRATORY (INHALATION) at 02:09

## 2017-09-05 RX ADMIN — IPRATROPIUM BROMIDE AND ALBUTEROL SULFATE 3 ML: .5; 3 SOLUTION RESPIRATORY (INHALATION) at 07:09

## 2017-09-05 RX ADMIN — Medication 3 ML: at 09:09

## 2017-09-05 RX ADMIN — Medication 3 ML: at 05:09

## 2017-09-05 RX ADMIN — ALPRAZOLAM 1 MG: 0.5 TABLET ORAL at 05:09

## 2017-09-05 RX ADMIN — CEFTRIAXONE 1 G: 1 INJECTION, SOLUTION INTRAVENOUS at 09:09

## 2017-09-05 RX ADMIN — ACETAMINOPHEN 1000 MG: 10 INJECTION, SOLUTION INTRAVENOUS at 02:09

## 2017-09-05 RX ADMIN — FENTANYL CITRATE 50 MCG: 50 INJECTION INTRAMUSCULAR; INTRAVENOUS at 12:09

## 2017-09-05 RX ADMIN — ONDANSETRON 4 MG: 2 INJECTION INTRAMUSCULAR; INTRAVENOUS at 01:09

## 2017-09-05 RX ADMIN — AMLODIPINE BESYLATE 5 MG: 5 TABLET ORAL at 09:09

## 2017-09-05 RX ADMIN — IPRATROPIUM BROMIDE AND ALBUTEROL SULFATE 3 ML: .5; 3 SOLUTION RESPIRATORY (INHALATION) at 03:09

## 2017-09-05 RX ADMIN — ASPIRIN 81 MG: 81 TABLET, COATED ORAL at 09:09

## 2017-09-05 RX ADMIN — MIDAZOLAM HYDROCHLORIDE 2 MG: 1 INJECTION, SOLUTION INTRAMUSCULAR; INTRAVENOUS at 12:09

## 2017-09-05 RX ADMIN — FENTANYL CITRATE 100 MCG: 50 INJECTION INTRAMUSCULAR; INTRAVENOUS at 12:09

## 2017-09-05 RX ADMIN — MORPHINE SULFATE 6 MG: 10 INJECTION INTRAVENOUS at 09:09

## 2017-09-05 RX ADMIN — MORPHINE SULFATE 2 MG: 10 INJECTION INTRAVENOUS at 05:09

## 2017-09-05 NOTE — PROGRESS NOTES
Progress Note    Admit Date: 8/29/2017   LOS: 6 days     SUBJECTIVE:     Mr. Stuart is a pleasant 68 YO gentleman with Myeloproliferative neoplasm, Polycythemia Vera, oxygen dependent COPD and most recent injuries to right hand from a . Pt had a recent COPD exacerbation and was seen at [facility] ED- tx and released. For the last 3 days, not feeling well. Has chest discomfort and nausea. He began to have fever at home associated with worsening sob and chest pressure. Pt was scheduled for hand surgery and did not get clearance from anesthesia to get and outpatient procedure thus re-scheduled for tomorrow     Since admission, states feeling little better. Denies cough but has chest congestion. Denies any sick contact.    Follow-up For:  COPD exacerbation     09/05/2017: feeling little better. No fever or chills.   09/04/2017: increasing sob. Have some reservation about getting surgery tomorrow   09/03/2017: Denies fever or chills.   09/02/2017: chest congestion better.   09/01/2017: feeling better. Had BM. Less SOB    08/31/2017: continue to have moderate sob. + Abdominal distention and constipation      Scheduled Meds:   albuterol-ipratropium 2.5mg-0.5mg/3mL  3 mL Nebulization Q4H    alprazolam  1 mg Oral TID    amlodipine  5 mg Oral Daily    aspirin  81 mg Oral Daily    cefTRIAXone (ROCEPHIN) IVPB  1 g Intravenous Q24H    doxycycline  100 mg Oral Q12H    enoxaparin  40 mg Subcutaneous Daily    methylPREDNISolone sodium succinate  80 mg Intravenous Q8H    pantoprazole  40 mg Intravenous Daily    polyethylene glycol  17 g Oral Daily    sodium chloride 0.9%  3 mL Intravenous Q8H     Continuous Infusions:     PRN Meds:lactulose, metoprolol, morphine, morphine, ondansetron, promethazine (PHENERGAN) IVPB, ramelteon    Review of patient's allergies indicates:  No Known Allergies    Review of Systems    Constitutional: fatigue. No fever   Eyes: no visual changes   ENT: no nasal congestion. Denies sore throat  with odynophagia   Respiratory: see HPI. Feeling little worse today   Cardiovascular: see HPI   Gastrointestinal: + nusea   Hematologic/Lymphatic: see HPI  Musculoskeletal: ROM of right hand limited   Neurological: no seizures or tremors, gait or balance prblems  Skin: No rashes or lesions  Psych: Denies any anxiety, depression or insomnia      OBJECTIVE:     Vital Signs (Most Recent)    Temp: 97.9 °F (36.6 °C) (09/05/17 0400)  Pulse: 91 (09/05/17 0454)  Resp: 20 (09/05/17 0454)  BP: 137/76 (09/05/17 0400)  SpO2: 98 % (09/05/17 0454)    Vital Signs Range (Last 24H):    Temp:  [97.5 °F (36.4 °C)-97.9 °F (36.6 °C)]   Pulse:  [80-96]   Resp:  [18-20]   BP: (131-174)/(68-80)   SpO2:  [94 %-100 %]     I & O (Last 24H):    Intake/Output Summary (Last 24 hours) at 09/05/17 0722  Last data filed at 09/04/17 1730   Gross per 24 hour   Intake              720 ml   Output             1450 ml   Net             -730 ml     Physical Exam:    General: NAD, resting in bed.  Head: normocephalic, atraumatic   Eyes: conjunctivae pink, anicteric sclera.  Throat: No erythema or post nasal discharge   Neck: supple, no LAD   Lungs: Decreased air entry bilaterally, mild expiratory wheezing   Heart: S1, S2, RRR   Abdomen: + BS x 4 quadrants, slightly distended, mild epigastric tenderness    Extremities: no cyanosis or edema.   Skin: turgor normal, no erythema or rashes. No abnormal moles  MS:  right hand in bandage.   Neuro: A&O x 3    Psy: calm      Laboratory:  CBC:     Recent Labs  Lab 09/04/17 0617   WBC 23.40*   RBC 4.65   HGB 10.1*   HCT 36.1*   *   MCV 78*   MCH 21.7*   MCHC 28.0*     CMP:     Recent Labs  Lab 09/04/17 0617   *   CALCIUM 8.6*   ALBUMIN 3.1*   PROT 6.4      K 3.6   CO2 39*   CL 96   BUN 19   CREATININE 0.7   ALKPHOS 156*   ALT 71*   AST 17   BILITOT 0.5     LFTs:     Recent Labs  Lab 09/04/17  0617   ALT 71*   AST 17   ALKPHOS 156*   BILITOT 0.5   PROT 6.4   ALBUMIN 3.1*     Coagulation:      Recent Labs  Lab 08/29/17  1734   LABPROT 11.2   INR 1.1     Cardiac markers:     Recent Labs  Lab 08/30/17  0224   TROPONINI <0.006     ABGs:     Recent Labs  Lab 08/29/17  1728   PH 7.478*   PCO2 41.1   PO2 75*   HCO3 30.5*   POCSATURATED 96   BE 6     Microbiology Results (last 7 days)     ** No results found for the last 168 hours. **        Specimen (12h ago through future)    None        No results for input(s): COLORU, CLARITYU, SPECGRAV, PHUR, PROTEINUA, GLUCOSEU, BILIRUBINCON, BLOODU, WBCU, RBCU, BACTERIA, MUCUS, NITRITE, LEUKOCYTESUR, UROBILINOGEN, HYALINECASTS in the last 168 hours.      Diagnostic Results:      ASSESSMENT/PLAN:     Active Hospital Problems    Diagnosis  POA    *COPD exacerbation [J44.1]  Yes      Resolved Hospital Problems    Diagnosis Date Resolved POA   No resolved problems to display.       1. Acute exacerbation of COPD: pt has significant symptoms and outpatient tx failed                        - started IV steroid, Rocephin and Zithromax                        - Duoneb                        - pulmonary consulted    - continue to improve slowly    - Dced  zithromax, decrease steroid dose    -  Lasix 40mg IV and K supplement      2. Fever and Leukocytosis/SIRS: multifactorial in nature- hand injury can be a source of infection                          - on Rocephin    - wbc trending up    - will add Doxy IV    - check differential in the am       3. MPN: Polycythemia Vera: currently on Hydrea                        - will hold off on hydrea      4. Abnormal LFT: US of liver done                        - GI consult pending                         - acute hep panel negative                          - ? Medication induced liver toxicity?    - improving steadily    - no n/v     5. Elevated glucose: mostly due to IV steroids        6. Nausea: protonix and Zofra                         -dc motrin     7. DVT  Prophylaxis: Lovenox     8. Constipation: started Lactulose and having good  BM    9. Hand injury- 3 fingers are damaged and awaiting surgical intervention   - pt and family wants Dr. Monson to see    - discussed the case in detail with Dr. Monson and he evaluated pt on Fri   - plan for OR today.   - discussed with Dr. Ermias Welsh M.D  Internal Medicine & Geriatric Medicine  Hematology & Oncology  Palliative Medicine    [address] [facility], [address]  [place], [place] [id number] [phone] (Office)  [phone] (Fax)

## 2017-09-05 NOTE — ANESTHESIA PROCEDURE NOTES
Peripheral nerve block: right axillary nerve block    Patient location during procedure: holding area    Reason for block: primary anesthetic   Diagnosis: fracture of the thumb and first and second finger of the right hand   Start time: 9/5/2017 12:05 PM  Timeout: 9/5/2017 12:05 PM   End time: 9/5/2017 12:15 PM  Surgery related to: ORIF fingers of the right hand  Staffing  Anesthesiologist: AUDREY VELASCO  Performed: anesthesiologist   Preanesthetic Checklist  Completed: patient identified, site marked, surgical consent, pre-op evaluation, timeout performed, IV checked, risks and benefits discussed and monitors and equipment checked  Peripheral Block  Patient position: supine  Prep: ChloraPrep  Patient monitoring: cardiac monitor, continuous pulse ox and frequent blood pressure checks  Block type: axillary  Laterality: right  Injection technique: single shot  Needle  Needle type: Echogenic   Needle gauge: 22 G  Needle length: 2 in  Needle localization: ultrasound guidance   -ultrasound image captured on disc.  Assessment  Injection assessment: negative aspiration, negative parasthesia and local visualized surrounding nerve  Paresthesia pain: none  Heart rate change: no  Slow fractionated injection: yes  Medications:  Bolus administered: 30 mL of 0.5 ropivacaine  Epinephrine added: none

## 2017-09-05 NOTE — PLAN OF CARE
09/04/17 1910   Patient Assessment/Suction   Level of Consciousness (AVPU) alert   Respiratory Effort Normal;Unlabored   Expansion/Accessory Muscles/Retractions no use of accessory muscles   All Lung Fields Breath Sounds diminished;stridorous, expiratory   Cough Frequency infrequent   Cough Type good;nonproductive;dry   PRE-TX-O2-ETCO2   O2 Device (Oxygen Therapy) nasal cannula w/ humidification   Flow (L/min) 3   SpO2 98 %   Pulse 91   Resp 18   Aerosol Therapy   $ Aerosol Therapy Charges Aerosol Treatment   Respiratory Treatment Status given   SVN/Inhaler Treatment Route mask;with air   Position During Treatment HOB at 30 degrees   Patient Tolerance good   Post-Treatment   Post-treatment Heart Rate (beats/min) 92   Post-treatment Resp Rate (breaths/min) 18   All Fields Breath Sounds unchanged   Pt tolerates NC and treatments well.

## 2017-09-05 NOTE — PLAN OF CARE
09/05/17 1113   Discharge Reassessment   Assessment Type Discharge Planning Reassessment   Provided patient/caregiver education on the expected discharge date and the discharge plan Yes   Discharge Plan A Home with family   Discharge Plan B (Pt having surgery on hand today )   Patient choice form signed by patient/caregiver N/A   Can the patient answer the patient profile reliably? Yes, cognitively intact   How does the patient rate their overall health at the present time? Fair   Describe the patient's ability to walk at the present time. No restrictions   How often would a person be available to care for the patient? Often   Number of comorbid conditions (as recorded on the chart) None

## 2017-09-05 NOTE — PLAN OF CARE
Problem: Fall Risk (Adult)  Goal: Identify Related Risk Factors and Signs and Symptoms  Related risk factors and signs and symptoms are identified upon initiation of Human Response Clinical Practice Guideline (CPG)   Outcome: Ongoing (interventions implemented as appropriate)   09/04/17 2022   Fall Risk   Related Risk Factors (Fall Risk) culprit medication(s);sleep pattern alteration;environment unfamiliar     Goal: Absence of Falls  Patient will demonstrate the desired outcomes by discharge/transition of care.   Outcome: Ongoing (interventions implemented as appropriate)   09/04/17 2022   Fall Risk (Adult)   Absence of Falls making progress toward outcome       Problem: Pain, Acute (Adult)  Goal: Identify Related Risk Factors and Signs and Symptoms  Related risk factors and signs and symptoms are identified upon initiation of Human Response Clinical Practice Guideline (CPG)   Outcome: Ongoing (interventions implemented as appropriate)   09/04/17 2022   Pain, Acute   Related Risk Factors (Acute Pain) infection;persistent pain;procedure/treatment;trauma   Signs and Symptoms (Acute Pain) alteration in muscle tone;facial mask of pain/grimace;fatigue/weakness;verbalization of pain descriptors     Goal: Acceptable Pain Control/Comfort Level  Patient will demonstrate the desired outcomes by discharge/transition of care.   Outcome: Ongoing (interventions implemented as appropriate)   09/04/17 2022   Pain, Acute (Adult)   Acceptable Pain Control/Comfort Level making progress toward outcome       Problem: Infection, Risk/Actual (Adult)  Goal: Identify Related Risk Factors and Signs and Symptoms  Related risk factors and signs and symptoms are identified upon initiation of Human Response Clinical Practice Guideline (CPG)   Outcome: Ongoing (interventions implemented as appropriate)   09/04/17 2022   Infection, Risk/Actual   Related Risk Factors (Infection, Risk/Actual) exposure to microbes;chronic illness/condition;skin  integrity impairment;trauma injury;treatment plan   Signs and Symptoms (Infection, Risk/Actual) lab value changes;weakness     Goal: Infection Prevention/Resolution  Patient will demonstrate the desired outcomes by discharge/transition of care.   Outcome: Ongoing (interventions implemented as appropriate)   09/04/17 2022   Infection, Risk/Actual (Adult)   Infection Prevention/Resolution making progress toward outcome       Comments: Uneventful shift. Pt pain managed with PRN morphine, dressing to R hand changed per orders. Pt remains free of falls this shift, able to make needs known. Aware of procedure scheduled for tomorrow. States he won't eat after midnight, though no NPO after midnight orders are present. No PRN BP medication needed this shift.

## 2017-09-05 NOTE — ANESTHESIA PREPROCEDURE EVALUATION
09/05/2017  Chaz Stuart is a 67 y.o., male.    Anesthesia Evaluation     I have reviewed the Nursing Notes.      Review of Systems  Anesthesia Hx:  No problems with previous Anesthesia   Social:  Former Smoker    Cardiovascular:   Exercise tolerance: poor Denies Pacemaker. Hypertension  Denies Valvular problems/Murmurs.  Denies MI.  Denies CAD.    Denies CABG/stent.  Denies Dysrhythmias.   Denies Angina.             hyperlipidemia    Pulmonary:   COPD (O2 dependent round the clock), severe Shortness of breath Breathing at baseline. Pt was 84% on O2 in holding area   Renal/:   BPH    Hepatic/GI:   No Bowel Prep. GERD Denies Liver Disease. Denies Hepatitis.    Musculoskeletal:   Arthritis     Neurological:   Denies CVA. Denies Seizures.  Denies Dementia    Endocrine:  Endocrine Normal    Psych:   Psychiatric History          Physical Exam  General:  Well nourished    Airway/Jaw/Neck:  AIRWAY FINDINGS: Normal           Mental Status:  Mental Status Findings: Normal        Anesthesia Plan  Type of Anesthesia, risks & benefits discussed:  Anesthesia Type:  regional, MAC, general  Patient's Preference: regional + MAC  Intra-op Monitoring Plan: standard ASA monitors  Intra-op Monitoring Plan Comments:   Post Op Pain Control Plan:   Post Op Pain Control Plan Comments:   Induction:   IV  Beta Blocker:  Patient is on a Beta-Blocker and has received one dose within the past 24 hours (No further documentation required).       Informed Consent: Patient understands risks and agrees with Anesthesia plan.  Questions answered. Anesthesia consent signed with patient.  ASA Score: 4     Day of Surgery Review of History & Physical:    H&P update referred to the provider.     Anesthesia Plan Notes: Discussed plan with family, pt and surgeon. Plan A is block (axiallary and supplement with wrist block) with minimal  sedation. If he fails this Plan B is General  Anesthesthisa with LMA and maintain spontaneous ventilation.        Ready For Surgery From Anesthesia Perspective.

## 2017-09-05 NOTE — TRANSFER OF CARE
"Anesthesia Transfer of Care Note    Patient: Chaz Stuart    Procedure(s) Performed: Procedure(s) (LRB):  PINNING  INDEX, MIDDLE FINGER (Right)    Patient location: PACU    Anesthesia Type: general and regional    Transport from OR: Transported from OR on room air with adequate spontaneous ventilation    Post pain: adequate analgesia    Post assessment: no apparent anesthetic complications    Post vital signs: stable    Level of consciousness: awake    Nausea/Vomiting: no nausea/vomiting    Complications: none    Transfer of care protocol was followed      Last vitals:   Visit Vitals  BP (!) 157/80 (BP Location: Right arm, Patient Position: Lying)   Pulse 90   Temp 36.8 °C (98.2 °F) (Oral)   Resp 10   Ht 5' 7" (1.702 m)   Wt 67.2 kg (148 lb 3.2 oz)   SpO2 99%   BMI 23.21 kg/m²     "

## 2017-09-05 NOTE — PLAN OF CARE
Problem: Patient Care Overview  Goal: Plan of Care Review  Outcome: Ongoing (interventions implemented as appropriate)   09/05/17 1604   Coping/Psychosocial   Plan Of Care Reviewed With patient     Recovery care complete. AA/O. Kirill score 9/10. No N/V. Afebrile. Pt presents with expiratory wheezing; duo neb x2 given per MD order. 80mg methyl prednisone administered per MD order. All other VSS. NSR per monitor. Acceptable level of pain maintained (4/10). Pt denies any c/o pain; shoulder block noted. IVF infusing. DSG to R hand c/d/i with no redness nor swelling noted. Report given to MAJO Frost RN on telemetry unit. Pt transported to room 300 via hospital bed. Family notified.      Problem: Surgery Nonspecified (Adult)  Goal: Anesthesia/Sedation Recovery  Outcome: Outcome(s) achieved Date Met: 09/05/17 09/05/17 1604   Goal/Outcome Evaluation   Anesthesia/Sedation Recovery criteria met for transfer

## 2017-09-05 NOTE — BRIEF OP NOTE
Operative Note         SUMMARY     Surgery Date: 9/5/2017     Surgeon(s) and Role:     * Phillip Monson III, MD - Primary    Pre-op Diagnosis:  Crush injury R hand, open fractures R IF and MF    Post-op Diagnosis: Crush injury R hand, open fractures R IF and MF    Procedure(s) (LRB):  OPEN REDUCTION INTERNAL FIXATION- THUMB,INDEX MIDDLE FINGERS VS PINNING  THUMB, INDEX, MIDDLE FINGERS (Right) I&D open fx R IF, MF    Anesthesia: Choice    Findings/Key Components:      Estimated Blood Loss: min          Specimens     None

## 2017-09-05 NOTE — NURSING TRANSFER
Nursing Transfer Note      9/5/2017     Transfer To: Room 300; PER handoff given to MAJO Frost RN    Transfer From: PACU    Transfer via bed    Transfer with Telemetry box, IVF, O2     Transported by [facility]    [employer] sent: N/A    Chart send with patient: Yes    Notified: family

## 2017-09-06 LAB
ALBUMIN SERPL BCP-MCNC: 2.7 G/DL
ALP SERPL-CCNC: 106 U/L
ALT SERPL W/O P-5'-P-CCNC: 63 U/L
ANION GAP SERPL CALC-SCNC: 10 MMOL/L
AST SERPL-CCNC: 23 U/L
BILIRUB SERPL-MCNC: 0.4 MG/DL
BUN SERPL-MCNC: 28 MG/DL
CALCIUM SERPL-MCNC: 8.5 MG/DL
CHLORIDE SERPL-SCNC: 95 MMOL/L
CO2 SERPL-SCNC: 40 MMOL/L
CREAT SERPL-MCNC: 0.8 MG/DL
ERYTHROCYTE [DISTWIDTH] IN BLOOD BY AUTOMATED COUNT: 19.1 %
EST. GFR  (AFRICAN AMERICAN): >60 ML/MIN/1.73 M^2
EST. GFR  (NON AFRICAN AMERICAN): >60 ML/MIN/1.73 M^2
GLUCOSE SERPL-MCNC: 147 MG/DL
HCT VFR BLD AUTO: 30.3 %
HGB BLD-MCNC: 8.5 G/DL
MCH RBC QN AUTO: 21.7 PG
MCHC RBC AUTO-ENTMCNC: 28.1 G/DL
MCV RBC AUTO: 77 FL
PLATELET # BLD AUTO: 693 K/UL
PMV BLD AUTO: 9.5 FL
POTASSIUM SERPL-SCNC: 3.2 MMOL/L
PROT SERPL-MCNC: 5.4 G/DL
RBC # BLD AUTO: 3.92 M/UL
SODIUM SERPL-SCNC: 145 MMOL/L
WBC # BLD AUTO: 16.18 K/UL

## 2017-09-06 PROCEDURE — 85027 COMPLETE CBC AUTOMATED: CPT

## 2017-09-06 PROCEDURE — 63600175 PHARM REV CODE 636 W HCPCS: Performed by: EMERGENCY MEDICINE

## 2017-09-06 PROCEDURE — 25000003 PHARM REV CODE 250: Performed by: HOSPITALIST

## 2017-09-06 PROCEDURE — 36415 COLL VENOUS BLD VENIPUNCTURE: CPT

## 2017-09-06 PROCEDURE — 80053 COMPREHEN METABOLIC PANEL: CPT

## 2017-09-06 PROCEDURE — 21400001 HC TELEMETRY ROOM

## 2017-09-06 PROCEDURE — 27000173 HC ACAPELLA DEVICE DH OR DM

## 2017-09-06 PROCEDURE — 94664 DEMO&/EVAL PT USE INHALER: CPT

## 2017-09-06 PROCEDURE — 99900035 HC TECH TIME PER 15 MIN (STAT)

## 2017-09-06 PROCEDURE — 63600175 PHARM REV CODE 636 W HCPCS: Performed by: INTERNAL MEDICINE

## 2017-09-06 PROCEDURE — 25000003 PHARM REV CODE 250: Performed by: EMERGENCY MEDICINE

## 2017-09-06 PROCEDURE — 94761 N-INVAS EAR/PLS OXIMETRY MLT: CPT

## 2017-09-06 PROCEDURE — A4216 STERILE WATER/SALINE, 10 ML: HCPCS | Performed by: EMERGENCY MEDICINE

## 2017-09-06 PROCEDURE — 25000003 PHARM REV CODE 250: Performed by: INTERNAL MEDICINE

## 2017-09-06 PROCEDURE — 27000221 HC OXYGEN, UP TO 24 HOURS

## 2017-09-06 PROCEDURE — 94640 AIRWAY INHALATION TREATMENT: CPT

## 2017-09-06 PROCEDURE — 25000242 PHARM REV CODE 250 ALT 637 W/ HCPCS: Performed by: EMERGENCY MEDICINE

## 2017-09-06 PROCEDURE — C9113 INJ PANTOPRAZOLE SODIUM, VIA: HCPCS | Performed by: EMERGENCY MEDICINE

## 2017-09-06 RX ORDER — LIDOCAINE HYDROCHLORIDE 10 MG/ML
1 INJECTION, SOLUTION EPIDURAL; INFILTRATION; INTRACAUDAL; PERINEURAL ONCE
Status: DISCONTINUED | OUTPATIENT
Start: 2017-09-06 | End: 2017-09-11 | Stop reason: HOSPADM

## 2017-09-06 RX ADMIN — DOXYCYCLINE HYCLATE 100 MG: 100 TABLET, COATED ORAL at 09:09

## 2017-09-06 RX ADMIN — METHYLPREDNISOLONE SODIUM SUCCINATE 80 MG: 125 INJECTION, POWDER, FOR SOLUTION INTRAMUSCULAR; INTRAVENOUS at 09:09

## 2017-09-06 RX ADMIN — ENOXAPARIN SODIUM 40 MG: 100 INJECTION SUBCUTANEOUS at 05:09

## 2017-09-06 RX ADMIN — METHYLPREDNISOLONE SODIUM SUCCINATE 80 MG: 125 INJECTION, POWDER, FOR SOLUTION INTRAMUSCULAR; INTRAVENOUS at 06:09

## 2017-09-06 RX ADMIN — ALPRAZOLAM 1 MG: 0.5 TABLET ORAL at 06:09

## 2017-09-06 RX ADMIN — Medication 3 ML: at 10:09

## 2017-09-06 RX ADMIN — PANTOPRAZOLE SODIUM 40 MG: 40 INJECTION, POWDER, FOR SOLUTION INTRAVENOUS at 09:09

## 2017-09-06 RX ADMIN — IPRATROPIUM BROMIDE AND ALBUTEROL SULFATE 3 ML: .5; 3 SOLUTION RESPIRATORY (INHALATION) at 07:09

## 2017-09-06 RX ADMIN — METHYLPREDNISOLONE SODIUM SUCCINATE 80 MG: 125 INJECTION, POWDER, FOR SOLUTION INTRAMUSCULAR; INTRAVENOUS at 02:09

## 2017-09-06 RX ADMIN — ALPRAZOLAM 1 MG: 0.5 TABLET ORAL at 09:09

## 2017-09-06 RX ADMIN — IPRATROPIUM BROMIDE AND ALBUTEROL SULFATE 3 ML: .5; 3 SOLUTION RESPIRATORY (INHALATION) at 03:09

## 2017-09-06 RX ADMIN — IPRATROPIUM BROMIDE AND ALBUTEROL SULFATE 3 ML: .5; 3 SOLUTION RESPIRATORY (INHALATION) at 11:09

## 2017-09-06 RX ADMIN — IPRATROPIUM BROMIDE AND ALBUTEROL SULFATE 3 ML: .5; 3 SOLUTION RESPIRATORY (INHALATION) at 02:09

## 2017-09-06 RX ADMIN — AMLODIPINE BESYLATE 5 MG: 5 TABLET ORAL at 09:09

## 2017-09-06 RX ADMIN — ASPIRIN 81 MG: 81 TABLET, COATED ORAL at 09:09

## 2017-09-06 RX ADMIN — METOPROLOL TARTRATE 5 MG: 5 INJECTION INTRAVENOUS at 05:09

## 2017-09-06 RX ADMIN — Medication 3 ML: at 06:09

## 2017-09-06 RX ADMIN — METOPROLOL TARTRATE 5 MG: 5 INJECTION INTRAVENOUS at 10:09

## 2017-09-06 RX ADMIN — MORPHINE SULFATE 6 MG: 10 INJECTION INTRAVENOUS at 06:09

## 2017-09-06 RX ADMIN — ALPRAZOLAM 1 MG: 0.5 TABLET ORAL at 02:09

## 2017-09-06 RX ADMIN — CEFTRIAXONE 1 G: 1 INJECTION, SOLUTION INTRAVENOUS at 09:09

## 2017-09-06 RX ADMIN — Medication 3 ML: at 02:09

## 2017-09-06 RX ADMIN — MORPHINE SULFATE 6 MG: 10 INJECTION INTRAVENOUS at 07:09

## 2017-09-06 RX ADMIN — RAMELTEON 8 MG: 8 TABLET, FILM COATED ORAL at 09:09

## 2017-09-06 NOTE — NURSING
Bedside report given by KRISTY Sparks.  PT AAOx4. R-arm in sling with index and middle finger dressed. Reports pain 8/10, R-hand. General assessment reveals no acute distress. Call bell in reach, bed locked in lowest position.

## 2017-09-06 NOTE — PHYSICIAN QUERY
"PT Name: Chaz Stuart  MR #: [id number]    Physician Query Form - Procedure Clarification     CDS/: Geri Bunch RN             Contact information: [phone]  This form is a permanent document in the medical record.     Query Date: September 6, 2017  By submitting this query, we are merely seeking further clarification of documentation. Please utilize your independent clinical judgment when addressing the question(s) below.    The Medical record contains the following:     Indicators       Supporting Clinical Findings   Location in Medical Record   x Documentation of "Debridement"   Irrigation and debridement of open fx of index finger distal phalanx and interphalangeal joint OP Note 9/5   x Documentation of "I & D" Irrigation and debridement OP Note 9/5    EBL =     x Other: the open fractures were debrided and   then irrigated with normal saline.  The fragments of bone, skin and deep tissues   were removed and curettes were used to clean the open fracture    OP Note 9/5     Excisional debridement is a surgical removal of  nonvitalized tissue, necrosis or slough. The use of a sharp instrument does not always indicate that an excisional debridement was performed.  Non excisional debridement is the scraping away or removal of loose tissue fragments.    Provider, please specify type of procedure(s) performed:    [  x] Excisional Debridement (Specify site, type, depth of tissue removal ,instrument, size of wound & EBL)   * Site: (Specify)_________IF DIP joint, MF P2____________________________   * Depth of tissue excised:    [ x ] Skin/Subcutaneous [x ] Soft tissue [ ] Fascia [ ] Muscle [x ] Bone   * Instrument used:    [  x] Scalpel [ x] Scissors [x ] Curette [ ] Other (Specify) ____________________   * Size of wound after debridement: (Specify) ___closed__________________________   * EBL (Specify) _________________min_________________    [  ] Non Excisional Debridement   * Depth of tissue debrided:    [ "  ] Skin/Subcutaneous [ ] Soft tissue [ ] Fascia [ ] Muscle [ ] Bone    [  ] Incision and Drainage    [  ] Other Procedure (Specify) ________________________________    [  ] Clinically Undetermined

## 2017-09-06 NOTE — PLAN OF CARE
Problem: Fall Risk (Adult)  Intervention: Reduce Risk/Promote Restraint Free Environment   17   Safety Interventions   Environmental Safety Modification assistive device/personal items within reach;lighting adjusted;room near unit station   Prevent [place] Drop/Fall   Safety/Security Measures bed alarm set     Intervention: Review Medications/Identify Contributors to Fall Risk   17   Safety Interventions   Medication Review/Management medications reviewed     Intervention: Patient Rounds   17   Safety Interventions   Patient Rounds bed in low position;bed wheels locked;call light in reach;clutter free environment maintained;ID band on;placement of personal items at bedside;toileting offered;visualized patient     Intervention: Safety Promotion/Fall Prevention   17   Safety Interventions   Safety Promotion/Fall Prevention assistive device/personal item within reach;bed alarm set;Fall Risk reviewed with patient/family;lighting adjusted;medications reviewed;nonskid shoes/socks when out of bed;room near unit station;side rails raised x 2       Goal: Identify Related Risk Factors and Signs and Symptoms  Related risk factors and signs and symptoms are identified upon initiation of Human Response Clinical Practice Guideline (CPG)   Outcome: Ongoing (interventions implemented as appropriate)   17   Fall Risk   Related Risk Factors (Fall Risk) age-related changes;depression/anxiety;environment unfamiliar;polypharmacy   Signs and Symptoms (Fall Risk) presence of risk factors     Goal: Absence of Falls  Patient will demonstrate the desired outcomes by discharge/transition of care.   Outcome: Ongoing (interventions implemented as appropriate)   17   Fall Risk (Adult)   Absence of Falls making progress toward outcome       Problem: Patient Care Overview  Goal: Plan of Care Review  Outcome: Ongoing (interventions implemented as appropriate)   17    Coping/Psychosocial   Plan Of Care Reviewed With patient     Patient noted to be restless throughout this shift.  Patient refused medication to assist with insomnia.  Pain controlled by current plan of care.  Patient noted to remain free from falls and trauma this shift.  Purposeful hourly rounding in progress.  Call bell within reach.  Will continue to monitor.

## 2017-09-06 NOTE — NURSING
Resting quietly in bed.  Able to make needs known.  Denies pain at this time.  Encouraged to call for assist PRN.  Verbalized understanding.  Call bell within reach.  Will continue to monitor.

## 2017-09-06 NOTE — PLAN OF CARE
Problem: ARDS (Acute Resp Distress Syndrome) (Adult)  Intervention: Optimize Oxygenation/Ventilation/Perfusion   09/06/17 1822   Respiratory Interventions   Airway/Ventilation Management airway patency maintained     Intervention: Position to Optimize Ventilation   09/06/17 1357   Positioning   Body Position positioned/repositioned independently     Intervention: Provide Preventive/Supportive Care   09/05/17 1604 09/06/17 1357 09/06/17 1822   Cognitive Interventions   Sensory Stimulation Regulation --  --  music/television provided for relaxation;care clustered;lighting decreased   Skin Interventions   Skin Protection adhesive use limited;transparent dressing maintained;tubing/devices free from skin contact --  --    Activity   Activity Type --  activity clustered for rest period --      Intervention: Prevent/Manage DVT/VTE Risk   09/05/17 1604 09/06/17 0944   Minimize Embolism Risk   VTE Prevention/Management intravenous hydration --    OTHER   VTE Required Core Measure --  Pharmacological prophylaxis initiated/maintained     Intervention: Support/Optimize Psychosocial Response to Illness   09/06/17 1822   Psychosocial Support   Family/Support System Care involvement promoted;self-care encouraged   Coping/Psychosocial Interventions   Supportive Measures relaxation techniques promoted;positive reinforcement provided;self-responsibility promoted;decision-making supported       Goal: Signs and Symptoms of Listed Potential Problems Will be Absent, Minimized or Managed (ARDS)  Signs and symptoms of listed potential problems will be absent, minimized or managed by discharge/transition of care (reference ARDS (Acute Resp Distress Syndrome) (Adult) CPG).   Outcome: Ongoing (interventions implemented as appropriate)   09/06/17 1822   ARDS (Acute Resp Distress Syndrome)   Problems Assessed (Acute Respiratory Distress Syndrome) all   Problems Present (Acute Respiratory Distress Syndrome) situational response       Problem:  Pain, Acute (Adult)  Intervention: Monitor/Manage Analgesia   09/06/17 0430 09/06/17 1822   Safety Interventions   Medication Review/Management medications reviewed --    Manage Acute Burn Pain   Bowel Intervention --  ambulation promoted     Intervention: Mutually Develop/Implement Acute Pain Management Plan   09/06/17 1822   Cognitive Interventions   Sensory Stimulation Regulation music/television provided for relaxation;care clustered;lighting decreased   Pain/Comfort/Sleep Interventions   Pain Management Interventions position adjusted;medication     Intervention: Support/Optimize Psychosocial Response to Acute Pain   09/06/17 1822   Psychosocial Support   Diversional Activities television   Trust Relationship/Rapport care explained;choices provided;empathic listening provided;questions answered;questions encouraged;reassurance provided;thoughts/feelings acknowledged       Goal: Identify Related Risk Factors and Signs and Symptoms  Related risk factors and signs and symptoms are identified upon initiation of Human Response Clinical Practice Guideline (CPG)   Outcome: Ongoing (interventions implemented as appropriate)   09/06/17 1822   Pain, Acute   Related Risk Factors (Acute Pain) procedure/treatment;surgery   Signs and Symptoms (Acute Pain) verbalization of pain descriptors;questions meaning of pain     Goal: Acceptable Pain Control/Comfort Level  Patient will demonstrate the desired outcomes by discharge/transition of care.   Outcome: Ongoing (interventions implemented as appropriate)   09/06/17 1822   Pain, Acute (Adult)   Acceptable Pain Control/Comfort Level making progress toward outcome

## 2017-09-06 NOTE — PROGRESS NOTES
Progress Note    Admit Date: 8/29/2017   LOS: 7 days     SUBJECTIVE:     Mr. Stuart is a pleasant 68 YO gentleman with Myeloproliferative neoplasm, Polycythemia Vera, oxygen dependent COPD and most recent injuries to right hand from a . Pt had a recent COPD exacerbation and was seen at [facility] ED- tx and released. For the last 3 days, not feeling well. Has chest discomfort and nausea. He began to have fever at home associated with worsening sob and chest pressure. Pt was scheduled for hand surgery and did not get clearance from anesthesia to get and outpatient procedure thus re-scheduled for tomorrow     Since admission, states feeling little better. Denies cough but has chest congestion. Denies any sick contact.    Follow-up For:  COPD exacerbation     09/06/2017: Pt underwent hand surgery on 09/05 and tolerated well. Denies fever or chills.   09/05/2017: feeling little better. No fever or chills.   09/04/2017: increasing sob. Have some reservation about getting surgery tomorrow   09/03/2017: Denies fever or chills.   09/02/2017: chest congestion better.   09/01/2017: feeling better. Had BM. Less SOB    08/31/2017: continue to have moderate sob. + Abdominal distention and constipation      Scheduled Meds:   albuterol-ipratropium 2.5mg-0.5mg/3mL  3 mL Nebulization Q4H    alprazolam  1 mg Oral TID    amlodipine  5 mg Oral Daily    aspirin  81 mg Oral Daily    cefTRIAXone (ROCEPHIN) IVPB  1 g Intravenous Q24H    doxycycline  100 mg Oral Q12H    enoxaparin  40 mg Subcutaneous Daily    methylPREDNISolone sodium succinate  80 mg Intravenous Q8H    pantoprazole  40 mg Intravenous Daily    polyethylene glycol  17 g Oral Daily    sodium chloride 0.9%  3 mL Intravenous Q8H     Continuous Infusions:     PRN Meds:lactulose, metoprolol, morphine, morphine, ondansetron, promethazine (PHENERGAN) IVPB, ramelteon    Review of patient's allergies indicates:  No Known Allergies    Review of Systems    Constitutional:  No fever   Eyes: no visual changes   ENT: no nasal congestion. Denies sore throat with odynophagia   Respiratory: see HPI. Feeling little worse today   Cardiovascular: see HPI   Gastrointestinal: denies abd pain.    Hematologic/Lymphatic: see HPI  Musculoskeletal: ROM of right hand limited   Neurological: no seizures or tremors, gait or balance prblems  Skin: No rashes or lesions  Psych: Denies any anxiety, depression or insomnia      OBJECTIVE:     Vital Signs (Most Recent)    Temp: 96.9 °F (36.1 °C) (09/06/17 0457)  Pulse: 85 (09/06/17 0742)  Resp: (!) 22 (09/06/17 0742)  BP: (!) 172/75 (09/06/17 0457)  SpO2: (!) 90 % (09/06/17 0742)    Vital Signs Range (Last 24H):    Temp:  [96.6 °F (35.9 °C)-98.4 °F (36.9 °C)]   Pulse:  [84-99]   Resp:  [10-26]   BP: (140-184)/(67-88)   SpO2:  [89 %-100 %]     I & O (Last 24H):    Intake/Output Summary (Last 24 hours) at 09/06/17 0806  Last data filed at 09/06/17 0300   Gross per 24 hour   Intake             1230 ml   Output              950 ml   Net              280 ml     Physical Exam:    General: NAD, resting in bed.  Head: normocephalic, atraumatic   Eyes: conjunctivae pink, anicteric sclera.  Throat: No erythema or post nasal discharge   Neck: supple, no LAD   Lungs: Decreased air entry bilaterally, mild expiratory wheezing   Heart: S1, S2, RRR   Abdomen: + BS x 4 quadrants, slightly distended, mild epigastric tenderness    Extremities: no cyanosis or edema.   Skin: turgor normal, no erythema or rashes. No abnormal moles  MS:  right hand in surgical bandage.   Neuro: A&O x 3    Psy: calm      Laboratory:  CBC:     Recent Labs  Lab 09/06/17  0516   WBC 16.18*   RBC 3.92*   HGB 8.5*   HCT 30.3*   *   MCV 77*   MCH 21.7*   MCHC 28.1*     CMP:     Recent Labs  Lab 09/05/17  0503   *   CALCIUM 8.8   ALBUMIN 2.9*   PROT 5.9*      K 3.2*   CO2 40*   CL 95   BUN 23   CREATININE 0.7   ALKPHOS 124   ALT 75*   AST 25   BILITOT 0.5     LFTs:     Recent Labs  Lab  09/05/17  0503   ALT 75*   AST 25   ALKPHOS 124   BILITOT 0.5   PROT 5.9*   ALBUMIN 2.9*     Coagulation:   No results for input(s): LABPROT, INR, APTT in the last 168 hours.  Cardiac markers:   No results for input(s): CKMB, CPKMB, TROPONINT, TROPONINI, MYOGLOBIN in the last 168 hours.  ABGs:   No results for input(s): PH, PCO2, PO2, HCO3, POCSATURATED, BE in the last 168 hours.  Microbiology Results (last 7 days)     ** No results found for the last 168 hours. **        Specimen (12h ago through future)    None        No results for input(s): COLORU, CLARITYU, SPECGRAV, PHUR, PROTEINUA, GLUCOSEU, BILIRUBINCON, BLOODU, WBCU, RBCU, BACTERIA, MUCUS, NITRITE, LEUKOCYTESUR, UROBILINOGEN, HYALINECASTS in the last 168 hours.      Diagnostic Results:      ASSESSMENT/PLAN:     Active Hospital Problems    Diagnosis  POA    *COPD exacerbation [J44.1]  Yes      Resolved Hospital Problems    Diagnosis Date Resolved POA   No resolved problems to display.       1. Acute exacerbation of COPD: pt has significant symptoms and outpatient tx failed                        - started IV steroid, Rocephin and Zithromax                        - Duoneb                        - pulmonary consulted    - continue to improve slowly    - Dced  zithromax, decrease steroid dose    -  Lasix 40mg IV and K supplement prn     2. Fever and Leukocytosis/SIRS: multifactorial in nature- hand injury can be a source of infection                          - on Rocephin    - wbc trending up    - will add Doxy IV    -wbc improving      3. MPN: Polycythemia Vera: currently on Hydrea                - will hold off on hydrea      4. Abnormal LFT: US of liver done                        - GI consult pending                         - acute hep panel negative                          - ? Medication induced liver toxicity?    - improving steadily    - no n/v     5. Elevated glucose: mostly due to IV steroids        6. Nausea: protonix and Zofra                          -dc motrin     7. DVT  Prophylaxis: Lovenox     8. Constipation: started Lactulose and having good BM    9. Hand injury- 3 fingers are damaged and awaiting surgical intervention   - pt and family wants Dr. Monson to see    - discussed the case in detail with Dr. Monson and he evaluated pt on Fri   - plan for OR today.   - discussed with Dr. Monson    - Crush injury R hand, open fractures R IF and MF:Post-op Diagnosis: Crush injury R hand, open fractures R IF and MF     Procedure(s) (LRB):  OPEN REDUCTION INTERNAL FIXATION- THUMB,INDEX MIDDLE FINGERS VS PINNING  THUMB, INDEX, MIDDLE FINGERS (Right) I&D open fx R IF, MF    - will start OT when ok with OR    Rk Welsh M.D  Internal Medicine & Geriatric Medicine  Hematology & Oncology  Palliative Medicine    [address], [address]  [place] [address]  [phone] (Office)  [phone] (Fax)

## 2017-09-06 NOTE — PHYSICIAN QUERY
"PT Name: Chaz Stuart  MR #: [id number]    Physician Query Form - Procedure Clarification     CDS/: Geri Bunch RN            Contact information:  [phone]  This form is a permanent document in the medical record.     Query Date: September 6, 2017  By submitting this query, we are merely seeking further clarification of documentation. Please utilize your independent clinical judgment when addressing the question(s) below.    The Medical record contains the following:     Indicators       Supporting Clinical Findings   Location in Medical Record   x Documentation of "Debridement"   Irrigation and debridement of thumb wounds OP Note 9/5   x Documentation of "I & D" Irrigatin and debridement of thumb wounds OP Note 9/5    EBL =     x Other: The wounds were debrided....some skin was debrided full thickness down to the subcutaneous tissues OP Note 9/5     Excisional debridement is a surgical removal of  nonvitalized tissue, necrosis or slough. The use of a sharp instrument does not always indicate that an excisional debridement was performed.  Non excisional debridement is the scraping away or removal of loose tissue fragments.    Provider, please specify type of procedure(s) performed:    [ x ] Excisional Debridement (Specify site, type, depth of tissue removal ,instrument, size of wound & EBL)   * Site: (Specify)_____________________________________   * Depth of tissue excised:    [  ] Skin/Subcutaneous [ ] Soft tissue [ ] Fascia [ ] Muscle [ ] Bone   * Instrument used:    [  ] Scalpel [ ] Scissors [ ] Curette [ ] Other (Specify) ____________________   * Size of wound after debridement: (Specify) _____________________________   * EBL (Specify) __________________________________    [  ] Non Excisional Debridement   * Depth of tissue debrided:    [  ] Skin/Subcutaneous [ ] Soft tissue [ ] Fascia [ ] Muscle [ ] Bone    [  ] Incision and Drainage    [  ] Other Procedure (Specify) " ________________________________    [  ] Clinically Undetermined

## 2017-09-06 NOTE — PHYSICIAN QUERY
"PT Name: Chaz Stuart  MR #: [id number]    Physician Query Form - Procedure Clarification     CDS/: Geri Bunch RN             Contact information: [phone]  This form is a permanent document in the medical record.     Query Date: September 6, 2017  By submitting this query, we are merely seeking further clarification of documentation. Please utilize your independent clinical judgment when addressing the question(s) below.    The Medical record contains the following:     Indicators       Supporting Clinical Findings   Location in Medical Record   x Documentation of "Debridement"   Irrigation and debridement of middle finger of his right hand -open fracture of middle phalanx OP Note 9/5   x Documentation of "I & D" Irrigation and debridement  OP Note 9/5    EBL =     x Other: the open fractures were debrided and   then irrigated with normal saline.  The fragments of bone, skin and deep tissues   were removed and curettes were used to clean the open fracture    OP Note  9/5     Excisional debridement is a surgical removal of  nonvitalized tissue, necrosis or slough. The use of a sharp instrument does not always indicate that an excisional debridement was performed.  Non excisional debridement is the scraping away or removal of loose tissue fragments.    Provider, please specify type of procedure(s) performed:    [x  ] Excisional Debridement (Specify site, type, depth of tissue removal ,instrument, size of wound & EBL)   * Site: (Specify)_____________________________________   * Depth of tissue excised:    [  ] Skin/Subcutaneous [ ] Soft tissue [ ] Fascia [ ] Muscle [ ] Bone   * Instrument used:    [  ] Scalpel [ ] Scissors [ ] Curette [ ] Other (Specify) ____________________   * Size of wound after debridement: (Specify) _____________________________   * EBL (Specify) __________________________________    [  ] Non Excisional Debridement   * Depth of tissue debrided:    [  ] Skin/Subcutaneous [ ] Soft " tissue [ ] Fascia [ ] Muscle [ ] Bone    [  ] Incision and Drainage    [  ] Other Procedure (Specify) ________________________________    [  ] Clinically Undetermined

## 2017-09-06 NOTE — OP NOTE
DATE OF PROCEDURE:  09/05/2017    SURGEON:  Phillip Monson III, M.D.    ASSISTANT:  Jennifer Long LPN.    PREOPERATIVE DIAGNOSIS:  Open fracture, index finger and middle finger with a   crush injury and lacerations and lacerations to the thumb.    POSTOPERATIVE DIAGNOSES:  Open fracture, index finger and middle finger with a   crush injury and lacerations and lacerations to the thumb.    PROCEDURES:  1.  Irrigation and debridement of open fracture of index finger distal phalanx   and interphalangeal joint.  2.  Irrigation and debridement of middle finger of his right hand of middle   finger open fracture of middle phalanx.  3.  Open reduction and pinning of intraarticular fracture of index finger distal   interphalangeal joint.  4.  Open reduction and pinning of right middle finger middle phalanx fracture.  5.  Repair of complex laceration, right thumb pulp and thumb proximal phalanx.  6.  Irrigation and debridement of thumb wounds.    INDICATION:  The patient is a 67-year-old male who injured his right hand about   2 weeks ago when he accidentally got caught in a lawnmower engine, got caught   between the flywheel and the starting gear.  He was able to crank it back to   move his fingers, but had open fracture and severe injury to his index, middle   and thumb.  He was treated at [facility].  He was referred to Hand Surgery   prior to being able to go to see a surgeon.  He had COPD exacerbation.  He was   admitted to the hospital and I was consulted for evaluation.  He was found to   have a complex lacerations that had been sutured on his thumb, index and middle   fingers and fractures and instability of his index and middle.  Recommendation   made for repair and reduction and pinning.    PROCEDURE IN DETAIL:  After proper informed consent was obtained, the patient   was brought to the operating room and placed supine on the table.  Axillary   anesthesia was in place.  The right arm was prepped and draped in  sterile   fashion.  Arm was exsanguinated.  Tourniquet inflated to 250 mmHg.  The sutures   were removed.  The wounds were debrided including the open fractures of his   index finger and middle finger.  Some skin was debrided full thickness down to   the subcutaneous tissues.  Following this, the open fractures were debrided and   then irrigated with normal saline.  The fragments of bone, skin and deep tissues   were removed and curettes were used to clean the open fracture and the complex   skin lacerations were debrided and then approximated as best as possible.  The   index finger was addressed first with a 0.045 K-wire in a retrograde fashion was   pinned across the DIP joint to stabilize the intraarticular fracture at the   base of the distal phalanx was highly unstable.  Following pinning, it was   improved.  The middle phalanx fracture of his middle finger was reduced in an   open fashion and then pinned with cross 0.035 inch K-wires.  Proper alignment   was confirmed under fluoroscopy as well as placement of hardware.  The pins were   bent and cut outside the skin.  The tourniquet was deflated.  Hemostasis was   obtained with electrocautery.  Wounds were irrigated again and closed with   interrupted 4-0 nylon to the thumb pulp, the thumb proximal phalanx laceration   and complex stellate lacerations to the middle finger and index finger.    Following this, he was placed in a sterile dressing and finger splints and   brought to Recovery in stable condition.      DEONTE  dd: 09/05/2017 14:03:42 (CDT)  td: 09/05/2017 23:06:32 (CDT)  Doc ID   #[id number]  Job ID #[id number]    CC:

## 2017-09-07 LAB
ALBUMIN SERPL BCP-MCNC: 2.9 G/DL
ALP SERPL-CCNC: 109 U/L
ALT SERPL W/O P-5'-P-CCNC: 51 U/L
ANION GAP SERPL CALC-SCNC: 9 MMOL/L
AST SERPL-CCNC: 22 U/L
BILIRUB SERPL-MCNC: 0.5 MG/DL
BUN SERPL-MCNC: 24 MG/DL
CALCIUM SERPL-MCNC: 8.7 MG/DL
CHLORIDE SERPL-SCNC: 92 MMOL/L
CO2 SERPL-SCNC: 41 MMOL/L
CREAT SERPL-MCNC: 0.7 MG/DL
ERYTHROCYTE [DISTWIDTH] IN BLOOD BY AUTOMATED COUNT: 18.7 %
EST. GFR  (AFRICAN AMERICAN): >60 ML/MIN/1.73 M^2
EST. GFR  (NON AFRICAN AMERICAN): >60 ML/MIN/1.73 M^2
GLUCOSE SERPL-MCNC: 132 MG/DL
HCT VFR BLD AUTO: 34.8 %
HGB BLD-MCNC: 9.6 G/DL
MCH RBC QN AUTO: 21.4 PG
MCHC RBC AUTO-ENTMCNC: 27.6 G/DL
MCV RBC AUTO: 78 FL
PLATELET # BLD AUTO: 682 K/UL
PMV BLD AUTO: 9.6 FL
POTASSIUM SERPL-SCNC: 3.1 MMOL/L
PROT SERPL-MCNC: 5.6 G/DL
RBC # BLD AUTO: 4.48 M/UL
SODIUM SERPL-SCNC: 142 MMOL/L
WBC # BLD AUTO: 17.57 K/UL

## 2017-09-07 PROCEDURE — 63600175 PHARM REV CODE 636 W HCPCS: Performed by: EMERGENCY MEDICINE

## 2017-09-07 PROCEDURE — C9113 INJ PANTOPRAZOLE SODIUM, VIA: HCPCS | Performed by: EMERGENCY MEDICINE

## 2017-09-07 PROCEDURE — 36415 COLL VENOUS BLD VENIPUNCTURE: CPT

## 2017-09-07 PROCEDURE — 94640 AIRWAY INHALATION TREATMENT: CPT

## 2017-09-07 PROCEDURE — A4216 STERILE WATER/SALINE, 10 ML: HCPCS | Performed by: EMERGENCY MEDICINE

## 2017-09-07 PROCEDURE — 21400001 HC TELEMETRY ROOM

## 2017-09-07 PROCEDURE — 25000003 PHARM REV CODE 250: Performed by: EMERGENCY MEDICINE

## 2017-09-07 PROCEDURE — 27000173 HC ACAPELLA DEVICE DH OR DM

## 2017-09-07 PROCEDURE — 63600175 PHARM REV CODE 636 W HCPCS: Performed by: INTERNAL MEDICINE

## 2017-09-07 PROCEDURE — 85027 COMPLETE CBC AUTOMATED: CPT

## 2017-09-07 PROCEDURE — 94664 DEMO&/EVAL PT USE INHALER: CPT

## 2017-09-07 PROCEDURE — 80053 COMPREHEN METABOLIC PANEL: CPT

## 2017-09-07 PROCEDURE — 94660 CPAP INITIATION&MGMT: CPT

## 2017-09-07 PROCEDURE — 27000190 HC CPAP FULL FACE MASK W/VALVE

## 2017-09-07 PROCEDURE — 27000221 HC OXYGEN, UP TO 24 HOURS

## 2017-09-07 PROCEDURE — 25000003 PHARM REV CODE 250: Performed by: INTERNAL MEDICINE

## 2017-09-07 PROCEDURE — 25000242 PHARM REV CODE 250 ALT 637 W/ HCPCS: Performed by: EMERGENCY MEDICINE

## 2017-09-07 PROCEDURE — 99900035 HC TECH TIME PER 15 MIN (STAT)

## 2017-09-07 RX ORDER — POTASSIUM CHLORIDE 20 MEQ/15ML
20 SOLUTION ORAL DAILY
Status: DISCONTINUED | OUTPATIENT
Start: 2017-09-07 | End: 2017-09-11 | Stop reason: HOSPADM

## 2017-09-07 RX ADMIN — PANTOPRAZOLE SODIUM 40 MG: 40 INJECTION, POWDER, FOR SOLUTION INTRAVENOUS at 08:09

## 2017-09-07 RX ADMIN — DOXYCYCLINE HYCLATE 100 MG: 100 TABLET, COATED ORAL at 08:09

## 2017-09-07 RX ADMIN — Medication 3 ML: at 06:09

## 2017-09-07 RX ADMIN — METHYLPREDNISOLONE SODIUM SUCCINATE 80 MG: 125 INJECTION, POWDER, FOR SOLUTION INTRAMUSCULAR; INTRAVENOUS at 09:09

## 2017-09-07 RX ADMIN — ASPIRIN 81 MG: 81 TABLET, COATED ORAL at 08:09

## 2017-09-07 RX ADMIN — Medication 3 ML: at 09:09

## 2017-09-07 RX ADMIN — MORPHINE SULFATE 6 MG: 10 INJECTION INTRAVENOUS at 06:09

## 2017-09-07 RX ADMIN — IPRATROPIUM BROMIDE AND ALBUTEROL SULFATE 3 ML: .5; 3 SOLUTION RESPIRATORY (INHALATION) at 11:09

## 2017-09-07 RX ADMIN — AMLODIPINE BESYLATE 5 MG: 5 TABLET ORAL at 08:09

## 2017-09-07 RX ADMIN — IPRATROPIUM BROMIDE AND ALBUTEROL SULFATE 3 ML: .5; 3 SOLUTION RESPIRATORY (INHALATION) at 04:09

## 2017-09-07 RX ADMIN — ALPRAZOLAM 1 MG: 0.5 TABLET ORAL at 06:09

## 2017-09-07 RX ADMIN — METHYLPREDNISOLONE SODIUM SUCCINATE 80 MG: 125 INJECTION, POWDER, FOR SOLUTION INTRAMUSCULAR; INTRAVENOUS at 02:09

## 2017-09-07 RX ADMIN — METHYLPREDNISOLONE SODIUM SUCCINATE 80 MG: 125 INJECTION, POWDER, FOR SOLUTION INTRAMUSCULAR; INTRAVENOUS at 06:09

## 2017-09-07 RX ADMIN — MORPHINE SULFATE 6 MG: 10 INJECTION INTRAVENOUS at 11:09

## 2017-09-07 RX ADMIN — ALPRAZOLAM 1 MG: 0.5 TABLET ORAL at 09:09

## 2017-09-07 RX ADMIN — MORPHINE SULFATE 6 MG: 10 INJECTION INTRAVENOUS at 10:09

## 2017-09-07 RX ADMIN — DOXYCYCLINE HYCLATE 100 MG: 100 TABLET, COATED ORAL at 09:09

## 2017-09-07 RX ADMIN — ALPRAZOLAM 1 MG: 0.5 TABLET ORAL at 02:09

## 2017-09-07 RX ADMIN — POTASSIUM CHLORIDE 20 MEQ: 20 SOLUTION ORAL at 08:09

## 2017-09-07 RX ADMIN — Medication 3 ML: at 02:09

## 2017-09-07 RX ADMIN — CEFTRIAXONE 1 G: 1 INJECTION, SOLUTION INTRAVENOUS at 09:09

## 2017-09-07 RX ADMIN — IPRATROPIUM BROMIDE AND ALBUTEROL SULFATE 3 ML: .5; 3 SOLUTION RESPIRATORY (INHALATION) at 07:09

## 2017-09-07 RX ADMIN — ENOXAPARIN SODIUM 40 MG: 100 INJECTION SUBCUTANEOUS at 05:09

## 2017-09-07 NOTE — PROGRESS NOTES
Progress Note    Admit Date: 8/29/2017   LOS: 8 days     SUBJECTIVE:     Mr. Stuart is a pleasant 68 YO gentleman with Myeloproliferative neoplasm, Polycythemia Vera, oxygen dependent COPD and most recent injuries to right hand from a . Pt had a recent COPD exacerbation and was seen at [facility] ED- tx and released. For the last 3 days, not feeling well. Has chest discomfort and nausea. He began to have fever at home associated with worsening sob and chest pressure. Pt was scheduled for hand surgery and did not get clearance from anesthesia to get and outpatient procedure thus re-scheduled for tomorrow     Since admission, states feeling little better. Denies cough but has chest congestion. Denies any sick contact.    Follow-up For:  COPD exacerbation     09/07/2017: hand pain better. Continue to have lung congestion   09/06/2017: Pt underwent hand surgery on 09/05 and tolerated well. Denies fever or chills.   09/05/2017: feeling little better. No fever or chills.   09/04/2017: increasing sob. Have some reservation about getting surgery tomorrow   09/03/2017: Denies fever or chills.   09/02/2017: chest congestion better.   09/01/2017: feeling better. Had BM. Less SOB    08/31/2017: continue to have moderate sob. + Abdominal distention and constipation      Scheduled Meds:   albuterol-ipratropium 2.5mg-0.5mg/3mL  3 mL Nebulization Q4H    alprazolam  1 mg Oral TID    amlodipine  5 mg Oral Daily    aspirin  81 mg Oral Daily    cefTRIAXone (ROCEPHIN) IVPB  1 g Intravenous Q24H    doxycycline  100 mg Oral Q12H    enoxaparin  40 mg Subcutaneous Daily    lidocaine (PF) 10 mg/ml (1%)  1 mL Intradermal Once    methylPREDNISolone sodium succinate  80 mg Intravenous Q8H    pantoprazole  40 mg Intravenous Daily    polyethylene glycol  17 g Oral Daily    sodium chloride 0.9%  3 mL Intravenous Q8H     Continuous Infusions:     PRN Meds:lactulose, morphine, morphine, ondansetron, promethazine (PHENERGAN) IVPB,  ramelteon    Review of patient's allergies indicates:  No Known Allergies    Review of Systems    Constitutional: No fever   Eyes: no visual changes   ENT: no nasal congestion. Denies sore throat with odynophagia   Respiratory: see HPI. Feeling little worse today   Cardiovascular: see HPI   Gastrointestinal: denies abd pain.    Hematologic/Lymphatic: see HPI  Musculoskeletal: ROM of right hand limited   Neurological: no seizures or tremors, gait or balance prblems  Skin: No rashes or lesions  Psych: Denies any anxiety, depression or insomnia      OBJECTIVE:     Vital Signs (Most Recent)    Temp: 97.8 °F (36.6 °C) (09/07/17 0745)  Pulse: 87 (09/07/17 0745)  Resp: 18 (09/07/17 0745)  BP: (!) 178/80 (09/07/17 0745)  SpO2: (!) 94 % (09/07/17 0745)    Vital Signs Range (Last 24H):    Temp:  [97.6 °F (36.4 °C)-98.6 °F (37 °C)]   Pulse:  [68-95]   Resp:  [17-20]   BP: (148-192)/(72-91)   SpO2:  [9 %-100 %]     I & O (Last 24H):    Intake/Output Summary (Last 24 hours) at 09/07/17 0806  Last data filed at 09/07/17 0608   Gross per 24 hour   Intake             1173 ml   Output             1650 ml   Net             -477 ml     Physical Exam:    General: NAD, resting in bed.  Head: normocephalic, atraumatic   Eyes: conjunctivae pink, anicteric sclera.  Throat: No erythema or post nasal discharge   Neck: supple, no LAD   Lungs: Decreased air entry bilaterally   Heart: S1, S2, RRR   Abdomen: + BS x 4 quadrants, slightly distended, mild epigastric tenderness    Extremities: no cyanosis or edema.   Skin: turgor normal, no erythema or rashes. No abnormal moles  MS:  right hand in surgical bandage.   Neuro: A&O x 3    Psy: calm      Laboratory:  CBC:     Recent Labs  Lab 09/07/17  0653   WBC 17.57*   RBC 4.48*   HGB 9.6*   HCT 34.8*   *   MCV 78*   MCH 21.4*   MCHC 27.6*     CMP:     Recent Labs  Lab 09/07/17  0653   *   CALCIUM 8.7   ALBUMIN 2.9*   PROT 5.6*      K 3.1*   CO2 41*   CL 92*   BUN 24*   CREATININE  0.7   ALKPHOS 109   ALT 51*   AST 22   BILITOT 0.5     LFTs:     Recent Labs  Lab 09/07/17  0653   ALT 51*   AST 22   ALKPHOS 109   BILITOT 0.5   PROT 5.6*   ALBUMIN 2.9*     Coagulation:   No results for input(s): LABPROT, INR, APTT in the last 168 hours.  Cardiac markers:   No results for input(s): CKMB, CPKMB, TROPONINT, TROPONINI, MYOGLOBIN in the last 168 hours.  ABGs:   No results for input(s): PH, PCO2, PO2, HCO3, POCSATURATED, BE in the last 168 hours.  Microbiology Results (last 7 days)     ** No results found for the last 168 hours. **        Specimen (12h ago through future)    None        No results for input(s): COLORU, CLARITYU, SPECGRAV, PHUR, PROTEINUA, GLUCOSEU, BILIRUBINCON, BLOODU, WBCU, RBCU, BACTERIA, MUCUS, NITRITE, LEUKOCYTESUR, UROBILINOGEN, HYALINECASTS in the last 168 hours.      Diagnostic Results:    Current Facility-Administered Medications   Medication Dose Route Frequency Provider Last Rate Last Dose    albuterol-ipratropium 2.5mg-0.5mg/3mL nebulizer solution 3 mL  3 mL Nebulization Q4H Armand Mcmullen MD   3 mL at 09/07/17 1914    alprazolam tablet 1 mg  1 mg Oral TID Armand Mcmullen MD   1 mg at 09/07/17 1443    amlodipine tablet 5 mg  5 mg Oral Daily Armand Mcmullen MD   5 mg at 09/07/17 0835    aspirin EC tablet 81 mg  81 mg Oral Daily Armand Mcmullen MD   81 mg at 09/07/17 0835    cefTRIAXone (ROCEPHIN) 1 g in dextrose 5 % 50 mL IVPB  1 g Intravenous Q24H Armand Mcmullen MD   1 g at 09/06/17 2134    doxycycline tablet 100 mg  100 mg Oral Q12H Rk Welsh MD   100 mg at 09/07/17 0836    enoxaparin injection 40 mg  40 mg Subcutaneous Daily Armand Mcmullen MD   40 mg at 09/07/17 1730    lactulose 20 gram/30 mL solution Soln 30 g  30 g Oral BID PRN Rk Welsh MD   30 g at 08/31/17 0822    lidocaine (PF) 10 mg/ml (1%) injection 10 mg  1 mL Intradermal Once May Rosado MD        methylPREDNISolone sodium succinate injection 80  mg  80 mg Intravenous Q8H Rk Welsh MD   80 mg at 09/07/17 1443    morphine injection 2 mg  2 mg Intravenous Q4H PRN Armand Mcmullen MD   2 mg at 09/05/17 2153    morphine injection 6 mg  6 mg Intravenous Q4H PRN Armand Mcmullen MD   6 mg at 09/07/17 1142    ondansetron injection 4 mg  4 mg Intravenous Q8H PRN Armand Mcmullen MD   4 mg at 09/05/17 1315    pantoprazole injection 40 mg  40 mg Intravenous Daily Armand Mcmullen MD   40 mg at 09/07/17 0835    polyethylene glycol packet 17 g  17 g Oral Daily Jefry Silva MD   17 g at 09/01/17 0838    potassium chloride 10% solution 20 mEq  20 mEq Oral Daily Rk Welsh MD   20 mEq at 09/07/17 0844    promethazine (PHENERGAN) 6.25 mg in dextrose 5 % 50 mL IVPB  6.25 mg Intravenous Q6H PRN Armand Mcmullen MD        ramelteon tablet 8 mg  8 mg Oral Nightly PRN Armand Mcmullen MD   8 mg at 09/06/17 2134    sodium chloride 0.9% flush 3 mL  3 mL Intravenous Q8H Armand Mcmullen MD   3 mL at 09/07/17 1443         ASSESSMENT/PLAN:     Active Hospital Problems    Diagnosis  POA    *COPD exacerbation [J44.1]  Yes      Resolved Hospital Problems    Diagnosis Date Resolved POA   No resolved problems to display.       1. Acute exacerbation of COPD: pt has significant symptoms and outpatient tx failed                        - started IV steroid, Rocephin and Zithromax                        - Duoneb                        - pulmonary consulted    - continue to improve slowly    - Dced  zithromax, decrease steroid dose    -  Lasix 40mg IV and K supplement prn   - start BiPAP at night      2. Fever and Leukocytosis/SIRS: multifactorial in nature- hand injury can be a source of infection                          - on Rocephin    - wbc trending up    - will add Doxy IV    -wbc improving      3. MPN: Polycythemia Vera: currently on Hydrea                - will hold off on hydrea      4. Abnormal LFT: US of liver done                         - GI consult pending                         - acute hep panel negative                          - ? Medication induced liver toxicity?    - improving steadily    - no n/v     5. Elevated glucose: mostly due to IV steroids        6. Nausea: protonix and Zofra                         -dc motrin     7. DVT  Prophylaxis: Lovenox     8. Constipation: started Lactulose and having good BM    9. Hand injury- 3 fingers are damaged and awaiting surgical intervention   - pt and family wants Dr. Monson to see    - discussed the case in detail with Dr. Monson and he evaluated pt on Fri   - plan for OR today.   - discussed with Dr. Monson    - Crush injury R hand, open fractures R IF and MF:Post-op Diagnosis: Crush injury R hand, open fractures R IF and MF     Procedure(s) (LRB):  OPEN REDUCTION INTERNAL FIXATION- THUMB,INDEX MIDDLE FINGERS VS PINNING  THUMB, INDEX, MIDDLE FINGERS (Right) I&D open fx R IF, MF    - Discussed with Dr. Monson- hold off on OT. PT to have evaluation outpatient before OT    Rk Welsh M.D  Internal Medicine & Geriatric Medicine  Hematology & Oncology  Palliative Medicine    [address], [address]  [place] [address]  [phone] (Office)  [phone] (Fax)

## 2017-09-07 NOTE — NURSING
Resting quietly in bed. Able to make needs known.  Denies pain at this time.  Encouraged to call with PRN assist.  Verbalized understanding.  Call bell within reach.  Will continue to monitor.

## 2017-09-07 NOTE — ANESTHESIA POSTPROCEDURE EVALUATION
"Anesthesia Post Evaluation    Patient: Chaz Stuart    Procedure(s) Performed: Procedure(s) (LRB):  PINNING  INDEX, MIDDLE FINGER (Right)    Final Anesthesia Type: regional  Patient location during evaluation: PACU  Patient participation: Yes- Able to Participate  Level of consciousness: awake and alert and oriented  Post-procedure vital signs: reviewed and stable  Pain management: adequate  Airway patency: patent  PONV status at discharge: No PONV  Anesthetic complications: no      Cardiovascular status: blood pressure returned to baseline and hemodynamically stable  Respiratory status: unassisted  Hydration status: euvolemic  Follow-up not needed.        Visit Vitals  BP (!) 178/80 (BP Location: Right arm, Patient Position: Lying)   Pulse 87   Temp 36.6 °C (97.8 °F) (Oral)   Resp 18   Ht 5' 7" (1.702 m)   Wt (!) 148.9 kg (328 lb 4.2 oz)   SpO2 (!) 94%   BMI 51.41 kg/m²       Pain/Kirill Score: Pain Assessment Performed: Yes (9/7/2017  7:00 AM)  Presence of Pain: complains of pain/discomfort (9/7/2017  7:00 AM)  Pain Rating Prior to Med Admin: 7 (9/7/2017  6:17 AM)  Pain Rating Post Med Admin: 7 (9/7/2017  6:20 AM)      "

## 2017-09-07 NOTE — PLAN OF CARE
Problem: Patient Care Overview  Goal: Plan of Care Review  Outcome: Ongoing (interventions implemented as appropriate)   09/07/17 0453   Coping/Psychosocial   Plan Of Care Reviewed With patient     Patient noted to be restless throughout this shift.  Patient states that he is not able to get consistent sleep.  Educated on relaxation techniques and breathing exercises.  Pain controlled by current plan of care throughout this shift.  Patient noted to remain free from falls and trauma this shift.  Purposeful hourly rounding in progress. Call bell within reach.  Will continue to monitor.     Problem: Pain, Acute (Adult)  Intervention: Monitor/Manage Analgesia   09/07/17 0453   Safety Interventions   Medication Review/Management medications reviewed   Manage Acute Burn Pain   Bowel Intervention adequate fluid intake promoted;commode/bedpan at bedside     Intervention: Mutually Develop/Implement Acute Pain Management Plan   09/07/17 0453   Cognitive Interventions   Sensory Stimulation Regulation lighting decreased;quiet environment promoted   Pain/Comfort/Sleep Interventions   Pain Management Interventions pain management plan reviewed with patient/caregiver;position adjusted;quiet environment facilitated     Intervention: Support/Optimize Psychosocial Response to Acute Pain   09/07/17 0453   Psychosocial Support   Family/Support System Care involvement promoted;self-care encouraged   Trust Relationship/Rapport care explained;choices provided;questions encouraged;thoughts/feelings acknowledged   Coping/Psychosocial Interventions   Supportive Measures active listening utilized;positive reinforcement provided;verbalization of feelings encouraged       Goal: Identify Related Risk Factors and Signs and Symptoms  Related risk factors and signs and symptoms are identified upon initiation of Human Response Clinical Practice Guideline (CPG)   Outcome: Ongoing (interventions implemented as appropriate)   09/07/17 0453   Pain,  Acute   Related Risk Factors (Acute Pain) disease process;procedure/treatment   Signs and Symptoms (Acute Pain) fatigue/weakness;pacing/restlessness;verbalization of pain descriptors     Goal: Acceptable Pain Control/Comfort Level  Patient will demonstrate the desired outcomes by discharge/transition of care.   Outcome: Ongoing (interventions implemented as appropriate)   09/07/17 0453   Pain, Acute (Adult)   Acceptable Pain Control/Comfort Level making progress toward outcome

## 2017-09-07 NOTE — PROGRESS NOTES
Pt in bed doing OK    PE R hand dressings clean to R thumb,IF,MF    A/P    68 y/o M s/p R hand crush injury in mower engine flywheel one week ago     Admitted for COPD exacaerbation     S/p ORIF R IF,CARLOS, repair thumb lac    D/c when OK from pulm standpoint    F/u Dr Monson next week

## 2017-09-07 NOTE — NURSING
Report by KRISTY Sparks. Pt resting in bed. Complains of getting very little rest throughout night and pain to R-hand, but received morphine @ 617am. Otherwise, patient is in pleasant mood, offering candy. No acute distress noted. On 3L NC. Verbalize understanding to call for assistance OOB. Bed locked in lowest position with call bell in reach.     @345 - Spoke with Alaina regarding critical C02 lab value.     @819 - Dr. Welsh notified of critical C02 value and declining K+. 20 mEq K+ chloride solution ordered.

## 2017-09-07 NOTE — PROGRESS NOTES
Ortho Daily Progress Note    Chaz Stuart is a 67 y.o. male admitted on 8/29/2017      Chief Complaint/Reason for admission: Headache (Pt. with PMH of COPD and polycythemia presents with shortness of breath, chest pain and headache for several days. Pt. reportedly has been on steroids and breathing treatments with no relief. Pt. was supposed to have hand surgery at [facility] but was unable to go. ) and Shortness of Breath       Hospital Day: 8  Post Op Day: 2 Days Post-Op     The patient was seen and examined this morning at the bedside. Patient reports no acute issues overnight.  Patient reports that pain is adequately controlled.    _______________    Vitals:    09/07/17 0745 09/07/17 0831 09/07/17 1115 09/07/17 1158   BP: (!) 178/80   (!) 158/74   Pulse: 87 89 93 93   Resp: 18  19 18   Temp: 97.8 °F (36.6 °C)   99.5 °F (37.5 °C)   TempSrc: Oral   Oral   SpO2: (!) 94%  96% 96%   Weight:       Height:           Vital Signs (Most Recent)  Temp: 99.5 °F (37.5 °C) (09/07/17 1158)  Pulse: 93 (09/07/17 1158)  Resp: 18 (09/07/17 1158)  BP: (!) 158/74 (09/07/17 1158)  SpO2: 96 % (09/07/17 1158)    Vital Signs Range (Last 24H):  Temp:  [97.6 °F (36.4 °C)-99.5 °F (37.5 °C)]   Pulse:  [68-95]   Resp:  [17-20]   BP: (148-186)/(72-91)   SpO2:  [9 %-100 %]       Physical:    AAOx3  Incision/ dressing clean/dry/intact  NVI Distally  Palpable distal pulses  CR<3sec      Recent Labs      09/05/17   0503  09/06/17   0516  09/07/17   0653   K  3.2*  3.2*  3.1*   CALCIUM  8.8  8.5*  8.7   WBC  23.94*  16.18*  17.57*   HGB  9.6*  8.5*  9.6*   HCT  34.9*  30.3*  34.8*   PLT  709*  693*  682*       I/O last 3 completed shifts:  In: 1373 [P.O.:1167; I.V.:6; IV Piggyback:200]  Out: 1650 [Urine:1650]          Assessment:  A/P POD 2 s/p right    Fingers ORIF and wound closure           Plan:    continue tx for pulmonary  Ok to DC and see back in clinic      Ishmael Thornton MD  [facility]

## 2017-09-08 LAB
ALBUMIN SERPL BCP-MCNC: 2.6 G/DL
ALP SERPL-CCNC: 88 U/L
ALT SERPL W/O P-5'-P-CCNC: 37 U/L
ANION GAP SERPL CALC-SCNC: 8 MMOL/L
AST SERPL-CCNC: 16 U/L
BILIRUB SERPL-MCNC: 0.4 MG/DL
BUN SERPL-MCNC: 24 MG/DL
CALCIUM SERPL-MCNC: 8.3 MG/DL
CHLORIDE SERPL-SCNC: 96 MMOL/L
CO2 SERPL-SCNC: 38 MMOL/L
CREAT SERPL-MCNC: 0.7 MG/DL
ERYTHROCYTE [DISTWIDTH] IN BLOOD BY AUTOMATED COUNT: 18.7 %
EST. GFR  (AFRICAN AMERICAN): >60 ML/MIN/1.73 M^2
EST. GFR  (NON AFRICAN AMERICAN): >60 ML/MIN/1.73 M^2
GLUCOSE SERPL-MCNC: 143 MG/DL
HCT VFR BLD AUTO: 30.8 %
HGB BLD-MCNC: 8.7 G/DL
MCH RBC QN AUTO: 21.8 PG
MCHC RBC AUTO-ENTMCNC: 28.2 G/DL
MCV RBC AUTO: 77 FL
PLATELET # BLD AUTO: 577 K/UL
PMV BLD AUTO: 9.3 FL
POTASSIUM SERPL-SCNC: 3.7 MMOL/L
PROT SERPL-MCNC: 4.9 G/DL
RBC # BLD AUTO: 3.99 M/UL
SODIUM SERPL-SCNC: 142 MMOL/L
WBC # BLD AUTO: 13.36 K/UL

## 2017-09-08 PROCEDURE — 94761 N-INVAS EAR/PLS OXIMETRY MLT: CPT

## 2017-09-08 PROCEDURE — 80053 COMPREHEN METABOLIC PANEL: CPT

## 2017-09-08 PROCEDURE — 21400001 HC TELEMETRY ROOM

## 2017-09-08 PROCEDURE — A4216 STERILE WATER/SALINE, 10 ML: HCPCS | Performed by: EMERGENCY MEDICINE

## 2017-09-08 PROCEDURE — 27000173 HC ACAPELLA DEVICE DH OR DM

## 2017-09-08 PROCEDURE — 85027 COMPLETE CBC AUTOMATED: CPT

## 2017-09-08 PROCEDURE — 25000242 PHARM REV CODE 250 ALT 637 W/ HCPCS: Performed by: EMERGENCY MEDICINE

## 2017-09-08 PROCEDURE — 25000003 PHARM REV CODE 250: Performed by: INTERNAL MEDICINE

## 2017-09-08 PROCEDURE — 94664 DEMO&/EVAL PT USE INHALER: CPT

## 2017-09-08 PROCEDURE — 36415 COLL VENOUS BLD VENIPUNCTURE: CPT

## 2017-09-08 PROCEDURE — 63600175 PHARM REV CODE 636 W HCPCS: Performed by: EMERGENCY MEDICINE

## 2017-09-08 PROCEDURE — C9113 INJ PANTOPRAZOLE SODIUM, VIA: HCPCS | Performed by: EMERGENCY MEDICINE

## 2017-09-08 PROCEDURE — 63600175 PHARM REV CODE 636 W HCPCS: Performed by: INTERNAL MEDICINE

## 2017-09-08 PROCEDURE — 94640 AIRWAY INHALATION TREATMENT: CPT

## 2017-09-08 PROCEDURE — 99900035 HC TECH TIME PER 15 MIN (STAT)

## 2017-09-08 PROCEDURE — 27000221 HC OXYGEN, UP TO 24 HOURS

## 2017-09-08 PROCEDURE — 25000003 PHARM REV CODE 250: Performed by: EMERGENCY MEDICINE

## 2017-09-08 PROCEDURE — 94660 CPAP INITIATION&MGMT: CPT

## 2017-09-08 RX ORDER — HYDRALAZINE HYDROCHLORIDE 20 MG/ML
10 INJECTION INTRAMUSCULAR; INTRAVENOUS EVERY 6 HOURS PRN
Status: DISCONTINUED | OUTPATIENT
Start: 2017-09-08 | End: 2017-09-11 | Stop reason: HOSPADM

## 2017-09-08 RX ADMIN — IPRATROPIUM BROMIDE AND ALBUTEROL SULFATE 3 ML: .5; 3 SOLUTION RESPIRATORY (INHALATION) at 07:09

## 2017-09-08 RX ADMIN — ALPRAZOLAM 1 MG: 0.5 TABLET ORAL at 05:09

## 2017-09-08 RX ADMIN — DOXYCYCLINE HYCLATE 100 MG: 100 TABLET, COATED ORAL at 09:09

## 2017-09-08 RX ADMIN — METHYLPREDNISOLONE SODIUM SUCCINATE 80 MG: 125 INJECTION, POWDER, FOR SOLUTION INTRAMUSCULAR; INTRAVENOUS at 09:09

## 2017-09-08 RX ADMIN — Medication 3 ML: at 05:09

## 2017-09-08 RX ADMIN — ALPRAZOLAM 1 MG: 0.5 TABLET ORAL at 09:09

## 2017-09-08 RX ADMIN — CEFTRIAXONE 1 G: 1 INJECTION, SOLUTION INTRAVENOUS at 09:09

## 2017-09-08 RX ADMIN — MORPHINE SULFATE 6 MG: 10 INJECTION INTRAVENOUS at 09:09

## 2017-09-08 RX ADMIN — METHYLPREDNISOLONE SODIUM SUCCINATE 80 MG: 125 INJECTION, POWDER, FOR SOLUTION INTRAMUSCULAR; INTRAVENOUS at 05:09

## 2017-09-08 RX ADMIN — MORPHINE SULFATE 2 MG: 10 INJECTION INTRAVENOUS at 05:09

## 2017-09-08 RX ADMIN — ENOXAPARIN SODIUM 40 MG: 100 INJECTION SUBCUTANEOUS at 05:09

## 2017-09-08 RX ADMIN — IPRATROPIUM BROMIDE AND ALBUTEROL SULFATE 3 ML: .5; 3 SOLUTION RESPIRATORY (INHALATION) at 11:09

## 2017-09-08 RX ADMIN — ALPRAZOLAM 1 MG: 0.5 TABLET ORAL at 01:09

## 2017-09-08 RX ADMIN — ASPIRIN 81 MG: 81 TABLET, COATED ORAL at 09:09

## 2017-09-08 RX ADMIN — METHYLPREDNISOLONE SODIUM SUCCINATE 80 MG: 125 INJECTION, POWDER, FOR SOLUTION INTRAMUSCULAR; INTRAVENOUS at 01:09

## 2017-09-08 RX ADMIN — AMLODIPINE BESYLATE 5 MG: 5 TABLET ORAL at 09:09

## 2017-09-08 RX ADMIN — IPRATROPIUM BROMIDE AND ALBUTEROL SULFATE 3 ML: .5; 3 SOLUTION RESPIRATORY (INHALATION) at 04:09

## 2017-09-08 RX ADMIN — MORPHINE SULFATE 6 MG: 10 INJECTION INTRAVENOUS at 05:09

## 2017-09-08 RX ADMIN — PANTOPRAZOLE SODIUM 40 MG: 40 INJECTION, POWDER, FOR SOLUTION INTRAVENOUS at 09:09

## 2017-09-08 RX ADMIN — IPRATROPIUM BROMIDE AND ALBUTEROL SULFATE 3 ML: .5; 3 SOLUTION RESPIRATORY (INHALATION) at 12:09

## 2017-09-08 RX ADMIN — POTASSIUM CHLORIDE 20 MEQ: 20 SOLUTION ORAL at 09:09

## 2017-09-08 RX ADMIN — Medication 3 ML: at 01:09

## 2017-09-08 RX ADMIN — Medication 3 ML: at 10:09

## 2017-09-08 RX ADMIN — HYDRALAZINE HYDROCHLORIDE 10 MG: 20 INJECTION INTRAMUSCULAR; INTRAVENOUS at 09:09

## 2017-09-08 NOTE — PLAN OF CARE
Problem: Fall Risk (Adult)  Goal: Absence of Falls  Patient will demonstrate the desired outcomes by discharge/transition of care.   Outcome: Ongoing (interventions implemented as appropriate)   09/08/17 0301   Fall Risk (Adult)   Absence of Falls making progress toward outcome   Pt did not fall or have any injuries during shift. Bed in lowest position, bed alarm on, and call light in reach    Problem: Patient Care Overview  Goal: Plan of Care Review  Outcome: Ongoing (interventions implemented as appropriate)   09/08/17 0301   Coping/Psychosocial   Plan Of Care Reviewed With patient   Pt verbalized understanding plan of care. He was calm, cooperative, and accepting    Problem: Pain, Acute (Adult)  Goal: Acceptable Pain Control/Comfort Level  Patient will demonstrate the desired outcomes by discharge/transition of care.   Outcome: Ongoing (interventions implemented as appropriate)   09/08/17 0301   Pain, Acute (Adult)   Acceptable Pain Control/Comfort Level making progress toward outcome       Problem: Constipation (Adult)  Goal: Effective Bowel Elimination  Patient will demonstrate the desired outcomes by discharge/transition of care.   Outcome: Ongoing (interventions implemented as appropriate)   09/08/17 0301   Constipation (Adult)   Effective Bowel Elimination making progress toward outcome       Problem: Infection, Risk/Actual (Adult)  Goal: Infection Prevention/Resolution  Patient will demonstrate the desired outcomes by discharge/transition of care.   Outcome: Ongoing (interventions implemented as appropriate)   09/08/17 0301   Infection, Risk/Actual (Adult)   Infection Prevention/Resolution making progress toward outcome       Problem: Pressure Ulcer Risk (Blu Scale) (Adult,Obstetrics,Pediatric)  Goal: Skin Integrity  Patient will demonstrate the desired outcomes by discharge/transition of care.   Outcome: Ongoing (interventions implemented as appropriate)   09/08/17 0301   Pressure Ulcer Risk (Blu  Scale) (Adult,Obstetrics,Pediatric)   Skin Integrity making progress toward outcome

## 2017-09-08 NOTE — NURSING
Pt is very short of breath. Notified RT. RT in room to place pt on BIPAP. Safety maintained. Will continue to monitor closely.

## 2017-09-08 NOTE — NURSING
"Emily, from Dr. Welsh office, returned my call. Informed Emily of what happened earlier with the pt's respiratory status(as described in previous note). Informed Emily that pt states he is currently feeling much better than he was earlier, but that pt continues to make a "humming" noise while breathing and has pursed lips. Emily to relay this info to Dr. Welsh. Will continue to monitor pt.   "

## 2017-09-08 NOTE — PROGRESS NOTES
Progress Note    Admit Date: 8/29/2017   LOS: 9 days     SUBJECTIVE:     Mr. Stuart is a pleasant 66 YO gentleman with Myeloproliferative neoplasm, Polycythemia Vera, oxygen dependent COPD and most recent injuries to right hand from a . Pt had a recent COPD exacerbation and was seen at [facility] ED- tx and released. For the last 3 days, not feeling well. Has chest discomfort and nausea. He began to have fever at home associated with worsening sob and chest pressure. Pt was scheduled for hand surgery and did not get clearance from anesthesia to get and outpatient procedure thus re-scheduled for tomorrow     Since admission, states feeling little better. Denies cough but has chest congestion. Denies any sick contact.    Follow-up For:  COPD exacerbation     09/08/2017: tolerated BiPAP for short time. Continue to have congestion and cough.   09/07/2017: hand pain better. Continue to have lung congestion   09/06/2017: Pt underwent hand surgery on 09/05 and tolerated well. Denies fever or chills.   09/05/2017: feeling little better. No fever or chills.   09/04/2017: increasing sob. Have some reservation about getting surgery tomorrow   09/03/2017: Denies fever or chills.   09/02/2017: chest congestion better.   09/01/2017: feeling better. Had BM. Less SOB    08/31/2017: continue to have moderate sob. + Abdominal distention and constipation      Scheduled Meds:   albuterol-ipratropium 2.5mg-0.5mg/3mL  3 mL Nebulization Q4H    alprazolam  1 mg Oral TID    amlodipine  5 mg Oral Daily    aspirin  81 mg Oral Daily    cefTRIAXone (ROCEPHIN) IVPB  1 g Intravenous Q24H    doxycycline  100 mg Oral Q12H    enoxaparin  40 mg Subcutaneous Daily    lidocaine (PF) 10 mg/ml (1%)  1 mL Intradermal Once    methylPREDNISolone sodium succinate  80 mg Intravenous Q8H    pantoprazole  40 mg Intravenous Daily    polyethylene glycol  17 g Oral Daily    potassium chloride 10%  20 mEq Oral Daily    sodium chloride 0.9%  3 mL  Intravenous Q8H     Continuous Infusions:     PRN Meds:hydrALAZINE, lactulose, morphine, morphine, ondansetron, promethazine (PHENERGAN) IVPB, ramelteon    Review of patient's allergies indicates:  No Known Allergies    Review of Systems    Constitutional: No fever   Eyes: no visual changes   ENT: no nasal congestion. Denies sore throat with odynophagia   Respiratory: see HPI. Feeling little worse today   Cardiovascular: see HPI   Gastrointestinal: denies abd pain.    Hematologic/Lymphatic: see HPI  Musculoskeletal: ROM of right hand limited   Neurological: no seizures or tremors, gait or balance prblems  Skin: No rashes or lesions  Psych: Denies any anxiety, depression or insomnia      OBJECTIVE:     Vital Signs (Most Recent)    Temp: 98.3 °F (36.8 °C) (09/08/17 0745)  Pulse: 82 (09/08/17 1118)  Resp: 20 (09/08/17 1118)  BP: (!) 182/80 (09/08/17 0842)  SpO2: 96 % (09/08/17 1118)    Vital Signs Range (Last 24H):    Temp:  [97.5 °F (36.4 °C)-99.5 °F (37.5 °C)]   Pulse:  [75-95]   Resp:  [18-21]   BP: (138-198)/(63-92)   SpO2:  [93 %-99 %]     I & O (Last 24H):    Intake/Output Summary (Last 24 hours) at 09/08/17 1139  Last data filed at 09/08/17 0553   Gross per 24 hour   Intake              860 ml   Output              650 ml   Net              210 ml     Physical Exam:    General: NAD, resting in bed. BiPAP at bedside   Head: normocephalic, atraumatic   Eyes: conjunctivae pink, anicteric sclera.  Throat: No erythema or post nasal discharge   Neck: supple, no LAD   Lungs: Decreased air entry bilaterally   Heart: S1, S2, RRR   Abdomen: + BS x 4 quadrants, slightly distended, mild epigastric tenderness    Extremities: no cyanosis or edema.   Skin: turgor normal, no erythema or rashes. No abnormal moles  MS:  right hand in surgical bandage.   Neuro: A&O x 3    Psy: calm      Laboratory:  CBC:     Recent Labs  Lab 09/08/17  0248   WBC 13.36*   RBC 3.99*   HGB 8.7*   HCT 30.8*   *   MCV 77*   MCH 21.8*   MCHC  28.2*     CMP:     Recent Labs  Lab 09/08/17  0248   *   CALCIUM 8.3*   ALBUMIN 2.6*   PROT 4.9*      K 3.7   CO2 38*   CL 96   BUN 24*   CREATININE 0.7   ALKPHOS 88   ALT 37   AST 16   BILITOT 0.4     LFTs:     Recent Labs  Lab 09/08/17  0248   ALT 37   AST 16   ALKPHOS 88   BILITOT 0.4   PROT 4.9*   ALBUMIN 2.6*     Coagulation:   No results for input(s): LABPROT, INR, APTT in the last 168 hours.  Cardiac markers:   No results for input(s): CKMB, CPKMB, TROPONINT, TROPONINI, MYOGLOBIN in the last 168 hours.  ABGs:   No results for input(s): PH, PCO2, PO2, HCO3, POCSATURATED, BE in the last 168 hours.  Microbiology Results (last 7 days)     ** No results found for the last 168 hours. **        Specimen (12h ago through future)    None        No results for input(s): COLORU, CLARITYU, SPECGRAV, PHUR, PROTEINUA, GLUCOSEU, BILIRUBINCON, BLOODU, WBCU, RBCU, BACTERIA, MUCUS, NITRITE, LEUKOCYTESUR, UROBILINOGEN, HYALINECASTS in the last 168 hours.      Diagnostic Results:    Current Facility-Administered Medications   Medication Dose Route Frequency Provider Last Rate Last Dose    albuterol-ipratropium 2.5mg-0.5mg/3mL nebulizer solution 3 mL  3 mL Nebulization Q4H Armand Mcmullen MD   3 mL at 09/08/17 1118    alprazolam tablet 1 mg  1 mg Oral TID Armand Mcmullen MD   1 mg at 09/08/17 0540    amlodipine tablet 5 mg  5 mg Oral Daily Armand Mcmullen MD   5 mg at 09/08/17 0925    aspirin EC tablet 81 mg  81 mg Oral Daily Armand Mcmullen MD   81 mg at 09/08/17 0925    cefTRIAXone (ROCEPHIN) 1 g in dextrose 5 % 50 mL IVPB  1 g Intravenous Q24H Armand Mcmullen MD   1 g at 09/07/17 2114    doxycycline tablet 100 mg  100 mg Oral Q12H Rk Welsh MD   100 mg at 09/08/17 0925    enoxaparin injection 40 mg  40 mg Subcutaneous Daily Armand Mcmullen MD   40 mg at 09/07/17 1730    hydrALAZINE injection 10 mg  10 mg Intravenous Q6H PRN Rk Welsh MD   10 mg at 09/08/17 0925     lactulose 20 gram/30 mL solution Soln 30 g  30 g Oral BID PRN Rk Welsh MD   30 g at 08/31/17 0822    lidocaine (PF) 10 mg/ml (1%) injection 10 mg  1 mL Intradermal Once May Rosado MD        methylPREDNISolone sodium succinate injection 80 mg  80 mg Intravenous Q8H Rk Welsh MD   80 mg at 09/08/17 0541    morphine injection 2 mg  2 mg Intravenous Q4H PRN Armand Mcmullen MD   2 mg at 09/08/17 0546    morphine injection 6 mg  6 mg Intravenous Q4H PRN Armand Mcmullen MD   6 mg at 09/08/17 0946    ondansetron injection 4 mg  4 mg Intravenous Q8H PRN Armand Mcmullen MD   4 mg at 09/05/17 1315    pantoprazole injection 40 mg  40 mg Intravenous Daily Armand Mcmullen MD   40 mg at 09/08/17 0925    polyethylene glycol packet 17 g  17 g Oral Daily Jefry Silva MD   17 g at 09/01/17 0838    potassium chloride 10% solution 20 mEq  20 mEq Oral Daily Rk Welsh MD   20 mEq at 09/08/17 0925    promethazine (PHENERGAN) 6.25 mg in dextrose 5 % 50 mL IVPB  6.25 mg Intravenous Q6H PRN Armand Mcmullen MD        ramelteon tablet 8 mg  8 mg Oral Nightly PRN Armand Mcmullen MD   8 mg at 09/06/17 2134    sodium chloride 0.9% flush 3 mL  3 mL Intravenous Q8H Armand Mcmullen MD   3 mL at 09/08/17 0541         ASSESSMENT/PLAN:     Active Hospital Problems    Diagnosis  POA    *COPD exacerbation [J44.1]  Yes      Resolved Hospital Problems    Diagnosis Date Resolved POA   No resolved problems to display.       1. Acute exacerbation of COPD: pt has significant symptoms and outpatient tx failed                        - started IV steroid, Rocephin and Zithromax                        - Duoneb                        - pulmonary consulted    - continue to improve slowly    - Dced  zithromax, decrease steroid dose    -  Lasix 40mg IV and K supplement prn   - BiPAP at night    - pt making improvement      2. Fever and Leukocytosis/SIRS: multifactorial in nature-  hand injury can be a source of infection                          - on Rocephin    - wbc trending up    - will add Doxy IV    -wbc improving      3. MPN: Polycythemia Vera: currently on Hydrea                - will hold off on hydrea      4. Abnormal LFT: US of liver done                        - GI consult pending                         - acute hep panel negative                          - ? Medication induced liver toxicity?    - improving steadily    - no n/v     5. Elevated glucose: mostly due to IV steroids        6. Nausea: protonix and Zofra                         -dc motrin     7. DVT  Prophylaxis: Lovenox     8. Constipation: started Lactulose and having good BM    9. Hand injury- 3 fingers are damaged and awaiting surgical intervention   - pt and family wants Dr. Monson to see    - discussed the case in detail with Dr. Monson and he evaluated pt on Fri   - plan for OR today.   - discussed with Dr. Monson    - Crush injury R hand, open fractures R IF and MF:Post-op Diagnosis: Crush injury R hand, open fractures R IF and MF     Procedure(s) (LRB):  OPEN REDUCTION INTERNAL FIXATION- THUMB,INDEX MIDDLE FINGERS VS PINNING  THUMB, INDEX, MIDDLE FINGERS (Right) I&D open fx R IF, MF    - Discussed with Dr. Monson on 09/07- hold off on OT. PT to have evaluation outpatient before OT    Rk Welsh M.D  Internal Medicine & Geriatric Medicine  Hematology & Oncology  Palliative Medicine    [address], [address]  [place], [place] [address] [phone] (Office)  [phone] (Fax)

## 2017-09-08 NOTE — PLAN OF CARE
Problem: Patient Care Overview  Goal: Plan of Care Review   09/08/17 1434   Coping/Psychosocial   Plan Of Care Reviewed With patient     Goal: Interdisciplinary Rounds/Family Conf   09/08/17 1434   Interdisciplinary Rounds/Family Conf   Participants physician;patient;nursing       Problem: ARDS (Acute Resp Distress Syndrome) (Adult)  Intervention: Optimize Oxygenation/Ventilation/Perfusion   09/08/17 1434   Respiratory Interventions   Airway/Ventilation Management airway patency maintained;calming measures promoted     Intervention: Position to Optimize Ventilation   09/08/17 1434   Positioning   Body Position supine, head elevated     Intervention: Provide Preventive/Supportive Care   09/08/17 1434   Cognitive Interventions   Sensory Stimulation Regulation music/television provided for relaxation;care clustered;quiet environment promoted   Skin Interventions   Skin Protection adhesive use limited   Activity   Activity Type activity adjusted per tolerance;ROM, active encouraged     Intervention: Prevent/Manage DVT/VTE Risk   09/08/17 1434   Minimize Embolism Risk   VTE Prevention/Management ROM (active) performed     Intervention: Support/Optimize Psychosocial Response to Illness   09/08/17 1434   Coping/Psychosocial Interventions   Supportive Measures relaxation techniques promoted       Goal: Signs and Symptoms of Listed Potential Problems Will be Absent, Minimized or Managed (ARDS)  Signs and symptoms of listed potential problems will be absent, minimized or managed by discharge/transition of care (reference ARDS (Acute Resp Distress Syndrome) (Adult) CPG).    09/08/17 1434   ARDS (Acute Resp Distress Syndrome)   Problems Assessed (Acute Respiratory Distress Syndrome) all       Problem: Pain, Acute (Adult)  Intervention: Monitor/Manage Analgesia   09/08/17 1434   Manage Acute Burn Pain   Bowel Intervention privacy promoted;adequate fluid intake promoted     Intervention: Mutually Develop/Implement Acute Pain  Management Plan   09/08/17 1434   Cognitive Interventions   Sensory Stimulation Regulation music/television provided for relaxation     Intervention: Support/Optimize Psychosocial Response to Acute Pain   09/08/17 1434   Psychosocial Support   Diversional Activities television   Trust Relationship/Rapport care explained;choices provided;emotional support provided;empathic listening provided;thoughts/feelings acknowledged;reassurance provided;questions encouraged;questions answered   Coping/Psychosocial Interventions   Supportive Measures relaxation techniques promoted       Goal: Acceptable Pain Control/Comfort Level  Patient will demonstrate the desired outcomes by discharge/transition of care.    09/08/17 1434   Pain, Acute (Adult)   Acceptable Pain Control/Comfort Level making progress toward outcome

## 2017-09-08 NOTE — PROGRESS NOTES
Pt refused to wear BIPAP stating mask  Is very uncomfortable and giving him anxiety. Pt is on 3L NC sat 98% with no respiratory distress. Will continue to monitor.

## 2017-09-08 NOTE — PLAN OF CARE
Problem: ARDS (Acute Resp Distress Syndrome) (Adult)  Intervention: Optimize Oxygenation/Ventilation/Perfusion   09/06/17 1822   Respiratory Interventions   Airway/Ventilation Management airway patency maintained     Intervention: Position to Optimize Ventilation   09/07/17 1730   Positioning   Body Position positioned/repositioned independently     Intervention: Provide Preventive/Supportive Care   09/05/17 1604 09/07/17 0453 09/07/17 1730   Cognitive Interventions   Sensory Stimulation Regulation --  lighting decreased;quiet environment promoted --    Skin Interventions   Skin Protection adhesive use limited;transparent dressing maintained;tubing/devices free from skin contact --  --    Activity   Activity Type --  --  activity adjusted per tolerance     Intervention: Prevent/Manage DVT/VTE Risk   09/05/17 1604 09/07/17 0819   Minimize Embolism Risk   VTE Prevention/Management intravenous hydration --    OTHER   VTE Required Core Measure --  Pharmacological prophylaxis initiated/maintained     Intervention: Support/Optimize Psychosocial Response to Illness   09/07/17 0453   Psychosocial Support   Family/Support System Care involvement promoted;self-care encouraged   Coping/Psychosocial Interventions   Supportive Measures active listening utilized;positive reinforcement provided;verbalization of feelings encouraged       Goal: Signs and Symptoms of Listed Potential Problems Will be Absent, Minimized or Managed (ARDS)  Signs and symptoms of listed potential problems will be absent, minimized or managed by discharge/transition of care (reference ARDS (Acute Resp Distress Syndrome) (Adult) CPG).   Outcome: Ongoing (interventions implemented as appropriate)   09/07/17 1955   ARDS (Acute Resp Distress Syndrome)   Problems Assessed (Acute Respiratory Distress Syndrome) all   Problems Present (Acute Respiratory Distress Syndrome) situational response       Problem: Pain, Acute (Adult)  Intervention: Mutually  Develop/Implement Acute Pain Management Plan   09/07/17 0453   Cognitive Interventions   Sensory Stimulation Regulation lighting decreased;quiet environment promoted   Pain/Comfort/Sleep Interventions   Pain Management Interventions pain management plan reviewed with patient/caregiver;position adjusted;quiet environment facilitated     Intervention: Support/Optimize Psychosocial Response to Acute Pain   09/06/17 1822 09/07/17 0453   Psychosocial Support   Diversional Activities television --    Family/Support System Care --  involvement promoted;self-care encouraged   Trust Relationship/Rapport --  care explained;choices provided;questions encouraged;thoughts/feelings acknowledged   Coping/Psychosocial Interventions   Supportive Measures --  active listening utilized;positive reinforcement provided;verbalization of feelings encouraged       Goal: Identify Related Risk Factors and Signs and Symptoms  Related risk factors and signs and symptoms are identified upon initiation of Human Response Clinical Practice Guideline (CPG)   Outcome: Ongoing (interventions implemented as appropriate)   09/07/17 0453   Pain, Acute   Related Risk Factors (Acute Pain) disease process;procedure/treatment   Signs and Symptoms (Acute Pain) fatigue/weakness;pacing/restlessness;verbalization of pain descriptors     Goal: Acceptable Pain Control/Comfort Level  Patient will demonstrate the desired outcomes by discharge/transition of care.    09/07/17 1955   Pain, Acute (Adult)   Acceptable Pain Control/Comfort Level making progress toward outcome

## 2017-09-08 NOTE — NURSING
"Left message with Ghazal at Dr. Welsh office for MD or nurse to call me back ASAP. Pt seems very short of breath and is using accessory muscles to breath. RT called to room and recommended pt go back on BIPAP, he previously removed BIPAP to eat lunch. Pt refuses BIPAP and states " I am doing good. This is normal for me. My doctor told me that he only wants me on that machine at night." RT explained importance of BIPAP use to pt. Pt continues to refuse BIPAP. Pt placed on 4L NC by RT. Will await MD or his nurse to call back. Will continue to monitor closely.   "

## 2017-09-08 NOTE — PROGRESS NOTES
Report received from Domonique WAGNER. Nursing rounds completed. Assessed pt's general appearance/condition.  Patient resting quietly in bed, respirations even/unlabored, no signs of distress noted, pt denies pain at this time.  Will continue to monitor.

## 2017-09-08 NOTE — PROGRESS NOTES
Ortho Daily Progress Note    Chaz Stuart is a 67 y.o. male admitted on 8/29/2017      Chief Complaint/Reason for admission: Headache (Pt. with PMH of COPD and polycythemia presents with shortness of breath, chest pain and headache for several days. Pt. reportedly has been on steroids and breathing treatments with no relief. Pt. was supposed to have hand surgery at [facility] but was unable to go. ) and Shortness of Breath       Hospital Day: 9  Post Op Day: 3 Days Post-Op     The patient was seen and examined this morning at the bedside. Patient reports no acute issues overnight.  Patient reports that pain is adequately controlled.    _______________    Vitals:    09/08/17 0842 09/08/17 1118 09/08/17 1130 09/08/17 1300   BP: (!) 182/80  (!) 178/83 (!) 156/80   Pulse:  82 96    Resp:  20 20    Temp:   99.1 °F (37.3 °C)    TempSrc:   Oral    SpO2:  96% 95%    Weight:       Height:           Vital Signs (Most Recent)  Temp: 99.1 °F (37.3 °C) (09/08/17 1130)  Pulse: 96 (09/08/17 1130)  Resp: 20 (09/08/17 1130)  BP: (!) 156/80 (09/08/17 1300)  SpO2: 95 % (09/08/17 1130)    Vital Signs Range (Last 24H):  Temp:  [97.5 °F (36.4 °C)-99.1 °F (37.3 °C)]   Pulse:  [75-96]   Resp:  [18-21]   BP: (138-198)/(63-92)   SpO2:  [93 %-99 %]       Physical:    AAOx3  Incision/ dressing clean/dry/intact  NVI Distally  Palpable distal pulses  CR<3sec      Recent Labs      09/06/17   0516  09/07/17   0653  09/08/17   0248   K  3.2*  3.1*  3.7   CALCIUM  8.5*  8.7  8.3*   WBC  16.18*  17.57*  13.36*   HGB  8.5*  9.6*  8.7*   HCT  30.3*  34.8*  30.8*   PLT  693*  682*  577*       I/O last 3 completed shifts:  In: 941 [P.O.:935; I.V.:6]  Out: 1150 [Urine:1150]          Assessment:  S/P right hand ORIF              Plan:    Continue management of chronic respiratory issues.   F/U with Dr. Monson in clinic      Ishmael Thornton MD  [facility]

## 2017-09-08 NOTE — NURSING
Left message with Adina at Dr. Welsh office regarding pt's elevated BP of 182/80. Adina to call Dr. Welsh. Will continue to monitor.

## 2017-09-08 NOTE — PROGRESS NOTES
Patient on 3L NC with sats of 91%. Patient appears to be having trouble breathing tried to placed patient again on bipap but refusing stated he was told by his doctor he only needed to wear bipap at night. I tried explaining to patient he needed to wear bipap at the moment but he continues to refused stating he's not having trouble breathing everything was normal for him. Increased to 4.5L NC with sats of 94%.

## 2017-09-09 LAB
ALBUMIN SERPL BCP-MCNC: 2.9 G/DL
ALP SERPL-CCNC: 101 U/L
ALT SERPL W/O P-5'-P-CCNC: 46 U/L
ANION GAP SERPL CALC-SCNC: 10 MMOL/L
AST SERPL-CCNC: 19 U/L
BILIRUB SERPL-MCNC: 0.5 MG/DL
BUN SERPL-MCNC: 26 MG/DL
CALCIUM SERPL-MCNC: 8.7 MG/DL
CHLORIDE SERPL-SCNC: 97 MMOL/L
CO2 SERPL-SCNC: 34 MMOL/L
CREAT SERPL-MCNC: 0.8 MG/DL
ERYTHROCYTE [DISTWIDTH] IN BLOOD BY AUTOMATED COUNT: 18.9 %
EST. GFR  (AFRICAN AMERICAN): >60 ML/MIN/1.73 M^2
EST. GFR  (NON AFRICAN AMERICAN): >60 ML/MIN/1.73 M^2
GLUCOSE SERPL-MCNC: 170 MG/DL
HCT VFR BLD AUTO: 34.7 %
HGB BLD-MCNC: 9.8 G/DL
MCH RBC QN AUTO: 21.7 PG
MCHC RBC AUTO-ENTMCNC: 28.2 G/DL
MCV RBC AUTO: 77 FL
PLATELET # BLD AUTO: 678 K/UL
PMV BLD AUTO: 9.7 FL
POTASSIUM SERPL-SCNC: 3.7 MMOL/L
PROT SERPL-MCNC: 5.8 G/DL
RBC # BLD AUTO: 4.51 M/UL
SODIUM SERPL-SCNC: 141 MMOL/L
WBC # BLD AUTO: 17.99 K/UL

## 2017-09-09 PROCEDURE — 80053 COMPREHEN METABOLIC PANEL: CPT

## 2017-09-09 PROCEDURE — 25000003 PHARM REV CODE 250: Performed by: EMERGENCY MEDICINE

## 2017-09-09 PROCEDURE — 63600175 PHARM REV CODE 636 W HCPCS: Performed by: INTERNAL MEDICINE

## 2017-09-09 PROCEDURE — 94664 DEMO&/EVAL PT USE INHALER: CPT

## 2017-09-09 PROCEDURE — 94640 AIRWAY INHALATION TREATMENT: CPT

## 2017-09-09 PROCEDURE — 21400001 HC TELEMETRY ROOM

## 2017-09-09 PROCEDURE — 99900035 HC TECH TIME PER 15 MIN (STAT)

## 2017-09-09 PROCEDURE — 25000003 PHARM REV CODE 250: Performed by: INTERNAL MEDICINE

## 2017-09-09 PROCEDURE — 36415 COLL VENOUS BLD VENIPUNCTURE: CPT

## 2017-09-09 PROCEDURE — 94660 CPAP INITIATION&MGMT: CPT

## 2017-09-09 PROCEDURE — A4216 STERILE WATER/SALINE, 10 ML: HCPCS | Performed by: EMERGENCY MEDICINE

## 2017-09-09 PROCEDURE — C9113 INJ PANTOPRAZOLE SODIUM, VIA: HCPCS | Performed by: EMERGENCY MEDICINE

## 2017-09-09 PROCEDURE — 27000221 HC OXYGEN, UP TO 24 HOURS

## 2017-09-09 PROCEDURE — 94761 N-INVAS EAR/PLS OXIMETRY MLT: CPT

## 2017-09-09 PROCEDURE — 25000242 PHARM REV CODE 250 ALT 637 W/ HCPCS: Performed by: EMERGENCY MEDICINE

## 2017-09-09 PROCEDURE — 63600175 PHARM REV CODE 636 W HCPCS: Performed by: EMERGENCY MEDICINE

## 2017-09-09 PROCEDURE — 85027 COMPLETE CBC AUTOMATED: CPT

## 2017-09-09 RX ORDER — METHYLPREDNISOLONE SOD SUCC 125 MG
40 VIAL (EA) INJECTION EVERY 8 HOURS
Status: DISCONTINUED | OUTPATIENT
Start: 2017-09-09 | End: 2017-09-10

## 2017-09-09 RX ORDER — AMLODIPINE BESYLATE 5 MG/1
10 TABLET ORAL DAILY
Status: DISCONTINUED | OUTPATIENT
Start: 2017-09-09 | End: 2017-09-11 | Stop reason: HOSPADM

## 2017-09-09 RX ADMIN — METHYLPREDNISOLONE SODIUM SUCCINATE 40 MG: 125 INJECTION, POWDER, FOR SOLUTION INTRAMUSCULAR; INTRAVENOUS at 09:09

## 2017-09-09 RX ADMIN — IPRATROPIUM BROMIDE AND ALBUTEROL SULFATE 3 ML: .5; 3 SOLUTION RESPIRATORY (INHALATION) at 07:09

## 2017-09-09 RX ADMIN — MORPHINE SULFATE: 10 INJECTION INTRAVENOUS at 10:09

## 2017-09-09 RX ADMIN — RAMELTEON 8 MG: 8 TABLET, FILM COATED ORAL at 09:09

## 2017-09-09 RX ADMIN — ALPRAZOLAM 1 MG: 0.5 TABLET ORAL at 05:09

## 2017-09-09 RX ADMIN — PANTOPRAZOLE SODIUM 40 MG: 40 INJECTION, POWDER, FOR SOLUTION INTRAVENOUS at 08:09

## 2017-09-09 RX ADMIN — METHYLPREDNISOLONE SODIUM SUCCINATE 40 MG: 125 INJECTION, POWDER, FOR SOLUTION INTRAMUSCULAR; INTRAVENOUS at 02:09

## 2017-09-09 RX ADMIN — IPRATROPIUM BROMIDE AND ALBUTEROL SULFATE 3 ML: .5; 3 SOLUTION RESPIRATORY (INHALATION) at 03:09

## 2017-09-09 RX ADMIN — DOXYCYCLINE HYCLATE 100 MG: 100 TABLET, COATED ORAL at 08:09

## 2017-09-09 RX ADMIN — IPRATROPIUM BROMIDE AND ALBUTEROL SULFATE 3 ML: .5; 3 SOLUTION RESPIRATORY (INHALATION) at 12:09

## 2017-09-09 RX ADMIN — CEFTRIAXONE 1 G: 1 INJECTION, SOLUTION INTRAVENOUS at 09:09

## 2017-09-09 RX ADMIN — DOXYCYCLINE HYCLATE 100 MG: 100 TABLET, COATED ORAL at 09:09

## 2017-09-09 RX ADMIN — Medication 3 ML: at 05:09

## 2017-09-09 RX ADMIN — Medication 3 ML: at 09:09

## 2017-09-09 RX ADMIN — METHYLPREDNISOLONE SODIUM SUCCINATE 80 MG: 125 INJECTION, POWDER, FOR SOLUTION INTRAMUSCULAR; INTRAVENOUS at 05:09

## 2017-09-09 RX ADMIN — ASPIRIN 81 MG: 81 TABLET, COATED ORAL at 08:09

## 2017-09-09 RX ADMIN — AMLODIPINE BESYLATE 10 MG: 5 TABLET ORAL at 08:09

## 2017-09-09 RX ADMIN — MORPHINE SULFATE: 10 INJECTION INTRAVENOUS at 05:09

## 2017-09-09 RX ADMIN — POTASSIUM CHLORIDE 20 MEQ: 20 SOLUTION ORAL at 08:09

## 2017-09-09 RX ADMIN — RAMELTEON 8 MG: 8 TABLET, FILM COATED ORAL at 01:09

## 2017-09-09 RX ADMIN — IPRATROPIUM BROMIDE AND ALBUTEROL SULFATE 3 ML: .5; 3 SOLUTION RESPIRATORY (INHALATION) at 11:09

## 2017-09-09 RX ADMIN — MORPHINE SULFATE 6 MG: 10 INJECTION INTRAVENOUS at 11:09

## 2017-09-09 RX ADMIN — ALPRAZOLAM 1 MG: 0.5 TABLET ORAL at 02:09

## 2017-09-09 RX ADMIN — Medication 3 ML: at 02:09

## 2017-09-09 RX ADMIN — ALPRAZOLAM 1 MG: 0.5 TABLET ORAL at 09:09

## 2017-09-09 RX ADMIN — ENOXAPARIN SODIUM 40 MG: 100 INJECTION SUBCUTANEOUS at 05:09

## 2017-09-09 NOTE — NURSING
0900- appetite good . Right hand dressings intact and clean no swelling around dressing . Tolerating oyxgen at 2 liters. Does get short of breath with exertion but able to control breathing . No changes on telemetry .       1130- medicated for pain in hand . No acute distress .     1300- pain medication effective appetite good ate all of lunch meal . No acute distress patient on his cell phone . No changes on telemetry .

## 2017-09-09 NOTE — NURSING
0645- bedside rounding report received from hi brownlee rn on patients progress and updated hand off report sheet given too. No acute events. Patient on bipap at this time but awake denies needs.     0745- assessment completed and plan of care discussed with patient. Denies pain at this time. Brought pair of skid resistant socks patient voices desire to walk around and I agreed but explained to patient need to get the ok from dr montesinos . Patient agreeable as well . Brought socks and fixed chair in room so he can also sit in it comfortably. No chagnes on telemetry. Denies pain.

## 2017-09-09 NOTE — PROGRESS NOTES
Progress Note    Admit Date: 8/29/2017   LOS: 10 days     SUBJECTIVE:     Mr. Stuart is a pleasant 66 YO gentleman with Myeloproliferative neoplasm, Polycythemia Vera, oxygen dependent COPD and most recent injuries to right hand from a . Pt had a recent COPD exacerbation and was seen at [facility] ED- tx and released. For the last 3 days, not feeling well. Has chest discomfort and nausea. He began to have fever at home associated with worsening sob and chest pressure. Pt was scheduled for hand surgery and did not get clearance from anesthesia to get and outpatient procedure thus re-scheduled for tomorrow     Since admission, states feeling little better. Denies cough but has chest congestion. Denies any sick contact.    Follow-up For:  COPD exacerbation     09/09/2017: little more comfortable on BiPAP at on the 2nd night. Cough and congestion better. Denies fever or chills.   09/08/2017: tolerated BiPAP for short time. Continue to have congestion and cough.   09/07/2017: hand pain better. Continue to have lung congestion   09/06/2017: Pt underwent hand surgery on 09/05 and tolerated well. Denies fever or chills.   09/05/2017: feeling little better. No fever or chills.   09/04/2017: increasing sob. Have some reservation about getting surgery tomorrow   09/03/2017: Denies fever or chills.   09/02/2017: chest congestion better.   09/01/2017: feeling better. Had BM. Less SOB    08/31/2017: continue to have moderate sob. + Abdominal distention and constipation      Scheduled Meds:   albuterol-ipratropium 2.5mg-0.5mg/3mL  3 mL Nebulization Q4H    alprazolam  1 mg Oral TID    amlodipine  10 mg Oral Daily    aspirin  81 mg Oral Daily    cefTRIAXone (ROCEPHIN) IVPB  1 g Intravenous Q24H    doxycycline  100 mg Oral Q12H    enoxaparin  40 mg Subcutaneous Daily    lidocaine (PF) 10 mg/ml (1%)  1 mL Intradermal Once    methylPREDNISolone sodium succinate  40 mg Intravenous Q8H    pantoprazole  40 mg Intravenous  Daily    polyethylene glycol  17 g Oral Daily    potassium chloride 10%  20 mEq Oral Daily    sodium chloride 0.9%  3 mL Intravenous Q8H     Continuous Infusions:     PRN Meds:hydrALAZINE, lactulose, morphine, morphine, ondansetron, promethazine (PHENERGAN) IVPB, ramelteon    Review of patient's allergies indicates:  No Known Allergies    Review of Systems    Constitutional: No fever   Eyes: no visual changes   ENT: no nasal congestion. Denies sore throat with odynophagia   Respiratory: see HPI.  Cardiovascular: see HPI   Gastrointestinal: denies abd pain.    Hematologic/Lymphatic: see HPI  Musculoskeletal: ROM of right hand limited   Neurological: no seizures or tremors, gait or balance prblems  Skin: No rashes or lesions  Psych: Denies any anxiety, depression or insomnia      OBJECTIVE:     Vital Signs (Most Recent)    Temp: 96.2 °F (35.7 °C) (09/09/17 0546)  Pulse: 73 (09/09/17 0720)  Resp: 17 (09/09/17 0720)  BP: (!) 167/91 (09/09/17 0546)  SpO2: 99 % (09/09/17 0720)    Vital Signs Range (Last 24H):    Temp:  [96.2 °F (35.7 °C)-99.1 °F (37.3 °C)]   Pulse:  []   Resp:  [17-20]   BP: (147-182)/(67-91)   SpO2:  [95 %-99 %]     I & O (Last 24H):    Intake/Output Summary (Last 24 hours) at 09/09/17 0802  Last data filed at 09/08/17 2146   Gross per 24 hour   Intake               50 ml   Output                0 ml   Net               50 ml     Physical Exam:    General: NAD, resting in bed. BiPAP at bedside   Head: normocephalic, atraumatic   Eyes: conjunctivae pink, anicteric sclera.  Throat: No erythema or post nasal discharge   Neck: supple, no LAD   Lungs: Decreased air entry bilaterally, mild expiratory wheezing   Heart: S1, S2, RRR   Abdomen: + BS x 4 quadrants, slightly distended, mild epigastric tenderness    Extremities: no cyanosis or edema.   Skin: turgor normal, no erythema or rashes. No abnormal moles  MS:  right hand in surgical bandage.   Neuro: A&O x 3    Psy: calm      Laboratory:  CBC:      Recent Labs  Lab 09/09/17  0434   WBC 17.99*   RBC 4.51*   HGB 9.8*   HCT 34.7*   *   MCV 77*   MCH 21.7*   MCHC 28.2*     CMP:     Recent Labs  Lab 09/09/17  0434   *   CALCIUM 8.7   ALBUMIN 2.9*   PROT 5.8*      K 3.7   CO2 34*   CL 97   BUN 26*   CREATININE 0.8   ALKPHOS 101   ALT 46*   AST 19   BILITOT 0.5     LFTs:     Recent Labs  Lab 09/09/17  0434   ALT 46*   AST 19   ALKPHOS 101   BILITOT 0.5   PROT 5.8*   ALBUMIN 2.9*     Coagulation:   No results for input(s): LABPROT, INR, APTT in the last 168 hours.  Cardiac markers:   No results for input(s): CKMB, CPKMB, TROPONINT, TROPONINI, MYOGLOBIN in the last 168 hours.  ABGs:   No results for input(s): PH, PCO2, PO2, HCO3, POCSATURATED, BE in the last 168 hours.  Microbiology Results (last 7 days)     ** No results found for the last 168 hours. **        Specimen (12h ago through future)    None        No results for input(s): COLORU, CLARITYU, SPECGRAV, PHUR, PROTEINUA, GLUCOSEU, BILIRUBINCON, BLOODU, WBCU, RBCU, BACTERIA, MUCUS, NITRITE, LEUKOCYTESUR, UROBILINOGEN, HYALINECASTS in the last 168 hours.      Diagnostic Results:    Current Facility-Administered Medications   Medication Dose Route Frequency Provider Last Rate Last Dose    albuterol-ipratropium 2.5mg-0.5mg/3mL nebulizer solution 3 mL  3 mL Nebulization Q4H Armand Mcmullen MD   3 mL at 09/09/17 0720    alprazolam tablet 1 mg  1 mg Oral TID Armand Mcmullen MD   1 mg at 09/09/17 0523    amlodipine tablet 10 mg  10 mg Oral Daily Rk Welsh MD        aspirin EC tablet 81 mg  81 mg Oral Daily Armand Mcmullen MD   81 mg at 09/08/17 0925    cefTRIAXone (ROCEPHIN) 1 g in dextrose 5 % 50 mL IVPB  1 g Intravenous Q24H Armand Mcmullen MD   1 g at 09/08/17 2146    doxycycline tablet 100 mg  100 mg Oral Q12H Rk Welsh MD   100 mg at 09/08/17 2146    enoxaparin injection 40 mg  40 mg Subcutaneous Daily Armand Mcmullen MD   40 mg at 09/08/17 1742     hydrALAZINE injection 10 mg  10 mg Intravenous Q6H PRN Rk Welsh MD   10 mg at 09/08/17 0925    lactulose 20 gram/30 mL solution Soln 30 g  30 g Oral BID PRN Rk Welsh MD   30 g at 08/31/17 0822    lidocaine (PF) 10 mg/ml (1%) injection 10 mg  1 mL Intradermal Once May Rosado MD        methylPREDNISolone sodium succinate injection 40 mg  40 mg Intravenous Q8H Rk Welsh MD        morphine injection 2 mg  2 mg Intravenous Q4H PRN Armand Mcmullen MD        morphine injection 6 mg  6 mg Intravenous Q4H PRN Armand Mcmullen MD   6 mg at 09/08/17 1747    ondansetron injection 4 mg  4 mg Intravenous Q8H PRN Armand Mcmullen MD   4 mg at 09/05/17 1315    pantoprazole injection 40 mg  40 mg Intravenous Daily Armand Mcmullen MD   40 mg at 09/08/17 0925    polyethylene glycol packet 17 g  17 g Oral Daily Jefry Silva MD   17 g at 09/01/17 0838    potassium chloride 10% solution 20 mEq  20 mEq Oral Daily Rk Welsh MD   20 mEq at 09/08/17 0925    promethazine (PHENERGAN) 6.25 mg in dextrose 5 % 50 mL IVPB  6.25 mg Intravenous Q6H PRN Armand Mcmullen MD        ramelteon tablet 8 mg  8 mg Oral Nightly PRN Armand Mcmullen MD   8 mg at 09/09/17 0112    sodium chloride 0.9% flush 3 mL  3 mL Intravenous Q8H Armand Mcmullen MD   3 mL at 09/09/17 0523         ASSESSMENT/PLAN:     Active Hospital Problems    Diagnosis  POA    *COPD exacerbation [J44.1]  Yes      Resolved Hospital Problems    Diagnosis Date Resolved POA   No resolved problems to display.       1. Acute exacerbation of COPD: pt has significant symptoms and outpatient tx failed                        - started IV steroid, Rocephin and Zithromax                        - Duoneb                        - pulmonary consulted    - continue to improve slowly    - Dced  zithromax   -  Lasix 40mg IV and K supplement prn   - BiPAP at night- tolerating it     - pt making improvement    - taper  off IV steroid and will start po prednisone in 24 hours   - pt may benefit from BIPAP at home - will  Have CM work with insurance      2. Fever and Leukocytosis/SIRS: multifactorial in nature- hand injury can be a source of infection                          - on Rocephin    - wbc trending up    - will add Doxy IV    -wbc stable       3. MPN: Polycythemia Vera: currently on Hydrea                - will hold off on hydrea      4. Abnormal LFT: US of liver done                        - GI consult pending                         - acute hep panel negative                          - ? Medication induced liver toxicity?    - improving steadily    - no n/v     5. Elevated glucose: mostly due to IV steroids      6. Nausea: protonix and Zofra                         -dc motrin     7. DVT  Prophylaxis: Lovenox     8. Constipation: started Lactulose and having good BM    9. Hand injury- 3 fingers are damaged and awaiting surgical intervention   - pt and family wants Dr. Monson to see    - discussed the case in detail with Dr. Monson and he evaluated pt on Fri   - plan for OR today.   - discussed with Dr. Monson    - Crush injury R hand, open fractures R IF and MF:Post-op Diagnosis: Crush injury R hand, open fractures R IF and MF     Procedure(s) (LRB):  OPEN REDUCTION INTERNAL FIXATION- THUMB,INDEX MIDDLE FINGERS VS PINNING  THUMB, INDEX, MIDDLE FINGERS (Right) I&D open fx R IF, MF    - Discussed with Dr. Monson on 09/07- hold off on OT. PT to have evaluation outpatient before OT    Rk Welsh M.D  Internal Medicine & Geriatric Medicine  Hematology & Oncology  Palliative Medicine    [address], [address]  [place] [id number] [phone] (Office)  [phone] (Fax)

## 2017-09-09 NOTE — PROGRESS NOTES
No new issues from Ortho standpoint. Pt states he is breathing a little better    Rt hand- dsgs./splints in place and intact to multiple fingers with no d/c through the dsg    A/P ORIF multiple fingers right hand  Leave dsgs on  F/U with Dr Monson in 2 weeks

## 2017-09-09 NOTE — PLAN OF CARE
Problem: Fall Risk (Adult)  Goal: Absence of Falls  Patient will demonstrate the desired outcomes by discharge/transition of care.   Outcome: Ongoing (interventions implemented as appropriate)   09/09/17 0348   Fall Risk (Adult)   Absence of Falls making progress toward outcome       Problem: Patient Care Overview  Goal: Plan of Care Review  Outcome: Ongoing (interventions implemented as appropriate)   09/09/17 0348   Coping/Psychosocial   Plan Of Care Reviewed With patient       Problem: ARDS (Acute Resp Distress Syndrome) (Adult)  Goal: Signs and Symptoms of Listed Potential Problems Will be Absent, Minimized or Managed (ARDS)  Signs and symptoms of listed potential problems will be absent, minimized or managed by discharge/transition of care (reference ARDS (Acute Resp Distress Syndrome) (Adult) CPG).   Outcome: Ongoing (interventions implemented as appropriate)   09/09/17 0348   ARDS (Acute Resp Distress Syndrome)   Problems Assessed (Acute Respiratory Distress Syndrome) hypoxia/hypoxemia   Problems Present (Acute Respiratory Distress Syndrome) hypoxia/hypoxemia       Problem: Pain, Acute (Adult)  Goal: Acceptable Pain Control/Comfort Level  Patient will demonstrate the desired outcomes by discharge/transition of care.   Outcome: Ongoing (interventions implemented as appropriate)   09/09/17 0348   Pain, Acute (Adult)   Acceptable Pain Control/Comfort Level making progress toward outcome       Problem: Constipation (Adult)  Goal: Effective Bowel Elimination  Patient will demonstrate the desired outcomes by discharge/transition of care.   Outcome: Ongoing (interventions implemented as appropriate)   09/09/17 0348   Constipation (Adult)   Effective Bowel Elimination making progress toward outcome       Problem: Infection, Risk/Actual (Adult)  Goal: Infection Prevention/Resolution  Patient will demonstrate the desired outcomes by discharge/transition of care.   Outcome: Ongoing (interventions implemented as  appropriate)   09/09/17 0348   Infection, Risk/Actual (Adult)   Infection Prevention/Resolution making progress toward outcome       Problem: Pressure Ulcer Risk (Blu Scale) (Adult,Obstetrics,Pediatric)  Goal: Skin Integrity  Patient will demonstrate the desired outcomes by discharge/transition of care.   Outcome: Ongoing (interventions implemented as appropriate)   09/09/17 0348   Pressure Ulcer Risk (Blu Scale) (Adult,Obstetrics,Pediatric)   Skin Integrity making progress toward outcome

## 2017-09-10 LAB
ALBUMIN SERPL BCP-MCNC: 2.8 G/DL
ALP SERPL-CCNC: 94 U/L
ALT SERPL W/O P-5'-P-CCNC: 55 U/L
ANION GAP SERPL CALC-SCNC: 10 MMOL/L
AST SERPL-CCNC: 22 U/L
BILIRUB SERPL-MCNC: 0.4 MG/DL
BUN SERPL-MCNC: 24 MG/DL
CALCIUM SERPL-MCNC: 8.7 MG/DL
CHLORIDE SERPL-SCNC: 98 MMOL/L
CO2 SERPL-SCNC: 35 MMOL/L
CREAT SERPL-MCNC: 0.7 MG/DL
ERYTHROCYTE [DISTWIDTH] IN BLOOD BY AUTOMATED COUNT: 19.1 %
EST. GFR  (AFRICAN AMERICAN): >60 ML/MIN/1.73 M^2
EST. GFR  (NON AFRICAN AMERICAN): >60 ML/MIN/1.73 M^2
GLUCOSE SERPL-MCNC: 130 MG/DL
HCT VFR BLD AUTO: 34.4 %
HGB BLD-MCNC: 9.6 G/DL
MCH RBC QN AUTO: 21.7 PG
MCHC RBC AUTO-ENTMCNC: 27.9 G/DL
MCV RBC AUTO: 78 FL
PLATELET # BLD AUTO: 540 K/UL
PMV BLD AUTO: 10.1 FL
POTASSIUM SERPL-SCNC: 4.1 MMOL/L
PROT SERPL-MCNC: 5.6 G/DL
RBC # BLD AUTO: 4.43 M/UL
SODIUM SERPL-SCNC: 143 MMOL/L
WBC # BLD AUTO: 14.96 K/UL

## 2017-09-10 PROCEDURE — 97161 PT EVAL LOW COMPLEX 20 MIN: CPT

## 2017-09-10 PROCEDURE — 21400001 HC TELEMETRY ROOM

## 2017-09-10 PROCEDURE — A4216 STERILE WATER/SALINE, 10 ML: HCPCS | Performed by: EMERGENCY MEDICINE

## 2017-09-10 PROCEDURE — 27000221 HC OXYGEN, UP TO 24 HOURS

## 2017-09-10 PROCEDURE — 25000242 PHARM REV CODE 250 ALT 637 W/ HCPCS: Performed by: EMERGENCY MEDICINE

## 2017-09-10 PROCEDURE — 94640 AIRWAY INHALATION TREATMENT: CPT

## 2017-09-10 PROCEDURE — 99900035 HC TECH TIME PER 15 MIN (STAT)

## 2017-09-10 PROCEDURE — 63600175 PHARM REV CODE 636 W HCPCS: Performed by: EMERGENCY MEDICINE

## 2017-09-10 PROCEDURE — 80053 COMPREHEN METABOLIC PANEL: CPT

## 2017-09-10 PROCEDURE — 25000003 PHARM REV CODE 250: Performed by: INTERNAL MEDICINE

## 2017-09-10 PROCEDURE — 94761 N-INVAS EAR/PLS OXIMETRY MLT: CPT

## 2017-09-10 PROCEDURE — 25000003 PHARM REV CODE 250: Performed by: EMERGENCY MEDICINE

## 2017-09-10 PROCEDURE — 63600175 PHARM REV CODE 636 W HCPCS: Performed by: INTERNAL MEDICINE

## 2017-09-10 PROCEDURE — 94664 DEMO&/EVAL PT USE INHALER: CPT

## 2017-09-10 PROCEDURE — 36415 COLL VENOUS BLD VENIPUNCTURE: CPT

## 2017-09-10 PROCEDURE — C9113 INJ PANTOPRAZOLE SODIUM, VIA: HCPCS | Performed by: EMERGENCY MEDICINE

## 2017-09-10 PROCEDURE — 85027 COMPLETE CBC AUTOMATED: CPT

## 2017-09-10 PROCEDURE — 94660 CPAP INITIATION&MGMT: CPT

## 2017-09-10 RX ORDER — PREDNISONE 20 MG/1
40 TABLET ORAL DAILY
Status: DISCONTINUED | OUTPATIENT
Start: 2017-09-11 | End: 2017-09-11 | Stop reason: HOSPADM

## 2017-09-10 RX ADMIN — IPRATROPIUM BROMIDE AND ALBUTEROL SULFATE 3 ML: .5; 3 SOLUTION RESPIRATORY (INHALATION) at 07:09

## 2017-09-10 RX ADMIN — AMLODIPINE BESYLATE 10 MG: 5 TABLET ORAL at 08:09

## 2017-09-10 RX ADMIN — Medication 3 ML: at 02:09

## 2017-09-10 RX ADMIN — IPRATROPIUM BROMIDE AND ALBUTEROL SULFATE 3 ML: .5; 3 SOLUTION RESPIRATORY (INHALATION) at 02:09

## 2017-09-10 RX ADMIN — DOXYCYCLINE HYCLATE 100 MG: 100 TABLET, COATED ORAL at 09:09

## 2017-09-10 RX ADMIN — ASPIRIN 81 MG: 81 TABLET, COATED ORAL at 08:09

## 2017-09-10 RX ADMIN — IPRATROPIUM BROMIDE AND ALBUTEROL SULFATE 3 ML: .5; 3 SOLUTION RESPIRATORY (INHALATION) at 11:09

## 2017-09-10 RX ADMIN — ENOXAPARIN SODIUM 40 MG: 100 INJECTION SUBCUTANEOUS at 05:09

## 2017-09-10 RX ADMIN — MORPHINE SULFATE 6 MG: 10 INJECTION INTRAVENOUS at 10:09

## 2017-09-10 RX ADMIN — POTASSIUM CHLORIDE 20 MEQ: 20 SOLUTION ORAL at 08:09

## 2017-09-10 RX ADMIN — MORPHINE SULFATE 6 MG: 10 INJECTION INTRAVENOUS at 05:09

## 2017-09-10 RX ADMIN — ALPRAZOLAM 1 MG: 0.5 TABLET ORAL at 09:09

## 2017-09-10 RX ADMIN — ALPRAZOLAM 1 MG: 0.5 TABLET ORAL at 05:09

## 2017-09-10 RX ADMIN — METHYLPREDNISOLONE SODIUM SUCCINATE 40 MG: 125 INJECTION, POWDER, FOR SOLUTION INTRAMUSCULAR; INTRAVENOUS at 01:09

## 2017-09-10 RX ADMIN — POLYETHYLENE GLYCOL 3350 17 G: 17 POWDER, FOR SOLUTION ORAL at 08:09

## 2017-09-10 RX ADMIN — HYDRALAZINE HYDROCHLORIDE 10 MG: 20 INJECTION INTRAMUSCULAR; INTRAVENOUS at 06:09

## 2017-09-10 RX ADMIN — MORPHINE SULFATE 6 MG: 10 INJECTION INTRAVENOUS at 09:09

## 2017-09-10 RX ADMIN — ALPRAZOLAM 1 MG: 0.5 TABLET ORAL at 01:09

## 2017-09-10 RX ADMIN — Medication 3 ML: at 09:09

## 2017-09-10 RX ADMIN — Medication 3 ML: at 05:09

## 2017-09-10 RX ADMIN — DOXYCYCLINE HYCLATE 100 MG: 100 TABLET, COATED ORAL at 08:09

## 2017-09-10 RX ADMIN — METHYLPREDNISOLONE SODIUM SUCCINATE 40 MG: 125 INJECTION, POWDER, FOR SOLUTION INTRAMUSCULAR; INTRAVENOUS at 05:09

## 2017-09-10 RX ADMIN — PANTOPRAZOLE SODIUM 40 MG: 40 INJECTION, POWDER, FOR SOLUTION INTRAVENOUS at 08:09

## 2017-09-10 RX ADMIN — IPRATROPIUM BROMIDE AND ALBUTEROL SULFATE 3 ML: .5; 3 SOLUTION RESPIRATORY (INHALATION) at 03:09

## 2017-09-10 NOTE — NURSING
"7445- bedside rounding report received from hi sebastian rn on patients progress and updated hand off report sheet . Patient is in bed with his gown on and blue jeans patient states", I going home  Today I coughed up that phelgm my doctor  Told me when I can cough up mucus I could go I got to go home I have business with my neighbors to take care of." no acute distress .   "

## 2017-09-10 NOTE — PT/OT/SLP EVAL
"Physical Therapy  Evaluation    Chaz Stuart   MRN: [id number]   Admitting Diagnosis: COPD exacerbation    PT Received On: 09/10/17  PT Start Time: 0956     PT Stop Time: 1016    PT Total Time (min): 20 min       Billable Minutes:  Evaluation  20     Diagnosis: COPD exacerbation    Past Medical History:   Diagnosis Date    Anxiety     COPD (chronic obstructive pulmonary disease)     DDD (degenerative disc disease), cervical     Emphysema/COPD     GERD (gastroesophageal reflux disease)     Hypertension       Past Surgical History:   Procedure Laterality Date    BACK SURGERY      CERVICAL SPINE SURGERY  Fusion    CHOLECYSTECTOMY      COLON SURGERY       Referring physician: Blaise  Date referred to PT: 9/10/17    General Precautions: Standard, fall, respiratory  Orthopedic Precautions: N/A   Braces: N/A       Patient History:  Lives With: alone  Living Arrangements: other (see comments) (carlos )  Home Accessibility: stairs to enter home  Number of Stairs to Enter Home: 5  Stair Railings at Home: outside, present at both sides  Living Environment Comment: Patient states his children and siblings live close by to assist him.   Equipment Currently Used at Home: oxygen    Previous Level of Function:  Ambulation Skills: independent  Transfer Skills: independent    Subjective:  Communicated with nurse Aguiar prior to session.  Patient agreeable to participate in PT evaluation.   Chief Complaint: "The nurse will not let me walk in the hallway. She said there is no order from the MD so I cannot do it. All I want to do it walk and they will not let me."  Patient goals: To go home because they will not let him walk.     Pain/Comfort  Pain Rating : 0/10    Objective:   Patient found with: peripheral IV, oxygen, telemetry     Cognitive Exam:  Oriented to: Person, Place, Time and Situation    Follows Commands/attention: Follows two-step commands  Communication: clear/fluent  Safety awareness/insight to disability: " impaired    Physical Exam:  Postural examination/scapula alignment: Rounded shoulder and Head forward    Skin integrity: Visible skin intact  Edema: None noted     Sensation: Intact per patient report    Upper Extremity Range of Motion:  Right Upper Extremity: WFL  Left Upper Extremity: WFL    Upper Extremity Strength:  Right Upper Extremity: WFL  Left Upper Extremity: WFL    Lower Extremity Range of Motion:  Right Lower Extremity: WFL  Left Lower Extremity: WFL    Lower Extremity Strength:  Right Lower Extremity: WFL  Left Lower Extremity: WFL    Gross motor coordination: WFL    Functional Mobility:  Bed Mobility:  Supine to Sit: Modified Independent  Sit to Supine: Modified Independent    Transfers:  Sit <> Stand Assistance: Supervision  Sit <> Stand Assistive Device: No Assistive Device    Gait:   Gait Distance: ~120ft on 4L NC O2 with standing rest break for patient to perform pursed lip breathing. He demonstrated 1 loss of  balance but was able to self recover.   Assistance 1: Supervision  Gait Assistive Device: No device  Gait Pattern: reciprocal    Balance:   Static Sit: NORMAL: No deviations seen in posture held statically  Dynamic Sit: GOOD+: Maintains balance through MAXIMAL excursions of active trunk motion  Static Stand: GOOD-: Takes MODERATE challenges from all directions inconsistently  Dynamic stand: FAIR+: Needs CLOSE SUPERVISION during gait and is able to right self with minor LOB    AM-PAC 6 CLICK MOBILITY  How much help from another person does this patient currently need?   1 = Unable, Total/Dependent Assistance  2 = A lot, Maximum/Moderate Assistance  3 = A little, Minimum/Contact Guard/Supervision  4 = None, Modified [place]/Independent    Turning over in bed (including adjusting bedclothes, sheets and blankets)?: 4  Sitting down on and standing up from a chair with arms (e.g., wheelchair, bedside commode, etc.): 4  Moving from lying on back to sitting on the side of the bed?: 4  Moving to  and from a bed to a chair (including a wheelchair)?: 4  Need to walk in hospital room?: 4  Climbing 3-5 steps with a railing?: 3  Total Score: 23     AM-PAC Raw Score CMS G-Code Modifier Level of Impairment Assistance   6 % Total / Unable   7 - 9 CM 80 - 100% Maximal Assist   10 - 14 CL 60 - 80% Moderate Assist   15 - 19 CK 40 - 60% Moderate Assist   20 - 22 CJ 20 - 40% Minimal Assist   23 CI 1-20% SBA / CGA   24 CH 0% Independent/ Mod I     Patient left supine with all lines intact, call button in reach, nurse Stefania  notified and sister present. Nurse Stefania educated that patient only needs supervision to ambulate due to needing someone to push O2 tank for him. She verbalized understanding.     Assessment:   Chaz Stuart is a 67 y.o. male with a medical diagnosis of COPD exacerbation and presents with decreased endurance for functional mobility. He would benefit from nursing staff supervision to ambulate in hallway with O2 due to patient requesting to ambulate. Nurse Aguiar notified that patient is okay to ambulate with supervision and O2 tank. He would benefit from PT while in the hospital in order to address deficits listed below and get patient back to PLOF.     Rehab identified problem list/impairments: Rehab identified problem list/impairments: impaired endurance, impaired balance, gait instability, decreased safety awareness, impaired cardiopulmonary response to activity    Rehab potential is fair.    Activity tolerance: Fair    Discharge recommendations: Discharge Facility/Level Of Care Needs: home (with assistance from family as needed)     Barriers to discharge: Barriers to Discharge: Other (Comment) (decreased endurance for functional mobility )    Equipment recommendations: Equipment Needed After Discharge: none     GOALS:    Physical Therapy Goals        Problem: Physical Therapy Goal    Goal Priority Disciplines Outcome Goal Variances Interventions   Physical Therapy Goal     PT/OT, PT       Description:  Goals to be met by: 17    Patient will increase functional independence with mobility by performin. Sit to stand transfer with [place]  2. Gait  x 500 feet with [place]   3. Lower extremity exercise program x30 reps per handout, with independence                    PLAN:    Patient to be seen 3 x/week to address the above listed problems via gait training, therapeutic activities, therapeutic exercises  Plan of Care expires: 17  Plan of Care reviewed with: patient, sibling (sister)    Briseida Meadows, PT, MOT  09/10/2017

## 2017-09-10 NOTE — PLAN OF CARE
Problem: Fall Risk (Adult)  Goal: Absence of Falls  Patient will demonstrate the desired outcomes by discharge/transition of care.   Outcome: Ongoing (interventions implemented as appropriate)   09/10/17 0501   Fall Risk (Adult)   Absence of Falls making progress toward outcome       Problem: Patient Care Overview  Goal: Plan of Care Review  Outcome: Ongoing (interventions implemented as appropriate)   09/10/17 0501   Coping/Psychosocial   Plan Of Care Reviewed With patient       Problem: Constipation (Adult)  Goal: Effective Bowel Elimination  Patient will demonstrate the desired outcomes by discharge/transition of care.   Outcome: Ongoing (interventions implemented as appropriate)   09/10/17 0501   Constipation (Adult)   Effective Bowel Elimination making progress toward outcome      09/10/17 0501   Constipation (Adult)   Effective Bowel Elimination making progress toward outcome       Problem: Infection, Risk/Actual (Adult)  Goal: Infection Prevention/Resolution  Patient will demonstrate the desired outcomes by discharge/transition of care.   Outcome: Ongoing (interventions implemented as appropriate)   09/10/17 0501   Infection, Risk/Actual (Adult)   Infection Prevention/Resolution making progress toward outcome       Problem: Pressure Ulcer Risk (Blu Scale) (Adult,Obstetrics,Pediatric)  Goal: Skin Integrity  Patient will demonstrate the desired outcomes by discharge/transition of care.   Outcome: Ongoing (interventions implemented as appropriate)   09/10/17 0501   Pressure Ulcer Risk (Blu Scale) (Adult,Obstetrics,Pediatric)   Skin Integrity making progress toward outcome

## 2017-09-10 NOTE — NURSING
0800- assessment completed and plan of care reviewed with patient . Patient still dressed and convinced he will go home today . No changes on telemetry . Oxygen at 4 liters nasal canula. Good appetite . Head of bed elevated and bed alarm is on. Extension tubing connected and secured to oxygen .     0915- spoke with dr montesinos in the hallway and I informed him of the patients request to  Go home today and did he plan to discharge the patient home.  informed me he is not going to discharge the patient home today and he has already spoken to patient about discharge for several days now.     1000 ate his meal this am tolerated well. Medicated for pain to right hand dressing clean and dry no streaking or redness good pulses unable to visualize the stumps of fingers removed. Offered shower today patient agreeable but firm about going home.    1110- patient walking in hallway with oxygen in use with PT tolerated very well. No changes on telemetry.

## 2017-09-10 NOTE — PLAN OF CARE
Problem: Physical Therapy Goal  Goal: Physical Therapy Goal  Goals to be met by: 17    Patient will increase functional independence with mobility by performin. Sit to stand transfer with [place]  2. Gait  x 500 feet with [place]   3. Lower extremity exercise program x30 reps per handout, with independence      Patient okay to ambulate with nursing staff supervision and oxygen. Nurse Aguiar notified.

## 2017-09-10 NOTE — NURSING
Tolerated supper meal . Complained of right hand pain medicated for pain iv route . No changes on telemetry. Bedside table close by with personal items. Daughter came to visit this evening.  Oxygen at 4 liters by nasal canula in use extension tubing is properly connected. No changes on telemetry.

## 2017-09-10 NOTE — PROGRESS NOTES
Progress Note    Admit Date: 8/29/2017   LOS: 11 days     SUBJECTIVE:     Mr. Stuart is a pleasant 66 YO gentleman with Myeloproliferative neoplasm, Polycythemia Vera, oxygen dependent COPD and most recent injuries to right hand from a . Pt had a recent COPD exacerbation and was seen at [facility] ED- tx and released. For the last 3 days, not feeling well. Has chest discomfort and nausea. He began to have fever at home associated with worsening sob and chest pressure. Pt was scheduled for hand surgery and did not get clearance from anesthesia to get and outpatient procedure thus re-scheduled for tomorrow     Since admission, states feeling little better. Denies cough but has chest congestion. Denies any sick contact.    Follow-up For:  COPD exacerbation     09/10/2017: Feeling much better. No fever or chills.   09/09/2017: little more comfortable on BiPAP at on the 2nd night. Cough and congestion better. Denies fever or chills.   09/08/2017: tolerated BiPAP for short time. Continue to have congestion and cough.   09/07/2017: hand pain better. Continue to have lung congestion   09/06/2017: Pt underwent hand surgery on 09/05 and tolerated well. Denies fever or chills.   09/05/2017: feeling little better. No fever or chills.   09/04/2017: increasing sob. Have some reservation about getting surgery tomorrow   09/03/2017: Denies fever or chills.   09/02/2017: chest congestion better.   09/01/2017: feeling better. Had BM. Less SOB    08/31/2017: continue to have moderate sob. + Abdominal distention and constipation      Scheduled Meds:   albuterol-ipratropium 2.5mg-0.5mg/3mL  3 mL Nebulization Q4H    alprazolam  1 mg Oral TID    amlodipine  10 mg Oral Daily    aspirin  81 mg Oral Daily    cefTRIAXone (ROCEPHIN) IVPB  1 g Intravenous Q24H    doxycycline  100 mg Oral Q12H    enoxaparin  40 mg Subcutaneous Daily    lidocaine (PF) 10 mg/ml (1%)  1 mL Intradermal Once    methylPREDNISolone sodium succinate  40 mg  Intravenous Q8H    pantoprazole  40 mg Intravenous Daily    polyethylene glycol  17 g Oral Daily    potassium chloride 10%  20 mEq Oral Daily    sodium chloride 0.9%  3 mL Intravenous Q8H     Continuous Infusions:     PRN Meds:hydrALAZINE, lactulose, morphine, morphine, ondansetron, promethazine (PHENERGAN) IVPB, ramelteon    Review of patient's allergies indicates:  No Known Allergies    Review of Systems    Constitutional: No fever   Eyes: no visual changes   ENT: no nasal congestion. Denies sore throat with odynophagia   Respiratory: chest congestion lot better   Cardiovascular: see HPI   Gastrointestinal: denies abd pain.    Hematologic/Lymphatic: see HPI  Musculoskeletal: ROM of right hand limited   Neurological: no seizures or tremors, gait or balance prblems  Skin: No rashes or lesions  Psych: Denies any anxiety, depression or insomnia      OBJECTIVE:     Vital Signs (Most Recent)    Temp: 97.8 °F (36.6 °C) (09/10/17 0534)  Pulse: 80 (09/10/17 0534)  Resp: 18 (09/10/17 0534)  BP: (!) 170/89 (09/10/17 0534)  SpO2: 96 % (09/10/17 0534)    Vital Signs Range (Last 24H):    Temp:  [97.1 °F (36.2 °C)-98.4 °F (36.9 °C)]   Pulse:  [68-94]   Resp:  [17-22]   BP: (124-170)/(63-89)   SpO2:  [95 %-100 %]     I & O (Last 24H):    Intake/Output Summary (Last 24 hours) at 09/10/17 0659  Last data filed at 09/09/17 1700   Gross per 24 hour   Intake              600 ml   Output                0 ml   Net              600 ml     Physical Exam:    General: NAD  Head: normocephalic, atraumatic   Eyes: conjunctivae pink, anicteric sclera.  Throat: No erythema or post nasal discharge   Neck: supple, no LAD   Lungs: Decreased air entry bilaterally  Heart: S1, S2, RRR   Abdomen: + BS x 4 quadrants, slightly distended, mild epigastric tenderness    Extremities: no cyanosis or edema.   Skin: turgor normal, no erythema or rashes. No abnormal moles  MS:  right hand in surgical bandage.   Neuro: A&O x 3    Psy: calm       Laboratory:  CBC:     Recent Labs  Lab 09/10/17  0506   WBC 14.96*   RBC 4.43*   HGB 9.6*   HCT 34.4*   *   MCV 78*   MCH 21.7*   MCHC 27.9*     CMP:     Recent Labs  Lab 09/09/17  0434   *   CALCIUM 8.7   ALBUMIN 2.9*   PROT 5.8*      K 3.7   CO2 34*   CL 97   BUN 26*   CREATININE 0.8   ALKPHOS 101   ALT 46*   AST 19   BILITOT 0.5     LFTs:     Recent Labs  Lab 09/09/17  0434   ALT 46*   AST 19   ALKPHOS 101   BILITOT 0.5   PROT 5.8*   ALBUMIN 2.9*     Coagulation:   No results for input(s): LABPROT, INR, APTT in the last 168 hours.  Cardiac markers:   No results for input(s): CKMB, CPKMB, TROPONINT, TROPONINI, MYOGLOBIN in the last 168 hours.  ABGs:   No results for input(s): PH, PCO2, PO2, HCO3, POCSATURATED, BE in the last 168 hours.  Microbiology Results (last 7 days)     ** No results found for the last 168 hours. **        Specimen (12h ago through future)    None        No results for input(s): COLORU, CLARITYU, SPECGRAV, PHUR, PROTEINUA, GLUCOSEU, BILIRUBINCON, BLOODU, WBCU, RBCU, BACTERIA, MUCUS, NITRITE, LEUKOCYTESUR, UROBILINOGEN, HYALINECASTS in the last 168 hours.      Diagnostic Results:    Current Facility-Administered Medications   Medication Dose Route Frequency Provider Last Rate Last Dose    albuterol-ipratropium 2.5mg-0.5mg/3mL nebulizer solution 3 mL  3 mL Nebulization Q4H Armand Mcmullen MD   3 mL at 09/10/17 0217    alprazolam tablet 1 mg  1 mg Oral TID Armand Mcmullen MD   1 mg at 09/10/17 0516    amlodipine tablet 10 mg  10 mg Oral Daily Rk Welsh MD   10 mg at 09/09/17 0854    aspirin EC tablet 81 mg  81 mg Oral Daily Armand Mcmullen MD   81 mg at 09/09/17 0854    cefTRIAXone (ROCEPHIN) 1 g in dextrose 5 % 50 mL IVPB  1 g Intravenous Q24H Armand Mcmullen MD   1 g at 09/09/17 2149    doxycycline tablet 100 mg  100 mg Oral Q12H Rk Welsh MD   100 mg at 09/09/17 2150    enoxaparin injection 40 mg  40 mg Subcutaneous Daily Armand  SALLY Mmcullen MD   40 mg at 09/09/17 1751    hydrALAZINE injection 10 mg  10 mg Intravenous Q6H PRN Rk Welsh MD   10 mg at 09/10/17 0601    lactulose 20 gram/30 mL solution Soln 30 g  30 g Oral BID PRN Rk Welsh MD   30 g at 08/31/17 0822    lidocaine (PF) 10 mg/ml (1%) injection 10 mg  1 mL Intradermal Once May Rosado MD        methylPREDNISolone sodium succinate injection 40 mg  40 mg Intravenous Q8H Rk Welsh MD   40 mg at 09/10/17 0516    morphine injection 2 mg  2 mg Intravenous Q4H PRN Armand Mcmullen MD        morphine injection 6 mg  6 mg Intravenous Q4H PRN Armand Mcmullen MD        ondansetron injection 4 mg  4 mg Intravenous Q8H PRN Armand Mcmullen MD   4 mg at 09/05/17 1315    pantoprazole injection 40 mg  40 mg Intravenous Daily Armand Mcmullen MD   40 mg at 09/09/17 0853    polyethylene glycol packet 17 g  17 g Oral Daily Jefry Silva MD   17 g at 09/01/17 0838    potassium chloride 10% solution 20 mEq  20 mEq Oral Daily Rk Welsh MD   20 mEq at 09/09/17 0853    promethazine (PHENERGAN) 6.25 mg in dextrose 5 % 50 mL IVPB  6.25 mg Intravenous Q6H PRN Armand Mcmullen MD        ramelteon tablet 8 mg  8 mg Oral Nightly PRN Armand Mcmullen MD   8 mg at 09/09/17 2149    sodium chloride 0.9% flush 3 mL  3 mL Intravenous Q8H Armand Mcmullen MD   3 mL at 09/10/17 0524         ASSESSMENT/PLAN:     Active Hospital Problems    Diagnosis  POA    *COPD exacerbation [J44.1]  Yes      Resolved Hospital Problems    Diagnosis Date Resolved POA   No resolved problems to display.       1. Acute exacerbation of COPD: pt has significant symptoms and outpatient tx failed                        - started IV steroid, Rocephin and Zithromax                        - Duoneb                        - pulmonary consulted    - continue to improve slowly    - Dced  zithromax   -  Lasix 40mg IV and K supplement prn   - BiPAP at night-  tolerating it     - pt making improvement    - taperred off IV steroid    -  prednisone taper    - pt may benefit from BIPAP at home - will  Have CM work with insurance      2. Fever and Leukocytosis/SIRS: multifactorial in nature- hand injury can be a source of infection             - dc Rocephin    - wbc trending up    - will add Doxy IV    -wbc stable       3. MPN: Polycythemia Vera: currently on Hydrea                - will hold off on hydrea      4. Abnormal LFT: US of liver done                        - GI consult pending                         - acute hep panel negative                          - ? Medication induced liver toxicity?    - improving steadily    - no n/v     5. Elevated glucose: mostly due to IV steroids      6. Nausea: protonix and Zofra                         -dc motrin     7. DVT  Prophylaxis: Lovenox     8. Constipation: started Lactulose and having good BM    9. Hand injury- 3 fingers are damaged and awaiting surgical intervention   - pt and family wants Dr. Monson to see    - discussed the case in detail with Dr. Monson and he evaluated pt on Fri   - plan for OR today.   - discussed with Dr. Monson    - Crush injury R hand, open fractures R IF and MF:Post-op Diagnosis: Crush injury R hand, open fractures R IF and MF     Procedure(s) (LRB):  OPEN REDUCTION INTERNAL FIXATION- THUMB,INDEX MIDDLE FINGERS VS PINNING  THUMB, INDEX, MIDDLE FINGERS (Right) I&D open fx R IF, MF    - Discussed with Dr. Monson on 09/07- hold off on OT. PT to have evaluation outpatient before OT    Hopefully dc in the am    Rk Welsh M.D  Internal Medicine & Geriatric Medicine  Hematology & Oncology  Palliative Medicine    [address], [address]  [place] [address] [phone] (Office)  [phone] (Fax)

## 2017-09-10 NOTE — NURSING
I walked with patient around the unit of east side 155 feet tolerated well. No changes on telemetry oxygen used as well . Patient paced himself and did not require support to walk .

## 2017-09-10 NOTE — NURSING
Bedside rounding report given to hi sebastian rn on patients progress and updated hand off report sheet given too. No acute events this shift.tolerating oyxgen at 4 liters by nasal canula tubings secured. Head of bed elevated and bed alarm on. No change on telemetry

## 2017-09-10 NOTE — NURSING
"Patient visiting with his daughter at this time. Good appetite . Patient reports his left arm just suddenly feels like someone punched him in the arm . I assessed the left upper arm no bruising no redness . No swelling and no palpable mass .  he can move his arm but states", it feels like somebody gave me a goose egg in my arm . " I asked him is this the first time this has every happened ? Patient states", no I have some muscle rubb at home I use it completely goes away in ten minutes." patient wants his daughter to go get his cream . I asked him to let me call the doctor first . Patient agreeable. No acute pain or distress. Denies shortness of breath . No change on telemetry .  "

## 2017-09-10 NOTE — PROGRESS NOTES
No new issues from Ortho standpoint  Rt hand- dsg/splints intact    A/P Rt hand multiple finger fx  F/U next week with Dr Monson

## 2017-09-11 VITALS
TEMPERATURE: 98 F | SYSTOLIC BLOOD PRESSURE: 153 MMHG | RESPIRATION RATE: 20 BRPM | WEIGHT: 145.06 LBS | HEART RATE: 99 BPM | OXYGEN SATURATION: 92 % | HEIGHT: 67 IN | BODY MASS INDEX: 22.77 KG/M2 | DIASTOLIC BLOOD PRESSURE: 69 MMHG

## 2017-09-11 LAB
ALBUMIN SERPL BCP-MCNC: 2.8 G/DL
ALP SERPL-CCNC: 88 U/L
ALT SERPL W/O P-5'-P-CCNC: 47 U/L
ANION GAP SERPL CALC-SCNC: 10 MMOL/L
AST SERPL-CCNC: 16 U/L
BILIRUB SERPL-MCNC: 0.4 MG/DL
BUN SERPL-MCNC: 25 MG/DL
CALCIUM SERPL-MCNC: 8.7 MG/DL
CHLORIDE SERPL-SCNC: 97 MMOL/L
CO2 SERPL-SCNC: 34 MMOL/L
CREAT SERPL-MCNC: 0.8 MG/DL
ERYTHROCYTE [DISTWIDTH] IN BLOOD BY AUTOMATED COUNT: 19.2 %
EST. GFR  (AFRICAN AMERICAN): >60 ML/MIN/1.73 M^2
EST. GFR  (NON AFRICAN AMERICAN): >60 ML/MIN/1.73 M^2
GLUCOSE SERPL-MCNC: 130 MG/DL
HCT VFR BLD AUTO: 33.6 %
HGB BLD-MCNC: 9.4 G/DL
MCH RBC QN AUTO: 21.6 PG
MCHC RBC AUTO-ENTMCNC: 28 G/DL
MCV RBC AUTO: 77 FL
PLATELET # BLD AUTO: 504 K/UL
PMV BLD AUTO: 9.7 FL
POTASSIUM SERPL-SCNC: 3.8 MMOL/L
PROT SERPL-MCNC: 5.5 G/DL
RBC # BLD AUTO: 4.36 M/UL
SODIUM SERPL-SCNC: 141 MMOL/L
WBC # BLD AUTO: 17.89 K/UL

## 2017-09-11 PROCEDURE — 36415 COLL VENOUS BLD VENIPUNCTURE: CPT

## 2017-09-11 PROCEDURE — 80053 COMPREHEN METABOLIC PANEL: CPT

## 2017-09-11 PROCEDURE — 25000003 PHARM REV CODE 250: Performed by: INTERNAL MEDICINE

## 2017-09-11 PROCEDURE — 94640 AIRWAY INHALATION TREATMENT: CPT

## 2017-09-11 PROCEDURE — 25000242 PHARM REV CODE 250 ALT 637 W/ HCPCS: Performed by: EMERGENCY MEDICINE

## 2017-09-11 PROCEDURE — 27000221 HC OXYGEN, UP TO 24 HOURS

## 2017-09-11 PROCEDURE — 85027 COMPLETE CBC AUTOMATED: CPT

## 2017-09-11 PROCEDURE — C9113 INJ PANTOPRAZOLE SODIUM, VIA: HCPCS | Performed by: EMERGENCY MEDICINE

## 2017-09-11 PROCEDURE — 94761 N-INVAS EAR/PLS OXIMETRY MLT: CPT

## 2017-09-11 PROCEDURE — A4216 STERILE WATER/SALINE, 10 ML: HCPCS | Performed by: EMERGENCY MEDICINE

## 2017-09-11 PROCEDURE — 63600175 PHARM REV CODE 636 W HCPCS: Performed by: EMERGENCY MEDICINE

## 2017-09-11 PROCEDURE — 25000003 PHARM REV CODE 250: Performed by: EMERGENCY MEDICINE

## 2017-09-11 PROCEDURE — 99900035 HC TECH TIME PER 15 MIN (STAT)

## 2017-09-11 PROCEDURE — 94664 DEMO&/EVAL PT USE INHALER: CPT

## 2017-09-11 PROCEDURE — 63600175 PHARM REV CODE 636 W HCPCS: Performed by: INTERNAL MEDICINE

## 2017-09-11 RX ORDER — LACTULOSE 10 G/15ML
30 SOLUTION ORAL 2 TIMES DAILY PRN
Qty: 600 ML | Refills: 1 | Status: SHIPPED | OUTPATIENT
Start: 2017-09-11 | End: 2017-09-21

## 2017-09-11 RX ORDER — ALPRAZOLAM 1 MG/1
0.5 TABLET ORAL 2 TIMES DAILY PRN
Qty: 60 TABLET | Refills: 5 | Status: SHIPPED | OUTPATIENT
Start: 2017-09-11 | End: 2017-09-25

## 2017-09-11 RX ORDER — DOXYCYCLINE 150 MG/1
150 CAPSULE ORAL 2 TIMES DAILY
Qty: 10 EACH | Refills: 0 | Status: SHIPPED | OUTPATIENT
Start: 2017-09-11 | End: 2017-09-16

## 2017-09-11 RX ORDER — PREDNISONE 20 MG/1
40 TABLET ORAL DAILY
Qty: 60 TABLET | Refills: 0 | Status: SHIPPED | OUTPATIENT
Start: 2017-09-11 | End: 2017-09-21

## 2017-09-11 RX ADMIN — PANTOPRAZOLE SODIUM 40 MG: 40 INJECTION, POWDER, FOR SOLUTION INTRAVENOUS at 08:09

## 2017-09-11 RX ADMIN — PREDNISONE 40 MG: 20 TABLET ORAL at 08:09

## 2017-09-11 RX ADMIN — POTASSIUM CHLORIDE 20 MEQ: 20 SOLUTION ORAL at 08:09

## 2017-09-11 RX ADMIN — IPRATROPIUM BROMIDE AND ALBUTEROL SULFATE 3 ML: .5; 3 SOLUTION RESPIRATORY (INHALATION) at 02:09

## 2017-09-11 RX ADMIN — IPRATROPIUM BROMIDE AND ALBUTEROL SULFATE 3 ML: .5; 3 SOLUTION RESPIRATORY (INHALATION) at 07:09

## 2017-09-11 RX ADMIN — DOXYCYCLINE HYCLATE 100 MG: 100 TABLET, COATED ORAL at 08:09

## 2017-09-11 RX ADMIN — ASPIRIN 81 MG: 81 TABLET, COATED ORAL at 08:09

## 2017-09-11 RX ADMIN — MORPHINE SULFATE 6 MG: 10 INJECTION INTRAVENOUS at 09:09

## 2017-09-11 RX ADMIN — AMLODIPINE BESYLATE 10 MG: 5 TABLET ORAL at 08:09

## 2017-09-11 RX ADMIN — ALPRAZOLAM 1 MG: 0.5 TABLET ORAL at 06:09

## 2017-09-11 RX ADMIN — Medication 3 ML: at 06:09

## 2017-09-11 NOTE — NURSING
Bedside report received from chastity villalobos rn on patients progress and updated hand off report sheet given too. No acute distress receiving a respiratory treatment at this time. Denies pain when asked.

## 2017-09-11 NOTE — PROGRESS NOTES
TN rescheduled follow up appt with Dr. Welsh [phone], spoke to Emily. Follow up appt rescheduled for 9/19/2017 @ 2:20PM.    TN scheduled hospital follow up appt with Dr. Monson, [phone], spoke to Annemarie. Follow up appt scheduled for 9/18/2017 @ 3PM.    TN scheduled hospital follow up casper with Peyton Pugh -[phone], spoke to Michael. Follow up appt scheduled for 10/13/2017 @ 8:40AM.    All information added to follow up tab.    TN also notified Dr. Welsh of appt with Peyton Pugh NP and that pt will be scheduled for sleep study following appt.

## 2017-09-11 NOTE — NURSING
Bedside rounding report given to chastity villalobos rn on patients progress and updated hand off report sheet given too. No acute distress or changes this shift

## 2017-09-11 NOTE — PROGRESS NOTES
WRITTEN HEALTHCARE DISCHARGE INFORMATION     Things that YOU are responsible for to Manage Your Care At Home:  1. Getting your prescriptions filled.  2. Taking you medications as directed. DO NOT MISS ANY DOSES!  3. Going to your follow-up doctor appointments. This is important because it allows the doctor to monitor your progress and to determine if any changes need to be made to your treatment plan.    If you are unable to make your follow up appointments, please call the number listed and reschedule this appointment.     After discharge, if you have any concerns regarding your surgery, please call Dr. Monson at [phone].     Thank you for choosing [facility] and allowing us to care for you.   From your care management team: Fransisca ANN,RSW & Jennifer KASPER RN,TN [phone] or [phone]     You should receive a call from [facility] Discharge Department within 48-72 hours to help manage your care after discharge. Please try to make sure that you answer your phone for this important phone call.     Follow-up Information     Rk Welsh MD On 9/19/2017.    Specialties:  Internal Medicine, Oncology, Hematology and Oncology  Why:  For Appointment on Tuesday 9/19/2017 @ 2:20PM  Contact information:  [address]  [address]  [facility] [address]  [phone]             Phillip Monson III, MD. Go on 9/18/2017.    Specialty:  Orthopedic Surgery  Why:  For Appointment on Monday 9/18/2017 @ 3PM  Contact information:  [address]  [address]  [place] [address]  [phone]             Shenette S Keaton, NP. Go on 10/13/2017.    Specialties:  Sleep Medicine, Internal Medicine  Why:  For Appointment on Friday 10/13/2017 @ 8:40AM  Contact information:  [address]  [address]  [facility] [address]  [phone]

## 2017-09-11 NOTE — DISCHARGE INSTRUCTIONS
Discharge instructions reviewed with patient and avs booklet given too him as well. Daughter present.no questions or concerns . No prescriptions were given to the patient.

## 2017-09-11 NOTE — NURSING
Patient denies pain when asked daughter is here to pick him up arranging transportation soon as discharge is complete

## 2017-09-11 NOTE — PROGRESS NOTES
"SW reviewed follow up appointment information as well as  "Post op and COPD discharge instructions" handout with patient using teach back while informing patient to concentrate on signs and symptoms to look for after discharge that would flag him that he needs to contact the doctor. Patient is in agreement and verbalized an understanding. Placed discharge information in blue discharge folder.  SW also reviewed patient responsibility checklist with him using teach back. Patient was able to verbalize his responsibilities after discharge to manage his care at home being   1. Going to follow up appointments   2.  rx from the pharmacy when discharged  3. Taking his medication as prescribed     Patient's nurse,  Stefania, informed that patient can discharge from  standpoint. Nurse can now complete discharge and review signs and symptoms teaching.   "

## 2017-09-11 NOTE — PLAN OF CARE
Problem: Pain, Acute (Adult)  Goal: Identify Related Risk Factors and Signs and Symptoms  Related risk factors and signs and symptoms are identified upon initiation of Human Response Clinical Practice Guideline (CPG)   Outcome: Ongoing (interventions implemented as appropriate)   09/07/17 0453   Pain, Acute   Related Risk Factors (Acute Pain) disease process;procedure/treatment   Signs and Symptoms (Acute Pain) fatigue/weakness;pacing/restlessness;verbalization of pain descriptors     Goal: Acceptable Pain Control/Comfort Level  Patient will demonstrate the desired outcomes by discharge/transition of care.    09/10/17 0724   Pain, Acute (Adult)   Acceptable Pain Control/Comfort Level making progress toward outcome       Problem: Infection, Risk/Actual (Adult)  Goal: Identify Related Risk Factors and Signs and Symptoms  Related risk factors and signs and symptoms are identified upon initiation of Human Response Clinical Practice Guideline (CPG)   Outcome: Ongoing (interventions implemented as appropriate)   09/04/17 2022 09/10/17 0724   Infection, Risk/Actual   Related Risk Factors (Infection, Risk/Actual) --  chronic illness/condition;prolonged hospitalization   Signs and Symptoms (Infection, Risk/Actual) lab value changes;weakness --      Goal: Infection Prevention/Resolution  Patient will demonstrate the desired outcomes by discharge/transition of care.   Outcome: Ongoing (interventions implemented as appropriate)   09/10/17 0725   Infection, Risk/Actual (Adult)   Infection Prevention/Resolution making progress toward outcome

## 2017-09-11 NOTE — NURSING
Assessment completed and plan of care discussed with patient. Dr Welsh is speaking to patient now and informed patient he will be discharged home today but he needs to set up for cpap at home for patient to be delivered by durable medical equipment . Patient agreeable. Has oxygen on at 4 liters. Denies pain dressing to right hand clean dry and intact. No swelling or drainage no skin breakdown around the dressings. Bed alarm is on . Tubings secured to all extensions . No changes on telemetry. Appetite good no stomach upset.

## 2017-09-11 NOTE — NURSING
Patient tolerated breakfast meal no stomach upset . Morphine effective for right hand pain. Continue to monitor.

## 2017-09-11 NOTE — PROGRESS NOTES
Progress Note    Admit Date: 8/29/2017   LOS: 12 days     SUBJECTIVE:     Mr. Stuart is a pleasant 68 YO gentleman with Myeloproliferative neoplasm, Polycythemia Vera, oxygen dependent COPD and most recent injuries to right hand from a . Pt had a recent COPD exacerbation and was seen at [facility] ED- tx and released. For the last 3 days, not feeling well. Has chest discomfort and nausea. He began to have fever at home associated with worsening sob and chest pressure. Pt was scheduled for hand surgery and did not get clearance from anesthesia to get and outpatient procedure thus re-scheduled for tomorrow     Since admission, states feeling little better. Denies cough but has chest congestion. Denies any sick contact.    Follow-up For:  COPD exacerbation     09/11/2017: feeling a lot better. Cough and congestion better. ROm improving on hands   09/10/2017: Feeling much better. No fever or chills.   09/09/2017: little more comfortable on BiPAP at on the 2nd night. Cough and congestion better. Denies fever or chills.   09/08/2017: tolerated BiPAP for short time. Continue to have congestion and cough.   09/07/2017: hand pain better. Continue to have lung congestion   09/06/2017: Pt underwent hand surgery on 09/05 and tolerated well. Denies fever or chills.   09/05/2017: feeling little better. No fever or chills.   09/04/2017: increasing sob. Have some reservation about getting surgery tomorrow   09/03/2017: Denies fever or chills.   09/02/2017: chest congestion better.   09/01/2017: feeling better. Had BM. Less SOB    08/31/2017: continue to have moderate sob. + Abdominal distention and constipation      Scheduled Meds:   albuterol-ipratropium 2.5mg-0.5mg/3mL  3 mL Nebulization Q4H    alprazolam  1 mg Oral TID    amlodipine  10 mg Oral Daily    aspirin  81 mg Oral Daily    doxycycline  100 mg Oral Q12H    enoxaparin  40 mg Subcutaneous Daily    lidocaine (PF) 10 mg/ml (1%)  1 mL Intradermal Once     pantoprazole  40 mg Intravenous Daily    polyethylene glycol  17 g Oral Daily    potassium chloride 10%  20 mEq Oral Daily    predniSONE  40 mg Oral Daily    sodium chloride 0.9%  3 mL Intravenous Q8H     Continuous Infusions:     PRN Meds:hydrALAZINE, lactulose, morphine, morphine, ondansetron, promethazine (PHENERGAN) IVPB, ramelteon    Review of patient's allergies indicates:  No Known Allergies    Review of Systems    Constitutional: No fever   Eyes: no visual changes   ENT: no nasal congestion. Denies sore throat with odynophagia   Respiratory: chest congestion improving   Cardiovascular: see HPI   Gastrointestinal: denies abd pain.    Hematologic/Lymphatic: see HPI  Musculoskeletal: ROM of right hand limited   Neurological: no seizures or tremors, gait or balance prblems  Skin: No rashes or lesions  Psych: Denies any anxiety, depression or insomnia      OBJECTIVE:     Vital Signs (Most Recent)    Temp: 98.7 °F (37.1 °C) (09/11/17 0735)  Pulse: 97 (09/11/17 0735)  Resp: 18 (09/11/17 0735)  BP: (!) 143/67 (09/11/17 0735)  SpO2: 97 % (09/11/17 0735)    Vital Signs Range (Last 24H):    Temp:  [96.8 °F (36 °C)-98.7 °F (37.1 °C)]   Pulse:  [74-97]   Resp:  [18-22]   BP: (132-167)/(59-74)   SpO2:  [88 %-100 %]     I & O (Last 24H):    Intake/Output Summary (Last 24 hours) at 09/11/17 0740  Last data filed at 09/10/17 1700   Gross per 24 hour   Intake             1735 ml   Output             1900 ml   Net             -165 ml     Physical Exam:    General: NAD  Head: normocephalic, atraumatic   Eyes: conjunctivae pink, anicteric sclera.  Throat: No erythema or post nasal discharge   Neck: supple, no LAD   Lungs: Decreased air entry bilaterally  Heart: S1, S2, RRR   Abdomen: + BS x 4 quadrants, slightly distended, mild epigastric tenderness    Extremities: no cyanosis or edema.   Skin: turgor normal, no erythema or rashes. No abnormal moles  MS:  right hand in surgical bandage.   Neuro: A&O x 3    Psy: calm       Laboratory:  CBC:     Recent Labs  Lab 09/11/17  0432   WBC 17.89*   RBC 4.36*   HGB 9.4*   HCT 33.6*   *   MCV 77*   MCH 21.6*   MCHC 28.0*     CMP:     Recent Labs  Lab 09/11/17  0432   *   CALCIUM 8.7   ALBUMIN 2.8*   PROT 5.5*      K 3.8   CO2 34*   CL 97   BUN 25*   CREATININE 0.8   ALKPHOS 88   ALT 47*   AST 16   BILITOT 0.4     LFTs:     Recent Labs  Lab 09/11/17  0432   ALT 47*   AST 16   ALKPHOS 88   BILITOT 0.4   PROT 5.5*   ALBUMIN 2.8*     Coagulation:   No results for input(s): LABPROT, INR, APTT in the last 168 hours.  Cardiac markers:   No results for input(s): CKMB, CPKMB, TROPONINT, TROPONINI, MYOGLOBIN in the last 168 hours.  ABGs:   No results for input(s): PH, PCO2, PO2, HCO3, POCSATURATED, BE in the last 168 hours.  Microbiology Results (last 7 days)     ** No results found for the last 168 hours. **        Specimen (12h ago through future)    None        No results for input(s): COLORU, CLARITYU, SPECGRAV, PHUR, PROTEINUA, GLUCOSEU, BILIRUBINCON, BLOODU, WBCU, RBCU, BACTERIA, MUCUS, NITRITE, LEUKOCYTESUR, UROBILINOGEN, HYALINECASTS in the last 168 hours.      Diagnostic Results:    Current Facility-Administered Medications   Medication Dose Route Frequency Provider Last Rate Last Dose    albuterol-ipratropium 2.5mg-0.5mg/3mL nebulizer solution 3 mL  3 mL Nebulization Q4H Armand Mcmullen MD   3 mL at 09/11/17 0716    alprazolam tablet 1 mg  1 mg Oral TID Armand Mcmullen MD   1 mg at 09/11/17 0648    amlodipine tablet 10 mg  10 mg Oral Daily Rk Welsh MD   10 mg at 09/10/17 0849    aspirin EC tablet 81 mg  81 mg Oral Daily Armand Mcmullen MD   81 mg at 09/10/17 0849    doxycycline tablet 100 mg  100 mg Oral Q12H Rk Welsh MD   100 mg at 09/10/17 2136    enoxaparin injection 40 mg  40 mg Subcutaneous Daily Armand Mcmullen MD   40 mg at 09/10/17 1715    hydrALAZINE injection 10 mg  10 mg Intravenous Q6H PRN Rk Welsh MD   10 mg  at 09/10/17 0601    lactulose 20 gram/30 mL solution Soln 30 g  30 g Oral BID PRN Rk Welsh MD   30 g at 08/31/17 0822    lidocaine (PF) 10 mg/ml (1%) injection 10 mg  1 mL Intradermal Once May Rosado MD        morphine injection 2 mg  2 mg Intravenous Q4H PRN Armand Mcmullen MD        morphine injection 6 mg  6 mg Intravenous Q4H PRN Armand Mcmullen MD   6 mg at 09/10/17 2215    ondansetron injection 4 mg  4 mg Intravenous Q8H PRN Armand Mcmullen MD   4 mg at 09/05/17 1315    pantoprazole injection 40 mg  40 mg Intravenous Daily Armand Mcmullen MD   40 mg at 09/10/17 0849    polyethylene glycol packet 17 g  17 g Oral Daily Jefry Silva MD   17 g at 09/10/17 0849    potassium chloride 10% solution 20 mEq  20 mEq Oral Daily Rk Welsh MD   20 mEq at 09/10/17 0849    predniSONE tablet 40 mg  40 mg Oral Daily Rk Welsh MD        promethazine (PHENERGAN) 6.25 mg in dextrose 5 % 50 mL IVPB  6.25 mg Intravenous Q6H PRN Armand Mcmullen MD        ramelteon tablet 8 mg  8 mg Oral Nightly PRN Armand Mcmullen MD   8 mg at 09/09/17 2149    sodium chloride 0.9% flush 3 mL  3 mL Intravenous Q8H Armand Mcmullen MD   3 mL at 09/11/17 0649         ASSESSMENT/PLAN:     Active Hospital Problems    Diagnosis  POA    *COPD exacerbation [J44.1]  Yes      Resolved Hospital Problems    Diagnosis Date Resolved POA   No resolved problems to display.       1. Acute exacerbation of COPD: pt has significant symptoms and outpatient tx failed                        - started IV steroid, Rocephin and Zithromax                        - Duoneb                        - pulmonary consulted    - continue to improve slowly    - Dced  zithromax   -  Lasix 40mg IV and K supplement prn   - BiPAP at night- tolerating it     - pt making improvement    - taperred off IV steroid    -  prednisone taper over the next 4 weeks    - pt may benefit from BIPAP at home - will  Have CM  work with insurance    - pt need sleep study and arrangements made for outpatient      2. Fever and Leukocytosis/SIRS: multifactorial in nature- hand injury can be a source of infection             - dc Rocephin    - wbc trending up    - Doxy po     -wbc stable    - dc home on Doxy x 5 more days       3. MPN: Polycythemia Vera: currently on Hydrea                - will hold off on hydrea      4. Abnormal LFT: US of liver done                        - GI consult pending                         - acute hep panel negative                          - ? Medication induced liver toxicity?    - improving steadily    - no n/v     5. Elevated glucose: mostly due to IV steroids      6. Nausea: protonix and Zofra                         -dc motrin     7. DVT  Prophylaxis: Lovenox     8. Constipation: started Lactulose and having good BM    9. Hand injury- 3 fingers are damaged and awaiting surgical intervention   - pt and family wants Dr. Monson to see    - discussed the case in detail with Dr. Monson and he evaluated pt on Fri   - plan for OR today.   - discussed with Dr. Monson    - Crush injury R hand, open fractures R IF and MF:Post-op Diagnosis: Crush injury R hand, open fractures R IF and MF     Procedure(s) (LRB):  OPEN REDUCTION INTERNAL FIXATION- THUMB,INDEX MIDDLE FINGERS VS PINNING  THUMB, INDEX, MIDDLE FINGERS (Right) I&D open fx R IF, MF    - Discussed with Dr. Monson on 09/07- hold off on OT. PT to have evaluation outpatient before OT    Hopefully dc today     Rk Welsh M.D  Internal Medicine & Geriatric Medicine  Hematology & Oncology  Palliative Medicine    [address], [address]  [place], [place] [address] [phone] (Office)  [phone] (Fax)

## 2017-09-11 NOTE — NURSING
Report received by KRISTY Aguiar. The pt is lying in bed resting. He is not in any pain or discomfort. Tele monitor in progress with alarms set. Safety precautions maintained and bed locked in lowest position. Side rails up X2. Call bell and personal items within reach. Will continue to monitor pt for any changes

## 2017-09-12 NOTE — PT/OT/SLP DISCHARGE
Physical Therapy Discharge Summary    Chaz Stuart  MRN: [id number]   COPD exacerbation   Patient Discharged from acute Physical Therapy on 17.  Please refer to prior PT noted date on 9/10/17 for functional status.     Assessment:   Patient was discharge unexpectedly.  Information required to complete and accurate discharge summary is unknown.  Refer to therapy initial evaluation and last progress note for initial and most recent functional status and goal achievement.  Recommendations made may be found in medical record.  GOALS:    Physical Therapy Goals        Problem: Physical Therapy Goal    Goal Priority Disciplines Outcome Goal Variances Interventions   Physical Therapy Goal     PT/OT, PT Unable to achieve outcome(s) by discharge     Description:  Goals to be met by: 17    Patient will increase functional independence with mobility by performin. Sit to stand transfer with [place]  2. Gait  x 500 feet with [place]   3. Lower extremity exercise program x30 reps per handout, with independence                    Reasons for Discontinuation of Therapy Services  Transfer to alternate level of care.      Plan:  Patient Discharged to: Home no PT services needed.

## 2017-09-12 NOTE — DISCHARGE SUMMARY
[facility]  Discharge Summary      Admit Date: 8/29/2017    Discharge Date and Time: 9/11/2017 11:48 AM    Attending Physician: No att. providers found     Reason for Admission: Elevated liver enzymes [R74.8]  COPD exacerbation [J44.1]  COPD exacerbation [J44.1]  Elevated liver enzymes [R74.8]  COPD exacerbation [J44.1]      Procedures Performed: Procedure(s) (LRB):  OPEN REDUCTION INTERNAL FIXATION-FINGER (Right)  PINNING-PERCUTANEOUS-FINGER  DEBRIDEMENT-WOUND    Hospital Course (synopsis of major diagnoses, care, treatment, and services provided during the course of the hospital stay):     Mr. Stuart is a pleasant 68 YO gentleman with Myeloproliferative neoplasm, Polycythemia Vera, oxygen dependent COPD and most recent injuries to right hand from a . Pt had a recent COPD exacerbation and was seen at [facility] ED- tx and released. For the last 3 days, not feeling well. Has chest discomfort and nausea. He began to have fever at home associated with worsening sob and chest pressure. Pt was scheduled for hand surgery and did not get clearance from anesthesia to get and outpatient procedure thus re-scheduled for tomorrow     Since admission, states feeling little better. Denies cough but has chest congestion. Denies any sick contact      During this hospitalization, these following conditions were addressed and managed along with other comorbid conditions:     1. Acute exacerbation of COPD: pt has significant symptoms and outpatient tx failed                        - started IV steroid, Rocephin and Zithromax                        - Duoneb                        - pulmonary consulted    - continue to improve slowly    - Dced  zithromax   -  Lasix 40mg IV and K supplement prn   - BiPAP at night- tolerating it     - pt making improvement    - taperred off IV steroid    -  prednisone taper over the next 4 weeks    - pt may benefit from BIPAP at home - will  Have CM work with insurance    - pt need  sleep study and arrangements made for outpatient      2. Fever and Leukocytosis/SIRS: multifactorial in nature- hand injury can be a source of infection             - dc Rocephin    - wbc trending up    - Doxy po     -wbc stable    - dc home on Doxy x 5 more days       3. MPN: Polycythemia Vera: currently on Hydrea                - will hold off on hydrea      4. Abnormal LFT: US of liver done                        - GI consult pending                         - acute hep panel negative                          - ? Medication induced liver toxicity?    - improving steadily    - no n/v     5. Elevated glucose: mostly due to IV steroids      6. Nausea: protonix and Zofra                         -dc motrin     7. DVT  Prophylaxis: Lovenox     8. Constipation: started Lactulose and having good BM    9. Hand injury- 3 fingers are damaged and awaiting surgical intervention   - pt and family wants Dr. Monson to see    - discussed the case in detail with Dr. Monson and he evaluated pt on Fri   - plan for OR today.   - discussed with Dr. Monson    - Crush injury R hand, open fractures R IF and MF:Post-op Diagnosis: Crush injury R hand, open fractures R IF and MF     Procedure(s) (LRB):  OPEN REDUCTION INTERNAL FIXATION- THUMB,INDEX MIDDLE FINGERS VS PINNING  THUMB, INDEX, MIDDLE FINGERS (Right) I&D open fx R IF, MF    - Discussed with Dr. Monson on 09/07- hold off on OT. PT to have evaluation outpatient before OT    Consults: pulmonary/intensive care and orthopedic surgery    Significant Diagnostic Studies: see above    Final Diagnoses:    Principal Problem: COPD exacerbation   Secondary Diagnoses:   Active Hospital Problems    Diagnosis  POA    *COPD exacerbation [J44.1]  Yes      Resolved Hospital Problems    Diagnosis Date Resolved POA   No resolved problems to display.       Discharged Condition: stable    Disposition: Home or Self Care     Activity: As tolerated, right hand precaution    Diet: high protein, low carb      Follow Up/Patient Instructions:     Medications:  Reconciled Home Medications:   Discharge Medication List as of 9/11/2017 11:11 AM      START taking these medications    Details   doxycycline monohydrate 150 mg Cap Take 150 mg by mouth 2 (two) times daily., Starting Mon 9/11/2017, Until Sat 9/16/2017, Normal      lactulose (CHRONULAC) 20 gram/30 mL Soln Take 45 mLs (30 g total) by mouth 2 (two) times daily as needed., Starting Mon 9/11/2017, Until Thu 9/21/2017, Normal      predniSONE (DELTASONE) 20 MG tablet Take 2 tablets (40 mg total) by mouth once daily. X 7 days, then 20mg daily x 7 days, then 10 mg daily x 7, then 5mg x 7 days, Starting Mon 9/11/2017, Until Thu 9/21/2017, Normal         CONTINUE these medications which have CHANGED    Details   alprazolam (XANAX) 1 MG tablet Take 0.5 tablets (0.5 mg total) by mouth 2 (two) times daily as needed for Anxiety., Starting Mon 9/11/2017, Normal         CONTINUE these medications which have NOT CHANGED    Details   albuterol (VENTOLIN HFA) 90 mcg/actuation inhaler Inhale 2 puffs into the lungs every 6 (six) hours as needed., Starting 11/15/2016, Until Wed 11/15/17, Normal      albuterol-ipratropium 2.5mg-0.5mg/3mL (DUO-NEB) 0.5 mg-3 mg(2.5 mg base)/3 mL nebulizer solution Take 3 mLs by nebulization every 4 (four) hours as needed for Wheezing or Shortness of Breath., Starting Sun 8/27/2017, Print      amlodipine (NORVASC) 5 MG tablet Take 1 tablet (5 mg total) by mouth once daily., Starting Wed 5/31/2017, Until Thu 5/31/2018, Normal      aspirin (ECOTRIN) 81 MG EC tablet Take 1 tablet (81 mg total) by mouth once daily., Starting 6/25/2014, Until Discontinued, Normal      dutasteride (AVODART) 0.5 mg capsule Take 1 capsule (0.5 mg total) by mouth once daily., Starting Tue 5/23/2017, Until Wed 5/23/2018, Normal      hydrocodone-acetaminophen 5-325mg (NORCO) 5-325 mg per tablet Take 1 tablet by mouth every 6 (six) hours as needed for Pain., Historical Med       hydroxyurea (HYDREA) 500 mg Cap Take 1 capsule (500 mg total) by mouth once daily., Starting 5/5/2017, Until Sat 5/5/18, Normal      SYMBICORT 160-4.5 mcg/actuation HFAA Inhale 2 puffs into the lungs 2 (two) times daily., Starting 1/10/2017, Until Discontinued, Normal           No discharge procedures on file.  Follow-up Information     Rk Welsh MD On 9/19/2017.    Specialties:  Internal Medicine, Oncology, Hematology and Oncology  Why:  For Appointment on Tuesday 9/19/2017 @ 2:20PM  Contact information:  [address]  [address]  [place] [facility] [place] [address]  [phone]             Phillip Monson III, MD. Go on 9/18/2017.    Specialty:  Orthopedic Surgery  Why:  For Appointment on Monday 9/18/2017 @ 3PM  Contact information:  [address] [facility]  [address]  [place] [facility] [place] [address]  [phone]             Shenette S Keaton, NP. Go on 10/13/2017.    Specialties:  Sleep Medicine, Internal Medicine  Why:  For Appointment on Friday 10/13/2017 @ 8:40AM  Contact information:  [address]  [address]  [facility] [address]  [phone]

## 2017-09-19 PROBLEM — F41.9 ANXIETY: Status: ACTIVE | Noted: 2017-09-19

## 2017-09-19 PROBLEM — Z09 HOSPITAL DISCHARGE FOLLOW-UP: Status: ACTIVE | Noted: 2017-09-19

## 2017-09-27 ENCOUNTER — OFFICE VISIT (OUTPATIENT)
Dept: FAMILY MEDICINE | Facility: CLINIC | Age: 67
End: 2017-09-27
Payer: MEDICARE

## 2017-09-27 VITALS
DIASTOLIC BLOOD PRESSURE: 70 MMHG | BODY MASS INDEX: 20.07 KG/M2 | TEMPERATURE: 98 F | SYSTOLIC BLOOD PRESSURE: 138 MMHG | HEIGHT: 67 IN | OXYGEN SATURATION: 91 % | WEIGHT: 127.88 LBS | HEART RATE: 100 BPM

## 2017-09-27 DIAGNOSIS — F33.1 MAJOR DEPRESSIVE DISORDER, RECURRENT EPISODE, MODERATE: ICD-10-CM

## 2017-09-27 DIAGNOSIS — N40.1 BENIGN PROSTATIC HYPERPLASIA WITH LOWER URINARY TRACT SYMPTOMS, SYMPTOM DETAILS UNSPECIFIED: ICD-10-CM

## 2017-09-27 DIAGNOSIS — E78.2 MIXED DYSLIPIDEMIA: ICD-10-CM

## 2017-09-27 DIAGNOSIS — D75.1 SECONDARY POLYCYTHEMIA: Primary | ICD-10-CM

## 2017-09-27 DIAGNOSIS — I10 HTN, GOAL BELOW 140/90: ICD-10-CM

## 2017-09-27 DIAGNOSIS — F41.1 GENERALIZED ANXIETY DISORDER: ICD-10-CM

## 2017-09-27 DIAGNOSIS — J44.9 CHRONIC OBSTRUCTIVE PULMONARY DISEASE, UNSPECIFIED COPD TYPE: ICD-10-CM

## 2017-09-27 DIAGNOSIS — Z51.89 THERAPEUTIC PROCEDURE: ICD-10-CM

## 2017-09-27 DIAGNOSIS — K21.9 GASTROESOPHAGEAL REFLUX DISEASE, ESOPHAGITIS PRESENCE NOT SPECIFIED: ICD-10-CM

## 2017-09-27 DIAGNOSIS — Z98.1 HISTORY OF FUSION OF CERVICAL SPINE: ICD-10-CM

## 2017-09-27 DIAGNOSIS — S69.91XS INJURY OF FINGER OF RIGHT HAND, SEQUELA: ICD-10-CM

## 2017-09-27 DIAGNOSIS — D47.1 MPN (MYELOPROLIFERATIVE NEOPLASM): ICD-10-CM

## 2017-09-27 DIAGNOSIS — J43.1 PANLOBULAR EMPHYSEMA: ICD-10-CM

## 2017-09-27 DIAGNOSIS — D45 POLYCYTHEMIA VERA: ICD-10-CM

## 2017-09-27 DIAGNOSIS — Z99.81 OXYGEN DEPENDENT: ICD-10-CM

## 2017-09-27 PROBLEM — D72.829 LEUKOCYTOSIS: Status: RESOLVED | Noted: 2017-05-23 | Resolved: 2017-09-27

## 2017-09-27 PROBLEM — E86.0 DEHYDRATION: Status: RESOLVED | Noted: 2017-06-21 | Resolved: 2017-09-27

## 2017-09-27 PROBLEM — R06.02 SOB (SHORTNESS OF BREATH): Status: RESOLVED | Noted: 2017-07-05 | Resolved: 2017-09-27

## 2017-09-27 PROBLEM — R03.0 ELEVATED BLOOD PRESSURE READING: Status: RESOLVED | Noted: 2017-01-19 | Resolved: 2017-09-27

## 2017-09-27 PROBLEM — S69.91XA INJURY OF RIGHT HAND: Status: RESOLVED | Noted: 2017-08-28 | Resolved: 2017-09-27

## 2017-09-27 PROBLEM — Z09 HOSPITAL DISCHARGE FOLLOW-UP: Status: RESOLVED | Noted: 2017-09-19 | Resolved: 2017-09-27

## 2017-09-27 PROBLEM — F41.9 ANXIETY: Status: RESOLVED | Noted: 2017-09-19 | Resolved: 2017-09-27

## 2017-09-27 PROCEDURE — 1126F AMNT PAIN NOTED NONE PRSNT: CPT | Mod: S$GLB,,, | Performed by: FAMILY MEDICINE

## 2017-09-27 PROCEDURE — 99215 OFFICE O/P EST HI 40 MIN: CPT | Mod: 25,S$GLB,, | Performed by: FAMILY MEDICINE

## 2017-09-27 PROCEDURE — 3008F BODY MASS INDEX DOCD: CPT | Mod: S$GLB,,, | Performed by: FAMILY MEDICINE

## 2017-09-27 PROCEDURE — 3078F DIAST BP <80 MM HG: CPT | Mod: S$GLB,,, | Performed by: FAMILY MEDICINE

## 2017-09-27 PROCEDURE — 3075F SYST BP GE 130 - 139MM HG: CPT | Mod: S$GLB,,, | Performed by: FAMILY MEDICINE

## 2017-09-27 PROCEDURE — 1159F MED LIST DOCD IN RCRD: CPT | Mod: S$GLB,,, | Performed by: FAMILY MEDICINE

## 2017-09-27 PROCEDURE — 99999 PR PBB SHADOW E&M-EST. PATIENT-LVL III: CPT | Mod: PBBFAC,,, | Performed by: FAMILY MEDICINE

## 2017-09-27 RX ORDER — AMLODIPINE BESYLATE 5 MG/1
5 TABLET ORAL DAILY
Qty: 90 TABLET | Refills: 2 | Status: SHIPPED | OUTPATIENT
Start: 2017-09-27 | End: 2018-03-15 | Stop reason: SDUPTHER

## 2017-09-27 RX ORDER — LACTULOSE 10 G/15ML
SOLUTION ORAL; RECTAL
COMMUNITY
Start: 2017-09-11 | End: 2017-12-04

## 2017-09-27 RX ORDER — BUDESONIDE AND FORMOTEROL FUMARATE DIHYDRATE 160; 4.5 UG/1; UG/1
2 AEROSOL RESPIRATORY (INHALATION) 2 TIMES DAILY
Qty: 10.2 G | Refills: 3 | Status: SHIPPED | OUTPATIENT
Start: 2017-09-27 | End: 2018-03-15 | Stop reason: SDUPTHER

## 2017-09-27 RX ORDER — ALBUTEROL SULFATE 90 UG/1
2 AEROSOL, METERED RESPIRATORY (INHALATION) EVERY 6 HOURS PRN
Qty: 18 G | Refills: 3 | Status: SHIPPED | OUTPATIENT
Start: 2017-09-27 | End: 2018-03-15 | Stop reason: SDUPTHER

## 2017-09-27 RX ORDER — ALPRAZOLAM 1 MG/1
1 TABLET ORAL 3 TIMES DAILY PRN
Qty: 90 TABLET | Refills: 2 | Status: SHIPPED | OUTPATIENT
Start: 2017-09-27 | End: 2018-01-03 | Stop reason: SDUPTHER

## 2017-09-27 RX ORDER — HYDROCODONE BITARTRATE AND ACETAMINOPHEN 5; 325 MG/1; MG/1
1 TABLET ORAL EVERY 6 HOURS PRN
Qty: 25 TABLET | Refills: 0 | Status: SHIPPED | OUTPATIENT
Start: 2017-09-27 | End: 2017-12-04

## 2017-09-27 NOTE — PROGRESS NOTES
Chief Complaint   Patient presents with    Establish Care       SUBJECTIVE:  Chaz Stuart is a 67 y.o. male here for new problem of establishing care with multiple issues and polycythemia vera, phlebotomy and COPD s/p recent attack and with chronic hypoxia.  Has myeloproliferative neoplasm as well and he had right hand injury s/p surgical repair with ortho and he needs new physician for primary care.  Currently has co-morbidities including per problem list.      Past Medical History:   Diagnosis Date    Anxiety     COPD (chronic obstructive pulmonary disease)     DDD (degenerative disc disease), cervical     Emphysema/COPD     GERD (gastroesophageal reflux disease)     Hypertension      Past Surgical History:   Procedure Laterality Date    BACK SURGERY      CERVICAL SPINE SURGERY  Fusion    CHOLECYSTECTOMY      COLON SURGERY       Social History     Social History    Marital status: Single     Spouse name: N/A    Number of children: 6    Years of education: N/A     Occupational History    Not on file.     Social History Main Topics    Smoking status: Former Smoker    Smokeless tobacco: Never Used    Alcohol use No    Drug use: No    Sexual activity: No     Other Topics Concern    Caffeine Use Yes    Financial Status: Disabled Yes    Firearms: Does Patient Have Access To A Firearm? Yes     locked gun cab    Home Situation: Lives Alone Yes    Education: Unfinished High School Yes     8th grade     Service No    Spirituality: Active Participation No    Spirituality: Organized [employer] No    Spirituality: Private Participation No    Legal: Arrest History No     Social History Narrative    No narrative on file     Family History   Problem Relation Age of Onset    Diabetes Mother     Heart disease Mother     Diabetes Father     Hypertension Father     Stroke Father     ADD / ADHD Neg Hx     Alcohol abuse Neg Hx     Anxiety disorder Neg Hx     Bipolar disorder Neg Hx      Dementia Neg Hx     Depression Neg Hx     Drug abuse Neg Hx     OCD Neg Hx     Paranoid behavior Neg Hx     Physical abuse Neg Hx     Schizophrenia Neg Hx     Seizures Neg Hx     Self injury Neg Hx     Sexual abuse Neg Hx     Suicide Neg Hx     Melanoma Neg Hx      Current Outpatient Prescriptions on File Prior to Visit   Medication Sig Dispense Refill    albuterol-ipratropium 2.5mg-0.5mg/3mL (DUO-NEB) 0.5 mg-3 mg(2.5 mg base)/3 mL nebulizer solution Take 3 mLs by nebulization every 4 (four) hours as needed for Wheezing or Shortness of Breath. 1 vial 1    amlodipine (NORVASC) 5 MG tablet Take 1 tablet (5 mg total) by mouth once daily. 90 tablet 2    aspirin (ECOTRIN) 81 MG EC tablet Take 1 tablet (81 mg total) by mouth once daily. 150 tablet 6    dutasteride (AVODART) 0.5 mg capsule Take 1 capsule (0.5 mg total) by mouth once daily. 90 capsule 3    hydrocodone-acetaminophen 5-325mg (NORCO) 5-325 mg per tablet Take 1 tablet by mouth every 6 (six) hours as needed for Pain.      hydroxyurea (HYDREA) 500 mg Cap Take 1 capsule (500 mg total) by mouth once daily. 90 capsule 1    SYMBICORT 160-4.5 mcg/actuation HFAA Inhale 2 puffs into the lungs 2 (two) times daily. 10.2 g 3    umeclidinium (INCRUSE ELLIPTA) 62.5 mcg/actuation DsDv Inhale 1 puff into the lungs once daily. Controller 1 each 2    VENTOLIN HFA 90 mcg/actuation inhaler Inhale 2 puffs into the lungs every 6 (six) hours as needed. 18 g 3     No current facility-administered medications on file prior to visit.      Review of patient's allergies indicates:  No Known Allergies      Review of Systems   Constitutional: Positive for malaise/fatigue and weight loss. Negative for chills, diaphoresis and fever.   HENT: Negative.    Eyes: Negative.    Respiratory: Positive for cough, sputum production, shortness of breath and wheezing.    Cardiovascular: Negative.    Gastrointestinal: Negative.    Genitourinary: Positive for frequency. Negative for  "dysuria, flank pain, hematuria and urgency.   Musculoskeletal: Positive for back pain, joint pain, myalgias and neck pain. Negative for falls.   Skin: Negative.    Neurological: Negative.  Negative for weakness.   Endo/Heme/Allergies: Negative.    Psychiatric/Behavioral: Positive for depression. Negative for hallucinations, memory loss, substance abuse and suicidal ideas. The patient is nervous/anxious and has insomnia.        OBJECTIVE:  /70   Pulse 100   Temp 98.3 °F (36.8 °C) (Oral)   Ht 5' 7" (1.702 m)   Wt 58 kg (127 lb 13.9 oz)   SpO2 (!) 91%   BMI 20.03 kg/m²     Wt Readings from Last 3 Encounters:   09/27/17 58 kg (127 lb 13.9 oz)   09/19/17 59.4 kg (131 lb)   09/10/17 65.8 kg (145 lb 1 oz)     BP Readings from Last 3 Encounters:   09/27/17 138/70   09/25/17 (!) 145/90   09/19/17 (!) 165/80       He appears well, in no apparent distress.  Alert and oriented times three, pleasant and cooperative. Vital signs are as documented in vital signs section.  Chronic hypoxia noted  On oxygen  Poor dentition  Neck with stiffness and some pain  Heart exam is tachycardic, no murmur  Lungs with poor airmovement  Right hand with that bandaged      Review of old Records:  Reviewed per epic and chart    Review of old labs:  Lab Results   Component Value Date    TSH 2.483 04/14/2015     Lab Results   Component Value Date    WBC 7.6 09/25/2017    HGB 8.9 (L) 09/25/2017    HCT 30.6 (L) 09/25/2017    MCV 75 (L) 09/25/2017     09/25/2017       Chemistry        Component Value Date/Time     09/11/2017 0432    K 3.8 09/11/2017 0432    CL 97 09/11/2017 0432    CO2 34 (H) 09/11/2017 0432    BUN 25 (H) 09/11/2017 0432    CREATININE 0.8 09/11/2017 0432     (H) 09/11/2017 0432        Component Value Date/Time    CALCIUM 8.7 09/11/2017 0432    ALKPHOS 88 09/11/2017 0432    AST 16 09/11/2017 0432    ALT 47 (H) 09/11/2017 0432    BILITOT 0.4 09/11/2017 0432    ESTGFRAFRICA >60 09/11/2017 0432    EGFRNONAA " >60 09/11/2017 0432        Lab Results   Component Value Date    CHOL 194 07/19/2017    CHOL 235 (H) 06/21/2017    CHOL 171 04/26/2017     Lab Results   Component Value Date    HDL 43 07/19/2017    HDL 54 06/21/2017    HDL 67 04/26/2017     Lab Results   Component Value Date    LDLCALC 87 07/19/2017    LDLCALC 126 (H) 06/21/2017    LDLCALC 87 04/26/2017     Lab Results   Component Value Date    TRIG 322 (H) 07/19/2017    TRIG 273 (H) 06/21/2017    TRIG 84 04/26/2017     Lab Results   Component Value Date    CHOLHDL 2.4 12/27/2016    CHOLHDL 2.3 11/02/2016    CHOLHDL 2.1 06/08/2016         Review of old imaging:  Reviewed per epic    ASSESSMENT:  Problem List Items Addressed This Visit     Therapeutic procedure    Secondary polycythemia - Primary    Polycythemia vera    Oxygen dependent    MPN (myeloproliferative neoplasm)    Mixed dyslipidemia    Major depressive disorder, recurrent episode, moderate    Injury of finger of right hand    HTN, goal below 140/90    History of fusion of cervical spine    GERD (gastroesophageal reflux disease)    Generalized anxiety disorder    COPD (chronic obstructive pulmonary disease)    BPH (benign prostatic hyperplasia)      Other Visit Diagnoses    None.         ICD-10-CM ICD-9-CM   1. Secondary polycythemia D75.1 289.0   2. Therapeutic procedure Z51.89 V57.9   3. MPN (myeloproliferative neoplasm) D47.1 238.79   4. Oxygen dependent Z99.81 V46.2   5. Polycythemia vera D45 238.4   6. Mixed dyslipidemia E78.2 272.2   7. Major depressive disorder, recurrent episode, moderate F33.1 296.32   8. Injury of finger of right hand, sequela S69.91XS 908.9   9. HTN, goal below 140/90 I10 401.9   10. History of fusion of cervical spine Z98.1 V45.4   11. Gastroesophageal reflux disease, esophagitis presence not specified K21.9 530.81   12. Generalized anxiety disorder F41.1 300.02   13. Panlobular emphysema J43.1 492.8   14. Benign prostatic hyperplasia with lower urinary tract symptoms, symptom  details unspecified N40.1 600.01         PLAN:  1. Secondary polycythemia  Continue with the current tx and phlebotomy    2. Therapeutic procedure  Go in per the routine    3. MPN (myeloproliferative neoplasm)  F/u with oncology    4. Oxygen dependent  Continue with portable oxygen, needs it 24/7, 82% without just sitting    5. Polycythemia vera  Continue tx    6. Mixed dyslipidemia  Recheck lipids    7. Major depressive disorder, recurrent episode, moderate  In remission  continue    8. Injury of finger of right hand, sequela  Get him to OT    9. HTN, goal below 140/90  The current medical regimen is effective;  continue present plan and medications.      10. History of fusion of cervical spine  noted    11. Gastroesophageal reflux disease, esophagitis presence not specified  The current medical regimen is effective;  continue present plan and medications.      12. Generalized anxiety disorder  Continue with xanax    13. Panlobular emphysema  Try to stay active    14. Benign prostatic hyperplasia with lower urinary tract symptoms, symptom details unspecified  Mainly asymptomatic      High risk  Now with everything.    Medication List with Changes/Refills   Current Medications    ALBUTEROL-IPRATROPIUM 2.5MG-0.5MG/3ML (DUO-NEB) 0.5 MG-3 MG(2.5 MG BASE)/3 ML NEBULIZER SOLUTION    Take 3 mLs by nebulization every 4 (four) hours as needed for Wheezing or Shortness of Breath.    AMLODIPINE (NORVASC) 5 MG TABLET    Take 1 tablet (5 mg total) by mouth once daily.    ASPIRIN (ECOTRIN) 81 MG EC TABLET    Take 1 tablet (81 mg total) by mouth once daily.    DUTASTERIDE (AVODART) 0.5 MG CAPSULE    Take 1 capsule (0.5 mg total) by mouth once daily.    HYDROCODONE-ACETAMINOPHEN 5-325MG (NORCO) 5-325 MG PER TABLET    Take 1 tablet by mouth every 6 (six) hours as needed for Pain.    HYDROXYUREA (HYDREA) 500 MG CAP    Take 1 capsule (500 mg total) by mouth once daily.    LACTULOSE (CHRONULAC) 10 GRAM/15 ML SOLUTION        SYMBICORT  160-4.5 MCG/ACTUATION HFAA    Inhale 2 puffs into the lungs 2 (two) times daily.    UMECLIDINIUM (INCRUSE ELLIPTA) 62.5 MCG/ACTUATION DSDV    Inhale 1 puff into the lungs once daily. Controller    VENTOLIN HFA 90 MCG/ACTUATION INHALER    Inhale 2 puffs into the lungs every 6 (six) hours as needed.       Return in about 4 weeks (around 10/25/2017) for assess treatment plan, HTN managment.

## 2017-09-30 DIAGNOSIS — S67.190A: ICD-10-CM

## 2017-09-30 DIAGNOSIS — S67.192A CRUSHING INJURY OF RIGHT MIDDLE FINGER: Primary | ICD-10-CM

## 2017-10-09 PROBLEM — Z09 CHEMOTHERAPY FOLLOW-UP EXAMINATION: Status: RESOLVED | Noted: 2017-07-05 | Resolved: 2017-10-09

## 2017-10-13 ENCOUNTER — OFFICE VISIT (OUTPATIENT)
Dept: SLEEP MEDICINE | Facility: CLINIC | Age: 67
End: 2017-10-13
Attending: INTERNAL MEDICINE
Payer: MEDICARE

## 2017-10-13 VITALS
OXYGEN SATURATION: 99 % | WEIGHT: 126.56 LBS | DIASTOLIC BLOOD PRESSURE: 68 MMHG | BODY MASS INDEX: 19.87 KG/M2 | HEIGHT: 67 IN | HEART RATE: 91 BPM | SYSTOLIC BLOOD PRESSURE: 110 MMHG

## 2017-10-13 DIAGNOSIS — Z99.81 OXYGEN DEPENDENT: ICD-10-CM

## 2017-10-13 DIAGNOSIS — G47.30 SLEEP APNEA, UNSPECIFIED TYPE: Primary | ICD-10-CM

## 2017-10-13 DIAGNOSIS — J44.1 COPD EXACERBATION: ICD-10-CM

## 2017-10-13 PROCEDURE — 99999 PR PBB SHADOW E&M-EST. PATIENT-LVL IV: CPT | Mod: PBBFAC,,, | Performed by: NURSE PRACTITIONER

## 2017-10-13 PROCEDURE — 99204 OFFICE O/P NEW MOD 45 MIN: CPT | Mod: S$GLB,,, | Performed by: NURSE PRACTITIONER

## 2017-10-13 NOTE — PATIENT INSTRUCTIONS
Elisa or Chapito will contact you to schedule your sleep study. Their number is [phone] (ext 2). The [facility] is located on 7th floor of the [facility].    We will call you when the sleep study results are ready - if you have not heard from us by 2 weeks from the date of the study, please call [phone] (ext 1).    You are advised to abstain from driving should you feel sleepy or drowsy.

## 2017-10-13 NOTE — PROGRESS NOTES
"Chaz Stuart  was seen as a new patient at the request of  Rk Welsh MD for the evaluation of obstructive sleep apnea.    CHIEF COMPLAINT:    Chief Complaint   Patient presents with    Snoring       10/13/2017 DARRELL Pugh NP: HISTORY OF PRESENT ILLNESS: Chaz Stuart is a 67 y.o. male is here for sleep evaluation.       Accompanied by son and grand daughter     Recently hospitalized for COPD exacerbation. Used PAP machine in hospital "I liked my sleep on that machine"  Never had sleep study done prior    Patient complaints include: snoring, air gasping, and witnessed apneas.   Reports difficulty falling asleep. Nocturia 5x.  Has been taking Xanax for sleep x20 years  O2 dependent COPD - 0.5 L O2 per NC continuously, uses 2L when walking     Denies symptoms of restless legs or kicking during sleep.    Occupation:disabled, lives alone at home    [place] Sleepiness Scale score during initial sleep evaluation was 15.    SLEEP ROUTINE:    Bed partner:  none  Time to bed:  8:30 pm  Sleep onset latency:  30 minutes        Disruptions or awakenings:  5    Wakeup time:   6 am  Perceived sleep quality:  0/5           PAST MEDICAL HISTORY:    Active Ambulatory Problems     Diagnosis Date Noted    COPD (chronic obstructive pulmonary disease) 12/04/2012    GERD (gastroesophageal reflux disease) 12/04/2012    BPH (benign prostatic hyperplasia) 12/06/2012    Generalized anxiety disorder 12/06/2012    History of fusion of cervical spine 08/20/2013    Major depressive disorder, recurrent episode, moderate 08/20/2013    Postlaminectomy syndrome, cervical 09/11/2013    Lumbar spondylosis 09/11/2013    Mixed dyslipidemia 03/27/2014    Oxygen dependent 09/18/2014    Lumbar facet arthropathy 09/30/2014    Visual disturbance 11/19/2015    Secondary polycythemia 11/19/2015    Body mass index (BMI) 21.0-21.9, adult 02/18/2016    Hypercalcemia 06/17/2016    Therapeutic procedure 05/09/2017    Elevated LFT " 05/23/2017    Thrombocythemia 05/23/2017    MPN (myeloproliferative neoplasm) 05/31/2017    Polycythemia vera 05/31/2017    HTN, goal below 140/90 05/31/2017    Injury of finger of right hand 08/28/2017    COPD exacerbation 08/29/2017     Resolved Ambulatory Problems     Diagnosis Date Noted    Diverticulosis 12/04/2012    Cognitive disorder 09/05/2014    Polycythemia 06/09/2016    Elevated blood pressure reading 01/19/2017    Leukocytosis 05/23/2017    Dehydration 06/21/2017    SOB (shortness of breath) 07/05/2017    Chemotherapy follow-up examination 07/05/2017    Injury of right hand 08/28/2017    Chronic obstructive pulmonary disease 08/28/2017    Anxiety 09/19/2017    Hospital discharge follow-up 09/19/2017     Past Medical History:   Diagnosis Date    Anxiety     COPD (chronic obstructive pulmonary disease)     DDD (degenerative disc disease), cervical     Emphysema/COPD     GERD (gastroesophageal reflux disease)     Hypertension                 PAST SURGICAL HISTORY:    Past Surgical History:   Procedure Laterality Date    BACK SURGERY      CERVICAL SPINE SURGERY  Fusion    CHOLECYSTECTOMY      COLON SURGERY           FAMILY HISTORY:                Family History   Problem Relation Age of Onset    Diabetes Mother     Heart disease Mother     Diabetes Father     Hypertension Father     Stroke Father     ADD / ADHD Neg Hx     Alcohol abuse Neg Hx     Anxiety disorder Neg Hx     Bipolar disorder Neg Hx     Dementia Neg Hx     Depression Neg Hx     Drug abuse Neg Hx     OCD Neg Hx     Paranoid behavior Neg Hx     Physical abuse Neg Hx     Schizophrenia Neg Hx     Seizures Neg Hx     Self injury Neg Hx     Sexual abuse Neg Hx     Suicide Neg Hx     Melanoma Neg Hx        SOCIAL HISTORY:          Tobacco:   History   Smoking Status    Former Smoker   Smokeless Tobacco    Never Used       Alcohol use:    History   Alcohol Use No                 ALLERGIES:  Review of  patient's allergies indicates:  No Known Allergies    CURRENT MEDICATIONS:    Current Outpatient Prescriptions   Medication Sig Dispense Refill    albuterol (VENTOLIN HFA) 90 mcg/actuation inhaler Inhale 2 puffs into the lungs every 6 (six) hours as needed. 18 g 3    albuterol-ipratropium 2.5mg-0.5mg/3mL (DUO-NEB) 0.5 mg-3 mg(2.5 mg base)/3 mL nebulizer solution Take 3 mLs by nebulization every 4 (four) hours as needed for Wheezing or Shortness of Breath. 1 vial 1    alprazolam (XANAX) 1 MG tablet Take 1 tablet (1 mg total) by mouth 3 (three) times daily as needed for Anxiety. 90 tablet 2    amlodipine (NORVASC) 5 MG tablet Take 1 tablet (5 mg total) by mouth once daily. 90 tablet 2    aspirin (ECOTRIN) 81 MG EC tablet Take 1 tablet (81 mg total) by mouth once daily. 150 tablet 6    dutasteride (AVODART) 0.5 mg capsule Take 1 capsule (0.5 mg total) by mouth once daily. 90 capsule 3    hydroxyurea (HYDREA) 500 mg Cap Take 1 capsule (500 mg total) by mouth once daily. 90 capsule 1    lactulose (CHRONULAC) 10 gram/15 mL solution       SYMBICORT 160-4.5 mcg/actuation HFAA Inhale 2 puffs into the lungs 2 (two) times daily. 10.2 g 3    umeclidinium (INCRUSE ELLIPTA) 62.5 mcg/actuation DsDv Inhale 1 puff into the lungs once daily. Controller 1 each 2    hydrocodone-acetaminophen 5-325mg (NORCO) 5-325 mg per tablet Take 1 tablet by mouth every 6 (six) hours as needed for Pain. 25 tablet 0     No current facility-administered medications for this visit.                   REVIEW OF SYSTEMS:     Sleep related symptoms as per HPI.  CONST:Denies weight gain    HEENT: Reports sinus congestion; dry mouth in AM; sometimes nosebleeds from O2 use  PULM: Reports dyspnea  CARD:  Sometimes palpitations   GI:  Reports acid reflux  : Denies polyuria  NEURO: Denies headaches  PSYCH: Reports mood disturbance  HEME: Denies anemia    Otherwise, a balance of systems reviewed is negative.          PHYSICAL EXAM:  Vitals:     "10/13/17 0821   BP: 110/68   Pulse: 91   SpO2: 99%   Weight: 57.4 kg (126 lb 8.7 oz)   Height: 5' 7" (1.702 m)   PainSc: 0-No pain     Body mass index is 19.82 kg/m².     GENERAL: Normal development, well groomed  HEENT:  Conjunctivae are non-erythematous; Pupils equal, round, and reactive to light; Nose is symmetrical; Nasal mucosa is pink and moist; Septum is midline; Inferior turbinates are normal; Nasal airflow is normal; Posterior pharynx is pink; Modified Mallampati: III; Posterior palate is normal; Tonsils +1; Uvula is normal and pink;Tongue is normal; Dentition is edentulous; No TMJ tenderness; Jaw opening and protrusion without click and without discomfort.  NECK: Supple. Neck circumference is 14.5 inches. No thyromegaly. No palpable nodes.  SKIN: On face and neck: No abrasions, no rashes, no lesions.  No subcutaneous nodules are palpable.  RESPIRATORY: Chest is clear to auscultation.  Normal chest expansion and non-labored breathing at rest.  CARDIOVASCULAR: Normal S1, S2.  No murmurs, gallops or rubs. No carotid bruits bilaterally.  EXTREMITIES: No edema. No clubbing. No cyanosis. Station normal. Gait normal.        NEURO/PSYCH: Oriented to time, place and person. Normal attention span and concentration. Affect is full. Mood is normal.                                              ASSESSMENT:    Sleep apnea, unspecified. The patient symptomatically has snoring, air gasping, and witnessed apneas with findings of crowded oral airway and elevated body mas index. Medical co-morbidities: anxiety, depression, O2 dependent COPD, HLD, and systemic HTN; recent hospitalization Sept 2017 for COPD exacerbation This warrants further investigation for possible obstructive sleep apnea.      O2 dependent COPD, needs to establish care with pulm    PLAN:    - Diagnostic: Polysomnogram in lab only due to co-morbidities. The nature of this procedure and its indication was discussed with the patient. Patient will be contacted " after sleep study is done.  RTC to discuss sleep study results    - Education: During our discussion today, we talked about the etiology of obstructive sleep apnea as well as the potential ramifications of untreated sleep apnea, which could include daytime sleepiness, hypertension, heart disease and/or stroke. We discussed potential treatment options, which could include weight loss, body positioning, continuous positive airway pressure (CPAP), OA, EPAP, or referral for surgical consideration.     - Precautions: The patient was advised to abstain from driving should they feel sleepy  or drowsy.     Thank you for allowing me the opportunity to participate in the care of your patient.

## 2017-10-19 ENCOUNTER — TELEPHONE (OUTPATIENT)
Dept: SLEEP MEDICINE | Facility: OTHER | Age: 67
End: 2017-10-19

## 2017-10-20 PROBLEM — S69.91XS: Status: ACTIVE | Noted: 2017-10-20

## 2017-10-20 PROBLEM — J44.9 CHRONIC OBSTRUCTIVE PULMONARY DISEASE: Status: ACTIVE | Noted: 2017-10-20

## 2017-10-20 PROBLEM — J20.9 ACUTE BRONCHITIS: Status: ACTIVE | Noted: 2017-10-20

## 2017-10-25 ENCOUNTER — CLINICAL SUPPORT (OUTPATIENT)
Dept: REHABILITATION | Facility: HOSPITAL | Age: 67
End: 2017-10-25
Payer: MEDICARE

## 2017-10-25 ENCOUNTER — HOSPITAL ENCOUNTER (OUTPATIENT)
Dept: SLEEP MEDICINE | Facility: HOSPITAL | Age: 67
Discharge: HOME | End: 2017-10-25
Attending: NURSE PRACTITIONER
Payer: MEDICARE

## 2017-10-25 DIAGNOSIS — S67.190A CRUSHING INJURY OF RIGHT INDEX FINGER, INITIAL ENCOUNTER: ICD-10-CM

## 2017-10-25 DIAGNOSIS — M25.60 RANGE OF MOTION DEFICIT: ICD-10-CM

## 2017-10-25 DIAGNOSIS — G47.8 SLEEP AROUSAL DISORDER: ICD-10-CM

## 2017-10-25 DIAGNOSIS — S67.192A CRUSHING INJURY OF RIGHT MIDDLE FINGER, INITIAL ENCOUNTER: Primary | ICD-10-CM

## 2017-10-25 DIAGNOSIS — M79.644 FINGER PAIN, RIGHT: ICD-10-CM

## 2017-10-25 DIAGNOSIS — R29.898 DECREASED GRIP STRENGTH OF RIGHT HAND: ICD-10-CM

## 2017-10-25 DIAGNOSIS — R29.898 DECREASED PINCH STRENGTH: ICD-10-CM

## 2017-10-25 DIAGNOSIS — G47.30 SLEEP APNEA, UNSPECIFIED TYPE: ICD-10-CM

## 2017-10-25 PROCEDURE — 95810 POLYSOM 6/> YRS 4/> PARAM: CPT

## 2017-10-25 PROCEDURE — 95810 PR POLYSOMNOGRAPHY, 4 OR MORE: ICD-10-PCS | Mod: 26,,, | Performed by: INTERNAL MEDICINE

## 2017-10-25 PROCEDURE — G8985 CARRY GOAL STATUS: HCPCS | Mod: CJ,PN

## 2017-10-25 PROCEDURE — 95810 POLYSOM 6/> YRS 4/> PARAM: CPT | Mod: 26,,, | Performed by: INTERNAL MEDICINE

## 2017-10-25 PROCEDURE — 97165 OT EVAL LOW COMPLEX 30 MIN: CPT | Mod: PN

## 2017-10-25 PROCEDURE — G8984 CARRY CURRENT STATUS: HCPCS | Mod: CK,PN

## 2017-10-25 NOTE — PLAN OF CARE
Occupational Therapy Evaluation    Patient: Chaz Stuart  Date of Evaluation: 10/25/2017  Referring Physician: Dr. Vaughn Monson  Diagnosis:   Encounter Diagnoses   Name Primary?    Crushing injury of right middle finger, initial encounter Yes    Crushing injury of right index finger, initial encounter     Range of motion deficit     Finger pain, right        Referral Orders: Eval and treat    Start Time: 7:00 am  End Time: 8:10 am  Total Time: 70 min  Group Time: -    Age: 67 y.o.  Sex: male  Hand dominance: right    Occupation: retired dispatch  Working presently: No  Last time worked: 3 years  Workmen's Compensation: No    Date of Injury: 9/5/17  Date of Surgery: 9/5/17  Involved areas: right index finger and middle finger    Mechanism of Injury: Caught his finger in the flywheel of a lawnmower when he was cleaning it out.  Past Medical History:   Diagnosis Date    Anxiety     COPD (chronic obstructive pulmonary disease)     DDD (degenerative disc disease), cervical     Emphysema/COPD     GERD (gastroesophageal reflux disease)     Hypertension      Current Outpatient Prescriptions   Medication Sig    albuterol (VENTOLIN HFA) 90 mcg/actuation inhaler Inhale 2 puffs into the lungs every 6 (six) hours as needed.    albuterol-ipratropium 2.5mg-0.5mg/3mL (DUO-NEB) 0.5 mg-3 mg(2.5 mg base)/3 mL nebulizer solution Take 3 mLs by nebulization every 4 (four) hours as needed for Wheezing or Shortness of Breath.    alprazolam (XANAX) 1 MG tablet Take 1 tablet (1 mg total) by mouth 3 (three) times daily as needed for Anxiety.    amlodipine (NORVASC) 5 MG tablet Take 1 tablet (5 mg total) by mouth once daily.    aspirin (ECOTRIN) 81 MG EC tablet Take 1 tablet (81 mg total) by mouth once daily.    dutasteride (AVODART) 0.5 mg capsule Take 1 capsule (0.5 mg total) by mouth once daily.    hydrocodone-acetaminophen 5-325mg (NORCO) 5-325 mg per tablet Take 1 tablet by mouth every 6 (six) hours as needed for  "Pain.    hydroxyurea (HYDREA) 500 mg Cap Take 1 capsule (500 mg total) by mouth once daily.    lactulose (CHRONULAC) 10 gram/15 mL solution     SYMBICORT 160-4.5 mcg/actuation HFAA Inhale 2 puffs into the lungs 2 (two) times daily.    umeclidinium (INCRUSE ELLIPTA) 62.5 mcg/actuation DsDv Inhale 1 puff into the lungs once daily. Controller     No current facility-administered medications for this visit.      Review of patient's allergies indicates:  No Known Allergies  X-Ray Results:   Findings:Three views obtained  . Comminuted fractures of the second distal phalanx, third middle phalanx. 2 mm radiopaque foreign body in the volar soft tissues anterior to the third PIP joint. The joint alignment is within normal limits.  No evidence of a marrow replacement process.   Impression          Comminuted second and third phalangeal fractures.       Subjective  Chaz reports "his fingers are not painful.  Pt states he is using his splints at night."    Functional Pain Scale Rating 0-10: 1/10 sensitivity in his thumb  Location: thumb pad  Description: raw  Activities which increase pain: touching it  Activities which decrease pain: massaging finger    Chaz's goals for therapy are: Not sure, I just thought I would do my own therapy at home.    Objective    Observation: Skin intact, small .5cm eschar on volar middle phalanx, damaged to index nail bed    Sensation: Median Nerve Impaired  [place] Aayush Monofilament Test: Yes  Results: 2.83 all fingers with except to index and  long finger                  Index tip 4.31,  Long 3.61 middle proximal middle phalanx, 4.31 distal middle phalanx  Stereognosis: Intact    Special Tests: n/t    Wound Assessment: Closed small round non exudase area  Color: white  Size: .5cm  Location: long finger middle phalanx    Edema: Circumferential measurements: as follows:    Index left Index right Middle right Middle left    Proximal Plalnx 6.1 cm 6.6 cm 6.5 cm 5.8 cm   PIP Joint 6.2 cm " 6.4 cm 6.9cm 6.2 cm   Middle Phanlnx 5.5 cm 6.1 cm 7.2 cm 5.4 cm   DIP Joint 5.0 cm 5.8 cm 7.2 cm 5.1 cm   Distal Phalnx 4.8 cm 5.8 cm 5.9 cm 5.0 cm       Range of Motion: right Active  (Ext/Flex) Index Middle   MP WFL/WFL WFL/WFL   PIP 0/86 0/72   DIP 15/22 42/57     Thumb Opposition: WFL opposition to all tips and 5th MCP  Palmar Abduction: WFL  Radial Abduction: WFL    Wrist Ext/Flex: WFL/WFL  Supination/Pronation: WFL/WFL  Elbow extension/flexion: WFL/WFL    Manual Muscle Test: n/t     Strength: (LYNNE Dynamometer in lbs.) Average 3 trials, Position II  Right: 55,55,50 = 53.3#  Left: 65, 75, 75 = 71.6#    Pinch Strength: (Pinch Gauge in psi's), Average 3 trials  Wilson Pinch R) 14,12,13 = 13 psi's   L) 16,16,20 = 17.3 psi's  3pt Pinch   R) 4, 5, 4 = 4.3 psi's  L) 16,15,14 = 14 psi'sFine Latasha Coordination Tests:   9 hole Peg Test R) 29 second   L) 18 seconds 1 drop    Functional Limitations: Patient presents with the following functional Limitations:   Self Care / ADL: pt reports that he is doing all of his self care he does use his ring and small finger for manipulation    Work/Activities: Pt is able to carry with the right hand, able to perform his cooking and cleaning the house, able to drive    Leisure: drive around like neighborhood watch    Treatment included: OT evaluation and instruction in written HEP including (Hand Therapy/Finger Exercises) Blocking Exercises, Tendon glides and Pinch Strengthening    Repetitions 10-20 each   Frequency 3* per day  Pt demonstrated exercises in clinic with proper technique.    Pt educated in the importance of engaging index and long finger with pinch instead of compensating with ring and small fingers.    Assessment  Treatment Diagnosis/ Problem List RUE Decreased ROM, Decreased  strength, Decreased pinch strength, Decreased functional  Use of index and long fingers and Edema     Goals to be met in 8 weeks:   Patient to be IND with HEP and modalities for pain/edema  managment.   Increase finger AROM 15 degrees to increase functional hand use for ADLs/work/leisure activities.   Increase  strength 5 lbs. to improve functional grasp for ADLs/work/leisure activities.    Increase pinch 1-3 psi's to increase IND with button and FM Coordination.   Decrease edema .2-.3 mm to increase joint mobility/flexibility for hand use.    Patient to be IND wiht Orthotic use, wear and care precautions.     G CODE TOOL: FOTO  handling  Current Score  = 49 or 51% impaired   Goal at Discharge Score 70 or 30% impaired   Discharge status Score =  or % impaired     Score interpretation is as follows:     TEST SCORE  Modifier  Impairment Limitation Restriction    0%  CH  0 % impaired, limited or restricted    1-19%  CI  @ least 1% but less than 20% impaired, limited or restricted    20-39%  CJ  @ least 20%<40% impaired, limited or restricted    40-59%  CK  @ least 40%<60% impaired, limited or restricted    60-79%  CL  @ least 60% <80% impaired, limited or restricted    80-99%  CM  @ least 80%<100% impaired limited or restricted    100%  CN  100% impaired, limited or restricted     Profile and History Assessment of Occupational Performance Level of Clinical Decision Making Complexity Score   Occupational Profile:   Chaz tSuart is a 67 y.o. 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 Chaz Stuart has difficulty with  engaging his injured fingers with self care and manual work at home  His/her main goal for therapy is to do his therapy at home.     Comorbidities:   anxiety, COPD/asthma and HTN, Cervical DDD    Medical and Therapy History Review:   Expanded               Performance Deficits    Physical:  Joint Mobility  Edema   Strength  Pinch Strength  Fine Motor Coordination    Cognitive:  intact    Psychosocial:    intact  - pt motivated to get better     Clinical Decision Making:  low    Assessment Process:  Problem-Focused  Assessments    Modification/Need for Assistance:  Minimal-Moderate Modifications/Assistance positioning for pinch and  strength tests    Intervention Selection:  Several Treatment Options    Good rehab potential   low  Based on PMHX, co morbidities , data from assessments and functional level of assistance required with task and clinical presentation directly impacting function.         Plan  Chaz Stuart to be treated by Occupational Therapy prn as he is up the road for other medical issues or appointments week for 8 weeks during the certification period from 10/30/17 to 12/25/17 to achieve the established goals.     Treatment to include: Paraffin, Fluidotherapy, Therapeutic exercises/activities., Strengthening, Edema Control and Scar Management, as well as any other treatments deemed necessary based on the patient's needs or progress.     I certify the need for these services furnished under this plan of treatment and while under my care.     ____________________________________                         __________________  Physician/Referring Practitioner                                               Date of Signature

## 2017-10-26 NOTE — PROGRESS NOTES
A PSG study was preformed on Chaz Stuart on the night of 10/25/17. The procedure was explained to the patient in great detail, which included the function of all the wires, when and why the tech would need to enter the room and the possibility of being placed on cpap during the night if criteria was meet, which was all discussed prior to the start of the study. All questions were asked and answered prior to the setup.     Little to no sleep disorder breathing events were noted during the night. Snoring was noted to be non audible to very light during the night. PLM's appeared to be noted during the night. A problem was noted with the thermister going in and out during the night,  even though a new one was placed. The patient was encouraged to sleep supine, but he couldn't. The study was started off of oxygen with spo2 well into the 90's. Oxygen was started at 0.5Lpm for patient comfort, due to the patient complaint of SOB. An end of the night instruction sheet was given to the patient upon discharge.

## 2017-11-07 ENCOUNTER — TELEPHONE (OUTPATIENT)
Dept: SLEEP MEDICINE | Facility: CLINIC | Age: 67
End: 2017-11-07

## 2017-11-07 NOTE — TELEPHONE ENCOUNTER
----- Message from Zulma Payton sent at 11/7/2017  8:18 AM CST -----  Contact: Self / [phone]  Pt called for sleep study results. He can be reached at [phone].

## 2017-11-07 NOTE — TELEPHONE ENCOUNTER
Scheduled pt with Dr Dyson for sleep study results   11/17 [facility] sleep clinic  Pt has O2 questions.  He hope to communicate with Dr Dyson when he meets him

## 2017-11-09 DIAGNOSIS — J44.9 CHRONIC OBSTRUCTIVE PULMONARY DISEASE, UNSPECIFIED COPD TYPE: ICD-10-CM

## 2017-11-09 RX ORDER — IPRATROPIUM BROMIDE AND ALBUTEROL SULFATE 2.5; .5 MG/3ML; MG/3ML
3 SOLUTION RESPIRATORY (INHALATION) EVERY 6 HOURS PRN
Qty: 360 VIAL | Refills: 11 | Status: ON HOLD | OUTPATIENT
Start: 2017-11-09 | End: 2017-11-19 | Stop reason: HOSPADM

## 2017-11-09 NOTE — TELEPHONE ENCOUNTER
----- Message from Diaz Lubin sent at 11/9/2017 10:28 AM CST -----  Contact: Self  REFILL: albuterol-ipratropium 2.5mg-0.5mg/3mL (DUO-NEB) 0.5 mg-3 mg(2.5 mg base)/3 mL nebulizer solution  albuterol (VENTOLIN HFA) 90 mcg/actuation inhaler    #Pt also states he has other medications he needs refilled that were prescribed by . Pt can be reached @ [phone].

## 2017-11-11 ENCOUNTER — HOSPITAL ENCOUNTER (INPATIENT)
Facility: HOSPITAL | Age: 67
LOS: 8 days | Discharge: HOME | DRG: 392 | End: 2017-11-19
Attending: EMERGENCY MEDICINE | Admitting: INTERNAL MEDICINE
Payer: MEDICARE

## 2017-11-11 DIAGNOSIS — J43.1 PANLOBULAR EMPHYSEMA: ICD-10-CM

## 2017-11-11 DIAGNOSIS — R11.10 VOMITING AND DIARRHEA: Primary | ICD-10-CM

## 2017-11-11 DIAGNOSIS — J44.9 CHRONIC OBSTRUCTIVE PULMONARY DISEASE, UNSPECIFIED COPD TYPE: ICD-10-CM

## 2017-11-11 DIAGNOSIS — B37.0 ORAL THRUSH: ICD-10-CM

## 2017-11-11 DIAGNOSIS — R19.7 VOMITING AND DIARRHEA: Primary | ICD-10-CM

## 2017-11-11 DIAGNOSIS — M54.2 NECK PAIN: ICD-10-CM

## 2017-11-11 DIAGNOSIS — R50.81 FEVER IN OTHER DISEASES: ICD-10-CM

## 2017-11-11 DIAGNOSIS — R06.00 DYSPNEA, UNSPECIFIED TYPE: ICD-10-CM

## 2017-11-11 DIAGNOSIS — D62 ACUTE BLOOD LOSS ANEMIA: ICD-10-CM

## 2017-11-11 DIAGNOSIS — R07.9 CHEST PAIN: ICD-10-CM

## 2017-11-11 DIAGNOSIS — R10.84 GENERALIZED ABDOMINAL PAIN: ICD-10-CM

## 2017-11-11 DIAGNOSIS — R78.81 BACTEREMIA DUE TO GRAM-POSITIVE BACTERIA: ICD-10-CM

## 2017-11-11 DIAGNOSIS — R07.89 CHEST PAIN, NON-CARDIAC: ICD-10-CM

## 2017-11-11 LAB
ALBUMIN SERPL BCP-MCNC: 3.4 G/DL
ALP SERPL-CCNC: 81 U/L
ALT SERPL W/O P-5'-P-CCNC: 14 U/L
ANION GAP SERPL CALC-SCNC: 12 MMOL/L
AST SERPL-CCNC: 19 U/L
BACTERIA #/AREA URNS HPF: NORMAL /HPF
BASOPHILS # BLD AUTO: 0.02 K/UL
BASOPHILS NFR BLD: 0.1 %
BILIRUB SERPL-MCNC: 0.7 MG/DL
BILIRUB UR QL STRIP: NEGATIVE
BUN SERPL-MCNC: 13 MG/DL
CALCIUM SERPL-MCNC: 9.5 MG/DL
CHLORIDE SERPL-SCNC: 103 MMOL/L
CLARITY UR: ABNORMAL
CO2 SERPL-SCNC: 27 MMOL/L
COLOR UR: ABNORMAL
CREAT SERPL-MCNC: 1 MG/DL
DIFFERENTIAL METHOD: ABNORMAL
EOSINOPHIL # BLD AUTO: 0.1 K/UL
EOSINOPHIL NFR BLD: 0.5 %
ERYTHROCYTE [DISTWIDTH] IN BLOOD BY AUTOMATED COUNT: 18.5 %
EST. GFR  (AFRICAN AMERICAN): >60 ML/MIN/1.73 M^2
EST. GFR  (NON AFRICAN AMERICAN): >60 ML/MIN/1.73 M^2
GLUCOSE SERPL-MCNC: 121 MG/DL
GLUCOSE UR QL STRIP: NEGATIVE
HCT VFR BLD AUTO: 30.2 %
HGB BLD-MCNC: 8.4 G/DL
HGB UR QL STRIP: NEGATIVE
HYALINE CASTS #/AREA URNS LPF: 0 /LPF
KETONES UR QL STRIP: NEGATIVE
LACTATE SERPL-SCNC: 1.4 MMOL/L
LEUKOCYTE ESTERASE UR QL STRIP: NEGATIVE
LIPASE SERPL-CCNC: 17 U/L
LYMPHOCYTES # BLD AUTO: 0.6 K/UL
LYMPHOCYTES NFR BLD: 3 %
MAGNESIUM SERPL-MCNC: 1.2 MG/DL
MCH RBC QN AUTO: 21.8 PG
MCHC RBC AUTO-ENTMCNC: 27.8 G/DL
MCV RBC AUTO: 78 FL
MICROSCOPIC COMMENT: NORMAL
MONOCYTES # BLD AUTO: 0.6 K/UL
MONOCYTES NFR BLD: 3.3 %
NEUTROPHILS # BLD AUTO: 17.8 K/UL
NEUTROPHILS NFR BLD: 93.6 %
NITRITE UR QL STRIP: NEGATIVE
PH UR STRIP: 5 [PH] (ref 5–8)
PLATELET # BLD AUTO: 129 K/UL
PMV BLD AUTO: 9.6 FL
POTASSIUM SERPL-SCNC: 3.5 MMOL/L
PROT SERPL-MCNC: 6.8 G/DL
PROT UR QL STRIP: ABNORMAL
RBC # BLD AUTO: 3.86 M/UL
RBC #/AREA URNS HPF: 1 /HPF (ref 0–4)
SODIUM SERPL-SCNC: 142 MMOL/L
SP GR UR STRIP: 1.01 (ref 1–1.03)
TROPONIN I SERPL DL<=0.01 NG/ML-MCNC: <0.006 NG/ML
TROPONIN I SERPL DL<=0.01 NG/ML-MCNC: <0.006 NG/ML
URN SPEC COLLECT METH UR: ABNORMAL
UROBILINOGEN UR STRIP-ACNC: NEGATIVE EU/DL
WBC # BLD AUTO: 19.12 K/UL
WBC #/AREA URNS HPF: 3 /HPF (ref 0–5)

## 2017-11-11 PROCEDURE — 87086 URINE CULTURE/COLONY COUNT: CPT

## 2017-11-11 PROCEDURE — 96365 THER/PROPH/DIAG IV INF INIT: CPT

## 2017-11-11 PROCEDURE — S0030 INJECTION, METRONIDAZOLE: HCPCS | Performed by: EMERGENCY MEDICINE

## 2017-11-11 PROCEDURE — 21400001 HC TELEMETRY ROOM

## 2017-11-11 PROCEDURE — 96375 TX/PRO/DX INJ NEW DRUG ADDON: CPT

## 2017-11-11 PROCEDURE — 83735 ASSAY OF MAGNESIUM: CPT

## 2017-11-11 PROCEDURE — 83690 ASSAY OF LIPASE: CPT

## 2017-11-11 PROCEDURE — 93010 ELECTROCARDIOGRAM REPORT: CPT | Mod: ,,, | Performed by: INTERNAL MEDICINE

## 2017-11-11 PROCEDURE — 36415 COLL VENOUS BLD VENIPUNCTURE: CPT

## 2017-11-11 PROCEDURE — 96361 HYDRATE IV INFUSION ADD-ON: CPT

## 2017-11-11 PROCEDURE — 85025 COMPLETE CBC W/AUTO DIFF WBC: CPT

## 2017-11-11 PROCEDURE — 96372 THER/PROPH/DIAG INJ SC/IM: CPT

## 2017-11-11 PROCEDURE — 99285 EMERGENCY DEPT VISIT HI MDM: CPT | Mod: 25

## 2017-11-11 PROCEDURE — 96376 TX/PRO/DX INJ SAME DRUG ADON: CPT

## 2017-11-11 PROCEDURE — 80053 COMPREHEN METABOLIC PANEL: CPT

## 2017-11-11 PROCEDURE — 84484 ASSAY OF TROPONIN QUANT: CPT

## 2017-11-11 PROCEDURE — 83605 ASSAY OF LACTIC ACID: CPT

## 2017-11-11 PROCEDURE — 63600175 PHARM REV CODE 636 W HCPCS: Performed by: EMERGENCY MEDICINE

## 2017-11-11 PROCEDURE — 87040 BLOOD CULTURE FOR BACTERIA: CPT

## 2017-11-11 PROCEDURE — 27000221 HC OXYGEN, UP TO 24 HOURS

## 2017-11-11 PROCEDURE — 94640 AIRWAY INHALATION TREATMENT: CPT

## 2017-11-11 PROCEDURE — 25000003 PHARM REV CODE 250: Performed by: EMERGENCY MEDICINE

## 2017-11-11 PROCEDURE — 96367 TX/PROPH/DG ADDL SEQ IV INF: CPT

## 2017-11-11 PROCEDURE — 93005 ELECTROCARDIOGRAM TRACING: CPT

## 2017-11-11 PROCEDURE — 81000 URINALYSIS NONAUTO W/SCOPE: CPT

## 2017-11-11 PROCEDURE — 25000242 PHARM REV CODE 250 ALT 637 W/ HCPCS: Performed by: EMERGENCY MEDICINE

## 2017-11-11 RX ORDER — DEXTROSE MONOHYDRATE AND SODIUM CHLORIDE 5; .9 G/100ML; G/100ML
INJECTION, SOLUTION INTRAVENOUS CONTINUOUS
Status: DISCONTINUED | OUTPATIENT
Start: 2017-11-11 | End: 2017-11-17

## 2017-11-11 RX ORDER — PANTOPRAZOLE SODIUM 40 MG/1
80 TABLET, DELAYED RELEASE ORAL
Status: COMPLETED | OUTPATIENT
Start: 2017-11-11 | End: 2017-11-11

## 2017-11-11 RX ORDER — ENOXAPARIN SODIUM 100 MG/ML
40 INJECTION SUBCUTANEOUS EVERY 24 HOURS
Status: DISCONTINUED | OUTPATIENT
Start: 2017-11-11 | End: 2017-11-12

## 2017-11-11 RX ORDER — ASPIRIN 81 MG/1
81 TABLET ORAL DAILY
Status: DISCONTINUED | OUTPATIENT
Start: 2017-11-12 | End: 2017-11-12

## 2017-11-11 RX ORDER — SULFAMETHOXAZOLE AND TRIMETHOPRIM 800; 160 MG/1; MG/1
1 TABLET ORAL 2 TIMES DAILY
Status: ON HOLD | COMMUNITY
End: 2017-11-19 | Stop reason: HOSPADM

## 2017-11-11 RX ORDER — ONDANSETRON 2 MG/ML
4 INJECTION INTRAMUSCULAR; INTRAVENOUS EVERY 8 HOURS PRN
Status: DISCONTINUED | OUTPATIENT
Start: 2017-11-11 | End: 2017-11-19 | Stop reason: HOSPADM

## 2017-11-11 RX ORDER — DICYCLOMINE HYDROCHLORIDE 10 MG/ML
20 INJECTION INTRAMUSCULAR
Status: COMPLETED | OUTPATIENT
Start: 2017-11-11 | End: 2017-11-11

## 2017-11-11 RX ORDER — ONDANSETRON 2 MG/ML
8 INJECTION INTRAMUSCULAR; INTRAVENOUS
Status: COMPLETED | OUTPATIENT
Start: 2017-11-11 | End: 2017-11-11

## 2017-11-11 RX ORDER — ACETAMINOPHEN 325 MG/1
650 TABLET ORAL EVERY 8 HOURS PRN
Status: DISCONTINUED | OUTPATIENT
Start: 2017-11-11 | End: 2017-11-11

## 2017-11-11 RX ORDER — MAG HYDROX/ALUMINUM HYD/SIMETH 200-200-20
60 SUSPENSION, ORAL (FINAL DOSE FORM) ORAL
Status: COMPLETED | OUTPATIENT
Start: 2017-11-11 | End: 2017-11-11

## 2017-11-11 RX ORDER — ALPRAZOLAM 0.5 MG/1
1 TABLET ORAL 3 TIMES DAILY PRN
Status: DISCONTINUED | OUTPATIENT
Start: 2017-11-11 | End: 2017-11-19 | Stop reason: HOSPADM

## 2017-11-11 RX ORDER — ACETAMINOPHEN 325 MG/1
650 TABLET ORAL EVERY 6 HOURS PRN
Status: DISCONTINUED | OUTPATIENT
Start: 2017-11-11 | End: 2017-11-16

## 2017-11-11 RX ORDER — CIPROFLOXACIN 2 MG/ML
400 INJECTION, SOLUTION INTRAVENOUS
Status: DISCONTINUED | OUTPATIENT
Start: 2017-11-11 | End: 2017-11-17

## 2017-11-11 RX ORDER — METRONIDAZOLE 500 MG/100ML
500 INJECTION, SOLUTION INTRAVENOUS
Status: DISCONTINUED | OUTPATIENT
Start: 2017-11-11 | End: 2017-11-17

## 2017-11-11 RX ORDER — FLUTICASONE FUROATE AND VILANTEROL 100; 25 UG/1; UG/1
1 POWDER RESPIRATORY (INHALATION) DAILY
Status: DISCONTINUED | OUTPATIENT
Start: 2017-11-12 | End: 2017-11-19 | Stop reason: HOSPADM

## 2017-11-11 RX ORDER — HYDROXYUREA 500 MG/1
500 CAPSULE ORAL DAILY
Status: DISCONTINUED | OUTPATIENT
Start: 2017-11-12 | End: 2017-11-12

## 2017-11-11 RX ORDER — HYDROCODONE BITARTRATE AND ACETAMINOPHEN 5; 325 MG/1; MG/1
1 TABLET ORAL EVERY 6 HOURS PRN
Status: DISCONTINUED | OUTPATIENT
Start: 2017-11-11 | End: 2017-11-19 | Stop reason: HOSPADM

## 2017-11-11 RX ORDER — IPRATROPIUM BROMIDE AND ALBUTEROL SULFATE 2.5; .5 MG/3ML; MG/3ML
3 SOLUTION RESPIRATORY (INHALATION) EVERY 4 HOURS
Status: DISCONTINUED | OUTPATIENT
Start: 2017-11-11 | End: 2017-11-17

## 2017-11-11 RX ORDER — DUTASTERIDE 0.5 MG/1
0.5 CAPSULE, LIQUID FILLED ORAL DAILY
Status: DISCONTINUED | OUTPATIENT
Start: 2017-11-12 | End: 2017-11-19 | Stop reason: HOSPADM

## 2017-11-11 RX ADMIN — ALPRAZOLAM 1 MG: 0.5 TABLET ORAL at 10:11

## 2017-11-11 RX ADMIN — METRONIDAZOLE 500 MG: 500 INJECTION, SOLUTION INTRAVENOUS at 08:11

## 2017-11-11 RX ADMIN — ACETAMINOPHEN 650 MG: 325 TABLET ORAL at 08:11

## 2017-11-11 RX ADMIN — PANTOPRAZOLE SODIUM 80 MG: 40 TABLET, DELAYED RELEASE ORAL at 04:11

## 2017-11-11 RX ADMIN — ALUMINUM HYDROXIDE, MAGNESIUM HYDROXIDE, AND SIMETHICONE 60 ML: 200; 200; 20 SUSPENSION ORAL at 04:11

## 2017-11-11 RX ADMIN — ONDANSETRON 4 MG: 2 INJECTION INTRAMUSCULAR; INTRAVENOUS at 08:11

## 2017-11-11 RX ADMIN — SODIUM CHLORIDE 1000 ML: 0.9 INJECTION, SOLUTION INTRAVENOUS at 04:11

## 2017-11-11 RX ADMIN — DEXTROSE MONOHYDRATE AND SODIUM CHLORIDE 1000 ML: 5; .9 INJECTION, SOLUTION INTRAVENOUS at 08:11

## 2017-11-11 RX ADMIN — HYDROCODONE BITARTRATE AND ACETAMINOPHEN 1 TABLET: 5; 325 TABLET ORAL at 08:11

## 2017-11-11 RX ADMIN — ONDANSETRON 8 MG: 2 INJECTION INTRAMUSCULAR; INTRAVENOUS at 04:11

## 2017-11-11 RX ADMIN — DICYCLOMINE HYDROCHLORIDE 20 MG: 10 INJECTION INTRAMUSCULAR at 04:11

## 2017-11-11 RX ADMIN — CIPROFLOXACIN 400 MG: 2 INJECTION, SOLUTION INTRAVENOUS at 09:11

## 2017-11-11 RX ADMIN — ENOXAPARIN SODIUM 40 MG: 100 INJECTION SUBCUTANEOUS at 09:11

## 2017-11-11 RX ADMIN — IPRATROPIUM BROMIDE AND ALBUTEROL SULFATE 3 ML: .5; 3 SOLUTION RESPIRATORY (INHALATION) at 08:11

## 2017-11-11 NOTE — ED PROVIDER NOTES
Encounter Date: 11/11/2017    SCRIBE #1 NOTE: I, Delphine Villalobos, am scribing for, and in the presence of,  Kash Mello MD. I have scribed the following portions of the note - Other sections scribed: HPI and ROS.       History     Chief Complaint   Patient presents with    Abdominal Pain     started x several days ago.  all over pains  vomiting x 1 today.  increased urination and bm's but denies diarrhea.    Dizziness     dizzy when standing x 2 days.       CC: Abdominal Pain, Chest Pain, and Dizziness    HPI: This 67 y.o. Male with PMHx of COPD, DDD, anxiety, GERD, emphysema/COPD, HTN, and diverticulitis and PSHx of colon surgery, back surgery, cholecystectomy, diverticulitis surgery, and finger surgery presents to the ED c/o 3 day hx of acute onset, constant, and severe generalized abdominal pain with associated nausea, vomiting (1x in the past 24 hours), and diarrhea (10x in the past 24 hours). Pt also c/o 3 day hx of intermittent chest pain with an onset that lasts for approximately 10 minutes. Pt reports associated 3 day hx of subjective fever, chills, sore throat, intermittent productive cough (mucus), and leg cramps. In addition, pt c/o dizziness when standing. Pt denies a hx of similar symptoms. Pt is on home oxygen. Pt denies a hx of DM, HTN, heart trouble, and thyroid disease. Pt also denies ear pain, eye pain, dysuria, rash, and headaches.       The history is provided by the patient and a relative. No  was used.     Review of patient's allergies indicates:  No Known Allergies  Past Medical History:   Diagnosis Date    Anxiety     COPD (chronic obstructive pulmonary disease)     DDD (degenerative disc disease), cervical     Diverticulitis     Emphysema/COPD     GERD (gastroesophageal reflux disease)     Hypertension      Past Surgical History:   Procedure Laterality Date    BACK SURGERY      CERVICAL SPINE SURGERY  Fusion    CHOLECYSTECTOMY      COLON SURGERY      FINGER  SURGERY       Family History   Problem Relation Age of Onset    Diabetes Mother     Heart disease Mother     Diabetes Father     Hypertension Father     Stroke Father     ADD / ADHD Neg Hx     Alcohol abuse Neg Hx     Anxiety disorder Neg Hx     Bipolar disorder Neg Hx     Dementia Neg Hx     Depression Neg Hx     Drug abuse Neg Hx     OCD Neg Hx     Paranoid behavior Neg Hx     Physical abuse Neg Hx     Schizophrenia Neg Hx     Seizures Neg Hx     Self injury Neg Hx     Sexual abuse Neg Hx     Suicide Neg Hx     Melanoma Neg Hx      Social History   Substance Use Topics    Smoking status: Former Smoker    Smokeless tobacco: Never Used    Alcohol use No     Review of Systems   Constitutional: Positive for chills and fever (subjective). Negative for diaphoresis.   HENT: Positive for sore throat. Negative for ear pain.    Eyes: Negative for pain.   Respiratory: Positive for cough (intermittent, productive (mucus)).    Cardiovascular: Positive for chest pain ( intermittent).   Gastrointestinal: Positive for abdominal pain (acute onset, constant, and severe generalized ), diarrhea (10x in the past 24 hours), nausea and vomiting (1x in the past 24 hours).   Genitourinary: Negative for dysuria.   Musculoskeletal: Positive for myalgias (leg cramps). Negative for back pain.   Skin: Negative for rash.   Neurological: Positive for dizziness (when standing). Negative for headaches.       Physical Exam     Initial Vitals [11/11/17 1542]   BP Pulse Resp Temp SpO2   122/65 (!) 124 18 98.7 °F (37.1 °C) 100 %      MAP       84         Physical Exam  The patient was examined specifically for the following:   General:No significant distress, Good color, Warm and dry. Head and neck:Scalp atraumatic, Neck supple. Neurological:Appropriate conversation, Gross motor deficits. Eyes:Conjugate gaze, Clear corneas. ENT: No epistaxis. Cardiac: Regular rate and rhythm, Grossly normal heart tones. Pulmonary: Wheezing,  Rales. Gastrointestinal: Abdominal tenderness, Abdominal distention. Musculoskeletal: Extremity deformity, Apparent pain with range of motion of the joints. Skin: Rash.   The findings on examination were normal except for the following: The patient is wearing oxygen.  He has mild bronchial breath sounds bilaterally.  The heart tones are normal except that the patient has a regular tachycardia.  The abdomen is mildly vaguely diffusely tender.  There is no real guarding rebound mass or distention.  Extremities are nontender.  There is no pain with range of motion of any joints.  The patient has no rash.  ED Course   Procedures  Labs Reviewed   COMPREHENSIVE METABOLIC PANEL - Abnormal; Notable for the following:        Result Value    Glucose 121 (*)     Albumin 3.4 (*)     All other components within normal limits   CBC W/ AUTO DIFFERENTIAL - Abnormal; Notable for the following:     WBC 19.12 (*)     RBC 3.86 (*)     Hemoglobin 8.4 (*)     Hematocrit 30.2 (*)     MCV 78 (*)     MCH 21.8 (*)     MCHC 27.8 (*)     RDW 18.5 (*)     Platelets 129 (*)     Gran # 17.8 (*)     Lymph # 0.6 (*)     Gran% 93.6 (*)     Lymph% 3.0 (*)     Mono% 3.3 (*)     All other components within normal limits   URINALYSIS - Abnormal; Notable for the following:     Appearance, UA Hazy (*)     Protein, UA 1+ (*)     All other components within normal limits   MAGNESIUM - Abnormal; Notable for the following:     Magnesium 1.2 (*)     All other components within normal limits   CULTURE, URINE   CULTURE, STOOL   CLOSTRIDIUM DIFFICILE   LIPASE   TROPONIN I   URINALYSIS MICROSCOPIC   LACTIC ACID, PLASMA     EKG Readings: (Independently Interpreted)   This patient is in a sinus tachycardia with a heart rate of 118.  The IA QRS and QT intervals are normal.  The patient has some low-voltage QRS complexes.  There is poor R-wave progression across precordium.  There are nonspecific ST segment changes.  There is no definite evidence of acute myocardial  infarction or malignant arrhythmia.       X-Rays:   Independently Interpreted Readings:   Other Readings:  Chest x-ray fails to reveal evidence of pneumonia  CT of the abdomen fails to reveal evidence of peritonitis or bowel obstruction.  There is some nonspecific fat stranding around the right kidney of uncertain significance    Medical decision making: This patient presents to the emergency with generalized abdominal pain one episode of vomiting and 10 diarrheal stools in the last 24 hours.  The patient has a temperature 102.6.  I'm concerned he may have a bacterial dysentery.  Stool cultures and cultures for Clostridium difficile of been ordered.  I discussed this case with , who agrees to admit him to the hospital.  He also has chest pain for the last 3 days but his troponin is negative I doubt myocardial infarction.  He has shortness of breath but has COPD and this is a chronic phenomenon.  Sepsis is in the differential diagnosis I will consider it.  I will give him generous fluids and have him evaluated by internal medicine             Scribe Attestation:   Scribe #1: I performed the above scribed service and the documentation accurately describes the services I performed. I attest to the accuracy of the note.    Attending Attestation:           Physician Attestation for Scribe:  Physician Attestation Statement for Scribe #1: I, Kash Mello MD, reviewed documentation, as scribed by Delphine Villalobos in my presence, and it is both accurate and complete.                 ED Course      Clinical Impression:   The primary encounter diagnosis was Vomiting and diarrhea. Diagnoses of Chest pain, Fever in other diseases, Generalized abdominal pain, Chest pain, non-cardiac, Dyspnea, unspecified type, and Chronic obstructive pulmonary disease, unspecified COPD type were also pertinent to this visit.                           Kash Mello MD  11/12/17 2642

## 2017-11-11 NOTE — ED TRIAGE NOTES
Pt arrived to ED via EMS from home for c/o 9 out of 10 abdominal pain, sore throat, epigastric chest pain, and dizziness x 3 days. Denies N/D. Reports vomiting on the way to ED. C/o SOB. Carlos WAGNER, placed pt on 2L of O2. Pt wears O2 at home and titrates as needed.  AAO x 4. Pursed lip breathing, pt and daughter state that is his normal breathing. Will continue to monitor.

## 2017-11-12 PROBLEM — D50.9 MICROCYTIC ANEMIA: Status: ACTIVE | Noted: 2017-11-12

## 2017-11-12 PROBLEM — R53.82 CHRONIC FATIGUE: Status: ACTIVE | Noted: 2017-11-12

## 2017-11-12 PROBLEM — D45 POLYCYTHEMIA VERA: Status: ACTIVE | Noted: 2017-11-12

## 2017-11-12 PROBLEM — R10.84 GENERALIZED ABDOMINAL PAIN: Status: ACTIVE | Noted: 2017-11-12

## 2017-11-12 LAB
ABO + RH BLD: NORMAL
ANION GAP SERPL CALC-SCNC: 8 MMOL/L
BASOPHILS # BLD AUTO: 0.03 K/UL
BASOPHILS NFR BLD: 0.2 %
BLD GP AB SCN CELLS X3 SERPL QL: NORMAL
BUN SERPL-MCNC: 18 MG/DL
CALCIUM SERPL-MCNC: 8.1 MG/DL
CHLORIDE SERPL-SCNC: 107 MMOL/L
CO2 SERPL-SCNC: 25 MMOL/L
CREAT SERPL-MCNC: 1.3 MG/DL
DIFFERENTIAL METHOD: ABNORMAL
EOSINOPHIL # BLD AUTO: 0.1 K/UL
EOSINOPHIL NFR BLD: 0.3 %
ERYTHROCYTE [DISTWIDTH] IN BLOOD BY AUTOMATED COUNT: 18.4 %
EST. GFR  (AFRICAN AMERICAN): >60 ML/MIN/1.73 M^2
EST. GFR  (NON AFRICAN AMERICAN): 56 ML/MIN/1.73 M^2
GLUCOSE SERPL-MCNC: 125 MG/DL
HCT VFR BLD AUTO: 21.5 %
HCT VFR BLD AUTO: 23.7 %
HGB BLD-MCNC: 5.9 G/DL
HGB BLD-MCNC: 6.5 G/DL
LYMPHOCYTES # BLD AUTO: 0.8 K/UL
LYMPHOCYTES NFR BLD: 4.4 %
MCH RBC QN AUTO: 22.2 PG
MCHC RBC AUTO-ENTMCNC: 27.4 G/DL
MCV RBC AUTO: 81 FL
MONOCYTES # BLD AUTO: 0.6 K/UL
MONOCYTES NFR BLD: 3.2 %
NEUTROPHILS # BLD AUTO: 15.9 K/UL
NEUTROPHILS NFR BLD: 91.4 %
PLATELET # BLD AUTO: 101 K/UL
PMV BLD AUTO: 9.5 FL
POTASSIUM SERPL-SCNC: 4.3 MMOL/L
RBC # BLD AUTO: 2.93 M/UL
SODIUM SERPL-SCNC: 140 MMOL/L
TROPONIN I SERPL DL<=0.01 NG/ML-MCNC: <0.006 NG/ML
WBC # BLD AUTO: 17.34 K/UL

## 2017-11-12 PROCEDURE — 25000242 PHARM REV CODE 250 ALT 637 W/ HCPCS: Performed by: EMERGENCY MEDICINE

## 2017-11-12 PROCEDURE — 80048 BASIC METABOLIC PNL TOTAL CA: CPT

## 2017-11-12 PROCEDURE — 84484 ASSAY OF TROPONIN QUANT: CPT

## 2017-11-12 PROCEDURE — 85018 HEMOGLOBIN: CPT

## 2017-11-12 PROCEDURE — 85025 COMPLETE CBC W/AUTO DIFF WBC: CPT

## 2017-11-12 PROCEDURE — S0030 INJECTION, METRONIDAZOLE: HCPCS | Performed by: EMERGENCY MEDICINE

## 2017-11-12 PROCEDURE — 94761 N-INVAS EAR/PLS OXIMETRY MLT: CPT

## 2017-11-12 PROCEDURE — 63600175 PHARM REV CODE 636 W HCPCS: Performed by: EMERGENCY MEDICINE

## 2017-11-12 PROCEDURE — 94640 AIRWAY INHALATION TREATMENT: CPT

## 2017-11-12 PROCEDURE — 86920 COMPATIBILITY TEST SPIN: CPT

## 2017-11-12 PROCEDURE — 36415 COLL VENOUS BLD VENIPUNCTURE: CPT

## 2017-11-12 PROCEDURE — 21400001 HC TELEMETRY ROOM

## 2017-11-12 PROCEDURE — 27000221 HC OXYGEN, UP TO 24 HOURS

## 2017-11-12 PROCEDURE — 25000003 PHARM REV CODE 250: Performed by: EMERGENCY MEDICINE

## 2017-11-12 PROCEDURE — 86900 BLOOD TYPING SEROLOGIC ABO: CPT

## 2017-11-12 PROCEDURE — 85014 HEMATOCRIT: CPT

## 2017-11-12 PROCEDURE — 86850 RBC ANTIBODY SCREEN: CPT

## 2017-11-12 RX ORDER — HYDROCODONE BITARTRATE AND ACETAMINOPHEN 500; 5 MG/1; MG/1
TABLET ORAL
Status: DISCONTINUED | OUTPATIENT
Start: 2017-11-12 | End: 2017-11-19 | Stop reason: HOSPADM

## 2017-11-12 RX ADMIN — ALPRAZOLAM 1 MG: 0.5 TABLET ORAL at 06:11

## 2017-11-12 RX ADMIN — ALPRAZOLAM 1 MG: 0.5 TABLET ORAL at 11:11

## 2017-11-12 RX ADMIN — METRONIDAZOLE 500 MG: 500 INJECTION, SOLUTION INTRAVENOUS at 04:11

## 2017-11-12 RX ADMIN — METRONIDAZOLE 500 MG: 500 INJECTION, SOLUTION INTRAVENOUS at 12:11

## 2017-11-12 RX ADMIN — CIPROFLOXACIN 400 MG: 2 INJECTION, SOLUTION INTRAVENOUS at 09:11

## 2017-11-12 RX ADMIN — IPRATROPIUM BROMIDE AND ALBUTEROL SULFATE 3 ML: .5; 3 SOLUTION RESPIRATORY (INHALATION) at 07:11

## 2017-11-12 RX ADMIN — ACETAMINOPHEN 650 MG: 325 TABLET ORAL at 04:11

## 2017-11-12 RX ADMIN — DEXTROSE MONOHYDRATE AND SODIUM CHLORIDE: 5; .9 INJECTION, SOLUTION INTRAVENOUS at 03:11

## 2017-11-12 RX ADMIN — HYDROXYUREA 500 MG: 500 CAPSULE ORAL at 09:11

## 2017-11-12 RX ADMIN — IPRATROPIUM BROMIDE AND ALBUTEROL SULFATE 3 ML: .5; 3 SOLUTION RESPIRATORY (INHALATION) at 03:11

## 2017-11-12 RX ADMIN — IPRATROPIUM BROMIDE AND ALBUTEROL SULFATE 3 ML: .5; 3 SOLUTION RESPIRATORY (INHALATION) at 11:11

## 2017-11-12 RX ADMIN — ASPIRIN 81 MG: 81 TABLET, COATED ORAL at 09:11

## 2017-11-12 RX ADMIN — IPRATROPIUM BROMIDE AND ALBUTEROL SULFATE 3 ML: .5; 3 SOLUTION RESPIRATORY (INHALATION) at 12:11

## 2017-11-12 RX ADMIN — METRONIDAZOLE 500 MG: 500 INJECTION, SOLUTION INTRAVENOUS at 09:11

## 2017-11-12 RX ADMIN — ENOXAPARIN SODIUM 40 MG: 100 INJECTION SUBCUTANEOUS at 04:11

## 2017-11-12 RX ADMIN — DUTASTERIDE 0.5 MG: 0.5 CAPSULE, LIQUID FILLED ORAL at 09:11

## 2017-11-12 RX ADMIN — DEXTROSE MONOHYDRATE AND SODIUM CHLORIDE: 5; .9 INJECTION, SOLUTION INTRAVENOUS at 04:11

## 2017-11-12 RX ADMIN — FLUTICASONE FUROATE AND VILANTEROL TRIFENATATE 1 PUFF: 100; 25 POWDER RESPIRATORY (INHALATION) at 07:11

## 2017-11-12 RX ADMIN — ALPRAZOLAM 1 MG: 0.5 TABLET ORAL at 09:11

## 2017-11-12 RX ADMIN — IPRATROPIUM BROMIDE AND ALBUTEROL SULFATE 3 ML: .5; 3 SOLUTION RESPIRATORY (INHALATION) at 04:11

## 2017-11-12 NOTE — PLAN OF CARE
Problem: Fall Risk (Adult)  Intervention: Safety Promotion/Fall Prevention   11/12/17 1300   Safety Interventions   Safety Promotion/Fall Prevention assistive device/personal item within reach;bed alarm set;Fall Risk reviewed with patient/family;side rails raised x 3;room near unit station;nonskid shoes/socks when out of bed;medications reviewed;lighting adjusted;Fall Risk signage in place;instructed to call staff for mobility       Goal: Absence of Falls  Patient will demonstrate the desired outcomes by discharge/transition of care.   Outcome: Ongoing (interventions implemented as appropriate)   11/12/17 1420   Fall Risk (Adult)   Absence of Falls making progress toward outcome       Problem: Infection, Risk/Actual (Adult)  Intervention: Manage Suspected/Actual Infection   11/12/17 1420   Safety Interventions   Isolation Precautions environmental surveillance   Infection Management aseptic technique maintained       Goal: Identify Related Risk Factors and Signs and Symptoms  Related risk factors and signs and symptoms are identified upon initiation of Human Response Clinical Practice Guideline (CPG)   Outcome: Ongoing (interventions implemented as appropriate)   11/12/17 1420   Infection, Risk/Actual   Related Risk Factors (Infection, Risk/Actual) age extremes   Signs and Symptoms (Infection, Risk/Actual) weakness;body temperature changes     Goal: Infection Prevention/Resolution  Patient will demonstrate the desired outcomes by discharge/transition of care.    11/12/17 1420   Infection, Risk/Actual (Adult)   Infection Prevention/Resolution making progress toward outcome

## 2017-11-12 NOTE — PLAN OF CARE
11/12/17 1009   Discharge Assessment   Assessment Type Discharge Planning Assessment   Confirmed/corrected address and phone number on facesheet? Yes   Assessment information obtained from? Patient   Prior to hospitilization cognitive status: Alert/Oriented   Prior to hospitalization functional status: Assistive Equipment;Independent   Current cognitive status: Alert/Oriented   Current Functional Status: Assistive Equipment   Lives With alone   Able to Return to Prior Arrangements yes   Is patient able to care for self after discharge? Yes   Who are your caregiver(s) and their phone number(s)? Daughter   Patient's perception of discharge disposition home or selfcare   Readmission Within The Last 30 Days no previous admission in last 30 days   Patient currently being followed by outpatient case management? No   Patient currently receives any other outside agency services? No   Equipment Currently Used at Home oxygen   Do you have any problems affording any of your prescribed medications? No   Is the patient taking medications as prescribed? yes   Does the patient have transportation home? Yes   Transportation Available family or friend will provide   Does the patient receive services at the [facility]madin Clinic? No   Discharge Plan A Home   Discharge Plan B Home   Patient/Family In Agreement With Plan yes     PCP: Rk Welsh MD

## 2017-11-12 NOTE — PROGRESS NOTES
Patient has not had a bowel movement since he's been in the ED at 4 pm. Patient states last bowel movement was at home before he came to the hospital; Stool culture nor C-diff specimen collected.

## 2017-11-12 NOTE — H&P
[facility]  Hematology/Oncology  H&P    Patient Name: Chaz Stuart  MRN: [id number]  Admission Date: 11/11/2017  Code Status: Full Code   Attending Provider: Rk Welsh MD  Primary Care Physician: Rk Welsh MD  Principal Problem:<principal problem not specified>    Subjective:     HPI:     Mr. Stuart is a pleasant 68 YO gentleman with Myeloproliferative neoplasm- Polycythemia Vera, oxygen dependent COPD and HTN. He was in his usual state of health until about 3 days ago. He began to have generalized abdominal pain associated with vomiting x 1 and diarrhea x 10 in 24 hours. Generalized fatigue and weakness. No fever or chills. Pt was recently tx with oral ABX for COPD exacerbation, bronchiitis and finger injury. No excess sob.     Oncology Treatment Plan:   [No treatment plan]    Medications:  Continuous Infusions:   dextrose 5 % and 0.9 % NaCl 150 mL/hr at 11/12/17 0445     Scheduled Meds:   albuterol-ipratropium 2.5mg-0.5mg/3mL  3 mL Nebulization Q4H    aspirin  81 mg Oral Daily    ciprofloxacin  400 mg Intravenous Q12H    dutasteride  0.5 mg Oral Daily    enoxaparin  40 mg Subcutaneous Daily    fluticasone-vilanterol  1 puff Inhalation Daily    hydroxyurea  500 mg Oral Daily    metronidazole  500 mg Intravenous Q8H    pneumoc 13-yadira conj-dip cr(PF)  0.5 mL Intramuscular Once     PRN Meds:acetaminophen, ALPRAZolam, hydrocodone-acetaminophen 5-325mg, ondansetron     Review of patient's allergies indicates:  No Known Allergies     Past Medical History:   Diagnosis Date    Anxiety     COPD (chronic obstructive pulmonary disease)     DDD (degenerative disc disease), cervical     Diverticulitis     Emphysema/COPD     GERD (gastroesophageal reflux disease)     Hypertension      Past Surgical History:   Procedure Laterality Date    BACK SURGERY      CERVICAL SPINE SURGERY  Fusion    CHOLECYSTECTOMY      COLON SURGERY      FINGER SURGERY       Family History      Problem Relation (Age of Onset)    Diabetes Mother, Father    Heart disease Mother    Hypertension Father    Stroke Father        Social History Main Topics    Smoking status: Former Smoker    Smokeless tobacco: Never Used    Alcohol use No    Drug use: No    Sexual activity: No       Review of Systems     Constitutional: + fatigue and anorexia   Eyes: no visual changes   ENT: no nasal congestion. Denies sore throat with odynophagia   Respiratory: see HPI  Cardiovascular: see HPI   Gastrointestinal: see HPI  Hematologic/Lymphatic: see HPI  Musculoskeletal: ROM of right hand limited   Neurological: no seizures or tremors, gait or balance prblems  Skin: No rashes or lesions  Psych: Denies any anxiety, depression or insomnia      Objective:     Vital Signs (Most Recent):  Temp: 99.5 °F (37.5 °C) (11/12/17 0732)  Pulse: 84 (11/12/17 0734)  Resp: 19 (11/12/17 0734)  BP: (!) 104/53 (11/12/17 0732)  SpO2: 100 % (11/12/17 0734) Vital Signs (24h Range):  Temp:  [97.4 °F (36.3 °C)-102.6 °F (39.2 °C)] 99.5 °F (37.5 °C)  Pulse:  [] 84  Resp:  [0-24] 19  SpO2:  [88 %-100 %] 100 %  BP: ()/(50-68) 104/53     Weight: 56.6 kg (124 lb 12.8 oz)  Body mass index is 19.55 kg/m².  Body surface area is 1.64 meters squared.      Intake/Output Summary (Last 24 hours) at 11/12/17 0748  Last data filed at 11/12/17 0500   Gross per 24 hour   Intake             2710 ml   Output                0 ml   Net             2710 ml       Physical Exam     General: NAD, resting in bed   Head: normocephalic, atraumatic   Eyes: conjunctivae pink, anicteric sclera.  Throat: No erythema or post nasal discharge   Neck: supple, no LAD   Lungs: expiratory wheezing bilaterally   Heart: S1, S2, RRR   Abdomen:  soft, mild epigastric tenderness, hyperactive BS  Extremities: no cyanosis or edema.   Skin: turgor normal, no erythema or rashes. No abnormal moles  MS:  No ROM difficulty  Neuro: A&O x 3    Psy: calm        Significant Labs:   CBC:      Recent Labs  Lab 11/11/17  1613 11/12/17  0423   WBC 19.12* 17.34*   HGB 8.4* 6.5*   HCT 30.2* 23.7*   * 101*   , CMP:     Recent Labs  Lab 11/11/17  1613 11/12/17  0423    140   K 3.5 4.3    107   CO2 27 25   * 125*   BUN 13 18   CREATININE 1.0 1.3   CALCIUM 9.5 8.1*   PROT 6.8  --    ALBUMIN 3.4*  --    BILITOT 0.7  --    ALKPHOS 81  --    AST 19  --    ALT 14  --    ANIONGAP 12 8   EGFRNONAA >60 56*   , Coagulation: No results for input(s): INR, APTT in the last 48 hours.    Invalid input(s): PT, Haptoglobin: No results for input(s): HAPTOGLOBIN in the last 48 hours., Immunology: No results for input(s): SPEP, JOSE, KOREY, FREELAMBDALI in the last 48 hours., LDH: No results for input(s): LDHCSF, BFSOURCE in the last 48 hours., Reticulocytes: No results for input(s): RETIC in the last 48 hours., Tumor Markers: No results for input(s): PSA, CEA, , AFPTM, [id number],  in the last 48 hours.    Invalid input(s): ALGTM, Uric Acid No results for input(s): URICACID in the last 48 hours. and Urine Studies:     Recent Labs  Lab 11/11/17  1720   COLORU Tiffani   APPEARANCEUA Hazy*   PHUR 5.0   SPECGRAV 1.015   PROTEINUA 1+*   GLUCUA Negative   KETONESU Negative   BILIRUBINUA Negative   OCCULTUA Negative   NITRITE Negative   UROBILINOGEN Negative   LEUKOCYTESUR Negative   RBCUA 1   WBCUA 3   BACTERIA None   HYALINECASTS 0       Diagnostic Results:      Current Facility-Administered Medications   Medication Dose Route Frequency Provider Last Rate Last Dose    acetaminophen tablet 650 mg  650 mg Oral Q6H PRN Kash Mello MD   650 mg at 11/11/17 2015    albuterol-ipratropium 2.5mg-0.5mg/3mL nebulizer solution 3 mL  3 mL Nebulization Q4H Kash Mello MD   3 mL at 11/12/17 0730    ALPRAZolam tablet 1 mg  1 mg Oral TID PRN Kash Mello MD   1 mg at 11/11/17 2238    aspirin EC tablet 81 mg  81 mg Oral Daily Kash Mello MD        ciprofloxacin (CIPRO)400mg/200ml D5W IVPB 400 mg   400 mg Intravenous Q12H Kash Mello  mL/hr at 11/11/17 2142 400 mg at 11/11/17 2142    dextrose 5 % and 0.9 % NaCl infusion   Intravenous Continuous Kash Mello  mL/hr at 11/12/17 0445      dutasteride capsule 0.5 mg  0.5 mg Oral Daily Kash Mello MD        enoxaparin injection 40 mg  40 mg Subcutaneous Daily Kash Mello MD   40 mg at 11/11/17 2140    fluticasone-vilanterol 100-25 mcg/dose diskus inhaler 1 puff  1 puff Inhalation Daily Kash Mello MD   1 puff at 11/12/17 0734    hydrocodone-acetaminophen 5-325mg per tablet 1 tablet  1 tablet Oral Q6H PRN Kash Mello MD   1 tablet at 11/11/17 2036    hydroxyurea capsule 500 mg  500 mg Oral Daily Kash Mello MD        metronidazole IVPB 500 mg  500 mg Intravenous Q8H Kash Mello  mL/hr at 11/12/17 0407 500 mg at 11/12/17 0407    ondansetron injection 4 mg  4 mg Intravenous Q8H PRN Kash Mello MD   4 mg at 11/11/17 2036    pneumoc 13-yadira conj-dip cr(PF) 0.5 mL  0.5 mL Intramuscular Once Rk Welsh MD         CT AB:1. Interval increased nonspecific left perinephric stranding which can be seen with acute or remote infectious or inflammatory process including pyelonephritis. No hydronephrosis. Correlate clinically.    2. Post surgical changes of cholecystectomy and rectosigmoid bowel.    3. Splenomegaly.    4. Mild diverticulosis coli without diverticulitis.     Assessment/Plan:     Active Hospital Problems    Diagnosis  POA    *Generalized abdominal pain [R10.84]    - with nausea and diarrhea x 10 times  - pt was taking steroid for copd  - now with Hg drop  - check hg q 12  - CT non specific  - stool for occult blood  - C.diff pending   - cipro & Flagyl   Yes    Polycythemia vera [D45]    - pt has been getting therapeutic phlebotomy but suspect gi blood loss  - H&H a 12   Yes    Microcytic anemia [D50.9]    - need further work up   Yes    Chronic fatigue [R53.82]  - multifactorial    Yes     Vomiting and diarrhea [R11.10, R19.7]  Yes    Chronic obstructive pulmonary disease [J44.9]    -stable  -pt needs BiPAP at night      Yes    BPH (benign prostatic hyperplasia) [N40.0]  Yes      Resolved Hospital Problems    Diagnosis Date Resolved POA   No resolved problems to display.     DVT proph: SCDs        Rk Welsh M.D  Internal Medicine & Geriatric Medicine  Hematology & Oncology  Palliative Medicine    [address], [address]  [place] [id number] [phone] (Office)  [phone] (Fax)          Rk Welsh MD  Hematology/Oncology  [facility]

## 2017-11-12 NOTE — PLAN OF CARE
Problem: Fall Risk (Adult)  Goal: Identify Related Risk Factors and Signs and Symptoms  Related risk factors and signs and symptoms are identified upon initiation of Human Response Clinical Practice Guideline (CPG)   Outcome: Ongoing (interventions implemented as appropriate)   11/11/17 2337   Fall Risk   Related Risk Factors (Fall Risk) polypharmacy   Signs and Symptoms (Fall Risk) presence of risk factors     Goal: Absence of Falls  Patient will demonstrate the desired outcomes by discharge/transition of care.   Outcome: Ongoing (interventions implemented as appropriate)   11/11/17 2337   Fall Risk (Adult)   Absence of Falls making progress toward outcome       Problem: Patient Care Overview  Goal: Plan of Care Review  Outcome: Ongoing (interventions implemented as appropriate)   11/11/17 2337   Coping/Psychosocial   Plan Of Care Reviewed With patient;daughter       Problem: Pain, Acute (Adult)  Goal: Identify Related Risk Factors and Signs and Symptoms  Related risk factors and signs and symptoms are identified upon initiation of Human Response Clinical Practice Guideline (CPG)  Outcome: Ongoing (interventions implemented as appropriate)   11/11/17 2337   Pain, Acute   Related Risk Factors (Acute Pain) infection     Goal: Acceptable Pain Control/Comfort Level  Patient will demonstrate the desired outcomes by discharge/transition of care.  Outcome: Ongoing (interventions implemented as appropriate)   11/11/17 2337   Pain, Acute (Adult)   Acceptable Pain Control/Comfort Level making progress toward outcome

## 2017-11-12 NOTE — ED NOTES
"Pt sitting on edge of bed, took oxygen off and states "I need an albuterol breathing treatment, I am having trouble breathing and I am still having abdominal pain". MD aware. Respiratory notified.   "

## 2017-11-12 NOTE — PROGRESS NOTES
11/11/17 2211   Vital Signs   Temp 99.8 °F (37.7 °C)   Temp src Oral   Pulse 91   Resp (!) 22   SpO2 97 %   O2 Device (Oxygen Therapy) nasal cannula   BP (!) 116/56     Received patient to floor per stretcher from ED. V/S stable. Family at bedside. Patient awake,alert and oriented x4. Admit assessment initiated. Call light placed within patient's reach, bed alarm placed on patient's bed, blue folder placed at patient's bedside, and telemetry monitoring initiated. Instructed patient to call for any assistance needed. Patient verbalized understanding. NAD noted. Will continue to monitor patient.

## 2017-11-12 NOTE — PLAN OF CARE
Problem: Infection, Risk/Actual (Adult)  Goal: Identify Related Risk Factors and Signs and Symptoms  Related risk factors and signs and symptoms are identified upon initiation of Human Response Clinical Practice Guideline (CPG)  Outcome: Ongoing (interventions implemented as appropriate)   11/11/17 2340   Infection, Risk/Actual   Related Risk Factors (Infection, Risk/Actual) age extremes   Signs and Symptoms (Infection, Risk/Actual) lab value changes;weakness;pain     Goal: Infection Prevention/Resolution  Patient will demonstrate the desired outcomes by discharge/transition of care.  Outcome: Ongoing (interventions implemented as appropriate)   11/11/17 2340   Infection, Risk/Actual (Adult)   Infection Prevention/Resolution making progress toward outcome

## 2017-11-13 LAB
ANION GAP SERPL CALC-SCNC: 6 MMOL/L
BACTERIA UR CULT: NO GROWTH
BASOPHILS # BLD AUTO: 0.04 K/UL
BASOPHILS NFR BLD: 0.2 %
BLD PROD TYP BPU: NORMAL
BLD PROD TYP BPU: NORMAL
BLOOD UNIT EXPIRATION DATE: NORMAL
BLOOD UNIT EXPIRATION DATE: NORMAL
BLOOD UNIT TYPE CODE: 6200
BLOOD UNIT TYPE CODE: 6200
BLOOD UNIT TYPE: NORMAL
BLOOD UNIT TYPE: NORMAL
BUN SERPL-MCNC: 13 MG/DL
CALCIUM SERPL-MCNC: 8.1 MG/DL
CHLORIDE SERPL-SCNC: 106 MMOL/L
CO2 SERPL-SCNC: 24 MMOL/L
CODING SYSTEM: NORMAL
CODING SYSTEM: NORMAL
CREAT SERPL-MCNC: 0.9 MG/DL
DIFFERENTIAL METHOD: ABNORMAL
DISPENSE STATUS: NORMAL
DISPENSE STATUS: NORMAL
EOSINOPHIL # BLD AUTO: 0.3 K/UL
EOSINOPHIL NFR BLD: 1.4 %
ERYTHROCYTE [DISTWIDTH] IN BLOOD BY AUTOMATED COUNT: 17.2 %
EST. GFR  (AFRICAN AMERICAN): >60 ML/MIN/1.73 M^2
EST. GFR  (NON AFRICAN AMERICAN): >60 ML/MIN/1.73 M^2
GLUCOSE SERPL-MCNC: 102 MG/DL
HCT VFR BLD AUTO: 28.2 %
HGB BLD-MCNC: 7.9 G/DL
HGB BLD-MCNC: 8.2 G/DL
HGB BLD-MCNC: 8.2 G/DL
LYMPHOCYTES # BLD AUTO: 1.1 K/UL
LYMPHOCYTES NFR BLD: 6 %
MCH RBC QN AUTO: 23.6 PG
MCHC RBC AUTO-ENTMCNC: 29.1 G/DL
MCV RBC AUTO: 81 FL
MONOCYTES # BLD AUTO: 0.9 K/UL
MONOCYTES NFR BLD: 4.8 %
NEUTROPHILS # BLD AUTO: 15.9 K/UL
NEUTROPHILS NFR BLD: 87.6 %
OB PNL STL: NEGATIVE
PLATELET # BLD AUTO: 100 K/UL
PMV BLD AUTO: 10.6 FL
POTASSIUM SERPL-SCNC: 3.8 MMOL/L
RBC # BLD AUTO: 3.47 M/UL
SODIUM SERPL-SCNC: 136 MMOL/L
TRANS ERYTHROCYTES VOL PATIENT: NORMAL ML
TRANS ERYTHROCYTES VOL PATIENT: NORMAL ML
VANCOMYCIN SERPL-MCNC: <1.1 UG/ML
WBC # BLD AUTO: 18.24 K/UL

## 2017-11-13 PROCEDURE — 80048 BASIC METABOLIC PNL TOTAL CA: CPT

## 2017-11-13 PROCEDURE — 36430 TRANSFUSION BLD/BLD COMPNT: CPT

## 2017-11-13 PROCEDURE — P9021 RED BLOOD CELLS UNIT: HCPCS

## 2017-11-13 PROCEDURE — 87046 STOOL CULTR AEROBIC BACT EA: CPT | Mod: 59

## 2017-11-13 PROCEDURE — 87045 FECES CULTURE AEROBIC BACT: CPT

## 2017-11-13 PROCEDURE — 27201040 HC RC 50 FILTER: Mod: 91

## 2017-11-13 PROCEDURE — S0030 INJECTION, METRONIDAZOLE: HCPCS | Performed by: EMERGENCY MEDICINE

## 2017-11-13 PROCEDURE — 94640 AIRWAY INHALATION TREATMENT: CPT

## 2017-11-13 PROCEDURE — 25000003 PHARM REV CODE 250: Performed by: EMERGENCY MEDICINE

## 2017-11-13 PROCEDURE — 80202 ASSAY OF VANCOMYCIN: CPT

## 2017-11-13 PROCEDURE — 27000221 HC OXYGEN, UP TO 24 HOURS

## 2017-11-13 PROCEDURE — 94761 N-INVAS EAR/PLS OXIMETRY MLT: CPT

## 2017-11-13 PROCEDURE — 82272 OCCULT BLD FECES 1-3 TESTS: CPT

## 2017-11-13 PROCEDURE — 21400001 HC TELEMETRY ROOM

## 2017-11-13 PROCEDURE — 85018 HEMOGLOBIN: CPT

## 2017-11-13 PROCEDURE — 87427 SHIGA-LIKE TOXIN AG IA: CPT | Mod: 59

## 2017-11-13 PROCEDURE — 63600175 PHARM REV CODE 636 W HCPCS: Performed by: EMERGENCY MEDICINE

## 2017-11-13 PROCEDURE — 85025 COMPLETE CBC W/AUTO DIFF WBC: CPT

## 2017-11-13 PROCEDURE — 36415 COLL VENOUS BLD VENIPUNCTURE: CPT

## 2017-11-13 PROCEDURE — 25000242 PHARM REV CODE 250 ALT 637 W/ HCPCS: Performed by: EMERGENCY MEDICINE

## 2017-11-13 RX ADMIN — IPRATROPIUM BROMIDE AND ALBUTEROL SULFATE 3 ML: .5; 3 SOLUTION RESPIRATORY (INHALATION) at 11:11

## 2017-11-13 RX ADMIN — IPRATROPIUM BROMIDE AND ALBUTEROL SULFATE 3 ML: .5; 3 SOLUTION RESPIRATORY (INHALATION) at 04:11

## 2017-11-13 RX ADMIN — DEXTROSE MONOHYDRATE AND SODIUM CHLORIDE: 5; .9 INJECTION, SOLUTION INTRAVENOUS at 02:11

## 2017-11-13 RX ADMIN — IPRATROPIUM BROMIDE AND ALBUTEROL SULFATE 3 ML: .5; 3 SOLUTION RESPIRATORY (INHALATION) at 07:11

## 2017-11-13 RX ADMIN — DUTASTERIDE 0.5 MG: 0.5 CAPSULE, LIQUID FILLED ORAL at 08:11

## 2017-11-13 RX ADMIN — IPRATROPIUM BROMIDE AND ALBUTEROL SULFATE 3 ML: .5; 3 SOLUTION RESPIRATORY (INHALATION) at 05:11

## 2017-11-13 RX ADMIN — FLUTICASONE FUROATE AND VILANTEROL TRIFENATATE 1 PUFF: 100; 25 POWDER RESPIRATORY (INHALATION) at 07:11

## 2017-11-13 RX ADMIN — CIPROFLOXACIN 400 MG: 2 INJECTION, SOLUTION INTRAVENOUS at 08:11

## 2017-11-13 RX ADMIN — IPRATROPIUM BROMIDE AND ALBUTEROL SULFATE 3 ML: .5; 3 SOLUTION RESPIRATORY (INHALATION) at 08:11

## 2017-11-13 RX ADMIN — ALPRAZOLAM 1 MG: 0.5 TABLET ORAL at 07:11

## 2017-11-13 RX ADMIN — ALPRAZOLAM 1 MG: 0.5 TABLET ORAL at 09:11

## 2017-11-13 RX ADMIN — METRONIDAZOLE 500 MG: 500 INJECTION, SOLUTION INTRAVENOUS at 02:11

## 2017-11-13 RX ADMIN — METRONIDAZOLE 500 MG: 500 INJECTION, SOLUTION INTRAVENOUS at 08:11

## 2017-11-13 RX ADMIN — ACETAMINOPHEN 650 MG: 325 TABLET ORAL at 08:11

## 2017-11-13 RX ADMIN — IPRATROPIUM BROMIDE AND ALBUTEROL SULFATE 3 ML: .5; 3 SOLUTION RESPIRATORY (INHALATION) at 12:11

## 2017-11-13 NOTE — PROGRESS NOTES
Progress Note    Admit Date: 11/11/2017   LOS: 2 days     SUBJECTIVE:     Follow-up For:  Abdominal pain    11/13/2017: Abdominal pain better. Received 2 units of prbc. No fever or chills. No BM. + fever       Scheduled Meds:   albuterol-ipratropium 2.5mg-0.5mg/3mL  3 mL Nebulization Q4H    ciprofloxacin  400 mg Intravenous Q12H    dutasteride  0.5 mg Oral Daily    fluticasone-vilanterol  1 puff Inhalation Daily    metronidazole  500 mg Intravenous Q8H     Continuous Infusions:   dextrose 5 % and 0.9 % NaCl 150 mL/hr at 11/12/17 1515     PRN Meds:sodium chloride, acetaminophen, ALPRAZolam, hydrocodone-acetaminophen 5-325mg, ondansetron, pneumoc 13-yadira conj-dip cr(PF)    Review of patient's allergies indicates:  No Known Allergies    Review of Systems    Constitutional: + fatigue  Eyes: no visual changes   ENT: no nasal congestion. Denies sore throat with odynophagia   Respiratory: see HPI  Cardiovascular: see HPI   Gastrointestinal: see HPI  Hematologic/Lymphatic: see HPI  Musculoskeletal: ROM of right hand limited   Neurological: no seizures or tremors, gait or balance prblems  Skin: No rashes or lesions  Psych: Denies any anxiety, depression or insomnia      OBJECTIVE:     Vital Signs (Most Recent)  Temp: (!) 101 °F (38.3 °C) (11/13/17 0652)  Pulse: 90 (11/13/17 0652)  Resp: 18 (11/13/17 0652)  BP: (!) 117/55 (11/13/17 0652)  SpO2: 96 % (11/13/17 0652)    Vital Signs Range (Last 24H):  Temp:  [98.2 °F (36.8 °C)-101 °F (38.3 °C)]   Pulse:  [79-94]   Resp:  [17-19]   BP: (103-117)/(53-58)   SpO2:  [94 %-100 %]     I & O (Last 24H):  Intake/Output Summary (Last 24 hours) at 11/13/17 0727  Last data filed at 11/13/17 0600   Gross per 24 hour   Intake             2384 ml   Output             1250 ml   Net             1134 ml     Physical Exam:    General: NAD, resting in bed   Head: normocephalic, atraumatic   Eyes: conjunctivae pink, anicteric sclera.  Throat: No erythema or post nasal discharge   Neck: supple,  no LAD   Lungs: expiratory wheezing bilaterally   Heart: S1, S2, RRR   Abdomen:  soft, mild epigastric tenderness, hyperactive BS  Extremities: no cyanosis or edema.   Skin: turgor normal, no erythema or rashes. No abnormal moles  MS:  No ROM difficulty  Neuro: A&O x 3    Psy: calm       Laboratory:  CBC:   Recent Labs  Lab 11/13/17  0758 11/13/17  1935   WBC 18.24*  --    RBC 3.47*  --    HGB 8.2*  8.2* 7.9*   HCT 28.2*  --    *  --    MCV 81*  --    MCH 23.6*  --    MCHC 29.1*  --      CMP:   Recent Labs  Lab 11/11/17  1613  11/13/17  0758   *  < > 102   CALCIUM 9.5  < > 8.1*   ALBUMIN 3.4*  --   --    PROT 6.8  --   --      < > 136   K 3.5  < > 3.8   CO2 27  < > 24     < > 106   BUN 13  < > 13   CREATININE 1.0  < > 0.9   ALKPHOS 81  --   --    ALT 14  --   --    AST 19  --   --    BILITOT 0.7  --   --    < > = values in this interval not displayed.  Coagulation: No results for input(s): LABPROT, INR, APTT in the last 168 hours.  Cardiac markers:   Recent Labs  Lab 11/12/17  0423   TROPONINI <0.006     ABGs: No results for input(s): PH, PCO2, PO2, HCO3, POCSATURATED, BE in the last 168 hours.  Microbiology Results (last 7 days)     Procedure Component Value Units Date/Time    Blood culture [[id number]] Collected:  11/11/17 1955    Order Status:  Completed Specimen:  Blood from Peripheral, Antecubital, Right Updated:  11/13/17 2103     Blood Culture, Routine No Growth to date     Blood Culture, Routine No Growth to date     Blood Culture, Routine No Growth to date    Blood culture [[id number]] Collected:  11/11/17 2010    Order Status:  Completed Specimen:  Blood from Peripheral, Forearm, Right Updated:  11/13/17 1412     Blood Culture, Routine Gram stain aer bottle: Gram positive cocci in clusters resembling Staph     Blood Culture, Routine Results called to and read back by: Annemarie Lombardo  11/13/2017  14:11    E. coli 0157 antigen [[id number]] Collected:  11/13/17 0904    Order Status:  No  result Specimen:  Stool from Stool Updated:  11/13/17 0917    Stool culture **CANNOT BE ORDERED STAT** [[id number]] Collected:  11/13/17 0904    Order Status:  Sent Specimen:  Stool from Stool Updated:  11/13/17 0917    Clostridium difficile EIA [[id number]] Collected:  11/13/17 0904    Order Status:  Sent Specimen:  Stool from Stool Updated:  11/13/17 0905    Urine culture [[id number]] Collected:  11/11/17 1720    Order Status:  Completed Specimen:  Urine from Urine, Clean Catch Updated:  11/13/17 0855     Urine Culture, Routine No growth        Specimen (12h ago through future)    None          Recent Labs  Lab 11/11/17 1720   COLORU Tiffani   SPECGRAV 1.015   PHUR 5.0   PROTEINUA 1+*   BACTERIA None   NITRITE Negative   LEUKOCYTESUR Negative   UROBILINOGEN Negative   HYALINECASTS 0       Diagnostic Results:      ASSESSMENT/PLAN:      *Generalized abdominal pain [R10.84]     - with nausea and diarrhea x 10 times  - pt was taking steroid for copd  - now with Hg drop  - check hg q 12  - CT non specific  - stool for occult blood  - C.diff pending   - cipro & Flagyl   - improving    Yes    Polycythemia vera [D45]     - pt has been getting therapeutic phlebotomy but suspect gi blood loss  - H&H a 12    Yes    Microcytic anemia [D50.9]     - drop in Hg  - s/p 2 units prb  - no BM       Yes    Chronic fatigue [R53.82]  - multifactorial   Yes    Vomiting and diarrhea [R11.10, R19.7]   Yes    Chronic obstructive pulmonary disease [J44.9]     -stable  -pt needs BiPAP at night      Yes    BPH (benign prostatic hyperplasia) [N40.0]   Yes     G+ Bacteremia: start Vancomycin     Rk Welsh M.D  Internal Medicine & Geriatric Medicine  Hematology & Oncology  Palliative Medicine    [address], [address]  [place] [id number] [phone] (Office)  [phone] (Fax)

## 2017-11-13 NOTE — PROGRESS NOTES
Dr Welsh notified of patient H/H 5.9/21.5.  Consent obtained from patient for blood,  2 units PRBC's ordered.

## 2017-11-13 NOTE — NURSING
Report given to Elisa WAGNER. Pt. AAOX3,  at bedside drawing blood. No apparent distress noted at this time.Side rails up x 2. Bed alarm set. Call light within his reach. Bed in lowest position.

## 2017-11-14 LAB
ANION GAP SERPL CALC-SCNC: 8 MMOL/L
BASOPHILS # BLD AUTO: 0.03 K/UL
BASOPHILS NFR BLD: 0.3 %
BUN SERPL-MCNC: 4 MG/DL
CALCIUM SERPL-MCNC: 8.2 MG/DL
CHLORIDE SERPL-SCNC: 105 MMOL/L
CO2 SERPL-SCNC: 27 MMOL/L
CREAT SERPL-MCNC: 0.8 MG/DL
DIFFERENTIAL METHOD: ABNORMAL
EOSINOPHIL # BLD AUTO: 0.2 K/UL
EOSINOPHIL NFR BLD: 1.9 %
ERYTHROCYTE [DISTWIDTH] IN BLOOD BY AUTOMATED COUNT: 17.5 %
EST. GFR  (AFRICAN AMERICAN): >60 ML/MIN/1.73 M^2
EST. GFR  (NON AFRICAN AMERICAN): >60 ML/MIN/1.73 M^2
GLUCOSE SERPL-MCNC: 115 MG/DL
HCT VFR BLD AUTO: 27.5 %
HGB BLD-MCNC: 8.2 G/DL
HGB BLD-MCNC: 8.4 G/DL
HGB BLD-MCNC: 8.9 G/DL
LYMPHOCYTES # BLD AUTO: 1.2 K/UL
LYMPHOCYTES NFR BLD: 11.6 %
MCH RBC QN AUTO: 23.8 PG
MCHC RBC AUTO-ENTMCNC: 29.8 G/DL
MCV RBC AUTO: 80 FL
MONOCYTES # BLD AUTO: 0.9 K/UL
MONOCYTES NFR BLD: 9.1 %
NEUTROPHILS # BLD AUTO: 7.7 K/UL
NEUTROPHILS NFR BLD: 77.1 %
PLATELET # BLD AUTO: 94 K/UL
PMV BLD AUTO: 9.2 FL
POTASSIUM SERPL-SCNC: 3.1 MMOL/L
RBC # BLD AUTO: 3.44 M/UL
SODIUM SERPL-SCNC: 140 MMOL/L
WBC # BLD AUTO: 10 K/UL

## 2017-11-14 PROCEDURE — 63600175 PHARM REV CODE 636 W HCPCS: Performed by: EMERGENCY MEDICINE

## 2017-11-14 PROCEDURE — 63600175 PHARM REV CODE 636 W HCPCS: Performed by: INTERNAL MEDICINE

## 2017-11-14 PROCEDURE — S0030 INJECTION, METRONIDAZOLE: HCPCS | Performed by: EMERGENCY MEDICINE

## 2017-11-14 PROCEDURE — 85018 HEMOGLOBIN: CPT | Mod: 91

## 2017-11-14 PROCEDURE — 87449 NOS EACH ORGANISM AG IA: CPT

## 2017-11-14 PROCEDURE — 94761 N-INVAS EAR/PLS OXIMETRY MLT: CPT

## 2017-11-14 PROCEDURE — 94640 AIRWAY INHALATION TREATMENT: CPT

## 2017-11-14 PROCEDURE — 80048 BASIC METABOLIC PNL TOTAL CA: CPT

## 2017-11-14 PROCEDURE — 21400001 HC TELEMETRY ROOM

## 2017-11-14 PROCEDURE — 36415 COLL VENOUS BLD VENIPUNCTURE: CPT

## 2017-11-14 PROCEDURE — 25000003 PHARM REV CODE 250: Performed by: EMERGENCY MEDICINE

## 2017-11-14 PROCEDURE — 85025 COMPLETE CBC W/AUTO DIFF WBC: CPT

## 2017-11-14 PROCEDURE — 27000221 HC OXYGEN, UP TO 24 HOURS

## 2017-11-14 PROCEDURE — 25000242 PHARM REV CODE 250 ALT 637 W/ HCPCS: Performed by: EMERGENCY MEDICINE

## 2017-11-14 RX ADMIN — HYDROCODONE BITARTRATE AND ACETAMINOPHEN 1 TABLET: 5; 325 TABLET ORAL at 10:11

## 2017-11-14 RX ADMIN — DEXTROSE MONOHYDRATE AND SODIUM CHLORIDE: 5; .9 INJECTION, SOLUTION INTRAVENOUS at 04:11

## 2017-11-14 RX ADMIN — ACETAMINOPHEN 650 MG: 325 TABLET ORAL at 11:11

## 2017-11-14 RX ADMIN — IPRATROPIUM BROMIDE AND ALBUTEROL SULFATE 3 ML: .5; 3 SOLUTION RESPIRATORY (INHALATION) at 08:11

## 2017-11-14 RX ADMIN — FLUTICASONE FUROATE AND VILANTEROL TRIFENATATE 1 PUFF: 100; 25 POWDER RESPIRATORY (INHALATION) at 08:11

## 2017-11-14 RX ADMIN — VANCOMYCIN HYDROCHLORIDE 750 MG: 750 INJECTION, POWDER, LYOPHILIZED, FOR SOLUTION INTRAVENOUS at 01:11

## 2017-11-14 RX ADMIN — IPRATROPIUM BROMIDE AND ALBUTEROL SULFATE 3 ML: .5; 3 SOLUTION RESPIRATORY (INHALATION) at 12:11

## 2017-11-14 RX ADMIN — IPRATROPIUM BROMIDE AND ALBUTEROL SULFATE 3 ML: .5; 3 SOLUTION RESPIRATORY (INHALATION) at 11:11

## 2017-11-14 RX ADMIN — METRONIDAZOLE 500 MG: 500 INJECTION, SOLUTION INTRAVENOUS at 11:11

## 2017-11-14 RX ADMIN — IPRATROPIUM BROMIDE AND ALBUTEROL SULFATE 3 ML: .5; 3 SOLUTION RESPIRATORY (INHALATION) at 03:11

## 2017-11-14 RX ADMIN — VANCOMYCIN HYDROCHLORIDE 750 MG: 750 INJECTION, POWDER, LYOPHILIZED, FOR SOLUTION INTRAVENOUS at 12:11

## 2017-11-14 RX ADMIN — CIPROFLOXACIN 400 MG: 2 INJECTION, SOLUTION INTRAVENOUS at 08:11

## 2017-11-14 RX ADMIN — ALPRAZOLAM 1 MG: 0.5 TABLET ORAL at 09:11

## 2017-11-14 RX ADMIN — METRONIDAZOLE 500 MG: 500 INJECTION, SOLUTION INTRAVENOUS at 08:11

## 2017-11-14 RX ADMIN — ALPRAZOLAM 1 MG: 0.5 TABLET ORAL at 01:11

## 2017-11-14 RX ADMIN — METRONIDAZOLE 500 MG: 500 INJECTION, SOLUTION INTRAVENOUS at 04:11

## 2017-11-14 RX ADMIN — IPRATROPIUM BROMIDE AND ALBUTEROL SULFATE 3 ML: .5; 3 SOLUTION RESPIRATORY (INHALATION) at 07:11

## 2017-11-14 RX ADMIN — DUTASTERIDE 0.5 MG: 0.5 CAPSULE, LIQUID FILLED ORAL at 08:11

## 2017-11-14 NOTE — PROGRESS NOTES
Progress Note    Admit Date: 11/11/2017   LOS: 3 days     SUBJECTIVE:     Follow-up For:  Abdominal pain    11/14//2017: No fever or chills. Tolerating IV ABX   11/13/2017: Abdominal pain better. Received 2 units of prbc. No fever or chills. No BM. + fever       Scheduled Meds:   albuterol-ipratropium 2.5mg-0.5mg/3mL  3 mL Nebulization Q4H    ciprofloxacin  400 mg Intravenous Q12H    dutasteride  0.5 mg Oral Daily    fluticasone-vilanterol  1 puff Inhalation Daily    metronidazole  500 mg Intravenous Q8H    vancomycin (VANCOCIN) IVPB  750 mg Intravenous Q12H     Continuous Infusions:   dextrose 5 % and 0.9 % NaCl 150 mL/hr at 11/14/17 0440     PRN Meds:sodium chloride, acetaminophen, ALPRAZolam, hydrocodone-acetaminophen 5-325mg, ondansetron, pneumoc 13-yadira conj-dip cr(PF)    Review of patient's allergies indicates:  No Known Allergies    Review of Systems    Constitutional: fatigue improving. Wan to have regular diet   Eyes: no visual changes   ENT: no nasal congestion. Denies sore throat with odynophagia   Respiratory: see HPI  Cardiovascular: see HPI   Gastrointestinal: see HPI  Hematologic/Lymphatic: see HPI  Musculoskeletal: ROM of right hand limited   Neurological: no seizures or tremors, gait or balance prblems  Skin: No rashes or lesions  Psych: Denies any anxiety, depression or insomnia      OBJECTIVE:     Vital Signs (Most Recent)  Temp: 98.8 °F (37.1 °C) (11/14/17 0449)  Pulse: 86 (11/14/17 0449)  Resp: (!) 21 (11/14/17 0449)  BP: (!) 106/56 (11/14/17 0449)  SpO2: 97 % (11/14/17 0449)    Vital Signs Range (Last 24H):  Temp:  [98 °F (36.7 °C)-100.4 °F (38 °C)]   Pulse:  [77-91]   Resp:  [18-21]   BP: ()/(52-63)   SpO2:  [90 %-100 %]     I & O (Last 24H):    Intake/Output Summary (Last 24 hours) at 11/14/17 0718  Last data filed at 11/13/17 1955   Gross per 24 hour   Intake             2820 ml   Output             1300 ml   Net             1520 ml     Physical Exam:    General: NAD, resting in  bed. Sister in the room   Head: normocephalic, atraumatic   Eyes: conjunctivae pink, anicteric sclera.  Throat: No erythema or post nasal discharge   Neck: supple, no LAD   Lungs: expiratory wheezing bilaterally   Heart: S1, S2, RRR   Abdomen:  soft, mild epigastric tenderness, hyperactive BS  Extremities: no cyanosis or edema.   Skin: turgor normal, no erythema or rashes. No abnormal moles  MS:  No ROM difficulty  Neuro: A&O x 3    Psy: calm       Laboratory:  CBC:     Recent Labs  Lab 11/14/17  0525   WBC 10.00   RBC 3.44*   HGB 8.2*   HCT 27.5*   PLT 94*   MCV 80*   MCH 23.8*   MCHC 29.8*     CMP:   Recent Labs  Lab 11/11/17  1613  11/13/17  0758   *  < > 102   CALCIUM 9.5  < > 8.1*   ALBUMIN 3.4*  --   --    PROT 6.8  --   --      < > 136   K 3.5  < > 3.8   CO2 27  < > 24     < > 106   BUN 13  < > 13   CREATININE 1.0  < > 0.9   ALKPHOS 81  --   --    ALT 14  --   --    AST 19  --   --    BILITOT 0.7  --   --    < > = values in this interval not displayed.  Coagulation: No results for input(s): LABPROT, INR, APTT in the last 168 hours.  Cardiac markers:     Recent Labs  Lab 11/12/17  0423   TROPONINI <0.006     ABGs: No results for input(s): PH, PCO2, PO2, HCO3, POCSATURATED, BE in the last 168 hours.  Microbiology Results (last 7 days)     Procedure Component Value Units Date/Time    Blood culture [[id number]] Collected:  11/11/17 1955    Order Status:  Completed Specimen:  Blood from Peripheral, Antecubital, Right Updated:  11/13/17 2103     Blood Culture, Routine No Growth to date     Blood Culture, Routine No Growth to date     Blood Culture, Routine No Growth to date    Blood culture [[id number]] Collected:  11/11/17 2010    Order Status:  Completed Specimen:  Blood from Peripheral, Forearm, Right Updated:  11/13/17 1412     Blood Culture, Routine Gram stain aer bottle: Gram positive cocci in clusters resembling Staph     Blood Culture, Routine Results called to and read back by: Annemarie  Grupo  11/13/2017  14:11    E. coli 0157 antigen [[id number]] Collected:  11/13/17 0904    Order Status:  No result Specimen:  Stool from Stool Updated:  11/13/17 0917    Stool culture **CANNOT BE ORDERED STAT** [[id number]] Collected:  11/13/17 0904    Order Status:  Sent Specimen:  Stool from Stool Updated:  11/13/17 0917    Clostridium difficile EIA [[id number]] Collected:  11/13/17 0904    Order Status:  Sent Specimen:  Stool from Stool Updated:  11/13/17 0905    Urine culture [[id number]] Collected:  11/11/17 1720    Order Status:  Completed Specimen:  Urine from Urine, Clean Catch Updated:  11/13/17 0855     Urine Culture, Routine No growth        Specimen (12h ago through future)    None          Recent Labs  Lab 11/11/17 1720   COLORU Tiffani   SPECGRAV 1.015   PHUR 5.0   PROTEINUA 1+*   BACTERIA None   NITRITE Negative   LEUKOCYTESUR Negative   UROBILINOGEN Negative   HYALINECASTS 0       Diagnostic Results:      ASSESSMENT/PLAN:      *Generalized abdominal pain [R10.84]     - with nausea and diarrhea x 10 times  - pt was taking steroid for copd  - now with Hg drop  - check hg q 12  - CT non specific  - stool for occult blood  - C.diff pending   - cipro & Flagyl   - improving    Yes    Polycythemia vera [D45]     - pt has been getting therapeutic phlebotomy but suspect gi blood loss  - H&H a 12  - negative occult blood     Yes    Microcytic anemia [D50.9]     - drop in Hg  - s/p 2 units prb  - had NL BM       Yes    Chronic fatigue [R53.82]  - multifactorial   Yes    Vomiting and diarrhea [R11.10, R19.7]   Yes    Chronic obstructive pulmonary disease [J44.9]     -stable  -pt needs BiPAP at night      Yes    BPH (benign prostatic hyperplasia) [N40.0]   Yes     G+ Bacteremia:coagulace Negative:  started Vancomycin   - recheck blood culture     Rk Welsh M.D  Internal Medicine & Geriatric Medicine  Hematology & Oncology  Palliative Medicine    [address], [address]  [place]  [address]  [phone] (Office)  [phone] (Fax)

## 2017-11-14 NOTE — PROGRESS NOTES
11/14/17 0809   Patient Assessment/Suction   Level of Consciousness (AVPU) alert   Respiratory Effort Unlabored   All Lung Fields Breath Sounds coarse   PRE-TX-O2-ETCO2   O2 Device (Oxygen Therapy) nasal cannula   $ Is the patient on Low Flow Oxygen? Yes   Flow (L/min) 3   Oxygen Concentration (%) 32   SpO2 100 %   Pulse Oximetry Type Intermittent   $ Pulse Oximetry - Multiple Charge Pulse Oximetry - Multiple   Pulse 79   Resp 18   Aerosol Therapy   $ Aerosol Therapy Charges Aerosol Treatment   Respiratory Treatment Status given   SVN/Inhaler Treatment Route mask   Position During Treatment HOB at 30 degrees   Patient Tolerance good   Inhaler   $ Inhaler Charges MDI (Metered Dose Inahler) Treatment   Respiratory Treatment Status given   SVN/Inhaler Treatment Route mouthpiece   Patient Tolerance good   Post-Treatment   Post-treatment Heart Rate (beats/min) 83   Post-treatment Resp Rate (breaths/min) 18   All Fields Breath Sounds wheezes, expiratory;aeration increased   Ready to Wean/Extubation Screen   FIO2<=50 (chart decimal) 0.32

## 2017-11-15 LAB
ANION GAP SERPL CALC-SCNC: 8 MMOL/L
BACTERIA BLD CULT: NORMAL
BASOPHILS # BLD AUTO: 0.03 K/UL
BASOPHILS NFR BLD: 0.3 %
BUN SERPL-MCNC: 3 MG/DL
C DIFF GDH STL QL: NEGATIVE
C DIFF TOX A+B STL QL IA: NEGATIVE
CALCIUM SERPL-MCNC: 8.3 MG/DL
CHLORIDE SERPL-SCNC: 103 MMOL/L
CO2 SERPL-SCNC: 33 MMOL/L
CREAT SERPL-MCNC: 0.8 MG/DL
DIFFERENTIAL METHOD: ABNORMAL
EOSINOPHIL # BLD AUTO: 0.3 K/UL
EOSINOPHIL NFR BLD: 3 %
ERYTHROCYTE [DISTWIDTH] IN BLOOD BY AUTOMATED COUNT: 18 %
EST. GFR  (AFRICAN AMERICAN): >60 ML/MIN/1.73 M^2
EST. GFR  (NON AFRICAN AMERICAN): >60 ML/MIN/1.73 M^2
GLUCOSE SERPL-MCNC: 126 MG/DL
HCT VFR BLD AUTO: 31.2 %
HGB BLD-MCNC: 8 G/DL
HGB BLD-MCNC: 9 G/DL
HGB BLD-MCNC: 9 G/DL
LYMPHOCYTES # BLD AUTO: 1.1 K/UL
LYMPHOCYTES NFR BLD: 12.8 %
MCH RBC QN AUTO: 23.4 PG
MCHC RBC AUTO-ENTMCNC: 28.8 G/DL
MCV RBC AUTO: 81 FL
MONOCYTES # BLD AUTO: 0.6 K/UL
MONOCYTES NFR BLD: 6.3 %
NEUTROPHILS # BLD AUTO: 6.9 K/UL
NEUTROPHILS NFR BLD: 77.6 %
PLATELET # BLD AUTO: 153 K/UL
PMV BLD AUTO: 9.7 FL
POTASSIUM SERPL-SCNC: 2.9 MMOL/L
RBC # BLD AUTO: 3.85 M/UL
SODIUM SERPL-SCNC: 144 MMOL/L
VANCOMYCIN TROUGH SERPL-MCNC: 7.3 UG/ML
WBC # BLD AUTO: 8.89 K/UL

## 2017-11-15 PROCEDURE — 63600175 PHARM REV CODE 636 W HCPCS: Performed by: EMERGENCY MEDICINE

## 2017-11-15 PROCEDURE — 25000003 PHARM REV CODE 250: Performed by: EMERGENCY MEDICINE

## 2017-11-15 PROCEDURE — 87040 BLOOD CULTURE FOR BACTERIA: CPT

## 2017-11-15 PROCEDURE — 85018 HEMOGLOBIN: CPT | Mod: 91

## 2017-11-15 PROCEDURE — 36415 COLL VENOUS BLD VENIPUNCTURE: CPT

## 2017-11-15 PROCEDURE — 85025 COMPLETE CBC W/AUTO DIFF WBC: CPT

## 2017-11-15 PROCEDURE — 25000242 PHARM REV CODE 250 ALT 637 W/ HCPCS: Performed by: EMERGENCY MEDICINE

## 2017-11-15 PROCEDURE — 94761 N-INVAS EAR/PLS OXIMETRY MLT: CPT

## 2017-11-15 PROCEDURE — 63600175 PHARM REV CODE 636 W HCPCS: Performed by: INTERNAL MEDICINE

## 2017-11-15 PROCEDURE — 27000221 HC OXYGEN, UP TO 24 HOURS

## 2017-11-15 PROCEDURE — S0030 INJECTION, METRONIDAZOLE: HCPCS | Performed by: EMERGENCY MEDICINE

## 2017-11-15 PROCEDURE — 21400001 HC TELEMETRY ROOM

## 2017-11-15 PROCEDURE — 80048 BASIC METABOLIC PNL TOTAL CA: CPT

## 2017-11-15 PROCEDURE — 94640 AIRWAY INHALATION TREATMENT: CPT

## 2017-11-15 PROCEDURE — 80202 ASSAY OF VANCOMYCIN: CPT

## 2017-11-15 RX ADMIN — HYDROCODONE BITARTRATE AND ACETAMINOPHEN 1 TABLET: 5; 325 TABLET ORAL at 05:11

## 2017-11-15 RX ADMIN — IPRATROPIUM BROMIDE AND ALBUTEROL SULFATE 3 ML: .5; 3 SOLUTION RESPIRATORY (INHALATION) at 04:11

## 2017-11-15 RX ADMIN — METRONIDAZOLE 500 MG: 500 INJECTION, SOLUTION INTRAVENOUS at 04:11

## 2017-11-15 RX ADMIN — METRONIDAZOLE 500 MG: 500 INJECTION, SOLUTION INTRAVENOUS at 11:11

## 2017-11-15 RX ADMIN — IPRATROPIUM BROMIDE AND ALBUTEROL SULFATE 3 ML: .5; 3 SOLUTION RESPIRATORY (INHALATION) at 11:11

## 2017-11-15 RX ADMIN — DEXTROSE MONOHYDRATE AND SODIUM CHLORIDE: 5; .9 INJECTION, SOLUTION INTRAVENOUS at 04:11

## 2017-11-15 RX ADMIN — CIPROFLOXACIN 400 MG: 2 INJECTION, SOLUTION INTRAVENOUS at 09:11

## 2017-11-15 RX ADMIN — ALPRAZOLAM 1 MG: 0.5 TABLET ORAL at 11:11

## 2017-11-15 RX ADMIN — IPRATROPIUM BROMIDE AND ALBUTEROL SULFATE 3 ML: .5; 3 SOLUTION RESPIRATORY (INHALATION) at 12:11

## 2017-11-15 RX ADMIN — VANCOMYCIN HYDROCHLORIDE 750 MG: 750 INJECTION, POWDER, LYOPHILIZED, FOR SOLUTION INTRAVENOUS at 12:11

## 2017-11-15 RX ADMIN — IPRATROPIUM BROMIDE AND ALBUTEROL SULFATE 3 ML: .5; 3 SOLUTION RESPIRATORY (INHALATION) at 07:11

## 2017-11-15 RX ADMIN — ALPRAZOLAM 1 MG: 0.5 TABLET ORAL at 08:11

## 2017-11-15 RX ADMIN — CIPROFLOXACIN 400 MG: 2 INJECTION, SOLUTION INTRAVENOUS at 08:11

## 2017-11-15 RX ADMIN — FLUTICASONE FUROATE AND VILANTEROL TRIFENATATE 1 PUFF: 100; 25 POWDER RESPIRATORY (INHALATION) at 08:11

## 2017-11-15 RX ADMIN — DEXTROSE MONOHYDRATE AND SODIUM CHLORIDE: 5; .9 INJECTION, SOLUTION INTRAVENOUS at 01:11

## 2017-11-15 RX ADMIN — VANCOMYCIN HYDROCHLORIDE 750 MG: 750 INJECTION, POWDER, LYOPHILIZED, FOR SOLUTION INTRAVENOUS at 02:11

## 2017-11-15 RX ADMIN — ACETAMINOPHEN 650 MG: 325 TABLET ORAL at 12:11

## 2017-11-15 RX ADMIN — DUTASTERIDE 0.5 MG: 0.5 CAPSULE, LIQUID FILLED ORAL at 09:11

## 2017-11-15 RX ADMIN — IPRATROPIUM BROMIDE AND ALBUTEROL SULFATE 3 ML: .5; 3 SOLUTION RESPIRATORY (INHALATION) at 08:11

## 2017-11-15 RX ADMIN — METRONIDAZOLE 500 MG: 500 INJECTION, SOLUTION INTRAVENOUS at 08:11

## 2017-11-15 NOTE — PLAN OF CARE
Problem: Diarrhea (Adult)  Intervention: Manage Oral/IV Intake   11/15/17 1517   Nutrition Interventions   Fluid/Electrolyte Management fluids provided   Safety Interventions   Medication Review/Management medications reviewed   Adjust Diet to Limit Bowel Elimination   Nutrition Interventions meals brought from home/family encouraged     Intervention: Promote Perineal Hygiene/Elimination Safety   11/15/17 1517   Positioning   Body Position ambulate in room;positioned/repositioned independently         Comments: Patient encouraged to drink fluids provided to assist in preventing dehydration. Patient verbalized understanding.

## 2017-11-15 NOTE — NURSING
"Patient laying in bed awake and alert. IV fluids infusing without difficulty. O2 via nasal cannula. Patient denies any complaints at this time. Call light and personal items in reach of patient, bed in lowest position, side rails up x2. Patient on contact precautions pending results of C-Diff lab results. Patient denies diarrhea "it's been loose but not diarrhea" when asked how his bowel movements have been.   "

## 2017-11-15 NOTE — PLAN OF CARE
Problem: Fall Risk (Adult)  Intervention: Patient Rounds   11/15/17 0327   Safety Interventions   Patient Rounds bed in low position;bed wheels locked;call light in reach;clutter free environment maintained;ID band on;placement of personal items at bedside;toileting offered;visualized patient     Intervention: Safety Promotion/Fall Prevention   11/15/17 0327   Safety Interventions   Safety Promotion/Fall Prevention assistive device/personal item within reach;bed alarm set;side rails raised x 2;room near unit station;nonskid shoes/socks when out of bed;medications reviewed       Goal: Absence of Falls  Patient will demonstrate the desired outcomes by discharge/transition of care.    11/15/17 0327   Fall Risk (Adult)   Absence of Falls making progress toward outcome       Problem: Patient Care Overview  Goal: Plan of Care Review  Outcome: Ongoing (interventions implemented as appropriate)   11/15/17 0327   Coping/Psychosocial   Plan Of Care Reviewed With patient       Problem: Pain, Acute (Adult)  Goal: Acceptable Pain Control/Comfort Level  Patient will demonstrate the desired outcomes by discharge/transition of care.   Outcome: Ongoing (interventions implemented as appropriate)   11/15/17 0327   Pain, Acute (Adult)   Acceptable Pain Control/Comfort Level making progress toward outcome       Problem: Cardiac: ACS (Acute Coronary Syndrome) (Adult)  Intervention: Support Psychosocial Response to Life-changing Event/Hospitalization   11/15/17 0327   Coping/Psychosocial Interventions   Supportive Measures active listening utilized;self-care encouraged         Problem: Infection, Risk/Actual (Adult)  Goal: Infection Prevention/Resolution  Patient will demonstrate the desired outcomes by discharge/transition of care.   Outcome: Ongoing (interventions implemented as appropriate)   11/15/17 0327   Infection, Risk/Actual (Adult)   Infection Prevention/Resolution making progress toward outcome

## 2017-11-15 NOTE — PROGRESS NOTES
Progress Note    Admit Date: 11/11/2017   LOS: 4 days     SUBJECTIVE:     Follow-up For:  Abdominal pain    11/15/2017: States doing ok, I woke him up.   11/14//2017: No fever or chills. Tolerating IV ABX   11/13/2017: Abdominal pain better. Received 2 units of prbc. No fever or chills. No BM. + fever       Scheduled Meds:   albuterol-ipratropium 2.5mg-0.5mg/3mL  3 mL Nebulization Q4H    ciprofloxacin  400 mg Intravenous Q12H    dutasteride  0.5 mg Oral Daily    fluticasone-vilanterol  1 puff Inhalation Daily    metronidazole  500 mg Intravenous Q8H    vancomycin (VANCOCIN) IVPB  750 mg Intravenous Q12H     Continuous Infusions:   dextrose 5 % and 0.9 % NaCl 150 mL/hr at 11/15/17 0409     PRN Meds:sodium chloride, acetaminophen, ALPRAZolam, hydrocodone-acetaminophen 5-325mg, ondansetron, pneumoc 13-yadira conj-dip cr(PF)    Review of patient's allergies indicates:  No Known Allergies    Review of Systems  Sleeping hard- not able to give me details   Constitutional: states doing ok  Respiratory: see HPI  Cardiovascular: see HPI   Gastrointestinal: see HPI  Hematologic/Lymphatic: see HPI    OBJECTIVE:     Vital Signs (Most Recent)  Temp: 99.1 °F (37.3 °C) (11/15/17 0311)  Pulse: 80 (11/15/17 0453)  Resp: 20 (11/15/17 0453)  BP: (!) 142/61 (11/15/17 0311)  SpO2: 97 % (11/15/17 0311)    Vital Signs Range (Last 24H):  Temp:  [97.5 °F (36.4 °C)-100.7 °F (38.2 °C)]   Pulse:  [76-89]   Resp:  [17-20]   BP: (110-142)/(51-65)   SpO2:  [95 %-100 %]     I & O (Last 24H):    Intake/Output Summary (Last 24 hours) at 11/15/17 0621  Last data filed at 11/14/17 2100   Gross per 24 hour   Intake              450 ml   Output                0 ml   Net              450 ml     Physical Exam:    General: NAD, sleeping with NC oxygen   Head: normocephalic, atraumatic   Eyes: conjunctivae pink, anicteric sclera.  Throat: No erythema or post nasal discharge   Neck: supple, no LAD   Lungs: expiratory wheezing bilaterally   Heart: S1, S2,  RRR   Abdomen:  soft, mild epigastric tenderness, hyperactive BS  Extremities: no cyanosis or edema.   Skin: turgor normal, no erythema or rashes.   MS:  No ROM difficulty  Neuro: A&O x 3    Psy: calm       Laboratory:  CBC:     Recent Labs  Lab 11/14/17  0525  11/14/17 2014   WBC 10.00  --   --    RBC 3.44*  --   --    HGB 8.2*  < > 8.4*   HCT 27.5*  --   --    PLT 94*  --   --    MCV 80*  --   --    MCH 23.8*  --   --    MCHC 29.8*  --   --    < > = values in this interval not displayed.  CMP:   Recent Labs  Lab 11/11/17  1613  11/14/17 0525   *  < > 115*   CALCIUM 9.5  < > 8.2*   ALBUMIN 3.4*  --   --    PROT 6.8  --   --      < > 140   K 3.5  < > 3.1*   CO2 27  < > 27     < > 105   BUN 13  < > 4*   CREATININE 1.0  < > 0.8   ALKPHOS 81  --   --    ALT 14  --   --    AST 19  --   --    BILITOT 0.7  --   --    < > = values in this interval not displayed.  Coagulation: No results for input(s): LABPROT, INR, APTT in the last 168 hours.  Cardiac markers:     Recent Labs  Lab 11/12/17  0423   TROPONINI <0.006     ABGs: No results for input(s): PH, PCO2, PO2, HCO3, POCSATURATED, BE in the last 168 hours.  Microbiology Results (last 7 days)     Procedure Component Value Units Date/Time    Blood culture [[id number]]     Order Status:  Sent Specimen:  Blood     Clostridium difficile EIA [[id number]] Collected:  11/14/17 2330    Order Status:  Sent Specimen:  Stool from Stool Updated:  11/15/17 0003    Blood culture [[id number]] Collected:  11/11/17 1955    Order Status:  Completed Specimen:  Blood from Peripheral, Antecubital, Right Updated:  11/14/17 2103     Blood Culture, Routine No Growth to date     Blood Culture, Routine No Growth to date     Blood Culture, Routine No Growth to date     Blood Culture, Routine No Growth to date    Blood culture [[id number]] Collected:  11/11/17 2010    Order Status:  Completed Specimen:  Blood from Peripheral, Forearm, Right Updated:  11/14/17 1038     Blood Culture,  Routine Gram stain aer bottle: Gram positive cocci in clusters resembling Staph     Blood Culture, Routine Results called to and read back by: Annemarie Lombardo  11/13/2017  14:11     Blood Culture, Routine --     COAGULASE-NEGATIVE STAPHYLOCOCCUS SPECIES  Organism is a probable contaminant      Stool culture **CANNOT BE ORDERED STAT** [[id number]] Collected:  11/13/17 0904    Order Status:  Completed Specimen:  Stool from Stool Updated:  11/14/17 0831     Stool Culture Nothing significant to date    E. coli 0157 antigen [[id number]] Collected:  11/13/17 0904    Order Status:  No result Specimen:  Stool from Stool Updated:  11/13/17 0917    Urine culture [[id number]] Collected:  11/11/17 1720    Order Status:  Completed Specimen:  Urine from Urine, Clean Catch Updated:  11/13/17 0855     Urine Culture, Routine No growth        Specimen (12h ago through future)    None          Recent Labs  Lab 11/11/17  1720   COLORU Tiffani   SPECGRAV 1.015   PHUR 5.0   PROTEINUA 1+*   BACTERIA None   NITRITE Negative   LEUKOCYTESUR Negative   UROBILINOGEN Negative   HYALINECASTS 0       Diagnostic Results:      ASSESSMENT/PLAN:      *Generalized abdominal pain [R10.84]     - with nausea and diarrhea x 10 times  - pt was taking steroid for copd  - now with Hg drop  - check hg q 12  - CT non specific  - stool for occult blood  - C.diff pending   - cipro & Flagyl   - improving      Yes    Polycythemia vera [D45]   - pt has been getting therapeutic phlebotomy but suspect gi blood loss  - H&H a 12  - negative occult blood       Yes    Microcytic anemia [D50.9]     - drop in Hg  - s/p 2 units prb  - had NL BM  -denies excess fatigue     Yes    Chronic fatigue [R53.82]  - multifactorial     Yes    Vomiting and diarrhea [R11.10, R19.7]     Yes    Chronic obstructive pulmonary disease [J44.9]     -stable  -pt needs BiPAP at night      Yes    BPH (benign prostatic hyperplasia) [N40.0]   Yes     G+ Bacteremia:coagulace Negative:  started  Vancomycin   - recheck blood culture - pending     [facility] [place] home soon      Rk Welsh M.D  Internal Medicine & Geriatric Medicine  Hematology & Oncology  Palliative Medicine    [address], [address]  [place] [id number] [phone] (Office)  [phone] (Fax)

## 2017-11-15 NOTE — PLAN OF CARE
Problem: Patient Care Overview  Goal: Plan of Care Review  Outcome: Ongoing (interventions implemented as appropriate)  No falls this shift bed kept in low position call light and phone remain within reach bed alarm remain active. Patient able to reposition self in bed without assist. No evidence of skin breakdown noted.     Admit dx: abd pain , vomiting/diarrhea     No Bm's this shift  11/11/17 CT Abd: Mild diverticulosis coli without diverticulitis.  Patient currently on Special contact precautions pending stool specimen results  11/13/17 C diff stool collected results pending    11/14/17  WBC 10.00  Blood culture collected 11/11/17 resulted Gram positive cocci in clusters resembling Staph 11/13/17  Urine culture collected 11/11/17 no growth to date 11/13/17  Patient currently on cipro IV q12 hrs, flagly q8hrs, and vanc q12hrs.  11/11/17 CXR: Chronic lung changes with severe emphysema.  No superimposed acute cardiopulmonary process identified.    11/14/17 hgb 8.9/hct 27.5  11/13/17 2 units of PRBC's infused

## 2017-11-15 NOTE — PLAN OF CARE
Problem: Fall Risk (Adult)  Intervention: Monitor/Assist with Self Care   11/15/17 0800 11/15/17 1521   Daily Care Interventions   Self-Care Promotion --  BADL personal objects within reach;independence encouraged;BADL personal routines maintained   Functional Level Current   Ambulation --  2 - assistive person   Transferring --  0 - independent   Toileting --  2 - assistive person   Bathing --  2 - assistive person   Dressing --  2 - assistive person   Eating --  0 - independent   Communication --  0 - understands/communicates without difficulty   Swallowing --  2 - difficulty swallowing foods   Activity   Activity Assistance Provided assistance, 1 person --      Intervention: Reduce Risk/Promote Restraint Free Environment   11/15/17 1521   Safety Interventions   Environmental Safety Modification assistive device/personal items within reach;clutter free environment maintained;lighting adjusted     Intervention: Review Medications/Identify Contributors to Fall Risk   11/15/17 1521   Safety Interventions   Medication Review/Management medications reviewed;high risk medications identified     Intervention: Patient Rounds   11/15/17 1145   Safety Interventions   Patient Rounds bed in low position;bed wheels locked;call light in reach;clutter free environment maintained;ID band on;placement of personal items at bedside;toileting offered;visualized patient     Intervention: Safety Promotion/Fall Prevention   11/15/17 1145   Safety Interventions   Safety Promotion/Fall Prevention assistive device/personal item within reach;bed alarm set;Fall Risk reviewed with patient/family;lighting adjusted;side rails raised x 2         Comments: Patient encouraged for self care and to use call light when In need of anything. Patient verbalized understanding.

## 2017-11-16 LAB
ANION GAP SERPL CALC-SCNC: 8 MMOL/L
BACTERIA BLD CULT: NORMAL
BACTERIA STL CULT: NORMAL
BASOPHILS # BLD AUTO: 0.03 K/UL
BASOPHILS NFR BLD: 0.4 %
BUN SERPL-MCNC: <2 MG/DL
CALCIUM SERPL-MCNC: 8.6 MG/DL
CHLORIDE SERPL-SCNC: 100 MMOL/L
CO2 SERPL-SCNC: 34 MMOL/L
CREAT SERPL-MCNC: 0.7 MG/DL
DIFFERENTIAL METHOD: ABNORMAL
EOSINOPHIL # BLD AUTO: 0.3 K/UL
EOSINOPHIL NFR BLD: 3.2 %
ERYTHROCYTE [DISTWIDTH] IN BLOOD BY AUTOMATED COUNT: 18.1 %
EST. GFR  (AFRICAN AMERICAN): >60 ML/MIN/1.73 M^2
EST. GFR  (NON AFRICAN AMERICAN): >60 ML/MIN/1.73 M^2
GLUCOSE SERPL-MCNC: 105 MG/DL
HCT VFR BLD AUTO: 35 %
HGB BLD-MCNC: 10.2 G/DL
HGB BLD-MCNC: 8.2 G/DL
HGB BLD-MCNC: 8.3 G/DL
LYMPHOCYTES # BLD AUTO: 1.2 K/UL
LYMPHOCYTES NFR BLD: 14.7 %
MCH RBC QN AUTO: 23.8 PG
MCHC RBC AUTO-ENTMCNC: 29.1 G/DL
MCV RBC AUTO: 82 FL
MONOCYTES # BLD AUTO: 0.6 K/UL
MONOCYTES NFR BLD: 6.9 %
NEUTROPHILS # BLD AUTO: 6.1 K/UL
NEUTROPHILS NFR BLD: 74.8 %
PLATELET # BLD AUTO: 179 K/UL
PMV BLD AUTO: 9.3 FL
POTASSIUM SERPL-SCNC: 2.9 MMOL/L
RBC # BLD AUTO: 4.29 M/UL
SODIUM SERPL-SCNC: 142 MMOL/L
WBC # BLD AUTO: 8.11 K/UL

## 2017-11-16 PROCEDURE — 25000003 PHARM REV CODE 250

## 2017-11-16 PROCEDURE — S0030 INJECTION, METRONIDAZOLE: HCPCS | Performed by: EMERGENCY MEDICINE

## 2017-11-16 PROCEDURE — 94640 AIRWAY INHALATION TREATMENT: CPT

## 2017-11-16 PROCEDURE — 25000242 PHARM REV CODE 250 ALT 637 W/ HCPCS: Performed by: EMERGENCY MEDICINE

## 2017-11-16 PROCEDURE — 85025 COMPLETE CBC W/AUTO DIFF WBC: CPT

## 2017-11-16 PROCEDURE — 25000003 PHARM REV CODE 250: Performed by: EMERGENCY MEDICINE

## 2017-11-16 PROCEDURE — 25000003 PHARM REV CODE 250: Performed by: INTERNAL MEDICINE

## 2017-11-16 PROCEDURE — 36415 COLL VENOUS BLD VENIPUNCTURE: CPT

## 2017-11-16 PROCEDURE — 94761 N-INVAS EAR/PLS OXIMETRY MLT: CPT

## 2017-11-16 PROCEDURE — 21400001 HC TELEMETRY ROOM

## 2017-11-16 PROCEDURE — 80048 BASIC METABOLIC PNL TOTAL CA: CPT

## 2017-11-16 PROCEDURE — 63600175 PHARM REV CODE 636 W HCPCS

## 2017-11-16 PROCEDURE — 85018 HEMOGLOBIN: CPT

## 2017-11-16 PROCEDURE — 27000221 HC OXYGEN, UP TO 24 HOURS

## 2017-11-16 PROCEDURE — 63600175 PHARM REV CODE 636 W HCPCS: Performed by: EMERGENCY MEDICINE

## 2017-11-16 PROCEDURE — 63600175 PHARM REV CODE 636 W HCPCS: Performed by: INTERNAL MEDICINE

## 2017-11-16 RX ORDER — POTASSIUM CHLORIDE 20 MEQ/15ML
20 SOLUTION ORAL 2 TIMES DAILY
Status: COMPLETED | OUTPATIENT
Start: 2017-11-16 | End: 2017-11-16

## 2017-11-16 RX ORDER — POTASSIUM CHLORIDE 7.45 MG/ML
10 INJECTION INTRAVENOUS
Status: DISCONTINUED | OUTPATIENT
Start: 2017-11-16 | End: 2017-11-16

## 2017-11-16 RX ORDER — NYSTATIN 100000 [USP'U]/ML
500000 SUSPENSION ORAL 4 TIMES DAILY
Status: DISCONTINUED | OUTPATIENT
Start: 2017-11-16 | End: 2017-11-19 | Stop reason: HOSPADM

## 2017-11-16 RX ORDER — ACETAMINOPHEN 325 MG/1
650 TABLET ORAL EVERY 6 HOURS PRN
Status: DISCONTINUED | OUTPATIENT
Start: 2017-11-16 | End: 2017-11-19 | Stop reason: HOSPADM

## 2017-11-16 RX ADMIN — IPRATROPIUM BROMIDE AND ALBUTEROL SULFATE 3 ML: .5; 3 SOLUTION RESPIRATORY (INHALATION) at 04:11

## 2017-11-16 RX ADMIN — IPRATROPIUM BROMIDE AND ALBUTEROL SULFATE 3 ML: .5; 3 SOLUTION RESPIRATORY (INHALATION) at 12:11

## 2017-11-16 RX ADMIN — ALPRAZOLAM 1 MG: 0.5 TABLET ORAL at 04:11

## 2017-11-16 RX ADMIN — NYSTATIN 500000 UNITS: 500000 SUSPENSION ORAL at 09:11

## 2017-11-16 RX ADMIN — DUTASTERIDE 0.5 MG: 0.5 CAPSULE, LIQUID FILLED ORAL at 09:11

## 2017-11-16 RX ADMIN — VANCOMYCIN HYDROCHLORIDE 1000 MG: 1 INJECTION, POWDER, LYOPHILIZED, FOR SOLUTION INTRAVENOUS at 02:11

## 2017-11-16 RX ADMIN — CIPROFLOXACIN 400 MG: 2 INJECTION, SOLUTION INTRAVENOUS at 11:11

## 2017-11-16 RX ADMIN — IPRATROPIUM BROMIDE AND ALBUTEROL SULFATE 3 ML: .5; 3 SOLUTION RESPIRATORY (INHALATION) at 11:11

## 2017-11-16 RX ADMIN — VANCOMYCIN HYDROCHLORIDE 1000 MG: 1 INJECTION, POWDER, LYOPHILIZED, FOR SOLUTION INTRAVENOUS at 04:11

## 2017-11-16 RX ADMIN — CIPROFLOXACIN 400 MG: 2 INJECTION, SOLUTION INTRAVENOUS at 10:11

## 2017-11-16 RX ADMIN — DEXTROSE MONOHYDRATE AND SODIUM CHLORIDE: 5; .9 INJECTION, SOLUTION INTRAVENOUS at 07:11

## 2017-11-16 RX ADMIN — DEXTROSE MONOHYDRATE AND SODIUM CHLORIDE: 5; .9 INJECTION, SOLUTION INTRAVENOUS at 09:11

## 2017-11-16 RX ADMIN — ALPRAZOLAM 1 MG: 0.5 TABLET ORAL at 05:11

## 2017-11-16 RX ADMIN — HYDROCODONE BITARTRATE AND ACETAMINOPHEN 1 TABLET: 5; 325 TABLET ORAL at 10:11

## 2017-11-16 RX ADMIN — POTASSIUM CHLORIDE 20 MEQ: 20 SOLUTION ORAL at 01:11

## 2017-11-16 RX ADMIN — IPRATROPIUM BROMIDE AND ALBUTEROL SULFATE 3 ML: .5; 3 SOLUTION RESPIRATORY (INHALATION) at 08:11

## 2017-11-16 RX ADMIN — HYDROCODONE BITARTRATE AND ACETAMINOPHEN 1 TABLET: 5; 325 TABLET ORAL at 09:11

## 2017-11-16 RX ADMIN — FLUTICASONE FUROATE AND VILANTEROL TRIFENATATE 1 PUFF: 100; 25 POWDER RESPIRATORY (INHALATION) at 08:11

## 2017-11-16 RX ADMIN — POTASSIUM CHLORIDE 20 MEQ: 20 SOLUTION ORAL at 09:11

## 2017-11-16 RX ADMIN — POTASSIUM CHLORIDE 10 MEQ: 10 INJECTION, SOLUTION INTRAVENOUS at 09:11

## 2017-11-16 RX ADMIN — ONDANSETRON 4 MG: 2 INJECTION INTRAMUSCULAR; INTRAVENOUS at 10:11

## 2017-11-16 RX ADMIN — METRONIDAZOLE 500 MG: 500 INJECTION, SOLUTION INTRAVENOUS at 09:11

## 2017-11-16 RX ADMIN — METRONIDAZOLE 500 MG: 500 INJECTION, SOLUTION INTRAVENOUS at 01:11

## 2017-11-16 RX ADMIN — ALPRAZOLAM 1 MG: 0.5 TABLET ORAL at 10:11

## 2017-11-16 RX ADMIN — METRONIDAZOLE 500 MG: 500 INJECTION, SOLUTION INTRAVENOUS at 04:11

## 2017-11-16 RX ADMIN — IPRATROPIUM BROMIDE AND ALBUTEROL SULFATE 3 ML: .5; 3 SOLUTION RESPIRATORY (INHALATION) at 07:11

## 2017-11-16 NOTE — NURSING
Spoke with Dr. Welsh and reported that patient is unable to tolerate IV KCL due to burning in arm. Telephonic order received for oral KCL.

## 2017-11-16 NOTE — PLAN OF CARE
Problem: Fall Risk (Adult)  Intervention: Monitor/Assist with Self Care   11/16/17 1635   Activity   Activity Assistance Provided assistance, 1 person     Intervention: Reduce Risk/Promote Restraint Free Environment   11/16/17 1736   Safety Interventions   Environmental Safety Modification assistive device/personal items within reach;clutter free environment maintained;lighting adjusted;room organization consistent     Intervention: Review Medications/Identify Contributors to Fall Risk   11/16/17 1736   Safety Interventions   Medication Review/Management medications reviewed;high risk medications identified     Intervention: Patient Rounds   11/16/17 1635   Safety Interventions   Patient Rounds bed in low position;bed wheels locked;call light in reach;clutter free environment maintained;ID band on;placement of personal items at bedside;toileting offered;visualized patient     Intervention: Safety Promotion/Fall Prevention   11/16/17 1635   Safety Interventions   Safety Promotion/Fall Prevention assistive device/personal item within reach;bed alarm set;Fall Risk reviewed with patient/family;nonskid shoes/socks when out of bed;side rails raised x 2         Comments: Patient educated to not ambulate without assistance. Patient verbalized understanding.

## 2017-11-16 NOTE — PLAN OF CARE
11/16/17 1204   Medicare Message   Important Message from Medicare regarding Discharge Appeal Rights Given to patient/caregiver;Explained to patient/caregiver;Signed/date by patient/caregiver   Date IMM was signed 11/16/17   Time IMM was signed 1017

## 2017-11-16 NOTE — PLAN OF CARE
11/16/17 1207   Discharge Reassessment   Assessment Type Discharge Planning Reassessment   Do you have any problems affording any of your prescribed medications? No   Discharge Plan A Home   Discharge Plan B (BIPAP, IV ABX, NEBs )   Patient choice form signed by patient/caregiver N/A   Can the patient answer the patient profile reliably? Yes, cognitively intact   How does the patient rate their overall health at the present time? Poor   Describe the patient's ability to walk at the present time. Minor restrictions or changes   How often would a person be available to care for the patient? Whenever needed   Number of comorbid conditions (as recorded on the chart) Three

## 2017-11-16 NOTE — NURSING
The following lab values reported Dr Card; Potassium 2.9, Calcium 8.6, Hgb 8 and BUN <2. Potassium rider 20 meQ ordered.

## 2017-11-16 NOTE — PROGRESS NOTES
Progress Note    Admit Date: 11/11/2017   LOS: 5 days     SUBJECTIVE:     Follow-up For:  Abdominal pain    11/16/2017: dysphagia and neck pain. + fatigue.   11/15/2017: States doing ok, I woke him up.   11/14//2017: No fever or chills. Tolerating IV ABX   11/13/2017: Abdominal pain better. Received 2 units of prbc. No fever or chills. No BM. + fever       Scheduled Meds:   albuterol-ipratropium 2.5mg-0.5mg/3mL  3 mL Nebulization Q4H    ciprofloxacin  400 mg Intravenous Q12H    dutasteride  0.5 mg Oral Daily    fluticasone-vilanterol  1 puff Inhalation Daily    metronidazole  500 mg Intravenous Q8H    vancomycin (VANCOCIN) IVPB  1,000 mg Intravenous Q12H     Continuous Infusions:   dextrose 5 % and 0.9 % NaCl 150 mL/hr at 11/15/17 1358     PRN Meds:sodium chloride, acetaminophen, ALPRAZolam, hydrocodone-acetaminophen 5-325mg, ondansetron, pneumoc 13-yadira conj-dip cr(PF)    Review of patient's allergies indicates:  No Known Allergies    Review of Systems    Constitutional: states doing ok  Respiratory: see HPI  Cardiovascular: no cp.   Gastrointestinal: intermittent abdominal pain   Hematologic/Lymphatic: see HPI    OBJECTIVE:     Vital Signs (Most Recent)  Temp: 97.8 °F (36.6 °C) (11/16/17 0714)  Pulse: 84 (11/16/17 0714)  Resp: 16 (11/16/17 0714)  BP: 131/63 (11/16/17 0714)  SpO2: 95 % (11/16/17 0714)    Vital Signs Range (Last 24H):  Temp:  [97.8 °F (36.6 °C)-99.5 °F (37.5 °C)]   Pulse:  [78-92]   Resp:  [16-20]   BP: (111-145)/(54-68)   SpO2:  [95 %-99 %]     I & O (Last 24H):    Intake/Output Summary (Last 24 hours) at 11/16/17 0723  Last data filed at 11/16/17 0625   Gross per 24 hour   Intake             5130 ml   Output             2200 ml   Net             2930 ml     Physical Exam:    General: NAD, sleeping with NC oxygen   Head: normocephalic, atraumatic   Eyes: conjunctivae pink, anicteric sclera.  Throat: No erythema or post nasal discharge   Neck: supple, no LAD   Lungs: expiratory wheezing  bilaterally   Heart: S1, S2, RRR   Abdomen:  soft, mild epigastric tenderness, hyperactive BS  Extremities: no cyanosis or edema.   Skin: turgor normal, no erythema or rashes.   MS:  No ROM difficulty  Neuro: A&O x 3    Psy: calm       Laboratory:  CBC:     Recent Labs  Lab 11/15/17  0623  11/15/17  1953   WBC 8.89  --   --    RBC 3.85*  --   --    HGB 9.0*  < > 8.0*   HCT 31.2*  --   --      --   --    MCV 81*  --   --    MCH 23.4*  --   --    MCHC 28.8*  --   --    < > = values in this interval not displayed.  CMP:   Recent Labs  Lab 11/11/17  1613  11/16/17  0505   *  < > 105   CALCIUM 9.5  < > 8.6*   ALBUMIN 3.4*  --   --    PROT 6.8  --   --      < > 142   K 3.5  < > 2.9*   CO2 27  < > 34*     < > 100   BUN 13  < > <2*   CREATININE 1.0  < > 0.7   ALKPHOS 81  --   --    ALT 14  --   --    AST 19  --   --    BILITOT 0.7  --   --    < > = values in this interval not displayed.  Coagulation: No results for input(s): LABPROT, INR, APTT in the last 168 hours.  Cardiac markers:     Recent Labs  Lab 11/12/17  0423   TROPONINI <0.006     ABGs: No results for input(s): PH, PCO2, PO2, HCO3, POCSATURATED, BE in the last 168 hours.  Microbiology Results (last 7 days)     Procedure Component Value Units Date/Time    Blood culture [[id number]] Collected:  11/11/17 1955    Order Status:  Completed Specimen:  Blood from Peripheral, Antecubital, Right Updated:  11/15/17 2103     Blood Culture, Routine No Growth to date     Blood Culture, Routine No Growth to date     Blood Culture, Routine No Growth to date     Blood Culture, Routine No Growth to date     Blood Culture, Routine No Growth to date    Blood culture [[id number]] Collected:  11/15/17 0623    Order Status:  Completed Specimen:  Blood Updated:  11/15/17 1512     Blood Culture, Routine No Growth to date    Clostridium difficile EIA [[id number]] Collected:  11/14/17 2330    Order Status:  Completed Specimen:  Stool from Stool Updated:  11/15/17  1103     C. diff Antigen Negative     C difficile Toxins A+B, EIA Negative     Comment: Testing not recommended for children <24 months old.       Blood culture [[id number]] Collected:  11/11/17 2010    Order Status:  Completed Specimen:  Blood from Peripheral, Forearm, Right Updated:  11/15/17 0744     Blood Culture, Routine Gram stain aer bottle: Gram positive cocci in clusters resembling Staph     Blood Culture, Routine Results called to and read back by: Annemarie Lombardo  11/13/2017  14:11     Blood Culture, Routine --     COAGULASE-NEGATIVE STAPHYLOCOCCUS SPECIES  Organism is a probable contaminant      Stool culture **CANNOT BE ORDERED STAT** [[id number]] Collected:  11/13/17 0904    Order Status:  Completed Specimen:  Stool from Stool Updated:  11/14/17 0831     Stool Culture Nothing significant to date    E. coli 0157 antigen [[id number]] Collected:  11/13/17 0904    Order Status:  Canceled Specimen:  Stool from Stool Updated:  11/13/17 0917    Urine culture [[id number]] Collected:  11/11/17 1720    Order Status:  Completed Specimen:  Urine from Urine, Clean Catch Updated:  11/13/17 0855     Urine Culture, Routine No growth        Specimen (12h ago through future)    None          Recent Labs  Lab 11/11/17  1720   COLORU Tiffani   SPECGRAV 1.015   PHUR 5.0   PROTEINUA 1+*   BACTERIA None   NITRITE Negative   LEUKOCYTESUR Negative   UROBILINOGEN Negative   HYALINECASTS 0       Diagnostic Results:      ASSESSMENT/PLAN:      *Generalized abdominal pain [R10.84]     - with nausea and diarrhea x 10 times  - pt was taking steroid for copd  -- CT non specific  - stool for occult blood: negative   - C.diff negative   - cipro & Flagyl : will dc   - improving      Yes    Polycythemia vera [D45]   - pt has been getting therapeutic phlebotomy but suspect gi blood loss-  negative occult blood  - discussed with daughter        Yes    Microcytic anemia [D50.9]     - drop in Hg  - s/p 2 units prb  - had NL BM  -denies excess  fatigue     Yes    Chronic fatigue [R53.82]  - multifactorial     Yes    Vomiting and diarrhea [R11.10, R19.7]  - resolved      Yes    Chronic obstructive pulmonary disease [J44.9]     -stable  -pt needs BiPAP at night      Yes    BPH (benign prostatic hyperplasia) [N40.0]   Yes     G+ Bacteremia:coagulace Negative:  started Vancomycin   -Negative    Oral candida and neck pain: CT neck   - Nystatin       Rk Welsh M.D  Internal Medicine & Geriatric Medicine  Hematology & Oncology  Palliative Medicine    [address] [place] [address], [address]  [place], [place] [id number] [phone] (Office)  [phone] (Fax)

## 2017-11-16 NOTE — NURSING
20ga IV to Right FA noted to be swollen and bleeding. IV fluids stopped. No tenderness noted. IV Heplock removed, catheter tip intact. Warm compress to Swollen area. Patient tolerated well.

## 2017-11-16 NOTE — PLAN OF CARE
Problem: Breathing Pattern Ineffective (Adult)  Intervention: Optimize Oxygenation/Ventilation/Perfusion   11/16/17 1635 11/16/17 1731   Positioning   Head of Bed (HOB) HOB at 45 degrees --    Respiratory Interventions   Airway/Ventilation Management --  airway patency maintained;calming measures promoted   Breathing Techniques/Airway Clearance --  deep/controlled cough encouraged;pursed-lip breathing encouraged     Intervention: Minimize Oxygen Consumption/Demand   11/16/17 1635 11/16/17 1731   Coping/Psychosocial Interventions   Environmental Support --  calm environment promoted   Pain/Comfort/Sleep Interventions   Sleep/Rest Enhancement --  awakenings minimized;regular sleep/rest pattern promoted   Activity   Activity Type bedrest --      Intervention: Monitor/Manage Contributing Psychosocial Factors   11/16/17 1731   Coping/Psychosocial Interventions   Supportive Measures active listening utilized         Comments: Patient encouraged to do pursed lip breathing. To limit activities and to use nurse call light for all activities. Patient verbalized understanding.

## 2017-11-16 NOTE — PLAN OF CARE
Problem: Fall Risk (Adult)  Intervention: Patient Rounds   11/16/17 0249   Safety Interventions   Patient Rounds bed in low position;bed wheels locked;call light in reach;clutter free environment maintained;placement of personal items at bedside;toileting offered;ID band on;visualized patient     Intervention: Safety Promotion/Fall Prevention   11/16/17 0249   Safety Interventions   Safety Promotion/Fall Prevention assistive device/personal item within reach;bed alarm set;commode/urinal/bedpan at bedside;diversional activities provided;Fall Risk reviewed with patient/family;Fall Risk signage in place;high risk medications identified;medications reviewed;instructed to call staff for mobility;side rails raised x 3;nonskid shoes/socks when out of bed     Intervention: Safety Precautions   11/16/17 0249   Safety Interventions   Safety Precautions emergency equipment at bedside       Goal: Absence of Falls  Patient will demonstrate the desired outcomes by discharge/transition of care.   Outcome: Ongoing (interventions implemented as appropriate)   11/16/17 0249   Fall Risk (Adult)   Absence of Falls making progress toward outcome

## 2017-11-17 LAB
ANION GAP SERPL CALC-SCNC: 3 MMOL/L
ANISOCYTOSIS BLD QL SMEAR: SLIGHT
BASOPHILS # BLD AUTO: 0.02 K/UL
BASOPHILS NFR BLD: 0.3 %
BUN SERPL-MCNC: <2 MG/DL
CALCIUM SERPL-MCNC: 8.4 MG/DL
CHLORIDE SERPL-SCNC: 102 MMOL/L
CO2 SERPL-SCNC: 38 MMOL/L
CREAT SERPL-MCNC: 0.7 MG/DL
DIFFERENTIAL METHOD: ABNORMAL
EOSINOPHIL # BLD AUTO: 0.2 K/UL
EOSINOPHIL NFR BLD: 3.1 %
ERYTHROCYTE [DISTWIDTH] IN BLOOD BY AUTOMATED COUNT: 18.1 %
EST. GFR  (AFRICAN AMERICAN): >60 ML/MIN/1.73 M^2
EST. GFR  (NON AFRICAN AMERICAN): >60 ML/MIN/1.73 M^2
GLUCOSE SERPL-MCNC: 113 MG/DL
HCT VFR BLD AUTO: 27.4 %
HGB BLD-MCNC: 7.9 G/DL
HGB BLD-MCNC: 8.2 G/DL
HGB BLD-MCNC: 8.8 G/DL
HYPOCHROMIA BLD QL SMEAR: ABNORMAL
LYMPHOCYTES # BLD AUTO: 1 K/UL
LYMPHOCYTES NFR BLD: 13.3 %
MCH RBC QN AUTO: 23.7 PG
MCHC RBC AUTO-ENTMCNC: 28.8 G/DL
MCV RBC AUTO: 82 FL
MONOCYTES # BLD AUTO: 0.6 K/UL
MONOCYTES NFR BLD: 8.3 %
NEUTROPHILS # BLD AUTO: 5.8 K/UL
NEUTROPHILS NFR BLD: 75.6 %
OVALOCYTES BLD QL SMEAR: ABNORMAL
PLATELET # BLD AUTO: 193 K/UL
PMV BLD AUTO: 9.2 FL
POTASSIUM SERPL-SCNC: 2.9 MMOL/L
RBC # BLD AUTO: 3.33 M/UL
SODIUM SERPL-SCNC: 143 MMOL/L
TARGETS BLD QL SMEAR: ABNORMAL
WBC # BLD AUTO: 7.72 K/UL

## 2017-11-17 PROCEDURE — 85025 COMPLETE CBC W/AUTO DIFF WBC: CPT

## 2017-11-17 PROCEDURE — 94640 AIRWAY INHALATION TREATMENT: CPT

## 2017-11-17 PROCEDURE — 94761 N-INVAS EAR/PLS OXIMETRY MLT: CPT

## 2017-11-17 PROCEDURE — 63600175 PHARM REV CODE 636 W HCPCS: Performed by: EMERGENCY MEDICINE

## 2017-11-17 PROCEDURE — 21400001 HC TELEMETRY ROOM

## 2017-11-17 PROCEDURE — 25000003 PHARM REV CODE 250: Performed by: EMERGENCY MEDICINE

## 2017-11-17 PROCEDURE — 25000242 PHARM REV CODE 250 ALT 637 W/ HCPCS: Performed by: EMERGENCY MEDICINE

## 2017-11-17 PROCEDURE — 25500020 PHARM REV CODE 255: Performed by: INTERNAL MEDICINE

## 2017-11-17 PROCEDURE — S0030 INJECTION, METRONIDAZOLE: HCPCS | Performed by: EMERGENCY MEDICINE

## 2017-11-17 PROCEDURE — 36415 COLL VENOUS BLD VENIPUNCTURE: CPT

## 2017-11-17 PROCEDURE — 25000003 PHARM REV CODE 250: Performed by: INTERNAL MEDICINE

## 2017-11-17 PROCEDURE — 80048 BASIC METABOLIC PNL TOTAL CA: CPT

## 2017-11-17 PROCEDURE — 63600175 PHARM REV CODE 636 W HCPCS: Performed by: INTERNAL MEDICINE

## 2017-11-17 PROCEDURE — 27000221 HC OXYGEN, UP TO 24 HOURS

## 2017-11-17 PROCEDURE — 85018 HEMOGLOBIN: CPT

## 2017-11-17 RX ORDER — IPRATROPIUM BROMIDE AND ALBUTEROL SULFATE 2.5; .5 MG/3ML; MG/3ML
3 SOLUTION RESPIRATORY (INHALATION) EVERY 6 HOURS
Status: DISCONTINUED | OUTPATIENT
Start: 2017-11-18 | End: 2017-11-19 | Stop reason: HOSPADM

## 2017-11-17 RX ADMIN — CIPROFLOXACIN 400 MG: 2 INJECTION, SOLUTION INTRAVENOUS at 12:11

## 2017-11-17 RX ADMIN — DEXTROSE MONOHYDRATE AND SODIUM CHLORIDE: 5; .9 INJECTION, SOLUTION INTRAVENOUS at 07:11

## 2017-11-17 RX ADMIN — IPRATROPIUM BROMIDE AND ALBUTEROL SULFATE 3 ML: .5; 3 SOLUTION RESPIRATORY (INHALATION) at 04:11

## 2017-11-17 RX ADMIN — HYDROCODONE BITARTRATE AND ACETAMINOPHEN 1 TABLET: 5; 325 TABLET ORAL at 12:11

## 2017-11-17 RX ADMIN — ONDANSETRON 4 MG: 2 INJECTION INTRAMUSCULAR; INTRAVENOUS at 10:11

## 2017-11-17 RX ADMIN — ALPRAZOLAM 1 MG: 0.5 TABLET ORAL at 12:11

## 2017-11-17 RX ADMIN — IOHEXOL 75 ML: 350 INJECTION, SOLUTION INTRAVENOUS at 11:11

## 2017-11-17 RX ADMIN — NYSTATIN 500000 UNITS: 500000 SUSPENSION ORAL at 05:11

## 2017-11-17 RX ADMIN — DEXTROSE MONOHYDRATE AND SODIUM CHLORIDE: 5; .9 INJECTION, SOLUTION INTRAVENOUS at 08:11

## 2017-11-17 RX ADMIN — METRONIDAZOLE 500 MG: 500 INJECTION, SOLUTION INTRAVENOUS at 01:11

## 2017-11-17 RX ADMIN — IPRATROPIUM BROMIDE AND ALBUTEROL SULFATE 3 ML: .5; 3 SOLUTION RESPIRATORY (INHALATION) at 07:11

## 2017-11-17 RX ADMIN — IPRATROPIUM BROMIDE AND ALBUTEROL SULFATE 3 ML: .5; 3 SOLUTION RESPIRATORY (INHALATION) at 03:11

## 2017-11-17 RX ADMIN — ALPRAZOLAM 1 MG: 0.5 TABLET ORAL at 08:11

## 2017-11-17 RX ADMIN — NYSTATIN 500000 UNITS: 500000 SUSPENSION ORAL at 12:11

## 2017-11-17 RX ADMIN — FLUTICASONE FUROATE AND VILANTEROL TRIFENATATE 1 PUFF: 100; 25 POWDER RESPIRATORY (INHALATION) at 07:11

## 2017-11-17 RX ADMIN — VANCOMYCIN HYDROCHLORIDE 1000 MG: 1 INJECTION, POWDER, LYOPHILIZED, FOR SOLUTION INTRAVENOUS at 04:11

## 2017-11-17 RX ADMIN — METRONIDAZOLE 500 MG: 500 INJECTION, SOLUTION INTRAVENOUS at 05:11

## 2017-11-17 RX ADMIN — DUTASTERIDE 0.5 MG: 0.5 CAPSULE, LIQUID FILLED ORAL at 08:11

## 2017-11-17 NOTE — PROGRESS NOTES
WRITTEN HEALTHCARE DISCHARGE INFORMATION     Things that YOU are responsible for to Manage Your Care At Home:  1. Getting your prescriptions filled.  2. Taking you medications as directed. DO NOT MISS ANY DOSES!  3. Going to your follow-up doctor appointments. This is important because it allows the doctor to monitor your progress and to determine if any changes need to be made to your treatment plan.    If you are unable to make your follow up appointments, please call the number listed and reschedule this appointment.     After discharge, if you need assistance, you can call [facility] On Call Nurse [facility] Line for 24/7 assistance at [phone]    Thank you for choosing [facility] and allowing us to care for you.   From your care management team: Fransisca ANN,RSW & Jennifer KASPER RN,TN [phone] or [phone]     You should receive a call from [facility] Discharge Department within 48-72 hours to help manage your care after discharge. Please try to make sure that you answer your phone for this important phone call.     Follow-up Information     Yo Jay MD On 12/4/2017.    Specialty:  Family Medicine  Why:  Hospital Follow Up on Monday at 10:30am.   Contact information:  [address] [address]  [phone]             Rk Welsh MD. Go on 11/21/2017.    Specialties:  Internal Medicine, Oncology, Hematology and Oncology  Why:  For Appointment on Tuesday 11/21/2017 @ 12:20PM  Contact information:  [address]  [facility] [address]  [facility] [address]  [phone]             Kevin Dyson MD. Go on 12/8/2017.    Why:  For Appointment on Friday 12/8/2017 @ 2:30PM  Contact information:  [address] [address]  [phone]

## 2017-11-17 NOTE — PROGRESS NOTES
"TN arranged for hospital follow up appt with Dr. Welsh's office [phone], spoke to Bernadette. Hospital follow up arranged for pt to see MD on 11/21/2017 @ 12:20PM.    TN arranged for hospital follow up appt with Dr. Dyson's office [phone], spoke to Destiney. Hospital follow up arranged for pt to see MD on 12/8/2017 @ 2:30PM.     All information placed in "follow up" tab.  "

## 2017-11-17 NOTE — PLAN OF CARE
Problem: Pain, Acute (Adult)  Intervention: Mutually Develop/Implement Acute Pain Management Plan   11/17/17 0036   Pain/Comfort/Sleep Interventions   Pain Management Interventions care clustered;diversional activity provided;medication;pain management plan reviewed with patient/caregiver   Cognitive Interventions   Sensory Stimulation Regulation auditory stimulation minimized;lighting decreased;care clustered;quiet environment promoted     Intervention: Support/Optimize Psychosocial Response to Acute Pain   11/17/17 0036   Coping/Psychosocial Interventions   Supportive Measures active listening utilized;verbalization of feelings encouraged   Psychosocial Support   Trust Relationship/Rapport care explained       Goal: Identify Related Risk Factors and Signs and Symptoms  Related risk factors and signs and symptoms are identified upon initiation of Human Response Clinical Practice Guideline (CPG)   Outcome: Ongoing (interventions implemented as appropriate)   11/17/17 0036   Pain, Acute   Related Risk Factors (Acute Pain) disease process   Signs and Symptoms (Acute Pain) verbalization of pain descriptors     Goal: Acceptable Pain Control/Comfort Level  Patient will demonstrate the desired outcomes by discharge/transition of care.   Outcome: Ongoing (interventions implemented as appropriate)   11/17/17 0036   Pain, Acute (Adult)   Acceptable Pain Control/Comfort Level making progress toward outcome

## 2017-11-17 NOTE — PROGRESS NOTES
"SW reviewed follow up appointment information as well as  "Stomach disorders, Anemia, COPD discharge instructions" handout with patient using teach back while informing patient to concentrate on signs and symptoms to look for after discharge that would flag him that he needs to contact the doctor. Patient is in agreement and verbalized an understanding. Placed discharge information in blue discharge folder.  SW also reviewed patient responsibility checklist with him using teach back. Patient was able to verbalize his responsibilities after discharge to manage his care at home being   1. Going to follow up appointments   2.  rx from the pharmacy when discharged  3. Taking his medication as prescribed       "

## 2017-11-17 NOTE — PLAN OF CARE
Problem: Pressure Ulcer Risk (Blu Scale) (Adult,Obstetrics,Pediatric)  Intervention: Prevent/Manage Excess Moisture   11/17/17 1032 11/17/17 1637   Skin Interventions   Skin Protection --  incontinence pads utilized   Hygiene Care   Perineal Care absorbent pad changed --    Bathing/Skin Care bath, complete;dressed/undressed;incontinence care;linen changed --      Intervention: Maintain Head of Bed Elevation Less Than 30 Degrees as Tolerated   11/17/17 1030   Positioning   Head of Bed (HOB) HOB at 45 degrees     Intervention: Prevent/Minimize Sheer/Friction Injuries   11/17/17 1637   Skin Interventions   Pressure Reduction Techniques frequent weight shift encouraged     Intervention: Turn/Reposition Often   11/17/17 1030 11/17/17 1637   Positioning   Body Position positioned/repositioned independently --    Skin Interventions   Pressure Reduction Techniques --  frequent weight shift encouraged         Comments: Patient encouraged to turn frequently. Patient able to shift weight and reposition frequently.

## 2017-11-17 NOTE — PLAN OF CARE
Problem: Fall Risk (Adult)  Intervention: Monitor/Assist with Self Care   17 0740 17 1630   Daily Care Interventions   Self-Care Promotion independence encouraged;BADL personal objects within reach;BADL personal routines maintained --    Functional Level Current   Ambulation 2 - assistive person --    Transferring 2 - assistive person --    Toileting 2 - assistive person --    Bathing 2 - assistive person --    Dressing 2 - assistive person --    Eating 0 - independent --    Communication 0 - understands/communicates without difficulty --    Swallowing 0 - swallows foods/liquids without difficulty --    Activity   Activity Assistance Provided --  assistance, 1 person     Intervention: Reduce Risk/Promote Restraint Free Environment   17 1633   Safety Interventions   Environmental Safety Modification assistive device/personal items within reach;clutter free environment maintained   Prevent  Drop/Fall   Safety/Security Measures bed alarm set     Intervention: Review Medications/Identify Contributors to Fall Risk   17 1633   Safety Interventions   Medication Review/Management medications reviewed;high risk medications identified     Intervention: Patient Rounds   17 1630   Safety Interventions   Patient Rounds bed in low position;bed wheels locked;call light in reach;clutter free environment maintained;ID band on;placement of personal items at bedside;toileting offered;visualized patient     Intervention: Safety Promotion/Fall Prevention   17 163   Safety Interventions   Safety Promotion/Fall Prevention assistive device/personal item within reach;bed alarm set;commode/urinal/bedpan at bedside;Fall Risk reviewed with patient/family;high risk medications identified;nonskid shoes/socks when out of bed;side rails raised x 2;instructed to call staff for mobility         Problem: Patient Care Overview  Goal: Plan of Care Review   17 1030   Coping/Psychosocial   Plan Of Care  Reviewed With patient       Comments: Patient educated to use call light each time patient wants to ambulate. Patient verbalized understanding.

## 2017-11-18 LAB
ANION GAP SERPL CALC-SCNC: 7 MMOL/L
ANISOCYTOSIS BLD QL SMEAR: SLIGHT
BASOPHILS # BLD AUTO: 0.05 K/UL
BASOPHILS NFR BLD: 0.6 %
BUN SERPL-MCNC: <2 MG/DL
CALCIUM SERPL-MCNC: 8.3 MG/DL
CHLORIDE SERPL-SCNC: 103 MMOL/L
CO2 SERPL-SCNC: 34 MMOL/L
CREAT SERPL-MCNC: 0.7 MG/DL
DIFFERENTIAL METHOD: ABNORMAL
EOSINOPHIL # BLD AUTO: 0.3 K/UL
EOSINOPHIL NFR BLD: 3.1 %
ERYTHROCYTE [DISTWIDTH] IN BLOOD BY AUTOMATED COUNT: 18.3 %
EST. GFR  (AFRICAN AMERICAN): >60 ML/MIN/1.73 M^2
EST. GFR  (NON AFRICAN AMERICAN): >60 ML/MIN/1.73 M^2
GLUCOSE SERPL-MCNC: 122 MG/DL
HCT VFR BLD AUTO: 29 %
HGB BLD-MCNC: 8.2 G/DL
HGB BLD-MCNC: 8.6 G/DL
HYPOCHROMIA BLD QL SMEAR: ABNORMAL
LYMPHOCYTES # BLD AUTO: 0.8 K/UL
LYMPHOCYTES NFR BLD: 9 %
MCH RBC QN AUTO: 23.2 PG
MCHC RBC AUTO-ENTMCNC: 28.3 G/DL
MCV RBC AUTO: 82 FL
MONOCYTES # BLD AUTO: 0.6 K/UL
MONOCYTES NFR BLD: 7.1 %
NEUTROPHILS # BLD AUTO: 7.2 K/UL
NEUTROPHILS NFR BLD: 80.8 %
OVALOCYTES BLD QL SMEAR: ABNORMAL
PLATELET # BLD AUTO: 269 K/UL
PLATELET BLD QL SMEAR: ABNORMAL
PMV BLD AUTO: 9.3 FL
POTASSIUM SERPL-SCNC: 3 MMOL/L
RBC # BLD AUTO: 3.53 M/UL
SODIUM SERPL-SCNC: 144 MMOL/L
TARGETS BLD QL SMEAR: ABNORMAL
WBC # BLD AUTO: 8.97 K/UL

## 2017-11-18 PROCEDURE — 94640 AIRWAY INHALATION TREATMENT: CPT

## 2017-11-18 PROCEDURE — 25000003 PHARM REV CODE 250: Performed by: EMERGENCY MEDICINE

## 2017-11-18 PROCEDURE — 85018 HEMOGLOBIN: CPT

## 2017-11-18 PROCEDURE — 27000190 HC CPAP FULL FACE MASK W/VALVE

## 2017-11-18 PROCEDURE — 36415 COLL VENOUS BLD VENIPUNCTURE: CPT

## 2017-11-18 PROCEDURE — 94660 CPAP INITIATION&MGMT: CPT

## 2017-11-18 PROCEDURE — 80048 BASIC METABOLIC PNL TOTAL CA: CPT

## 2017-11-18 PROCEDURE — 85025 COMPLETE CBC W/AUTO DIFF WBC: CPT

## 2017-11-18 PROCEDURE — 99900035 HC TECH TIME PER 15 MIN (STAT)

## 2017-11-18 PROCEDURE — 25000242 PHARM REV CODE 250 ALT 637 W/ HCPCS: Performed by: INTERNAL MEDICINE

## 2017-11-18 PROCEDURE — 27000221 HC OXYGEN, UP TO 24 HOURS

## 2017-11-18 PROCEDURE — 21400001 HC TELEMETRY ROOM

## 2017-11-18 PROCEDURE — 25000003 PHARM REV CODE 250: Performed by: INTERNAL MEDICINE

## 2017-11-18 RX ADMIN — FLUTICASONE FUROATE AND VILANTEROL TRIFENATATE 1 PUFF: 100; 25 POWDER RESPIRATORY (INHALATION) at 08:11

## 2017-11-18 RX ADMIN — NYSTATIN 500000 UNITS: 500000 SUSPENSION ORAL at 06:11

## 2017-11-18 RX ADMIN — NYSTATIN 500000 UNITS: 500000 SUSPENSION ORAL at 05:11

## 2017-11-18 RX ADMIN — HYDROCODONE BITARTRATE AND ACETAMINOPHEN 1 TABLET: 5; 325 TABLET ORAL at 08:11

## 2017-11-18 RX ADMIN — IPRATROPIUM BROMIDE AND ALBUTEROL SULFATE 3 ML: .5; 3 SOLUTION RESPIRATORY (INHALATION) at 07:11

## 2017-11-18 RX ADMIN — IPRATROPIUM BROMIDE AND ALBUTEROL SULFATE 3 ML: .5; 3 SOLUTION RESPIRATORY (INHALATION) at 01:11

## 2017-11-18 RX ADMIN — NYSTATIN 500000 UNITS: 500000 SUSPENSION ORAL at 12:11

## 2017-11-18 RX ADMIN — IPRATROPIUM BROMIDE AND ALBUTEROL SULFATE 3 ML: .5; 3 SOLUTION RESPIRATORY (INHALATION) at 08:11

## 2017-11-18 RX ADMIN — IPRATROPIUM BROMIDE AND ALBUTEROL SULFATE 3 ML: .5; 3 SOLUTION RESPIRATORY (INHALATION) at 12:11

## 2017-11-18 RX ADMIN — ALPRAZOLAM 1 MG: 0.5 TABLET ORAL at 09:11

## 2017-11-18 RX ADMIN — ALPRAZOLAM 1 MG: 0.5 TABLET ORAL at 10:11

## 2017-11-18 RX ADMIN — NYSTATIN 500000 UNITS: 500000 SUSPENSION ORAL at 11:11

## 2017-11-18 RX ADMIN — DUTASTERIDE 0.5 MG: 0.5 CAPSULE, LIQUID FILLED ORAL at 10:11

## 2017-11-18 NOTE — PLAN OF CARE
Problem: Fall Risk (Adult)  Intervention: Review Medications/Identify Contributors to Fall Risk   11/18/17 1550   Safety Interventions   Medication Review/Management medications reviewed     Intervention: Patient Rounds   11/18/17 1550   Safety Interventions   Patient Rounds bed in low position;bed wheels locked;call light in reach;placement of personal items at bedside;ID band on;clutter free environment maintained;visualized patient;toileting offered     Intervention: Safety Promotion/Fall Prevention   11/18/17 1550   Safety Interventions   Safety Promotion/Fall Prevention bed alarm set;side rails raised x 2;medications reviewed       Goal: Absence of Falls  Patient will demonstrate the desired outcomes by discharge/transition of care.    11/18/17 1550   Fall Risk (Adult)   Absence of Falls making progress toward outcome       Problem: Patient Care Overview  Goal: Plan of Care Review   11/18/17 1550   Coping/Psychosocial   Plan Of Care Reviewed With patient     Goal: Interdisciplinary Rounds/Family Conf   11/18/17 1550   Interdisciplinary Rounds/Family Conf   Participants nursing;patient       Problem: Pain, Acute (Adult)  Intervention: Monitor/Manage Analgesia   11/18/17 1550   Safety Interventions   Medication Review/Management medications reviewed     Intervention: Mutually Develop/Implement Acute Pain Management Plan   11/18/17 1550   Cognitive Interventions   Sensory Stimulation Regulation lighting decreased;care clustered;quiet environment promoted     Intervention: Support/Optimize Psychosocial Response to Acute Pain   11/18/17 1550   Psychosocial Support   Trust Relationship/Rapport emotional support provided       Goal: Acceptable Pain Control/Comfort Level  Patient will demonstrate the desired outcomes by discharge/transition of care.    11/18/17 1550   Pain, Acute (Adult)   Acceptable Pain Control/Comfort Level making progress toward outcome

## 2017-11-18 NOTE — PROGRESS NOTES
Progress Note    Admit Date: 11/11/2017   LOS: 6 days     SUBJECTIVE:     Follow-up For:  Abdominal pain    11/17/2017: continue to have moderate neck and throat pain.   11/16/2017: dysphagia and neck pain. + fatigue.   11/15/2017: States doing ok, I woke him up.   11/14//2017: No fever or chills. Tolerating IV ABX   11/13/2017: Abdominal pain better. Received 2 units of prbc. No fever or chills. No BM. + fever       Scheduled Meds:   albuterol-ipratropium 2.5mg-0.5mg/3mL  3 mL Nebulization Q4H    ciprofloxacin  400 mg Intravenous Q12H    dutasteride  0.5 mg Oral Daily    fluticasone-vilanterol  1 puff Inhalation Daily    metronidazole  500 mg Intravenous Q8H    nystatin  500,000 Units Oral QID    vancomycin (VANCOCIN) IVPB  1,000 mg Intravenous Q12H     Continuous Infusions:   dextrose 5 % and 0.9 % NaCl 150 mL/hr at 11/17/17 1958     PRN Meds:sodium chloride, acetaminophen, ALPRAZolam, hydrocodone-acetaminophen 5-325mg, ondansetron, pneumoc 13-yadira conj-dip cr(PF)    Review of patient's allergies indicates:  No Known Allergies    Review of Systems    Constitutional: states doing ok  Respiratory: see HPI  Cardiovascular: no cp.   Gastrointestinal: intermittent abdominal pain   Hematologic/Lymphatic: see HPI    OBJECTIVE:     Vital Signs (Most Recent)  Temp: 98.3 °F (36.8 °C) (11/17/17 1944)  Pulse: 82 (11/17/17 1946)  Resp: (!) 22 (11/17/17 1946)  BP: 125/61 (11/17/17 1944)  SpO2: 95 % (11/17/17 1946)    Vital Signs Range (Last 24H):  Temp:  [97.3 °F (36.3 °C)-98.5 °F (36.9 °C)]   Pulse:  []   Resp:  [16-22]   BP: (103-161)/(53-69)   SpO2:  [93 %-100 %]     I & O (Last 24H):    Intake/Output Summary (Last 24 hours) at 11/17/17 2059  Last data filed at 11/17/17 1843   Gross per 24 hour   Intake             4790 ml   Output             1575 ml   Net             3215 ml     Physical Exam:    General: NAD  Head: normocephalic, atraumatic   Eyes: conjunctivae pink, anicteric sclera.  Throat: No erythema or  post nasal discharge   Neck: supple, no LAD   Lungs: expiratory wheezing bilaterally   Heart: S1, S2, RRR   Abdomen:  soft, + BX   Extremities: no cyanosis or edema.   Skin: turgor normal, no erythema or rashes.   MS:  No ROM difficulty  Neuro: A&O x 3    Psy: calm       Laboratory:  CBC:     Recent Labs  Lab 11/17/17 0427 11/17/17 1937   WBC 7.72  --   --    RBC 3.33*  --   --    HGB 7.9*  < > 8.2*   HCT 27.4*  --   --      --   --    MCV 82  --   --    MCH 23.7*  --   --    MCHC 28.8*  --   --    < > = values in this interval not displayed.  CMP:   Recent Labs  Lab 11/11/17  1613  11/17/17 0427   *  < > 113*   CALCIUM 9.5  < > 8.4*   ALBUMIN 3.4*  --   --    PROT 6.8  --   --      < > 143   K 3.5  < > 2.9*   CO2 27  < > 38*     < > 102   BUN 13  < > <2*   CREATININE 1.0  < > 0.7   ALKPHOS 81  --   --    ALT 14  --   --    AST 19  --   --    BILITOT 0.7  --   --    < > = values in this interval not displayed.  Coagulation: No results for input(s): LABPROT, INR, APTT in the last 168 hours.  Cardiac markers:     Recent Labs  Lab 11/12/17 0423   TROPONINI <0.006     ABGs: No results for input(s): PH, PCO2, PO2, HCO3, POCSATURATED, BE in the last 168 hours.  Microbiology Results (last 7 days)     Procedure Component Value Units Date/Time    Blood culture [[id number]] Collected:  11/15/17 0623    Order Status:  Completed Specimen:  Blood Updated:  11/17/17 0903     Blood Culture, Routine No Growth to date     Blood Culture, Routine No Growth to date     Blood Culture, Routine No Growth to date    Blood culture [[id number]] Collected:  11/11/17 1955    Order Status:  Completed Specimen:  Blood from Peripheral, Antecubital, Right Updated:  11/16/17 2103     Blood Culture, Routine No growth after 5 days.    Stool culture **CANNOT BE ORDERED STAT** [[id number]] Collected:  11/13/17 0904    Order Status:  Completed Specimen:  Stool from Stool Updated:  11/16/17 0840     Stool Culture No  Salmonella,Shigella,Vibrio,Campylobacter,Yersinia isolated.    Clostridium difficile EIA [[id number]] Collected:  11/14/17 2330    Order Status:  Completed Specimen:  Stool from Stool Updated:  11/15/17 1103     C. diff Antigen Negative     C difficile Toxins A+B, EIA Negative     Comment: Testing not recommended for children <24 months old.       Blood culture [[id number]] Collected:  11/11/17 2010    Order Status:  Completed Specimen:  Blood from Peripheral, Forearm, Right Updated:  11/15/17 0744     Blood Culture, Routine Gram stain aer bottle: Gram positive cocci in clusters resembling Staph     Blood Culture, Routine Results called to and read back by: Annemarie Lombardo  11/13/2017  14:11     Blood Culture, Routine --     COAGULASE-NEGATIVE STAPHYLOCOCCUS SPECIES  Organism is a probable contaminant      E. coli 0157 antigen [[id number]] Collected:  11/13/17 0904    Order Status:  Canceled Specimen:  Stool from Stool Updated:  11/13/17 0917    Urine culture [[id number]] Collected:  11/11/17 1720    Order Status:  Completed Specimen:  Urine from Urine, Clean Catch Updated:  11/13/17 0855     Urine Culture, Routine No growth        Specimen (12h ago through future)    None          Recent Labs  Lab 11/11/17  1720   COLORU Tiffani   SPECGRAV 1.015   PHUR 5.0   PROTEINUA 1+*   BACTERIA None   NITRITE Negative   LEUKOCYTESUR Negative   UROBILINOGEN Negative   HYALINECASTS 0       Diagnostic Results:      ASSESSMENT/PLAN:      *Generalized abdominal pain [R10.84]     - with nausea and diarrhea x 10 times  - pt was taking steroid for copd  -- CT non specific  - stool for occult blood: negative   - C.diff negative   - cipro & Flagyl : dc   - improving      Yes    Polycythemia vera [D45]   - pt has been getting therapeutic phlebotomy but suspect gi blood loss-  negative occult blood  - discussed with daughter        Yes    Microcytic anemia [D50.9]     - drop in Hg  - s/p 2 units prb  - had NL BM  -denies excess fatigue  -  monitor cbc closely      Yes    Chronic fatigue [R53.82]  - multifactorial     Yes    Vomiting and diarrhea [R11.10, R19.7]  - resolved      Yes    Chronic obstructive pulmonary disease [J44.9]     -stable    - RT eval for BiPAP at night       Yes    BPH (benign prostatic hyperplasia) [N40.0]   Yes     G+ Bacteremia:coagulace Negative Staph:  started Vancomycin   - repeat blood cultures negative   - dc Vanc     Oral candida and neck pain: CT neck: negative for acute pathology    - Nystatin  Swish and swallow       Rk Welsh M.D  Internal Medicine & Geriatric Medicine  Hematology & Oncology  Palliative Medicine    [address] [place] [address], [address]  [place], [place] [id number] [phone] (Office)  [phone] (Fax)

## 2017-11-18 NOTE — ASSESSMENT & PLAN NOTE
Stools firming up  Appears improving - but he is not yet eating.  Stopped IV dextrose to stimulate appetite  Eating better and asking to go home

## 2017-11-18 NOTE — ASSESSMENT & PLAN NOTE
cxr shows severe emphysema  On breo and combivent - at home will be albuterol, breo, incruze  Lungs are very congested  He has a bipap machine at bedside; he has not been using - says it is too powerful  He tells me that a bipap has been arranged for him to  from [facility] (but we cannot find orders here.  Since he is not actually using it at present, he can discuss this with pulm at followup)

## 2017-11-18 NOTE — SUBJECTIVE & OBJECTIVE
Interval History: improving    Review of Systems   Stools firming  No appetite    Objective:     Vital Signs (Most Recent):  Temp: 98.6 °F (37 °C) (11/18/17 0752)  Pulse: 76 (11/18/17 0809)  Resp: (!) 26 (11/18/17 0809)  BP: (!) 155/70 (11/18/17 0752)  SpO2: 98 % (11/18/17 0809) Vital Signs (24h Range):  Temp:  [97.4 °F (36.3 °C)-98.6 °F (37 °C)] 98.6 °F (37 °C)  Pulse:  [76-93] 76  Resp:  [16-26] 26  SpO2:  [93 %-98 %] 98 %  BP: (125-161)/(58-70) 155/70     Weight: 59.6 kg (131 lb 6.3 oz)  Body mass index is 20.58 kg/m².    Intake/Output Summary (Last 24 hours) at 11/18/17 1052  Last data filed at 11/17/17 2300   Gross per 24 hour   Intake           2522.5 ml   Output             1025 ml   Net           1497.5 ml      Physical Exam  Resting in bed  No acute distress  Sl hoarse  Lungs very coarse and scant wheezes bilaterally  Heart rrr  abd benign  Legs no edema    Stop IVF to stimulate appetite  Hopeful DC home tomorrow if continues improvement and eating

## 2017-11-18 NOTE — HOSPITAL COURSE
11/13/2017: Abdominal pain better. Received 2 units of prbc. No fever or chills. No BM. + fever  11/14//2017: No fever or chills. Tolerating IV ABX   11/15/2017: States doing ok  11/16/2017: dysphagia and neck pain. + fatigue.   11/17/2017: continue to have moderate neck and throat pain.

## 2017-11-18 NOTE — PLAN OF CARE
Problem: Fall Risk (Adult)  Goal: Absence of Falls  Patient will demonstrate the desired outcomes by discharge/transition of care.   Outcome: Ongoing (interventions implemented as appropriate)   11/18/17 0539   Fall Risk (Adult)   Absence of Falls making progress toward outcome       Problem: Patient Care Overview  Goal: Plan of Care Review  Outcome: Ongoing (interventions implemented as appropriate)   11/18/17 0539   Coping/Psychosocial   Plan Of Care Reviewed With patient       Problem: Pain, Acute (Adult)  Goal: Acceptable Pain Control/Comfort Level  Patient will demonstrate the desired outcomes by discharge/transition of care.   Outcome: Ongoing (interventions implemented as appropriate)   11/18/17 0539   Pain, Acute (Adult)   Acceptable Pain Control/Comfort Level making progress toward outcome       Problem: Infection, Risk/Actual (Adult)  Goal: Infection Prevention/Resolution  Patient will demonstrate the desired outcomes by discharge/transition of care.   Outcome: Ongoing (interventions implemented as appropriate)   11/18/17 0539   Infection, Risk/Actual (Adult)   Infection Prevention/Resolution making progress toward outcome       Problem: Diarrhea (Adult)  Goal: Improved/Reduced Symptoms  Patient will demonstrate the desired outcomes by discharge/transition of care.   Outcome: Ongoing (interventions implemented as appropriate)   11/18/17 0539   Diarrhea (Adult)   Improved/Reduced Symptoms making progress toward outcome       Problem: Pressure Ulcer Risk (Blu Scale) (Adult,Obstetrics,Pediatric)  Goal: Skin Integrity  Patient will demonstrate the desired outcomes by discharge/transition of care.   Outcome: Ongoing (interventions implemented as appropriate)   11/18/17 0539   Pressure Ulcer Risk (Blu Scale) (Adult,Obstetrics,Pediatric)   Skin Integrity making progress toward outcome       Problem: Breathing Pattern Ineffective (Adult)  Goal: Effective Oxygenation/Ventilation  Patient will demonstrate the  desired outcomes by discharge/transition of care.   Outcome: Ongoing (interventions implemented as appropriate)   11/18/17 0501   Breathing Pattern Ineffective (Adult)   Effective Oxygenation/Ventilation making progress toward outcome

## 2017-11-18 NOTE — HPI
68 YO WM followed by Dr. Welsh with Myeloproliferative neoplasm- Polycythemia Vera, oxygen dependent COPD and HTN. He was in his usual state of health until about 3 days prior to admit. He began to have generalized abdominal pain associated with vomiting x 1 and diarrhea x 10 in 24 hours. Generalized fatigue and weakness. No fever or chills. Pt was recently tx with oral ABX for COPD exacerbation, bronchiitis and finger injury. No excess sob.

## 2017-11-18 NOTE — ASSESSMENT & PLAN NOTE
CT abd unrevealing  Had been on steroids for COPD  Was treated with cipro and flagyl - c diff neg, so stopped  Pain mostly resolved; he only has mild intermittent episodes and feels ready to go home

## 2017-11-19 VITALS
WEIGHT: 133.63 LBS | BODY MASS INDEX: 20.97 KG/M2 | HEART RATE: 93 BPM | SYSTOLIC BLOOD PRESSURE: 146 MMHG | HEIGHT: 67 IN | DIASTOLIC BLOOD PRESSURE: 74 MMHG | TEMPERATURE: 98 F | OXYGEN SATURATION: 96 % | RESPIRATION RATE: 20 BRPM

## 2017-11-19 LAB
ANION GAP SERPL CALC-SCNC: 7 MMOL/L
ANISOCYTOSIS BLD QL SMEAR: SLIGHT
BASOPHILS # BLD AUTO: 0.05 K/UL
BASOPHILS NFR BLD: 0.6 %
BUN SERPL-MCNC: 2 MG/DL
CALCIUM SERPL-MCNC: 8.4 MG/DL
CHLORIDE SERPL-SCNC: 99 MMOL/L
CO2 SERPL-SCNC: 38 MMOL/L
CREAT SERPL-MCNC: 0.6 MG/DL
DIFFERENTIAL METHOD: ABNORMAL
EOSINOPHIL # BLD AUTO: 0.3 K/UL
EOSINOPHIL NFR BLD: 3.2 %
ERYTHROCYTE [DISTWIDTH] IN BLOOD BY AUTOMATED COUNT: 18.1 %
EST. GFR  (AFRICAN AMERICAN): >60 ML/MIN/1.73 M^2
EST. GFR  (NON AFRICAN AMERICAN): >60 ML/MIN/1.73 M^2
GLUCOSE SERPL-MCNC: 78 MG/DL
HCT VFR BLD AUTO: 30 %
HGB BLD-MCNC: 8.6 G/DL
HYPOCHROMIA BLD QL SMEAR: ABNORMAL
LYMPHOCYTES # BLD AUTO: 1.4 K/UL
LYMPHOCYTES NFR BLD: 17.6 %
MCH RBC QN AUTO: 23.7 PG
MCHC RBC AUTO-ENTMCNC: 28.7 G/DL
MCV RBC AUTO: 83 FL
MONOCYTES # BLD AUTO: 0.7 K/UL
MONOCYTES NFR BLD: 8.8 %
NEUTROPHILS # BLD AUTO: 5.4 K/UL
NEUTROPHILS NFR BLD: 70.7 %
OVALOCYTES BLD QL SMEAR: ABNORMAL
PLATELET # BLD AUTO: 335 K/UL
PMV BLD AUTO: 9.1 FL
POTASSIUM SERPL-SCNC: 2.8 MMOL/L
RBC # BLD AUTO: 3.63 M/UL
SODIUM SERPL-SCNC: 144 MMOL/L
WBC # BLD AUTO: 7.77 K/UL

## 2017-11-19 PROCEDURE — 36415 COLL VENOUS BLD VENIPUNCTURE: CPT

## 2017-11-19 PROCEDURE — 94761 N-INVAS EAR/PLS OXIMETRY MLT: CPT

## 2017-11-19 PROCEDURE — 80048 BASIC METABOLIC PNL TOTAL CA: CPT

## 2017-11-19 PROCEDURE — 94640 AIRWAY INHALATION TREATMENT: CPT

## 2017-11-19 PROCEDURE — 25000003 PHARM REV CODE 250: Performed by: INTERNAL MEDICINE

## 2017-11-19 PROCEDURE — 27000221 HC OXYGEN, UP TO 24 HOURS

## 2017-11-19 PROCEDURE — 25000003 PHARM REV CODE 250: Performed by: EMERGENCY MEDICINE

## 2017-11-19 PROCEDURE — 85025 COMPLETE CBC W/AUTO DIFF WBC: CPT

## 2017-11-19 PROCEDURE — 25000242 PHARM REV CODE 250 ALT 637 W/ HCPCS: Performed by: INTERNAL MEDICINE

## 2017-11-19 RX ORDER — ACETAMINOPHEN 325 MG/1
650 TABLET ORAL EVERY 6 HOURS PRN
Refills: 0
Start: 2017-11-19 | End: 2018-12-06 | Stop reason: SDUPTHER

## 2017-11-19 RX ORDER — NYSTATIN 100000 [USP'U]/ML
500000 SUSPENSION ORAL 4 TIMES DAILY
Qty: 140 ML | Refills: 0 | Status: SHIPPED | OUTPATIENT
Start: 2017-11-19 | End: 2017-11-26

## 2017-11-19 RX ADMIN — FLUTICASONE FUROATE AND VILANTEROL TRIFENATATE 1 PUFF: 100; 25 POWDER RESPIRATORY (INHALATION) at 07:11

## 2017-11-19 RX ADMIN — ALPRAZOLAM 1 MG: 0.5 TABLET ORAL at 09:11

## 2017-11-19 RX ADMIN — NYSTATIN 500000 UNITS: 500000 SUSPENSION ORAL at 05:11

## 2017-11-19 RX ADMIN — HYDROCODONE BITARTRATE AND ACETAMINOPHEN 1 TABLET: 5; 325 TABLET ORAL at 10:11

## 2017-11-19 RX ADMIN — DUTASTERIDE 0.5 MG: 0.5 CAPSULE, LIQUID FILLED ORAL at 09:11

## 2017-11-19 RX ADMIN — IPRATROPIUM BROMIDE AND ALBUTEROL SULFATE 3 ML: .5; 3 SOLUTION RESPIRATORY (INHALATION) at 12:11

## 2017-11-19 RX ADMIN — NYSTATIN 500000 UNITS: 500000 SUSPENSION ORAL at 12:11

## 2017-11-19 RX ADMIN — IPRATROPIUM BROMIDE AND ALBUTEROL SULFATE 3 ML: .5; 3 SOLUTION RESPIRATORY (INHALATION) at 07:11

## 2017-11-19 NOTE — PLAN OF CARE
Problem: Patient Care Overview  Goal: Plan of Care Review  Outcome: Ongoing (interventions implemented as appropriate)   11/19/17 0448   Coping/Psychosocial   Plan Of Care Reviewed With patient       Problem: Pain, Acute (Adult)  Goal: Acceptable Pain Control/Comfort Level  Patient will demonstrate the desired outcomes by discharge/transition of care.   Outcome: Ongoing (interventions implemented as appropriate)   11/19/17 0448   Pain, Acute (Adult)   Acceptable Pain Control/Comfort Level making progress toward outcome       Problem: Cardiac: ACS (Acute Coronary Syndrome) (Adult)  Goal: Signs and Symptoms of Listed Potential Problems Will be Absent, Minimized or Managed (Cardiac: ACS)  Signs and symptoms of listed potential problems will be absent, minimized or managed by discharge/transition of care (reference Cardiac: ACS (Acute Coronary Syndrome) (Adult) [facility]).   Outcome: Ongoing (interventions implemented as appropriate)   11/19/17 0448   Cardiac: ACS (Acute Coronary Syndrome)   Problems Assessed (Acute Coronary Syndrome) hemodynamic instability   Problems Present (Acute Coronary Syndrome) hemodynamic instability       Problem: Infection, Risk/Actual (Adult)  Goal: Infection Prevention/Resolution  Patient will demonstrate the desired outcomes by discharge/transition of care.   Outcome: Ongoing (interventions implemented as appropriate)   11/19/17 0448   Infection, Risk/Actual (Adult)   Infection Prevention/Resolution making progress toward outcome       Problem: Diarrhea (Adult)  Goal: Improved/Reduced Symptoms  Patient will demonstrate the desired outcomes by discharge/transition of care.   Outcome: Ongoing (interventions implemented as appropriate)   11/19/17 0448   Diarrhea (Adult)   Improved/Reduced Symptoms making progress toward outcome       Problem: Pressure Ulcer Risk (Blu Scale) (Adult,Obstetrics,Pediatric)  Goal: Skin Integrity  Patient will demonstrate the desired outcomes by discharge/transition  of care.   Outcome: Ongoing (interventions implemented as appropriate)   11/19/17 0448   Pressure Ulcer Risk (Blu Scale) (Adult,Obstetrics,Pediatric)   Skin Integrity making progress toward outcome       Problem: Breathing Pattern Ineffective (Adult)  Goal: Anxiety/Fear Reduction  Patient will demonstrate the desired outcomes by discharge/transition of care.   Outcome: Ongoing (interventions implemented as appropriate)   11/19/17 0448   Breathing Pattern Ineffective (Adult)   Anxiety/Fear Reduction making progress toward outcome

## 2017-11-19 NOTE — PLAN OF CARE
11/19/17 1237   Final Note   Assessment Type Final Discharge Note   Discharge Disposition Home   What phone number can be called within the next 1-3 days to see how you are doing after discharge? ([phone] )   Hospital Follow Up  Appt(s) scheduled? Yes   Discharge plans and expectations educations in teach back method with documentation complete? Yes   Right Care Referral Info   Post Acute Recommendation No Care

## 2017-11-19 NOTE — PROGRESS NOTES
aaox3  Denies any needs and no distress noted.  Respirations even and unlabored.  VSS.  IV and tele dc'd.  Personal O2 at bedside.  Waiting for family to arrive to dc pt home.

## 2017-11-19 NOTE — NURSING
Pt refuses to get out of bed to sit in chair. Informed pt of benefits of getting out of bed and pt continues to refuse.

## 2017-11-19 NOTE — DISCHARGE SUMMARY
[facility] Hospital Medicine  Discharge Summary      Patient Name: Chaz Stuart  MRN: [id number]  Admission Date: 11/11/2017  Hospital Length of Stay: 8 days  Discharge Date and Time: 11/19/17  Attending Physician: Rk Welsh MD   Discharging Provider: Raul Card MD  Primary Care Provider: Yo Jay MD      HPI:   66 YO WM followed by Dr. Welsh with Myeloproliferative neoplasm- Polycythemia Vera, oxygen dependent COPD and HTN. He was in his usual state of health until about 3 days prior to admit. He began to have generalized abdominal pain associated with vomiting x 1 and diarrhea x 10 in 24 hours. Generalized fatigue and weakness. No fever or chills. Pt was recently tx with oral ABX for COPD exacerbation, bronchiitis and finger injury. No excess sob.        * No surgery found *      Hospital Course:     11/13/2017: Abdominal pain better. Received 2 units of prbc. No fever or chills. No BM. + fever  11/14//2017: No fever or chills. Tolerating IV ABX   11/15/2017: States doing ok  11/16/2017: dysphagia and neck pain. + fatigue.   11/17/2017: continue to have moderate neck and throat pain.    As of today, 11/19, his abdominal pain has mostly resolved.  He is eating again and feels ready for DC.  He will have followup with Dr. Welsh and with pulmonary.    Consults:     * Generalized abdominal pain    CT abd unrevealing  Had been on steroids for COPD  Was treated with cipro and flagyl - c diff neg, so stopped  Pain mostly resolved; he only has mild intermittent episodes and feels ready to go home        Chronic fatigue    Multifactorial  Anemia and chronic illness          Microcytic anemia    hct 29  mcv 82          Polycythemia vera    Rbc 3.5          Vomiting and diarrhea    Stools firming up  Appears improving - but he is not yet eating.  Stopped IV dextrose to stimulate appetite  Eating better and asking to go home        Chronic obstructive pulmonary disease    cxr  shows severe emphysema  On breo and combivent - at home will be albuterol, breo, incruze  Lungs are very congested  He has a bipap machine at bedside; he has not been using - says it is too powerful  He tells me that a bipap has been arranged for him to  from [facility] (but we cannot find orders here.  Since he is not actually using it at present, he can discuss this with pulm at followup)        BPH (benign prostatic hyperplasia)    Does not appear problematic at present            Final Active Diagnoses:    Diagnosis Date Noted POA    PRINCIPAL PROBLEM:  Generalized abdominal pain [R10.84] 11/12/2017 Yes    Polycythemia vera [D45] 11/12/2017 Yes    Microcytic anemia [D50.9] 11/12/2017 Yes    Chronic fatigue [R53.82] 11/12/2017 Yes    Vomiting and diarrhea [R11.10, R19.7] 11/11/2017 Yes    Chronic obstructive pulmonary disease [J44.9] 10/20/2017 Yes    BPH (benign prostatic hyperplasia) [N40.0] 12/06/2012 Yes      Problems Resolved During this Admission:    Diagnosis Date Noted Date Resolved POA       Discharged Condition: good;  Presenting symptoms resolved  He told the nurse he feels well enough to go home and cut his grass...    Disposition: Home or Self Care    Follow Up:  Follow-up Information     Yo Jay MD On 12/4/2017.    Specialty:  Family Medicine  Why:  Hospital Follow Up on Monday at 10:30am.   Contact information:  [address]  [place] [address]  [phone]             Rk Welsh MD. Go on 11/21/2017.    Specialties:  Internal Medicine, Oncology, Hematology and Oncology  Why:  For Appointment on Tuesday 11/21/2017 @ 12:20PM  Contact information:  [address]  [facility] [address]  [facility] [address]  [phone]             Kevin Dyson MD. Go on 12/8/2017.    Why:  For Appointment on Friday 12/8/2017 @ 2:30PM  Contact information:  [address] [id number] [phone]               Patient Instructions:   No discharge procedures on  file.    Significant Diagnostic Studies:   Comparison made to CT abdomen and pelvis 11/29/16 and a right upper quadrant ultrasound 8/29/17.  Please note that the lack of intravenous contrast limits evaluation of soft tissue and vascular structures. Study is somewhat limited by beam hardening with streak artifact from overlying monitoring leads and also respiratory motion.    The lung bases show centrilobular emphysematous change without focal consolidation or pleural fluid.  The visualized heart and pericardium are unremarkable.      Cholecystectomy. Spleen is enlarged without focal process seen. Noncontrast appearance of the liver, pancreas, stomach, duodenum and bilateral adrenal glands are within normal limits. No biliary ductal dilatation.    The kidneys are stable in shape, size, and location.  No radiodense calculus identified within the collecting systems on outside or urinary bladder. Bilateral renal vascular calcifications noted. No hydronephrosis. Interval increased nonspecific perinephric stranding on the left. Left renal lower pole subcentimeter low-attenuation cortical focus which is too small to characterize. The ureters are normal in course and caliber.  The urinary bladder is suboptimal in distended.  Prostate and seminal vesicles are within normal limits.    No ascites or free air.  No lymphadenopathy.    The appendix and terminal ileum appear normal.  Scattered colonic diverticula without focal diverticulitis. Postsurgical changes are again noted of the distal rectosigmoid, without obstruction. The ascending colon and proximal transverse colon are nondistended. The small and large loops of bowel are normal in caliber without focal wall thickening or adjacent fat stranding.      There is marked atherosclerotic calcification of the aorta and its branches. The aorta is ectatic but non-aneurysmal.    The osseous structures appear grossly stable without acute process seen.    CT neck:  The nasopharynx,  oropharynx, hypopharynx, larynx, and visualized trachea are patent.  The vallecula and piriform sinuses appear unremarkable.  No mass or fluid collection identified.    No pathologically enlarged cervical lymph nodes.  Salivary glands are unremarkable.  Thyroid is unremarkable.  No infiltration of retrobulbar fat.  Calcified plaque noted at the bilateral carotid bulbs resulting in less than 50% stenosis.   Partial evaluation of intracranial structures and orbits is unremarkable.  Note made of multilevel degenerative changes of the cervical spine, status post fusion at C5-C6.    There is severe biapical emphysema with large bullae.    Cxr:  Cardiac silhouette is normal and stable in size.  Mediastinal structures are midline.  Lungs are symmetrically expanded.  Chronic severe emphysematous changes are visualized throughout the lungs with stable chronic asymmetric interstitial prominence seen within the right lower lung zone.  No evidence of new focal consolidative process, pneumothorax, or significant effusion.  Bones show DJD.  No free air visualized beneath the diaphragm.    Pending Diagnostic Studies:     None         Medications:     Medication List      START taking these medications    acetaminophen 325 MG tablet  Commonly known as:  TYLENOL  Take 2 tablets (650 mg total) by mouth every 6 (six) hours as needed.     nystatin 100,000 unit/mL suspension  Commonly known as:  MYCOSTATIN  Take 5 mLs (500,000 Units total) by mouth 4 (four) times daily.        CONTINUE taking these medications    albuterol 90 mcg/actuation inhaler  Commonly known as:  VENTOLIN HFA  Inhale 2 puffs into the lungs every 6 (six) hours as needed.     ALPRAZolam 1 MG tablet  Commonly known as:  XANAX  Take 1 tablet (1 mg total) by mouth 3 (three) times daily as needed for Anxiety.     amLODIPine 5 MG tablet  Commonly known as:  NORVASC  Take 1 tablet (5 mg total) by mouth once daily.     aspirin 81 MG EC tablet  Commonly known as:   ECOTRIN  Take 1 tablet (81 mg total) by mouth once daily.     dutasteride 0.5 mg capsule  Commonly known as:  AVODART  Take 1 capsule (0.5 mg total) by mouth once daily.     hydrocodone-acetaminophen 5-325mg 5-325 mg per tablet  Commonly known as:  NORCO  Take 1 tablet by mouth every 6 (six) hours as needed for Pain.     hydroxyurea 500 mg Cap  Commonly known as:  HYDREA  Take 1 capsule (500 mg total) by mouth once daily.     lactulose 10 gram/15 mL solution  Commonly known as:  CHRONULAC     SYMBICORT 160-4.5 mcg/actuation Hfaa  Generic drug:  budesonide-formoterol 160-4.5 mcg  Inhale 2 puffs into the lungs 2 (two) times daily.     umeclidinium 62.5 mcg/actuation Dsdv  Commonly known as:  INCRUSE ELLIPTA  Inhale 1 puff into the lungs once daily. Controller        STOP taking these medications    albuterol-ipratropium 2.5mg-0.5mg/3mL 0.5 mg-3 mg(2.5 mg base)/3 mL nebulizer solution  Commonly known as:  DUO-NEB     sulfamethoxazole-trimethoprim 800-160mg 800-160 mg Tab  Commonly known as:  BACTRIM DS     tiotropium-olodaterol 2.5-2.5 mcg/actuation Mist  Commonly known as:  STIOLTO RESPIMAT           Where to Get Your Medications      These medications were sent to [facility] [address] [address]  [address] [address] [address]    Phone:  [phone]   · nystatin 100,000 unit/mL suspension     Information about where to get these medications is not yet available    Ask your nurse or doctor about these medications  · acetaminophen 325 MG tablet         Indwelling Lines/Drains at time of discharge:   Lines/Drains/Airways          No matching active lines, drains, or airways          Raul Card MD  Department of Hospital Medicine  [facility]

## 2017-11-20 ENCOUNTER — PATIENT OUTREACH (OUTPATIENT)
Dept: ADMINISTRATIVE | Facility: CLINIC | Age: 67
End: 2017-11-20

## 2017-11-20 LAB — BACTERIA BLD CULT: NORMAL

## 2017-11-20 NOTE — Clinical Note
Please forward this important TCC information to your provider in order to maximize the post discharge care delivery of this patient.  C3 nurse spoke with Chaz Stuart  for a TCC post hospital discharge follow up call. The patient has a scheduled HOSFU appointment with Yo Jay MD on 12/4 @ 1030. Respectfully, Savannah Murdock RN  [facility]   [email or web address]     Please do not reply to this message, as this inbox is not routinely monitored.

## 2017-11-21 NOTE — PHYSICIAN QUERY
PT Name: Chaz Stuart  MR #: [id number]     Physician Query Form - Etiology of Condition Clarification      CDS/: Rosey TSANG, RN, CCDS              Contact information: [email or web address]  This form is a permanent document in the medical record.     Query Date: November 21, 2017    By submitting this query, we are merely seeking further clarification of documentation.  Please utilize your independent clinical judgment when addressing the question(s) below.     The medical record contains the following:    Findings Supporting Clinical Information Location in Medical Record     Generalized abdominal pain, vomiting and diarrhea   Mr. Stuart is a pleasant 68 YO gentleman with Myeloproliferative neoplasm- Polycythemia Vera, oxygen dependent COPD and HTN. He was in his usual state of health until about 3 days ago. He began to have generalized abdominal pain associated with vomiting x 1 and diarrhea x 10 in 24 hours. Generalized fatigue and weakness. No fever or chills. Pt was recently tx with oral ABX for COPD exacerbation, bronchiitis and finger injury. No excess sob.     * Generalized abdominal pain    CT abd unrevealing  Had been on steroids for COPD  Was treated with cipro and flagyl - c diff neg, so stopped  Pain mostly resolved; he only has mild intermittent episodes and feels ready to go home    Vomiting and diarrhea    Stools firming up  Appears improving - but he is not yet eating.  Stopped IV dextrose to stimulate appetite  Eating better and asking to go home       Internal Medicine H&P 11/12/17                  Hospital Medicine Discharge Summary 11/19/17     Please document your best medical opinion regarding the etiology of __Generalized abdominal pain, vomiting and diarrhea__ for which the primary focus of treatment is/was directed.                         [   x ]  Clinically Undetermined    Please document in your progress notes daily for the duration of treatment, until resolved, and  include in your discharge summary.

## 2017-12-01 PROBLEM — J44.1 COPD WITH EXACERBATION: Status: ACTIVE | Noted: 2017-12-01

## 2017-12-01 PROBLEM — Z09 HOSPITAL DISCHARGE FOLLOW-UP: Status: ACTIVE | Noted: 2017-12-01

## 2017-12-04 ENCOUNTER — OFFICE VISIT (OUTPATIENT)
Dept: FAMILY MEDICINE | Facility: CLINIC | Age: 67
End: 2017-12-04
Payer: MEDICARE

## 2017-12-04 VITALS
OXYGEN SATURATION: 98 % | WEIGHT: 120.56 LBS | HEART RATE: 93 BPM | BODY MASS INDEX: 18.92 KG/M2 | DIASTOLIC BLOOD PRESSURE: 70 MMHG | SYSTOLIC BLOOD PRESSURE: 138 MMHG | HEIGHT: 67 IN | TEMPERATURE: 98 F | RESPIRATION RATE: 16 BRPM

## 2017-12-04 DIAGNOSIS — J43.1 PANLOBULAR EMPHYSEMA: ICD-10-CM

## 2017-12-04 DIAGNOSIS — J96.10 CHRONIC RESPIRATORY FAILURE, UNSPECIFIED WHETHER WITH HYPOXIA OR HYPERCAPNIA: Primary | ICD-10-CM

## 2017-12-04 DIAGNOSIS — G47.30 SLEEP APNEA, UNSPECIFIED TYPE: ICD-10-CM

## 2017-12-04 PROBLEM — J20.9 ACUTE BRONCHITIS: Status: RESOLVED | Noted: 2017-10-20 | Resolved: 2017-12-04

## 2017-12-04 PROBLEM — D45 POLYCYTHEMIA VERA: Status: RESOLVED | Noted: 2017-11-12 | Resolved: 2017-12-04

## 2017-12-04 PROBLEM — R11.10 VOMITING AND DIARRHEA: Status: RESOLVED | Noted: 2017-11-11 | Resolved: 2017-12-04

## 2017-12-04 PROBLEM — R19.7 VOMITING AND DIARRHEA: Status: RESOLVED | Noted: 2017-11-11 | Resolved: 2017-12-04

## 2017-12-04 PROBLEM — J44.9 CHRONIC OBSTRUCTIVE PULMONARY DISEASE: Status: RESOLVED | Noted: 2017-10-20 | Resolved: 2017-12-04

## 2017-12-04 PROBLEM — J44.1 COPD WITH EXACERBATION: Status: RESOLVED | Noted: 2017-12-01 | Resolved: 2017-12-04

## 2017-12-04 PROBLEM — D45 POLYCYTHEMIA VERA: Status: RESOLVED | Noted: 2017-05-31 | Resolved: 2017-12-04

## 2017-12-04 PROCEDURE — 99214 OFFICE O/P EST MOD 30 MIN: CPT | Mod: S$GLB,,, | Performed by: FAMILY MEDICINE

## 2017-12-04 PROCEDURE — 99999 PR PBB SHADOW E&M-EST. PATIENT-LVL III: CPT | Mod: PBBFAC,,, | Performed by: FAMILY MEDICINE

## 2017-12-04 NOTE — PROGRESS NOTES
Chief Complaint   Patient presents with    Hospital Follow Up       SUBJECTIVE:  Chaz Stuart is a 67 y.o. male here for follow up of hospital and doing much better, no issues and he is going to need some home health and oxygen.  Currently has co-morbidities including per problem list.      Social History   Substance Use Topics    Smoking status: Former Smoker     Packs/day: 3.00     Years: 10.00     Quit date: 12/8/2009    Smokeless tobacco: Never Used    Alcohol use No       Patient Active Problem List    Diagnosis Date Noted    Generalized abdominal pain 11/12/2017    Microcytic anemia 11/12/2017    Chronic fatigue 11/12/2017    Sleep arousal disorder     Sleep apnea     Hand injuries, right, sequela 10/20/2017    COPD exacerbation 08/29/2017    Injury of finger of right hand 08/28/2017     S/p surgical repair 9/17, lawnmower amputation  Dr. Mcqueen done  09/27/2017  Need to get in OT      MPN (myeloproliferative neoplasm) 05/31/2017     Seen by Heme/Oncology      HTN, goal below 140/90 05/31/2017    Elevated LFT 05/23/2017    Thrombocythemia 05/23/2017    Therapeutic procedure 05/09/2017     Gets phlebotomy for this at Dr. montesinos regularly      Hypercalcemia 06/17/2016    Body mass index (BMI) 21.0-21.9, adult 02/18/2016    Visual disturbance 11/19/2015    Secondary polycythemia 11/19/2015    Lumbar facet arthropathy 09/30/2014    Oxygen dependent 09/18/2014    Mixed dyslipidemia 03/27/2014    Postlaminectomy syndrome, cervical 09/11/2013    Lumbar spondylosis 09/11/2013    History of fusion of cervical spine 08/20/2013    Major depressive disorder, recurrent episode, moderate 08/20/2013    BPH (benign prostatic hyperplasia) 12/06/2012    Generalized anxiety disorder 12/06/2012    COPD (chronic obstructive pulmonary disease) 12/04/2012     Oxygen dependent now      GERD (gastroesophageal reflux disease) 12/04/2012       ROS    OBJECTIVE:  /70   Pulse 93   Temp  "98.2 °F (36.8 °C) (Oral)   Resp 16   Ht 5' 7" (1.702 m)   Wt 54.7 kg (120 lb 9.5 oz)   SpO2 98%   BMI 18.89 kg/m²     Wt Readings from Last 3 Encounters:   12/08/17 55.9 kg (123 lb 3.8 oz)   12/04/17 54.7 kg (120 lb 9.5 oz)   12/01/17 55.3 kg (122 lb)     BP Readings from Last 3 Encounters:   12/08/17 138/81   12/04/17 138/70   12/01/17 124/80       Chronically ill appearing, alert and oriented  Has oxygen  Chronic hypoxia       Review of old Records:  Reviewed per [facility]    Review of old labs:    Lab Results   Component Value Date    TSH 2.483 04/14/2015     Lab Results   Component Value Date    WBC 7.77 11/19/2017    HGB 8.6 (L) 11/19/2017    HCT 30.0 (L) 11/19/2017    MCV 83 11/19/2017     11/19/2017       Chemistry        Component Value Date/Time     11/19/2017 0437    K 2.8 (L) 11/19/2017 0437    CL 99 11/19/2017 0437    CO2 38 (H) 11/19/2017 0437    BUN 2 (L) 11/19/2017 0437    CREATININE 0.6 11/19/2017 0437    GLU 78 11/19/2017 0437        Component Value Date/Time    CALCIUM 8.4 (L) 11/19/2017 0437    ALKPHOS 81 11/11/2017 1613    AST 19 11/11/2017 1613    ALT 14 11/11/2017 1613    BILITOT 0.7 11/11/2017 1613    ESTGFRAFRICA >60 11/19/2017 0437    EGFRNONAA >60 11/19/2017 0437        Lab Results   Component Value Date    CHOL 194 07/19/2017    CHOL 235 (H) 06/21/2017    CHOL 171 04/26/2017     Lab Results   Component Value Date    HDL 43 07/19/2017    HDL 54 06/21/2017    HDL 67 04/26/2017     Lab Results   Component Value Date    LDLCALC 87 07/19/2017    LDLCALC 126 (H) 06/21/2017    LDLCALC 87 04/26/2017     Lab Results   Component Value Date    TRIG 322 (H) 07/19/2017    TRIG 273 (H) 06/21/2017    TRIG 84 04/26/2017     Lab Results   Component Value Date    CHOLHDL 2.4 12/27/2016    CHOLHDL 2.3 11/02/2016    CHOLHDL 2.1 06/08/2016         Review of old imaging:  n/a      ASSESSMENT:  Problem List Items Addressed This Visit     Sleep apnea    COPD (chronic obstructive pulmonary disease)    "   Other Visit Diagnoses     Chronic respiratory failure, unspecified whether with hypoxia or hypercapnia    -  Primary          ICD-10-CM ICD-9-CM   1. Chronic respiratory failure, unspecified whether with hypoxia or hypercapnia J96.10 518.83   2. Sleep apnea, unspecified type G47.30 780.57   3. Panlobular emphysema J43.1 492.8               PLAN:     continue with treatment  Get home helth if needed by the family  F/u from there.    Medication List with Changes/Refills   Current Medications    ACETAMINOPHEN (TYLENOL) 325 MG TABLET    Take 2 tablets (650 mg total) by mouth every 6 (six) hours as needed.    ALBUTEROL (VENTOLIN HFA) 90 MCG/ACTUATION INHALER    Inhale 2 puffs into the lungs every 6 (six) hours as needed.    ALPRAZOLAM (XANAX) 1 MG TABLET    Take 1 tablet (1 mg total) by mouth 3 (three) times daily as needed for Anxiety.    AMLODIPINE (NORVASC) 5 MG TABLET    Take 1 tablet (5 mg total) by mouth once daily.    ASPIRIN (ECOTRIN) 81 MG EC TABLET    Take 1 tablet (81 mg total) by mouth once daily.    DUTASTERIDE (AVODART) 0.5 MG CAPSULE    Take 1 capsule (0.5 mg total) by mouth once daily.    SYMBICORT 160-4.5 MCG/ACTUATION HFAA    Inhale 2 puffs into the lungs 2 (two) times daily.   Discontinued Medications    HYDROCODONE-ACETAMINOPHEN 5-325MG (NORCO) 5-325 MG PER TABLET    Take 1 tablet by mouth every 6 (six) hours as needed for Pain.    HYDROXYUREA (HYDREA) 500 MG CAP    Take 1 capsule (500 mg total) by mouth once daily.    LACTULOSE (CHRONULAC) 10 GRAM/15 ML SOLUTION        UMECLIDINIUM (INCRUSE ELLIPTA) 62.5 MCG/ACTUATION DSDV    Inhale 1 puff into the lungs once daily. Controller       No Follow-up on file.

## 2017-12-08 ENCOUNTER — OFFICE VISIT (OUTPATIENT)
Dept: SLEEP MEDICINE | Facility: CLINIC | Age: 67
End: 2017-12-08
Payer: MEDICARE

## 2017-12-08 VITALS
HEIGHT: 67 IN | SYSTOLIC BLOOD PRESSURE: 138 MMHG | DIASTOLIC BLOOD PRESSURE: 81 MMHG | OXYGEN SATURATION: 92 % | HEART RATE: 96 BPM | WEIGHT: 123.25 LBS | BODY MASS INDEX: 19.35 KG/M2

## 2017-12-08 DIAGNOSIS — J44.9 CHRONIC OBSTRUCTIVE PULMONARY DISEASE, UNSPECIFIED COPD TYPE: ICD-10-CM

## 2017-12-08 DIAGNOSIS — R06.83 PRIMARY SNORING: Primary | ICD-10-CM

## 2017-12-08 DIAGNOSIS — G47.23 IRREGULAR SLEEP-WAKE RHYTHM: ICD-10-CM

## 2017-12-08 PROCEDURE — 99999 PR PBB SHADOW E&M-EST. PATIENT-LVL III: CPT | Mod: PBBFAC,,, | Performed by: INTERNAL MEDICINE

## 2017-12-08 PROCEDURE — 99215 OFFICE O/P EST HI 40 MIN: CPT | Mod: S$GLB,,, | Performed by: INTERNAL MEDICINE

## 2017-12-08 NOTE — PROGRESS NOTES
Chaz Stuart  was seen as a new patient to me for the evaluation of blanka.    CHIEF COMPLAINT:    Chief Complaint   Patient presents with    Sleep Apnea     results        HISTORY OF PRESENT ILLNESS: Chaz Stuart is a 67 y.o. male is here for sleep evaluation.   Patient was seen by TAYLOR Pugh and sleep study was ordered.  S/p sleep study 10/25/17 but did not get follow up.  Patient tried bipap during hospitalization and slept well.  Patient lives alone and is not sure if he snores.  No witnessed apnea.  Feeling tire upon awake.  Patient is on home oxygen.  Currently on symbicort and ventolin.  Used to smoke up to 3 pack per day x 10 years or more.  +daytime sleepiness.  No parasomnia.  No cataplexy.      Raysal Sleepiness Scale score during initial sleep evaluation was 14.    SLEEP ROUTINE:  Activity the hour prior to sleep:   Watch tv    Bed partner:  alone  Time to bed:  7-8 pm   Lights off:  yes  Sleep onset latency:  Few minutes        Disruptions or awakenings:    3 times (difficulty going back to sleep)    Wakeup time:      3 am   Perceived sleep quality:  tire       Daytime naps:      60 minutes in the afternoon  Weekend sleep routine:      same  Caffeine use: none  exercise habit:   none      PAST MEDICAL HISTORY:    Active Ambulatory Problems     Diagnosis Date Noted    COPD (chronic obstructive pulmonary disease) 12/04/2012    GERD (gastroesophageal reflux disease) 12/04/2012    BPH (benign prostatic hyperplasia) 12/06/2012    Generalized anxiety disorder 12/06/2012    History of fusion of cervical spine 08/20/2013    Major depressive disorder, recurrent episode, moderate 08/20/2013    Postlaminectomy syndrome, cervical 09/11/2013    Lumbar spondylosis 09/11/2013    Mixed dyslipidemia 03/27/2014    Oxygen dependent 09/18/2014    Lumbar facet arthropathy 09/30/2014    Visual disturbance 11/19/2015    Secondary polycythemia 11/19/2015    Body mass index (BMI) 21.0-21.9, adult 02/18/2016     Hypercalcemia 06/17/2016    Therapeutic procedure 05/09/2017    Elevated LFT 05/23/2017    Thrombocythemia 05/23/2017    MPN (myeloproliferative neoplasm) 05/31/2017    HTN, goal below 140/90 05/31/2017    Injury of finger of right hand 08/28/2017    COPD exacerbation 08/29/2017    Hand injuries, right, sequela 10/20/2017    Sleep apnea     Sleep arousal disorder     Generalized abdominal pain 11/12/2017    Microcytic anemia 11/12/2017    Chronic fatigue 11/12/2017    Hospital discharge follow-up 12/01/2017     Resolved Ambulatory Problems     Diagnosis Date Noted    Diverticulosis 12/04/2012    Cognitive disorder 09/05/2014    Polycythemia 06/09/2016    Elevated blood pressure reading 01/19/2017    Leukocytosis 05/23/2017    Polycythemia vera 05/31/2017    Dehydration 06/21/2017    SOB (shortness of breath) 07/05/2017    Chemotherapy follow-up examination 07/05/2017    Injury of right hand 08/28/2017    Chronic obstructive pulmonary disease 08/28/2017    Anxiety 09/19/2017    Hospital discharge follow-up 09/19/2017    Chronic obstructive pulmonary disease 10/20/2017    Acute bronchitis 10/20/2017    Vomiting and diarrhea 11/11/2017    Polycythemia vera 11/12/2017    COPD with exacerbation 12/01/2017     Past Medical History:   Diagnosis Date    Anxiety     COPD (chronic obstructive pulmonary disease)     DDD (degenerative disc disease), cervical     Diverticulitis     GERD (gastroesophageal reflux disease)     Hypertension                 PAST SURGICAL HISTORY:    Past Surgical History:   Procedure Laterality Date    CERVICAL SPINE SURGERY  Fusion    CHOLECYSTECTOMY      COLON SURGERY      FINGER SURGERY           FAMILY HISTORY:                Family History   Problem Relation Age of Onset    Diabetes Mother     Heart disease Mother     Diabetes Father     Hypertension Father     Stroke Father     ADD / ADHD Neg Hx     Alcohol abuse Neg Hx     Anxiety disorder  Neg Hx     Bipolar disorder Neg Hx     Dementia Neg Hx     Depression Neg Hx     Drug abuse Neg Hx     OCD Neg Hx     Paranoid behavior Neg Hx     Physical abuse Neg Hx     Schizophrenia Neg Hx     Seizures Neg Hx     Self injury Neg Hx     Sexual abuse Neg Hx     Suicide Neg Hx     Melanoma Neg Hx        SOCIAL HISTORY:          Tobacco:   History   Smoking Status    Former Smoker    Packs/day: 3.00    Years: 10.00    Quit date: 12/8/2009   Smokeless Tobacco    Never Used       alcohol use:    History   Alcohol Use No                 Occupation:  Retire; former dispatcher    ALLERGIES:  Review of patient's allergies indicates:  No Known Allergies    CURRENT MEDICATIONS:    Current Outpatient Prescriptions   Medication Sig Dispense Refill    acetaminophen (TYLENOL) 325 MG tablet Take 2 tablets (650 mg total) by mouth every 6 (six) hours as needed.  0    albuterol (VENTOLIN HFA) 90 mcg/actuation inhaler Inhale 2 puffs into the lungs every 6 (six) hours as needed. 18 g 3    alprazolam (XANAX) 1 MG tablet Take 1 tablet (1 mg total) by mouth 3 (three) times daily as needed for Anxiety. 90 tablet 2    amlodipine (NORVASC) 5 MG tablet Take 1 tablet (5 mg total) by mouth once daily. 90 tablet 2    aspirin (ECOTRIN) 81 MG EC tablet Take 1 tablet (81 mg total) by mouth once daily. (Patient taking differently: Take 81 mg by mouth 2 (two) times daily. ) 150 tablet 6    dutasteride (AVODART) 0.5 mg capsule Take 1 capsule (0.5 mg total) by mouth once daily. 90 capsule 3    predniSONE (DELTASONE) 10 MG tablet Take 1 tablet (10 mg total) by mouth 2 (two) times daily. Only when severe sob/copd exacerbation 60 tablet 0    SYMBICORT 160-4.5 mcg/actuation HFAA Inhale 2 puffs into the lungs 2 (two) times daily. 10.2 g 3     No current facility-administered medications for this visit.                   REVIEW OF SYSTEMS:     Sleep related symptoms as per HPI.  CONST:Denies weight gain    HEENT: Denies sinus  "congestion  PULM: Denies dyspnea  CARD:  Denies palpitations   GI:  Denies acid reflux  : Denies polyuria  NEURO: Denies headaches  PSYCH: Denies mood disturbance  HEME: Denies anemia   Otherwise, a balance of systems reviewed is negative.          PHYSICAL EXAM:  Vitals:    12/08/17 1312   BP: 138/81   Pulse: 96   SpO2: (!) 92%   Weight: 55.9 kg (123 lb 3.8 oz)   Height: 5' 7" (1.702 m)   PainSc: 0-No pain     Body mass index is 19.3 kg/m².     GENERAL: Normal development, well groomed  HEENT:  Conjunctivae are non-erythematous; Pupils equal, round, and reactive to light; Nose is symmetrical; Nasal mucosa is pink and moist; Septum is midline; Inferior turbinates are normal; Nasal airflow is normal; Posterior pharynx is pink; Modified Mallampati: 4; Posterior palate is normal; Tonsils +1; Uvula is normal and pink;Tongue is normal; edentulous; No TMJ tenderness; Jaw opening and protrusion without click and without discomfort.  NECK: Supple.  No thyromegaly. No palpable nodes.     SKIN: On face and neck: No abrasions, no rashes, no lesions.  No subcutaneous nodules are palpable.  RESPIRATORY: decreased breath sound.  Normal chest expansion and non-labored breathing at rest.  CARDIOVASCULAR: Normal S1, S2.  No murmurs, gallops or rubs. No carotid bruits bilaterally.  EXTREMITIES: No edema. No clubbing. No cyanosis. Station normal. Gait normal.        NEURO/PSYCH: Oriented to time, place and person. Normal attention span and concentration. Affect is full. Mood is normal.                                              DATA   10/25/17 ahi of 0.3  CT chest 3/7/14 bilateral emphysematous changes at apex bilaterally   8/29/17 7/.48/41/75/30.5 on nasal canula.    pft none  Lab Results   Component Value Date    TSH 2.483 04/14/2015     ASSESSMENT    ICD-10-CM ICD-9-CM    1. Primary snoring R06.83 786.09    2. Chronic obstructive pulmonary disease, unspecified COPD type J44.9 496 PULM - Arterial Blood Gases--in addition to PFT " only      Complete PFT with bronchodilator   3. Irregular sleep-wake rhythm G47.23 327.39        PLAN:    Primary snoring - no evidence of blanka.  Does not qualify for cpap based upon ahi.  However, patient seem to have hypoventilation based upon HCO3.      Irregular sleep time - recommend avoid daytime napping.      Copd - Will send for abg and pft.  Will try to get bipap if there is evidence of hypoventilation.  Continue with albuterol, symbicort and pft.      Precautions: The patient was advised to abstain from driving should they feel sleepy or drowsy.     Thank you for allowing me the opportunity to participate in the care of your patient.    Patient will Return in about 3 months (around 3/8/2018). with md.    This is 50 minutes visit, over 50% of time spent in direct consultation with patient.

## 2017-12-12 ENCOUNTER — HOSPITAL ENCOUNTER (OUTPATIENT)
Dept: RESPIRATORY THERAPY | Facility: HOSPITAL | Age: 67
Discharge: HOME | End: 2017-12-12
Attending: INTERNAL MEDICINE
Payer: MEDICARE

## 2017-12-12 VITALS — HEART RATE: 86 BPM | RESPIRATION RATE: 20 BRPM | OXYGEN SATURATION: 98 %

## 2017-12-12 DIAGNOSIS — J44.9 CHRONIC OBSTRUCTIVE PULMONARY DISEASE, UNSPECIFIED COPD TYPE: ICD-10-CM

## 2017-12-12 LAB
ALLENS TEST: NORMAL
DELSYS: NORMAL
HCO3 UR-SCNC: 25.5 MMOL/L (ref 24–28)
MODE: NORMAL
PCO2 BLDA: 38.9 MMHG (ref 35–45)
PH SMN: 7.42 [PH] (ref 7.35–7.45)
PO2 BLDA: 81 MMHG (ref 80–100)
POC BE: 1 MMOL/L
POC SATURATED O2: 96 % (ref 95–100)
POC TCO2: 27 MMOL/L (ref 23–27)
SAMPLE: NORMAL
SITE: NORMAL

## 2017-12-12 PROCEDURE — 25000242 PHARM REV CODE 250 ALT 637 W/ HCPCS: Performed by: INTERNAL MEDICINE

## 2017-12-12 PROCEDURE — 94060 EVALUATION OF WHEEZING: CPT

## 2017-12-12 PROCEDURE — 82803 BLOOD GASES ANY COMBINATION: CPT

## 2017-12-12 PROCEDURE — 99900035 HC TECH TIME PER 15 MIN (STAT)

## 2017-12-12 PROCEDURE — 36600 WITHDRAWAL OF ARTERIAL BLOOD: CPT

## 2017-12-12 RX ORDER — ALBUTEROL SULFATE 2.5 MG/.5ML
2.5 SOLUTION RESPIRATORY (INHALATION) ONCE
Status: COMPLETED | OUTPATIENT
Start: 2017-12-12 | End: 2017-12-12

## 2017-12-12 RX ADMIN — ALBUTEROL SULFATE 2.5 MG: 2.5 SOLUTION RESPIRATORY (INHALATION) at 09:12

## 2017-12-12 NOTE — SIGNIFICANT EVENT
Results for INDRA ALTAMIRANO (MRN [id number]) as of 12/12/2017 09:47   Ref. Range 12/12/2017 09:26   POC PH Latest Ref Range: 7.35 - 7.45  7.424   POC PCO2 Latest Ref Range: 35 - 45 mmHg 38.9   POC PO2 Latest Ref Range: 80 - 100 mmHg 81   POC BE Latest Ref Range: -2 to 2 mmol/L 1   POC HCO3 Latest Ref Range: 24 - 28 mmol/L 25.5   POC SATURATED O2 Latest Ref Range: 95 - 100 % 96   POC TCO2 Latest Ref Range: 23 - 27 mmol/L 27   Sample Unknown ARTERIAL   DelSys Unknown Room Air   Allens Test Unknown Pass   Site Unknown LR   Mode Unknown SPONT

## 2017-12-13 PROBLEM — Z09 HOSPITAL DISCHARGE FOLLOW-UP: Status: RESOLVED | Noted: 2017-12-01 | Resolved: 2017-12-13

## 2018-01-03 DIAGNOSIS — F41.1 GENERALIZED ANXIETY DISORDER: Primary | ICD-10-CM

## 2018-01-03 NOTE — TELEPHONE ENCOUNTER
----- Message from Sabas Osullivan sent at 1/3/2018 10:41 AM CST -----  Contact: Self/[phone]  Refill:  alprazolam (XANAX) 1 MG tablet    Thank you.

## 2018-01-04 PROBLEM — D45 POLYCYTHEMIA VERA: Status: ACTIVE | Noted: 2018-01-04

## 2018-01-04 RX ORDER — ALPRAZOLAM 1 MG/1
TABLET ORAL
Qty: 90 TABLET | Refills: 1 | Status: ON HOLD | OUTPATIENT
Start: 2018-01-04 | End: 2018-02-18 | Stop reason: HOSPADM

## 2018-01-18 ENCOUNTER — DOCUMENTATION ONLY (OUTPATIENT)
Dept: REHABILITATION | Facility: HOSPITAL | Age: 68
End: 2018-01-18

## 2018-01-18 NOTE — PROGRESS NOTES
Occupational Therapy / Hand Therapy Discharge Summary    Patient:  Chaz Stuart  :  1950  AGE:  67 y.o.  Clinic #:  [id number]   Date of Note: 2018   Referring Physician:  Dr. Phillip Monson  Diagnosis: hand injury     Patient is a 67 y.o. male referred to Occupational Therapy by  with a referral for eval and treat.  Patient received initial evaluation on 10/25/17 and returned for 0 treatment sessions. Patient had 0 missed visits. Patient's treatment included evaluation, provision of HEP.    Patient's therapy goals were not met as pt did not return to therapy.      Patient is D/C from outpatient occupational therapy secondary to  plan of care and non attendance.

## 2018-02-16 ENCOUNTER — HOSPITAL ENCOUNTER (INPATIENT)
Facility: HOSPITAL | Age: 68
LOS: 2 days | Discharge: HOME | DRG: 191 | End: 2018-02-18
Attending: EMERGENCY MEDICINE | Admitting: INTERNAL MEDICINE
Payer: MEDICARE

## 2018-02-16 DIAGNOSIS — J98.4 PNEUMONITIS: ICD-10-CM

## 2018-02-16 DIAGNOSIS — D72.829 LEUKOCYTOSIS, UNSPECIFIED TYPE: ICD-10-CM

## 2018-02-16 DIAGNOSIS — R07.89 CHEST DISCOMFORT: ICD-10-CM

## 2018-02-16 DIAGNOSIS — J44.1 COPD WITH ACUTE EXACERBATION: Primary | ICD-10-CM

## 2018-02-16 LAB
ALBUMIN SERPL BCP-MCNC: 4.2 G/DL
ALP SERPL-CCNC: 114 U/L
ALT SERPL W/O P-5'-P-CCNC: 9 U/L
ANION GAP SERPL CALC-SCNC: 11 MMOL/L
ANISOCYTOSIS BLD QL SMEAR: SLIGHT
AST SERPL-CCNC: 20 U/L
BASOPHILS # BLD AUTO: 0.14 K/UL
BASOPHILS NFR BLD: 0.4 %
BILIRUB SERPL-MCNC: 0.3 MG/DL
BILIRUB UR QL STRIP: NEGATIVE
BNP SERPL-MCNC: 38 PG/ML
BUN SERPL-MCNC: 14 MG/DL
CALCIUM SERPL-MCNC: 10.6 MG/DL
CHLORIDE SERPL-SCNC: 103 MMOL/L
CLARITY UR: CLEAR
CO2 SERPL-SCNC: 28 MMOL/L
COLOR UR: YELLOW
CREAT SERPL-MCNC: 0.9 MG/DL
DACRYOCYTES BLD QL SMEAR: ABNORMAL
DIFFERENTIAL METHOD: ABNORMAL
DOHLE BOD BLD QL SMEAR: ABNORMAL
EOSINOPHIL # BLD AUTO: 0.3 K/UL
EOSINOPHIL NFR BLD: 0.8 %
ERYTHROCYTE [DISTWIDTH] IN BLOOD BY AUTOMATED COUNT: 21.9 %
EST. GFR  (AFRICAN AMERICAN): >60 ML/MIN/1.73 M^2
EST. GFR  (NON AFRICAN AMERICAN): >60 ML/MIN/1.73 M^2
FLUAV AG SPEC QL IA: NEGATIVE
FLUBV AG SPEC QL IA: NEGATIVE
GIANT PLATELETS BLD QL SMEAR: PRESENT
GLUCOSE SERPL-MCNC: 130 MG/DL
GLUCOSE UR QL STRIP: NEGATIVE
HCT VFR BLD AUTO: 41.5 %
HGB BLD-MCNC: 11.8 G/DL
HGB UR QL STRIP: NEGATIVE
HYPOCHROMIA BLD QL SMEAR: ABNORMAL
KETONES UR QL STRIP: NEGATIVE
LACTATE SERPL-SCNC: 1.6 MMOL/L
LEUKOCYTE ESTERASE UR QL STRIP: NEGATIVE
LYMPHOCYTES # BLD AUTO: 1.3 K/UL
LYMPHOCYTES NFR BLD: 3.8 %
MCH RBC QN AUTO: 18.2 PG
MCHC RBC AUTO-ENTMCNC: 28.4 G/DL
MCV RBC AUTO: 64 FL
MONOCYTES # BLD AUTO: 1.3 K/UL
MONOCYTES NFR BLD: 3.7 %
NEUTROPHILS # BLD AUTO: 30.8 K/UL
NEUTROPHILS NFR BLD: 91.3 %
NITRITE UR QL STRIP: NEGATIVE
OVALOCYTES BLD QL SMEAR: ABNORMAL
PH UR STRIP: 5 [PH] (ref 5–8)
PLATELET # BLD AUTO: 821 K/UL
PLATELET BLD QL SMEAR: ABNORMAL
PMV BLD AUTO: 9.6 FL
POIKILOCYTOSIS BLD QL SMEAR: SLIGHT
POLYCHROMASIA BLD QL SMEAR: ABNORMAL
POTASSIUM SERPL-SCNC: 4.2 MMOL/L
PROT SERPL-MCNC: 8.6 G/DL
PROT UR QL STRIP: NEGATIVE
RBC # BLD AUTO: 6.49 M/UL
SODIUM SERPL-SCNC: 142 MMOL/L
SP GR UR STRIP: 1.01 (ref 1–1.03)
SPECIMEN SOURCE: NORMAL
STOMATOCYTES BLD QL SMEAR: PRESENT
TARGETS BLD QL SMEAR: ABNORMAL
TOXIC GRANULES BLD QL SMEAR: PRESENT
TROPONIN I SERPL DL<=0.01 NG/ML-MCNC: <0.006 NG/ML
URN SPEC COLLECT METH UR: NORMAL
UROBILINOGEN UR STRIP-ACNC: NEGATIVE EU/DL
WBC # BLD AUTO: 33.89 K/UL

## 2018-02-16 PROCEDURE — 83605 ASSAY OF LACTIC ACID: CPT

## 2018-02-16 PROCEDURE — 21400001 HC TELEMETRY ROOM

## 2018-02-16 PROCEDURE — 87086 URINE CULTURE/COLONY COUNT: CPT

## 2018-02-16 PROCEDURE — 94761 N-INVAS EAR/PLS OXIMETRY MLT: CPT

## 2018-02-16 PROCEDURE — 87040 BLOOD CULTURE FOR BACTERIA: CPT

## 2018-02-16 PROCEDURE — 96367 TX/PROPH/DG ADDL SEQ IV INF: CPT

## 2018-02-16 PROCEDURE — 63600175 PHARM REV CODE 636 W HCPCS: Performed by: EMERGENCY MEDICINE

## 2018-02-16 PROCEDURE — 96365 THER/PROPH/DIAG IV INF INIT: CPT

## 2018-02-16 PROCEDURE — 27000221 HC OXYGEN, UP TO 24 HOURS

## 2018-02-16 PROCEDURE — 80053 COMPREHEN METABOLIC PANEL: CPT

## 2018-02-16 PROCEDURE — 25000242 PHARM REV CODE 250 ALT 637 W/ HCPCS: Performed by: EMERGENCY MEDICINE

## 2018-02-16 PROCEDURE — 93005 ELECTROCARDIOGRAM TRACING: CPT

## 2018-02-16 PROCEDURE — 87400 INFLUENZA A/B EACH AG IA: CPT | Mod: 59

## 2018-02-16 PROCEDURE — 81003 URINALYSIS AUTO W/O SCOPE: CPT

## 2018-02-16 PROCEDURE — 94640 AIRWAY INHALATION TREATMENT: CPT

## 2018-02-16 PROCEDURE — 84484 ASSAY OF TROPONIN QUANT: CPT

## 2018-02-16 PROCEDURE — 85025 COMPLETE CBC W/AUTO DIFF WBC: CPT

## 2018-02-16 PROCEDURE — 99285 EMERGENCY DEPT VISIT HI MDM: CPT | Mod: 25

## 2018-02-16 PROCEDURE — 96375 TX/PRO/DX INJ NEW DRUG ADDON: CPT

## 2018-02-16 PROCEDURE — 93010 ELECTROCARDIOGRAM REPORT: CPT | Mod: ,,, | Performed by: INTERNAL MEDICINE

## 2018-02-16 PROCEDURE — 83880 ASSAY OF NATRIURETIC PEPTIDE: CPT

## 2018-02-16 RX ORDER — ENOXAPARIN SODIUM 100 MG/ML
40 INJECTION SUBCUTANEOUS EVERY 24 HOURS
Status: DISCONTINUED | OUTPATIENT
Start: 2018-02-16 | End: 2018-02-18 | Stop reason: HOSPADM

## 2018-02-16 RX ORDER — CEFTRIAXONE 2 G/50ML
2 INJECTION, SOLUTION INTRAVENOUS
Status: COMPLETED | OUTPATIENT
Start: 2018-02-16 | End: 2018-02-16

## 2018-02-16 RX ORDER — METHYLPREDNISOLONE SOD SUCC 125 MG
125 VIAL (EA) INJECTION
Status: COMPLETED | OUTPATIENT
Start: 2018-02-16 | End: 2018-02-16

## 2018-02-16 RX ORDER — METHYLPREDNISOLONE SOD SUCC 125 MG
125 VIAL (EA) INJECTION EVERY 8 HOURS
Status: DISCONTINUED | OUTPATIENT
Start: 2018-02-16 | End: 2018-02-18 | Stop reason: HOSPADM

## 2018-02-16 RX ORDER — IPRATROPIUM BROMIDE AND ALBUTEROL SULFATE 2.5; .5 MG/3ML; MG/3ML
3 SOLUTION RESPIRATORY (INHALATION)
Status: COMPLETED | OUTPATIENT
Start: 2018-02-16 | End: 2018-02-16

## 2018-02-16 RX ORDER — IPRATROPIUM BROMIDE AND ALBUTEROL SULFATE 2.5; .5 MG/3ML; MG/3ML
3 SOLUTION RESPIRATORY (INHALATION) EVERY 4 HOURS
Status: DISCONTINUED | OUTPATIENT
Start: 2018-02-17 | End: 2018-02-18 | Stop reason: HOSPADM

## 2018-02-16 RX ORDER — SODIUM CHLORIDE 0.9 % (FLUSH) 0.9 %
3 SYRINGE (ML) INJECTION
Status: DISCONTINUED | OUTPATIENT
Start: 2018-02-16 | End: 2018-02-18 | Stop reason: HOSPADM

## 2018-02-16 RX ADMIN — IPRATROPIUM BROMIDE AND ALBUTEROL SULFATE 3 ML: .5; 2.5 SOLUTION RESPIRATORY (INHALATION) at 11:02

## 2018-02-16 RX ADMIN — IPRATROPIUM BROMIDE AND ALBUTEROL SULFATE 3 ML: .5; 2.5 SOLUTION RESPIRATORY (INHALATION) at 04:02

## 2018-02-16 RX ADMIN — ENOXAPARIN SODIUM 40 MG: 100 INJECTION SUBCUTANEOUS at 10:02

## 2018-02-16 RX ADMIN — METHYLPREDNISOLONE SODIUM SUCCINATE 125 MG: 125 INJECTION, POWDER, FOR SOLUTION INTRAMUSCULAR; INTRAVENOUS at 10:02

## 2018-02-16 RX ADMIN — METHYLPREDNISOLONE SODIUM SUCCINATE 125 MG: 125 INJECTION, POWDER, FOR SOLUTION INTRAMUSCULAR; INTRAVENOUS at 04:02

## 2018-02-16 RX ADMIN — CEFTRIAXONE 2 G: 2 INJECTION, SOLUTION INTRAVENOUS at 07:02

## 2018-02-16 RX ADMIN — AZITHROMYCIN MONOHYDRATE 500 MG: 500 INJECTION, POWDER, LYOPHILIZED, FOR SOLUTION INTRAVENOUS at 04:02

## 2018-02-16 NOTE — ED TRIAGE NOTES
Presented to ed with sob for about a week.   Treatment's at home.   Was given xanax 1mg.  Given last night.   Went to bed and cannot get out of this episode of sob.

## 2018-02-16 NOTE — ED PROVIDER NOTES
Encounter Date: 2/16/2018       History     Chief Complaint   Patient presents with    Shortness of Breath     states that he cant breathe and his nerves are shot. Breathing treatment today with no relief    Dizziness     Pt had about 1 week of progressive dyspnea. Had tried 10 mg prednisone in addition to albuterol q4 hrs for past 2 days. No relief of sx. On home o2 regularly. Had cough non productive of sputum. No f/c, no ill contacts. Had also stopped xanax about 2 weeks ago because it had been causing him itching.           Review of patient's allergies indicates:  No Known Allergies  Past Medical History:   Diagnosis Date    Anxiety     COPD (chronic obstructive pulmonary disease)     DDD (degenerative disc disease), cervical     Diverticulitis     GERD (gastroesophageal reflux disease)     Hypertension     Polycythemia vera 1 year     Past Surgical History:   Procedure Laterality Date    CERVICAL SPINE SURGERY  Fusion    CHOLECYSTECTOMY      COLON SURGERY      FINGER SURGERY       Family History   Problem Relation Age of Onset    Diabetes Mother     Heart disease Mother     Diabetes Father     Hypertension Father     Stroke Father     ADD / ADHD Neg Hx     Alcohol abuse Neg Hx     Anxiety disorder Neg Hx     Bipolar disorder Neg Hx     Dementia Neg Hx     Depression Neg Hx     Drug abuse Neg Hx     OCD Neg Hx     Paranoid behavior Neg Hx     Physical abuse Neg Hx     Schizophrenia Neg Hx     Seizures Neg Hx     Self injury Neg Hx     Sexual abuse Neg Hx     Suicide Neg Hx     Melanoma Neg Hx      Social History   Substance Use Topics    Smoking status: Former Smoker     Packs/day: 3.00     Years: 10.00     Quit date: 12/8/2009    Smokeless tobacco: Never Used      Comment: use to smoke  3-5 packs a day.     Alcohol use No     Review of Systems   Constitutional: Negative.    HENT: Positive for rhinorrhea. Negative for sore throat.    Eyes: Negative.    Respiratory: Positive  for cough, chest tightness, shortness of breath and wheezing.    Cardiovascular: Negative for chest pain, palpitations and leg swelling.   Gastrointestinal: Negative.    Endocrine: Negative.    Genitourinary: Negative.    Musculoskeletal: Negative.    Neurological: Negative.    Hematological: Negative.        Physical Exam     Initial Vitals [02/16/18 1515]   BP Pulse Resp Temp SpO2   (!) 171/82 98 (!) 22 98.1 °F (36.7 °C) 96 %      MAP       111.67         Physical Exam    Constitutional:   Thin, pursed lip breathing, tachypnea   HENT:   Head: Normocephalic and atraumatic.   Mouth/Throat: Oropharynx is clear and moist.   Eyes: EOM are normal. Pupils are equal, round, and reactive to light.   Neck: Normal range of motion. Neck supple.   Cardiovascular: Normal rate and regular rhythm. Exam reveals no friction rub.    No murmur heard.  Pulmonary/Chest: He has wheezes.   Reduced airflow,    Abdominal: Soft. There is no tenderness.   Musculoskeletal: He exhibits no tenderness.   Surgical changes to index and long finger of right hand.    Neurological: He is alert and oriented to person, place, and time. He has normal strength.   Skin: Skin is warm and dry. Capillary refill takes less than 2 seconds.         ED Course   Procedures  Labs Reviewed   CBC W/ AUTO DIFFERENTIAL - Abnormal; Notable for the following:        Result Value    WBC 33.89 (*)     RBC 6.49 (*)     Hemoglobin 11.8 (*)     MCV 64 (*)     MCH 18.2 (*)     MCHC 28.4 (*)     RDW 21.9 (*)     Platelets 821 (*)     Gran # (ANC) 30.8 (*)     Mono # 1.3 (*)     Gran% 91.3 (*)     Lymph% 3.8 (*)     Mono% 3.7 (*)     Platelet Estimate Increased (*)     All other components within normal limits   COMPREHENSIVE METABOLIC PANEL - Abnormal; Notable for the following:     Glucose 130 (*)     Calcium 10.6 (*)     Total Protein 8.6 (*)     ALT 9 (*)     All other components within normal limits   CULTURE, BLOOD   CULTURE, BLOOD   CULTURE, URINE   TROPONIN I    URINALYSIS   LACTIC ACID, PLASMA   B-TYPE NATRIURETIC PEPTIDE   LACTIC ACID, PLASMA   B-TYPE NATRIURETIC PEPTIDE     EKG Readings: (Independently Interpreted)   Initial Reading: No STEMI. Rhythm: Normal Sinus Rhythm. Heart Rate: 98. Ectopy: No Ectopy. ST Segments: Normal ST Segments. T Waves: Normal. Axis: Right Axis Deviation. Other Impression: right axis, anterior q waves.                                Clinical Impression:   The primary encounter diagnosis was COPD with acute exacerbation. Diagnoses of Chest discomfort, Leukocytosis, unspecified type, and Pneumonitis were also pertinent to this visit.                           John Doyle MD  02/16/18 1946

## 2018-02-16 NOTE — PROGRESS NOTES
02/16/18 1706   PRE-TX-O2-ETCO2   O2 Device (Oxygen Therapy) nasal cannula  (Pt uses 2lat home.)   $ Is the patient on Low Flow Oxygen? Yes   Flow (L/min) 2   Pulse Oximetry Type Continuous   $ Pulse Oximetry - Multiple Charge Pulse Oximetry - Multiple

## 2018-02-16 NOTE — PROGRESS NOTES
02/16/18 1656   Patient Assessment/Suction   Level of Consciousness (AVPU) alert   Respiratory Effort Unlabored   Expansion/Accessory Muscles/Retractions expansion symmetric   All Lung Fields Breath Sounds diminished;wheezes, expiratory   Rhythm/Pattern, Respiratory pattern regular   Cough Frequency frequent   Cough Type good;nonproductive   PRE-TX-O2-ETCO2   O2 Device (Oxygen Therapy) Aerosol Mask   SpO2 100 %   Pulse 88   Resp 18   Aerosol Therapy   $ Aerosol Therapy Charges Aerosol Treatment   Respiratory Treatment Status given   SVN/Inhaler Treatment Route mask   Position During Treatment Sitting in bed   Patient Tolerance good   Post-Treatment   Post-treatment Heart Rate (beats/min) 89   Post-treatment Resp Rate (breaths/min) 17   All Fields Breath Sounds unchanged

## 2018-02-16 NOTE — PROGRESS NOTES
02/16/18 1651   Patient Assessment/Suction   Level of Consciousness (AVPU) alert   Respiratory Effort Mild   Expansion/Accessory Muscles/Retractions no use of accessory muscles   All Lung Fields Breath Sounds diminished   Rhythm/Pattern, Respiratory pattern regular   Cough Frequency frequent   Cough Type good;nonproductive   PRE-TX-O2-ETCO2   O2 Device (Oxygen Therapy) Aerosol Mask   SpO2 100 %   Pulse Oximetry Type Continuous   $ Pulse Oximetry - Multiple Charge Pulse Oximetry - Multiple   Pulse 88   Resp 19   Aerosol Therapy   $ Aerosol Therapy Charges Aerosol Treatment   Respiratory Treatment Status given   SVN/Inhaler Treatment Route mask   Position During Treatment Sitting in bed   Patient Tolerance good   Post-Treatment   Post-treatment Heart Rate (beats/min) 88   Post-treatment Resp Rate (breaths/min) 18   All Fields Breath Sounds unchanged

## 2018-02-16 NOTE — PROGRESS NOTES
02/16/18 1646   Patient Assessment/Suction   Level of Consciousness (AVPU) alert   Respiratory Effort Grunting;On exertion   Expansion/Accessory Muscles/Retractions no use of accessory muscles   All Lung Fields Breath Sounds diminished   Rhythm/Pattern, Respiratory pattern regular   Cough Frequency frequent   Cough Type good;nonproductive   Is this patient in SNF or Inpatient Rehab? No   PRE-TX-O2-ETCO2   O2 Device (Oxygen Therapy) room air  (pt states he uses home oxygen 2l)   SpO2 96 %   Pulse Oximetry Type Continuous   $ Pulse Oximetry - Multiple Charge Pulse Oximetry - Multiple   Pulse 88   Resp 20   Aerosol Therapy   $ Aerosol Therapy Charges Aerosol Treatment   Respiratory Treatment Status given   SVN/Inhaler Treatment Route mask   Position During Treatment Sitting in bed   Patient Tolerance good   Post-Treatment   Post-treatment Heart Rate (beats/min) 87   Post-treatment Resp Rate (breaths/min) 18   All Fields Breath Sounds unchanged

## 2018-02-17 PROBLEM — L29.9 PRURITUS: Status: ACTIVE | Noted: 2018-02-17

## 2018-02-17 LAB
ANION GAP SERPL CALC-SCNC: 12 MMOL/L
ANISOCYTOSIS BLD QL SMEAR: SLIGHT
BASOPHILS # BLD AUTO: 0.04 K/UL
BASOPHILS NFR BLD: 0.1 %
BUN SERPL-MCNC: 17 MG/DL
CALCIUM SERPL-MCNC: 10.1 MG/DL
CHLORIDE SERPL-SCNC: 101 MMOL/L
CO2 SERPL-SCNC: 26 MMOL/L
CREAT SERPL-MCNC: 0.8 MG/DL
DIFFERENTIAL METHOD: ABNORMAL
EOSINOPHIL # BLD AUTO: 0 K/UL
EOSINOPHIL NFR BLD: 0 %
ERYTHROCYTE [DISTWIDTH] IN BLOOD BY AUTOMATED COUNT: 19.1 %
EST. GFR  (AFRICAN AMERICAN): >60 ML/MIN/1.73 M^2
EST. GFR  (NON AFRICAN AMERICAN): >60 ML/MIN/1.73 M^2
GLUCOSE SERPL-MCNC: 161 MG/DL
HCT VFR BLD AUTO: 41.5 %
HGB BLD-MCNC: 11.7 G/DL
HYPOCHROMIA BLD QL SMEAR: ABNORMAL
LYMPHOCYTES # BLD AUTO: 1 K/UL
LYMPHOCYTES NFR BLD: 2.7 %
MCH RBC QN AUTO: 18.5 PG
MCHC RBC AUTO-ENTMCNC: 28.2 G/DL
MCV RBC AUTO: 66 FL
MONOCYTES # BLD AUTO: 0.1 K/UL
MONOCYTES NFR BLD: 0.3 %
NEUTROPHILS # BLD AUTO: 34.2 K/UL
NEUTROPHILS NFR BLD: 97.6 %
OVALOCYTES BLD QL SMEAR: ABNORMAL
PLATELET # BLD AUTO: 772 K/UL
PLATELET BLD QL SMEAR: ABNORMAL
PMV BLD AUTO: 9.6 FL
POIKILOCYTOSIS BLD QL SMEAR: SLIGHT
POLYCHROMASIA BLD QL SMEAR: ABNORMAL
POTASSIUM SERPL-SCNC: 4.2 MMOL/L
RBC # BLD AUTO: 6.32 M/UL
SODIUM SERPL-SCNC: 139 MMOL/L
TARGETS BLD QL SMEAR: ABNORMAL
WBC # BLD AUTO: 35.35 K/UL

## 2018-02-17 PROCEDURE — 21400001 HC TELEMETRY ROOM

## 2018-02-17 PROCEDURE — 27000221 HC OXYGEN, UP TO 24 HOURS

## 2018-02-17 PROCEDURE — 63600175 PHARM REV CODE 636 W HCPCS: Performed by: EMERGENCY MEDICINE

## 2018-02-17 PROCEDURE — 80048 BASIC METABOLIC PNL TOTAL CA: CPT

## 2018-02-17 PROCEDURE — 36415 COLL VENOUS BLD VENIPUNCTURE: CPT

## 2018-02-17 PROCEDURE — 25000003 PHARM REV CODE 250

## 2018-02-17 PROCEDURE — 63600175 PHARM REV CODE 636 W HCPCS: Performed by: INTERNAL MEDICINE

## 2018-02-17 PROCEDURE — 94761 N-INVAS EAR/PLS OXIMETRY MLT: CPT

## 2018-02-17 PROCEDURE — 94640 AIRWAY INHALATION TREATMENT: CPT

## 2018-02-17 PROCEDURE — 25000242 PHARM REV CODE 250 ALT 637 W/ HCPCS: Performed by: EMERGENCY MEDICINE

## 2018-02-17 PROCEDURE — 25000003 PHARM REV CODE 250: Performed by: EMERGENCY MEDICINE

## 2018-02-17 PROCEDURE — 85025 COMPLETE CBC W/AUTO DIFF WBC: CPT

## 2018-02-17 RX ORDER — DUTASTERIDE 0.5 MG/1
0.5 CAPSULE, LIQUID FILLED ORAL DAILY
Status: DISCONTINUED | OUTPATIENT
Start: 2018-02-17 | End: 2018-02-18 | Stop reason: HOSPADM

## 2018-02-17 RX ORDER — DIPHENHYDRAMINE HYDROCHLORIDE 50 MG/ML
25 INJECTION INTRAMUSCULAR; INTRAVENOUS EVERY 6 HOURS PRN
Status: DISCONTINUED | OUTPATIENT
Start: 2018-02-17 | End: 2018-02-18 | Stop reason: HOSPADM

## 2018-02-17 RX ORDER — ASPIRIN 81 MG/1
81 TABLET ORAL 2 TIMES DAILY
Status: DISCONTINUED | OUTPATIENT
Start: 2018-02-17 | End: 2018-02-18 | Stop reason: HOSPADM

## 2018-02-17 RX ORDER — LORAZEPAM 0.5 MG/1
0.5 TABLET ORAL EVERY 6 HOURS PRN
Status: DISCONTINUED | OUTPATIENT
Start: 2018-02-17 | End: 2018-02-18 | Stop reason: HOSPADM

## 2018-02-17 RX ORDER — AMLODIPINE BESYLATE 5 MG/1
5 TABLET ORAL DAILY
Status: DISCONTINUED | OUTPATIENT
Start: 2018-02-17 | End: 2018-02-18 | Stop reason: HOSPADM

## 2018-02-17 RX ADMIN — ENOXAPARIN SODIUM 40 MG: 100 INJECTION SUBCUTANEOUS at 05:02

## 2018-02-17 RX ADMIN — METHYLPREDNISOLONE SODIUM SUCCINATE 125 MG: 125 INJECTION, POWDER, FOR SOLUTION INTRAMUSCULAR; INTRAVENOUS at 01:02

## 2018-02-17 RX ADMIN — DIPHENHYDRAMINE HYDROCHLORIDE 25 MG: 50 INJECTION INTRAMUSCULAR; INTRAVENOUS at 09:02

## 2018-02-17 RX ADMIN — ASPIRIN 81 MG: 81 TABLET, COATED ORAL at 09:02

## 2018-02-17 RX ADMIN — DUTASTERIDE 0.5 MG: 0.5 CAPSULE, LIQUID FILLED ORAL at 09:02

## 2018-02-17 RX ADMIN — IPRATROPIUM BROMIDE AND ALBUTEROL SULFATE 3 ML: .5; 2.5 SOLUTION RESPIRATORY (INHALATION) at 07:02

## 2018-02-17 RX ADMIN — IPRATROPIUM BROMIDE AND ALBUTEROL SULFATE 3 ML: .5; 2.5 SOLUTION RESPIRATORY (INHALATION) at 11:02

## 2018-02-17 RX ADMIN — AZITHROMYCIN MONOHYDRATE 500 MG: 500 INJECTION, POWDER, LYOPHILIZED, FOR SOLUTION INTRAVENOUS at 04:02

## 2018-02-17 RX ADMIN — AMLODIPINE BESYLATE 5 MG: 5 TABLET ORAL at 09:02

## 2018-02-17 RX ADMIN — IPRATROPIUM BROMIDE AND ALBUTEROL SULFATE 3 ML: .5; 2.5 SOLUTION RESPIRATORY (INHALATION) at 03:02

## 2018-02-17 RX ADMIN — IPRATROPIUM BROMIDE AND ALBUTEROL SULFATE 3 ML: .5; 2.5 SOLUTION RESPIRATORY (INHALATION) at 04:02

## 2018-02-17 RX ADMIN — DIPHENHYDRAMINE HYDROCHLORIDE 25 MG: 50 INJECTION INTRAMUSCULAR; INTRAVENOUS at 10:02

## 2018-02-17 RX ADMIN — CEFTRIAXONE 1 G: 1 INJECTION, SOLUTION INTRAVENOUS at 07:02

## 2018-02-17 RX ADMIN — METHYLPREDNISOLONE SODIUM SUCCINATE 125 MG: 125 INJECTION, POWDER, FOR SOLUTION INTRAMUSCULAR; INTRAVENOUS at 05:02

## 2018-02-17 RX ADMIN — LORAZEPAM 0.5 MG: 0.5 TABLET ORAL at 09:02

## 2018-02-17 RX ADMIN — METHYLPREDNISOLONE SODIUM SUCCINATE 125 MG: 125 INJECTION, POWDER, FOR SOLUTION INTRAMUSCULAR; INTRAVENOUS at 09:02

## 2018-02-17 NOTE — PLAN OF CARE
Problem: Anxiety (Adult)  Intervention: Promote Anxiety Reduction/Resolution   02/17/18 1006   Coping/Psychosocial Interventions   Supportive Measures active listening utilized;decision-making supported;problem solving facilitated;verbalization of feelings encouraged   Environmental Support calm environment promoted   Cognitive Interventions   Sensory Stimulation Regulation care clustered;lighting decreased;music/television provided for relaxation;quiet environment promoted       Goal: Identify Related Risk Factors and Signs and Symptoms  Related risk factors and signs and symptoms are identified upon initiation of Human Response Clinical Practice Guideline (CPG)  Outcome: Ongoing (interventions implemented as appropriate)   02/17/18 1006   Anxiety   Related Risk Factors (Anxiety) coping ineffective;health status change   Signs and Symptoms (Anxiety) nervousness/tension/restlessness     Goal: Reduction/Resolution  Patient will demonstrate the desired outcomes by discharge/transition of care.  Outcome: Ongoing (interventions implemented as appropriate)   02/17/18 1006   Anxiety (Adult)   Reduction/Resolution making progress toward outcome

## 2018-02-17 NOTE — SUBJECTIVE & OBJECTIVE
Past Medical History:   Diagnosis Date    Anxiety     COPD (chronic obstructive pulmonary disease)     DDD (degenerative disc disease), cervical     Diverticulitis     GERD (gastroesophageal reflux disease)     Hypertension     Polycythemia vera 1 year       Past Surgical History:   Procedure Laterality Date    CERVICAL SPINE SURGERY  Fusion    CHOLECYSTECTOMY      COLON SURGERY      FINGER SURGERY         Review of patient's allergies indicates:  No Known Allergies    No current facility-administered medications on file prior to encounter.      Current Outpatient Prescriptions on File Prior to Encounter   Medication Sig    acetaminophen (TYLENOL) 325 MG tablet Take 2 tablets (650 mg total) by mouth every 6 (six) hours as needed.    albuterol (VENTOLIN HFA) 90 mcg/actuation inhaler Inhale 2 puffs into the lungs every 6 (six) hours as needed.    ALPRAZolam (XANAX) 1 MG tablet TAKE 1 TABLET BY MOUTH THREE TIMES A DAY AS NEEDED FOR ANXIETY MAY CAUSE DROWSINESS.    amlodipine (NORVASC) 5 MG tablet Take 1 tablet (5 mg total) by mouth once daily.    aspirin (ECOTRIN) 81 MG EC tablet Take 1 tablet (81 mg total) by mouth once daily. (Patient taking differently: Take 81 mg by mouth 2 (two) times daily. )    SYMBICORT 160-4.5 mcg/actuation HFAA Inhale 2 puffs into the lungs 2 (two) times daily.    dutasteride (AVODART) 0.5 mg capsule Take 1 capsule (0.5 mg total) by mouth once daily.     Family History     Problem Relation (Age of Onset)    Diabetes Mother, Father    Heart disease Mother    Hypertension Father    Stroke Father        Social History Main Topics    Smoking status: Former Smoker     Packs/day: 3.00     Years: 10.00     Quit date: 12/8/2009    Smokeless tobacco: Never Used      Comment: use to smoke  3-5 packs a day.     Alcohol use No    Drug use: No    Sexual activity: No     Review of Systems   Resumed itching after xanax taken    Objective:     Vital Signs (Most Recent):  Temp: 98.1 °F  (36.7 °C) (02/17/18 1124)  Pulse: 88 (02/17/18 1124)  Resp: 18 (02/17/18 1124)  BP: 139/64 (02/17/18 1124)  SpO2: 98 % (02/17/18 1124) Vital Signs (24h Range):  Temp:  [97.3 °F (36.3 °C)-98.7 °F (37.1 °C)] 98.1 °F (36.7 °C)  Pulse:  [76-98] 88  Resp:  [16-22] 18  SpO2:  [96 %-100 %] 98 %  BP: (117-171)/(56-85) 139/64     Weight: 56.7 kg (125 lb)  Body mass index is 19.58 kg/m².    Physical Exam   he is awake, alert, and conversant  No in acute respiratory distress  Talking without dyspnea  Lungs sound ok, no significant wheezes  Heart rrr  abd soft, BS+  Legs no edema    Will continue treatments here overnight, but anticipate he will be ready for dc tomorrow

## 2018-02-17 NOTE — HPI
68 yo WM followed by Dr. Welsh for myeloproliferative neoplasm with polycythemia vera (last visit Jan 4) as well as panlobular emphysema (followed by pulm), comes in for exacerbation of his COPD.  He has had about 2 weeks of progressively worsening dyspnea.  He uses Symbicort and albuterol at home, and also has po prednisone (has been taking 10 mg daily) - these were ineffective, so he came to ER.  He has anxiety and has been on Xanax - however, he now says the pills are causing him to itch.   He says he used to take Valium for years, then he switched PCP to Dr. Heredia.  Dr. HENLEY refused to give him further Valium and instead referred him to psych, who converted the Valium to Xanax.  The Xanax apparently made him itch, to the point where he went to derm and had skin biopsies done - neg per pt.  He was forced to stop the Xanax, and has been off of it for some time.  However, recently, he began to have worsening anxiety, so he took a Xanax tab, and the itching resumed.  This will be discontinued.  Will try Ativan. Giving benadryl for itch.

## 2018-02-17 NOTE — PLAN OF CARE
".TN completed discharge needs assessment. TN provided and reviewed with patient "[facility] My Health Packet" , "Help At Home" TN discussed with patient the things the patient is responsible for to manage patient's  healthcare at home. Patient verbalized understanding & teachback implemented. Patient prefers morning doctor appointments.    TN taught S&S for Chest Pain, with teach back.     02/17/18 1045   Discharge Assessment   Assessment Type Discharge Planning Assessment   Confirmed/corrected address and phone number on facesheet? Yes   Assessment information obtained from? Patient   Communicated expected length of stay with patient/caregiver no   Prior to hospitilization cognitive status: Alert/Oriented;No Deficits   Prior to hospitalization functional status: Independent   Current cognitive status: Alert/Oriented;No Deficits   Current Functional Status: Independent   Lives With alone   Able to Return to Prior Arrangements yes   Is patient able to care for self after discharge? Yes   Who are your caregiver(s) and their phone number(s)? ([email or web address] [phone])   Patient's perception of discharge disposition admitted as an inpatient   Readmission Within The Last 30 Days no previous admission in last 30 days   Patient currently being followed by outpatient case management? No   Patient currently receives any other outside agency services? No   Equipment Currently Used at Home oxygen   Do you have any problems affording any of your prescribed medications? No   Is the patient taking medications as prescribed? yes   Does the patient have transportation home? Yes   Transportation Available car;family or friend will provide   Does the patient receive services at the [facility]? No   Discharge Plan A Home   Discharge Plan B Home;Home Health   Patient/Family In Agreement With Plan yes   Daughter helpsMckayla at home.  Has home oxygen.  Says he has a o2 tank in room      [facility] [place] [facility] [place], " [place] - [address] [address] [address]  Phone: [phone] Fax: [phone]    [id number] [place] - [address]  [address] [address]  Phone: [phone] Fax: [phone]

## 2018-02-17 NOTE — ASSESSMENT & PLAN NOTE
Appears to be due to Xanax  He has had derm workup including skin biopsy  He recently resumed xanax due to increasing anxiety, and itching started again  Stop xanax, converted to lorazepam prn  Benadryl for itch

## 2018-02-17 NOTE — NURSING
Bedside report by KRISTY Vidal. Patient found resting comfortably in bed. Pleasant mood. 2L NC. Only complaint is of itching. States the itching is from him taking Xananx and he refuses to take it anymore. Skin inspected - some redness on his back noted. No acute distress noted. Bed locked in lowest position, call bell in reach, communication board updated.

## 2018-02-17 NOTE — H&P
[facility] Medicine  History & Physical    Patient Name: Chaz Stuart  MRN: [id number]  Admission Date: 2/16/2018  Admitting Physician: Raul Card MD  Primary Care Provider:  Rk Welsh MD          Subjective:     Principal Problem:COPD with acute exacerbation    Chief Complaint:   Chief Complaint   Patient presents with    Shortness of Breath     states that he cant breathe and his nerves are shot. Breathing treatment today with no relief    Dizziness        HPI: 68 yo WM followed by Dr. Welsh for myeloproliferative neoplasm with polycythemia vera (last visit Jan 4) as well as panlobular emphysema (followed by pulm), comes in for exacerbation of his COPD.  He has had about 2 weeks of progressively worsening dyspnea.  He uses Symbicort and albuterol at home, and also has po prednisone (has been taking 10 mg daily) - these were ineffective, so he came to ER.  He has anxiety and has been on Xanax - however, he now says the pills are causing him to itch.   He says he used to take Valium for years, then he switched PCP to Dr. Heredia.  Dr. HENLEY refused to give him further Valium and instead referred him to psych, who converted the Valium to Xanax.  The Xanax apparently made him itch, to the point where he went to derm and had skin biopsies done - neg per pt.  He was forced to stop the Xanax, and has been off of it for some time.  However, recently, he began to have worsening anxiety, so he took a Xanax tab, and the itching resumed.  This will be discontinued.  Will try Ativan. Giving benadryl for itch.    Past Medical History:   Diagnosis Date    Anxiety     COPD (chronic obstructive pulmonary disease)     DDD (degenerative disc disease), cervical     Diverticulitis     GERD (gastroesophageal reflux disease)     Hypertension     Polycythemia vera 1 year       Past Surgical History:   Procedure Laterality Date    CERVICAL SPINE SURGERY  Fusion    CHOLECYSTECTOMY       COLON SURGERY      FINGER SURGERY         Review of patient's allergies indicates:  No Known Allergies    No current facility-administered medications on file prior to encounter.      Current Outpatient Prescriptions on File Prior to Encounter   Medication Sig    acetaminophen (TYLENOL) 325 MG tablet Take 2 tablets (650 mg total) by mouth every 6 (six) hours as needed.    albuterol (VENTOLIN HFA) 90 mcg/actuation inhaler Inhale 2 puffs into the lungs every 6 (six) hours as needed.    ALPRAZolam (XANAX) 1 MG tablet TAKE 1 TABLET BY MOUTH THREE TIMES A DAY AS NEEDED FOR ANXIETY MAY CAUSE DROWSINESS.    amlodipine (NORVASC) 5 MG tablet Take 1 tablet (5 mg total) by mouth once daily.    aspirin (ECOTRIN) 81 MG EC tablet Take 1 tablet (81 mg total) by mouth once daily. (Patient taking differently: Take 81 mg by mouth 2 (two) times daily. )    SYMBICORT 160-4.5 mcg/actuation HFAA Inhale 2 puffs into the lungs 2 (two) times daily.    dutasteride (AVODART) 0.5 mg capsule Take 1 capsule (0.5 mg total) by mouth once daily.     Family History     Problem Relation (Age of Onset)    Diabetes Mother, Father    Heart disease Mother    Hypertension Father    Stroke Father        Social History Main Topics    Smoking status: Former Smoker     Packs/day: 3.00     Years: 10.00     Quit date: 12/8/2009    Smokeless tobacco: Never Used      Comment: use to smoke  3-5 packs a day.     Alcohol use No    Drug use: No    Sexual activity: No     Review of Systems   Resumed itching after xanax taken    Objective:     Vital Signs (Most Recent):  Temp: 98.1 °F (36.7 °C) (02/17/18 1124)  Pulse: 88 (02/17/18 1124)  Resp: 18 (02/17/18 1124)  BP: 139/64 (02/17/18 1124)  SpO2: 98 % (02/17/18 1124) Vital Signs (24h Range):  Temp:  [97.3 °F (36.3 °C)-98.7 °F (37.1 °C)] 98.1 °F (36.7 °C)  Pulse:  [76-98] 88  Resp:  [16-22] 18  SpO2:  [96 %-100 %] 98 %  BP: (117-171)/(56-85) 139/64     Weight: 56.7 kg (125 lb)  Body mass index is 19.58  kg/m².    Physical Exam   he is awake, alert, and conversant  No in acute respiratory distress  Talking without dyspnea  Lungs sound ok, no significant wheezes  Heart rrr  abd soft, BS+  Legs no edema    Will continue treatments here overnight, but anticipate he will be ready for dc tomorrow    Assessment/Plan:     * COPD with acute exacerbation    CXR on admit:  Hypoinflation of the lungs remains without significant change from previously. There is upper lobe predominance of emphysematous disease. There is increased interstitial markings with prominent curly B-lines in the bases bilaterally more pronounced on the right. Findings suggestive of either interstitial infiltrates or interstitial edema. There is no pleural effusion there hypoinflation lungs from COPD also noted.  Heart size is within normal limits. Mediastinum is unremarkable. There is no tracheal abnormalities. Slight increase perihilar markings with central pulmonary vascular congestion also noted.  There are cardiac monitoring leads over chest.  Started on empiric rocephin and zith  Afebrile (but WBC elevated due to myeloproliferation)        Pruritus    Appears to be due to Xanax  He has had derm workup including skin biopsy  He recently resumed xanax due to increasing anxiety, and itching started again  Stop xanax, converted to lorazepam prn  Benadryl for itch          MPN (myeloproliferative neoplasm)    Followed by Dr. Welsh  WBC 35K  Polycythemia Vera - hct 41  Thrombocytosis - 772K          Leukocytosis    35K due to myeloproliferative disorder  1 month ago was 17K          Generalized anxiety disorder    Had been on Xanax;  Now causing him to itch  Will change to Ativan prn            VTE Risk Mitigation         Ordered     enoxaparin injection 40 mg  Daily     Route:  Subcutaneous        02/16/18 2125     High Risk of VTE  Once      02/16/18 2125             Raul Card MD  Department of Hospital Medicine   [facility]

## 2018-02-17 NOTE — PROGRESS NOTES
WRITTEN DISCHARGE INFORMATION:     Follow-up Information     Yo Jay MD On 2/23/2018.    Specialty:  Family Medicine  Why:  out patient services:  9:15 AM follow up from the hospital  Contact information:  [address] [address]  [phone]               Things that YOU are responsible for to Manage Your Care At Home:  1. Getting your prescriptions filled.  2. Taking you medications as directed. DO NOT MISS ANY DOSES!  3. Going to your follow-up doctor appointments. This is important because it allows the doctor to monitor your progress and to determine if any changes need to be made to your treatment plan.                                                          Help at Home  After discharge for assistance [facility] On Call Nurse Care Line 24/7 assistance  [phone]   Thank you for choosing [facility] for your care.  Please answer any calls you may receive from [facility] we want to continue to support you as you manage your healthcare needs.  Sincerely, Your [facility] Healthcare Manager is,  Anna Christine RN [facility] [phone]

## 2018-02-17 NOTE — ASSESSMENT & PLAN NOTE
CXR on admit:  Hypoinflation of the lungs remains without significant change from previously. There is upper lobe predominance of emphysematous disease. There is increased interstitial markings with prominent curly B-lines in the bases bilaterally more pronounced on the right. Findings suggestive of either interstitial infiltrates or interstitial edema. There is no pleural effusion there hypoinflation lungs from COPD also noted.  Heart size is within normal limits. Mediastinum is unremarkable. There is no tracheal abnormalities. Slight increase perihilar markings with central pulmonary vascular congestion also noted.  There are cardiac monitoring leads over chest.  Started on empiric rocephin and zith  Afebrile (but WBC elevated due to myeloproliferation)

## 2018-02-18 VITALS
HEART RATE: 80 BPM | TEMPERATURE: 98 F | BODY MASS INDEX: 19.58 KG/M2 | SYSTOLIC BLOOD PRESSURE: 144 MMHG | DIASTOLIC BLOOD PRESSURE: 69 MMHG | HEIGHT: 67 IN | WEIGHT: 124.75 LBS | RESPIRATION RATE: 18 BRPM | OXYGEN SATURATION: 97 %

## 2018-02-18 LAB — BACTERIA UR CULT: NO GROWTH

## 2018-02-18 PROCEDURE — 63600175 PHARM REV CODE 636 W HCPCS: Performed by: EMERGENCY MEDICINE

## 2018-02-18 PROCEDURE — 94761 N-INVAS EAR/PLS OXIMETRY MLT: CPT

## 2018-02-18 PROCEDURE — 25000003 PHARM REV CODE 250

## 2018-02-18 PROCEDURE — 25000242 PHARM REV CODE 250 ALT 637 W/ HCPCS: Performed by: EMERGENCY MEDICINE

## 2018-02-18 PROCEDURE — 94640 AIRWAY INHALATION TREATMENT: CPT

## 2018-02-18 PROCEDURE — 27000221 HC OXYGEN, UP TO 24 HOURS

## 2018-02-18 RX ORDER — LORAZEPAM 0.5 MG/1
0.5 TABLET ORAL EVERY 8 HOURS PRN
Qty: 30 TABLET | Refills: 0 | Status: SHIPPED | OUTPATIENT
Start: 2018-02-18 | End: 2018-03-05

## 2018-02-18 RX ADMIN — LORAZEPAM 0.5 MG: 0.5 TABLET ORAL at 08:02

## 2018-02-18 RX ADMIN — AMLODIPINE BESYLATE 5 MG: 5 TABLET ORAL at 08:02

## 2018-02-18 RX ADMIN — DUTASTERIDE 0.5 MG: 0.5 CAPSULE, LIQUID FILLED ORAL at 08:02

## 2018-02-18 RX ADMIN — LORAZEPAM 0.5 MG: 0.5 TABLET ORAL at 03:02

## 2018-02-18 RX ADMIN — ASPIRIN 81 MG: 81 TABLET, COATED ORAL at 08:02

## 2018-02-18 RX ADMIN — IPRATROPIUM BROMIDE AND ALBUTEROL SULFATE 3 ML: .5; 2.5 SOLUTION RESPIRATORY (INHALATION) at 11:02

## 2018-02-18 RX ADMIN — IPRATROPIUM BROMIDE AND ALBUTEROL SULFATE 3 ML: .5; 2.5 SOLUTION RESPIRATORY (INHALATION) at 04:02

## 2018-02-18 RX ADMIN — METHYLPREDNISOLONE SODIUM SUCCINATE 125 MG: 125 INJECTION, POWDER, FOR SOLUTION INTRAMUSCULAR; INTRAVENOUS at 01:02

## 2018-02-18 RX ADMIN — METHYLPREDNISOLONE SODIUM SUCCINATE 125 MG: 125 INJECTION, POWDER, FOR SOLUTION INTRAMUSCULAR; INTRAVENOUS at 06:02

## 2018-02-18 RX ADMIN — IPRATROPIUM BROMIDE AND ALBUTEROL SULFATE 3 ML: .5; 2.5 SOLUTION RESPIRATORY (INHALATION) at 07:02

## 2018-02-18 NOTE — PLAN OF CARE
Problem: Anxiety (Adult)  Intervention: Promote Anxiety Reduction/Resolution   02/17/18 1940   Coping/Psychosocial Interventions   Supportive Measures active listening utilized   Environmental Support calm environment promoted   Cognitive Interventions   Sensory Stimulation Regulation care clustered       Goal: Identify Related Risk Factors and Signs and Symptoms  Related risk factors and signs and symptoms are identified upon initiation of Human Response Clinical Practice Guideline (CPG)   Outcome: Ongoing (interventions implemented as appropriate)   02/17/18 1006   Anxiety   Related Risk Factors (Anxiety) coping ineffective;health status change   Signs and Symptoms (Anxiety) nervousness/tension/restlessness     Goal: Reduction/Resolution  Patient will demonstrate the desired outcomes by discharge/transition of care.   Outcome: Ongoing (interventions implemented as appropriate)   02/17/18 1006   Anxiety (Adult)   Reduction/Resolution making progress toward outcome

## 2018-02-18 NOTE — ASSESSMENT & PLAN NOTE
CXR on admit:  Hypoinflation of the lungs remains without significant change from previously. There is upper lobe predominance of emphysematous disease. There is increased interstitial markings with prominent curly B-lines in the bases bilaterally more pronounced on the right. Findings suggestive of either interstitial infiltrates or interstitial edema. There is no pleural effusion there hypoinflation lungs from COPD also noted.  Heart size is within normal limits. Mediastinum is unremarkable. There is no tracheal abnormalities. Slight increase perihilar markings with central pulmonary vascular congestion also noted.  There are cardiac monitoring leads over chest.  Started on empiric rocephin and zith  Afebrile (but WBC elevated due to myeloproliferation)  Symptoms have resolved; he feels quite well

## 2018-02-18 NOTE — ASSESSMENT & PLAN NOTE
Had been on Xanax;  Now causing him to itch  Will change to Ativan prn  He will be sent home with 30 tabs of 0.5mg ativan; told to only use prn, not round the clock

## 2018-02-18 NOTE — NURSING
Pt lying supine in bed at 30*. Antibiotics being administered through his right hand Iv. No complaints of pain or distress at this time. Bed lowered and locked. Call light within reach. Will continue to monitor.

## 2018-02-18 NOTE — PLAN OF CARE
Problem: Anxiety (Adult)  Intervention: Promote Anxiety Reduction/Resolution   02/17/18 1940   Coping/Psychosocial Interventions   Supportive Measures active listening utilized   Environmental Support calm environment promoted   Cognitive Interventions   Sensory Stimulation Regulation care clustered         Problem: Anxiety (Adult)  Intervention: Promote Anxiety Reduction/Resolution   02/17/18 1940   Coping/Psychosocial Interventions   Supportive Measures active listening utilized   Environmental Support calm environment promoted   Cognitive Interventions   Sensory Stimulation Regulation care clustered       Goal: Identify Related Risk Factors and Signs and Symptoms  Related risk factors and signs and symptoms are identified upon initiation of Human Response Clinical Practice Guideline (CPG)   02/17/18 1006   Anxiety   Related Risk Factors (Anxiety) coping ineffective;health status change   Signs and Symptoms (Anxiety) nervousness/tension/restlessness     Goal: Reduction/Resolution  Patient will demonstrate the desired outcomes by discharge/transition of care.   02/17/18 1006   Anxiety (Adult)   Reduction/Resolution making progress toward outcome

## 2018-02-18 NOTE — NURSING
D/C instructions given to patient. Pt verbalizes understanding of instructions. Pt states willingness to comply. Saline lock and tele monitoring removed.

## 2018-02-18 NOTE — NURSING
Bedside report by KRISTY Vidal. Patient in good spirit, requesting discharge date. 3L NC. Tolerating well. 100 ml UO - yellow and clear. No acute distress noted. Only thing he wants to know is if he's going to be discharged today. Bed locked in the lowest position with call bell in reach. SR x 2.

## 2018-02-18 NOTE — DISCHARGE SUMMARY
[facility] Medicine  Discharge Summary      Patient Name: Chaz Stuart  MRN: [id number]  Admission Date: 2/16/2018  Hospital Length of Stay: 2 days  Discharge Date and Time: 2/18/18  Attending Physician: Rk Welsh MD   Discharging Provider: Raul Card MD  Primary Care Provider: Yo Jay MD      HPI:   66 yo WM followed by Dr. Welsh for myeloproliferative neoplasm with polycythemia vera (last visit Jan 4) as well as panlobular emphysema (followed by pulm), comes in for exacerbation of his COPD.  He has had about 2 weeks of progressively worsening dyspnea.  He uses Symbicort and albuterol at home, and also has po prednisone (has been taking 10 mg daily) - these were ineffective, so he came to ER.  He has anxiety and has been on Xanax - however, he now says the pills are causing him to itch.   He says he used to take Valium for years, then he switched PCP to Dr. Heredia.  Dr. HENLEY refused to give him further Valium and instead referred him to psych, who converted the Valium to Xanax.  The Xanax apparently made him itch, to the point where he went to derm and had skin biopsies done - neg per pt.  He was forced to stop the Xanax, and has been off of it for some time.  However, recently, he began to have worsening anxiety, so he took a Xanax tab, and the itching resumed.  This will be discontinued.  Will try Ativan. Giving benadryl for itch.    * No surgery found *      Hospital Course:   Pt was doing well even on day of admit, without excessive dyspnea or distress.  His main complaint was itching due to Xanax.  He was given benadryl and he was changed to lorazepam 0.5 mg prn anxiety.  On day of dc he is feeling quite well and eager to return home.  He will followup with Dr. Jay this week, and will also need to see Dr. Welsh for his myeloproliferation.    Consults:   Consults         Status Ordering Provider     Inpatient consult to Respiratory Care  Once      Provider:  (Not yet assigned)    Acknowledged RONY CLARK          * COPD with acute exacerbation    CXR on admit:  Hypoinflation of the lungs remains without significant change from previously. There is upper lobe predominance of emphysematous disease. There is increased interstitial markings with prominent curly B-lines in the bases bilaterally more pronounced on the right. Findings suggestive of either interstitial infiltrates or interstitial edema. There is no pleural effusion there hypoinflation lungs from COPD also noted.  Heart size is within normal limits. Mediastinum is unremarkable. There is no tracheal abnormalities. Slight increase perihilar markings with central pulmonary vascular congestion also noted.  There are cardiac monitoring leads over chest.  Started on empiric rocephin and zith  Afebrile (but WBC elevated due to myeloproliferation)  Symptoms have resolved; he feels quite well        Pruritus    Appears to be due to Xanax  He has had derm workup including skin biopsy  He recently resumed xanax due to increasing anxiety, and itching started again  Stop xanax, converted to lorazepam prn  Benadryl for itch          MPN (myeloproliferative neoplasm)    Followed by Dr. Welsh  WBC 35K  Polycythemia Vera - hct 41  Thrombocytosis - 772K          Leukocytosis    35K due to myeloproliferative disorder  1 month ago was 17K  Will refer to Dr. Welsh as OP          Generalized anxiety disorder    Had been on Xanax;  Now causing him to itch  Will change to Ativan prn  He will be sent home with 30 tabs of 0.5mg ativan; told to only use prn, not round the clock            Final Active Diagnoses:    Diagnosis Date Noted POA    PRINCIPAL PROBLEM:  COPD with acute exacerbation [J44.1] 02/16/2018 Yes    Pruritus [L29.9] 02/17/2018 Yes    MPN (myeloproliferative neoplasm) [D47.1] 05/31/2017 Yes    Leukocytosis [D72.829] 05/23/2017 Yes    Generalized anxiety disorder [F41.1] 12/06/2012 Yes       Problems Resolved During this Admission:    Diagnosis Date Noted Date Resolved POA       Discharged Condition: good    Disposition: Home or Self Care    Follow Up:  Follow-up Information     Yo Jay MD On 2/23/2018.    Specialty:  Family Medicine  Why:  out patient services:  9:15 AM follow up from the hospital  Contact information:  [address] [address]  [phone]             Rk Welsh MD In 1 week.    Specialties:  Internal Medicine, Oncology, Hematology and Oncology  Why:  myelodysplasia  Contact information:  [address]  [address]  [place] [address]  [phone]                 Patient Instructions:   Diet regular  Activity ad jamal    Significant Diagnostic Studies:  Hypoinflation of the lungs remains without significant change from previously. There is upper lobe predominance of emphysematous disease. There is increased interstitial markings with prominent curly B-lines in the bases bilaterally more pronounced on the right. Findings suggestive of either interstitial infiltrates or interstitial edema. There is no pleural effusion there hypoinflation lungs from COPD also noted.  Heart size is within normal limits. Mediastinum is unremarkable. There is no tracheal abnormalities. Slight increase perihilar markings with central pulmonary vascular congestion also noted.  There are cardiac monitoring leads over chest.    At this point, lungs are clear to auscultation.  He has no wheezing or rhonchi and is exhibiting no respiratory difficulty.    Pending Diagnostic Studies:     None         Medications:     Medication List      START taking these medications    LORazepam 0.5 MG tablet  Commonly known as:  ATIVAN  Take 1 tablet (0.5 mg total) by mouth every 8 (eight) hours as needed for Anxiety (do not take on a regular schedule; only if needed).        CHANGE how you take these medications    aspirin 81 MG EC tablet  Commonly known as:  ECOTRIN  Take 1 tablet (81 mg  total) by mouth once daily.  What changed:  when to take this        CONTINUE taking these medications    acetaminophen 325 MG tablet  Commonly known as:  TYLENOL  Take 2 tablets (650 mg total) by mouth every 6 (six) hours as needed.     albuterol 90 mcg/actuation inhaler  Commonly known as:  VENTOLIN HFA  Inhale 2 puffs into the lungs every 6 (six) hours as needed.     amLODIPine 5 MG tablet  Commonly known as:  NORVASC  Take 1 tablet (5 mg total) by mouth once daily.     dutasteride 0.5 mg capsule  Commonly known as:  AVODART  Take 1 capsule (0.5 mg total) by mouth once daily.     SYMBICORT 160-4.5 mcg/actuation Hfaa  Generic drug:  budesonide-formoterol 160-4.5 mcg  Inhale 2 puffs into the lungs 2 (two) times daily.        STOP taking these medications    ALPRAZolam 1 MG tablet  Commonly known as:  XANAX           Where to Get Your Medications      You can get these medications from any pharmacy    Bring a paper prescription for each of these medications  · LORazepam 0.5 MG tablet         Indwelling Lines/Drains at time of discharge:   Lines/Drains/Airways          No matching active lines, drains, or airways          Raul Card MD  Department of Hospital Medicine  [facility]

## 2018-02-18 NOTE — NURSING
Bedside report given to KRISTY Vidal. No acute distress noted. Safety measures maintained. Chart check completed.

## 2018-02-21 LAB
BACTERIA BLD CULT: NORMAL
BACTERIA BLD CULT: NORMAL

## 2018-03-01 PROBLEM — Z09 HOSPITAL DISCHARGE FOLLOW-UP: Status: ACTIVE | Noted: 2018-03-01

## 2018-03-02 DIAGNOSIS — F41.1 GENERALIZED ANXIETY DISORDER: ICD-10-CM

## 2018-03-02 RX ORDER — ALPRAZOLAM 1 MG/1
TABLET ORAL
Qty: 90 TABLET | Refills: 0 | Status: SHIPPED | OUTPATIENT
Start: 2018-03-02 | End: 2018-03-05

## 2018-03-02 NOTE — TELEPHONE ENCOUNTER
----- Message from Roberta Bernard sent at 3/2/2018  3:47 PM CST -----  Contact: [phone]  Calling TO check on medication Xanax ,to confirm if ready

## 2018-03-02 NOTE — TELEPHONE ENCOUNTER
Patient informed that script is ready for  at the clinic. Patient informed that an office visit is needed.  Patient will call back to schedule an office visit.

## 2018-04-19 ENCOUNTER — OFFICE VISIT (OUTPATIENT)
Dept: FAMILY MEDICINE | Facility: CLINIC | Age: 68
End: 2018-04-19
Payer: MEDICARE

## 2018-04-19 VITALS
TEMPERATURE: 98 F | OXYGEN SATURATION: 96 % | HEIGHT: 67 IN | HEART RATE: 94 BPM | SYSTOLIC BLOOD PRESSURE: 132 MMHG | BODY MASS INDEX: 19.51 KG/M2 | DIASTOLIC BLOOD PRESSURE: 78 MMHG | WEIGHT: 124.31 LBS

## 2018-04-19 DIAGNOSIS — F33.1 MAJOR DEPRESSIVE DISORDER, RECURRENT EPISODE, MODERATE: ICD-10-CM

## 2018-04-19 DIAGNOSIS — M54.12 CERVICAL RADICULOPATHY: Primary | ICD-10-CM

## 2018-04-19 DIAGNOSIS — M47.816 LUMBAR FACET ARTHROPATHY: ICD-10-CM

## 2018-04-19 PROBLEM — J44.1 COPD WITH ACUTE EXACERBATION: Status: RESOLVED | Noted: 2018-02-16 | Resolved: 2018-04-19

## 2018-04-19 PROBLEM — J44.1 COPD EXACERBATION: Status: RESOLVED | Noted: 2017-08-29 | Resolved: 2018-04-19

## 2018-04-19 PROCEDURE — 3075F SYST BP GE 130 - 139MM HG: CPT | Mod: CPTII,S$GLB,, | Performed by: INTERNAL MEDICINE

## 2018-04-19 PROCEDURE — 99999 PR PBB SHADOW E&M-EST. PATIENT-LVL III: CPT | Mod: PBBFAC,,, | Performed by: INTERNAL MEDICINE

## 2018-04-19 PROCEDURE — 96372 THER/PROPH/DIAG INJ SC/IM: CPT | Mod: S$GLB,,, | Performed by: INTERNAL MEDICINE

## 2018-04-19 PROCEDURE — 3078F DIAST BP <80 MM HG: CPT | Mod: CPTII,S$GLB,, | Performed by: INTERNAL MEDICINE

## 2018-04-19 PROCEDURE — 99214 OFFICE O/P EST MOD 30 MIN: CPT | Mod: 25,S$GLB,, | Performed by: INTERNAL MEDICINE

## 2018-04-19 RX ORDER — KETOROLAC TROMETHAMINE 30 MG/ML
30 INJECTION, SOLUTION INTRAMUSCULAR; INTRAVENOUS
Status: COMPLETED | OUTPATIENT
Start: 2018-04-19 | End: 2018-04-19

## 2018-04-19 RX ORDER — METHYLPREDNISOLONE ACETATE 40 MG/ML
40 INJECTION, SUSPENSION INTRA-ARTICULAR; INTRALESIONAL; INTRAMUSCULAR; SOFT TISSUE
Status: COMPLETED | OUTPATIENT
Start: 2018-04-19 | End: 2018-04-19

## 2018-04-19 RX ORDER — ALPRAZOLAM 1 MG/1
TABLET ORAL
Refills: 0 | COMMUNITY
Start: 2018-04-18 | End: 2018-04-25 | Stop reason: SDUPTHER

## 2018-04-19 RX ORDER — METHYLPREDNISOLONE 4 MG/1
TABLET ORAL
Qty: 1 PACKAGE | Refills: 0 | Status: SHIPPED | OUTPATIENT
Start: 2018-04-19 | End: 2018-04-25 | Stop reason: SDUPTHER

## 2018-04-19 RX ORDER — TIZANIDINE 2 MG/1
2 TABLET ORAL EVERY 8 HOURS PRN
Qty: 30 TABLET | Refills: 1 | Status: SHIPPED | OUTPATIENT
Start: 2018-04-19 | End: 2018-04-25

## 2018-04-19 RX ORDER — CYCLOBENZAPRINE HCL 10 MG
TABLET ORAL
Refills: 0 | COMMUNITY
Start: 2018-03-29 | End: 2018-04-19 | Stop reason: ALTCHOICE

## 2018-04-19 RX ADMIN — KETOROLAC TROMETHAMINE 30 MG: 30 INJECTION, SOLUTION INTRAMUSCULAR; INTRAVENOUS at 11:04

## 2018-04-19 RX ADMIN — METHYLPREDNISOLONE ACETATE 40 MG: 40 INJECTION, SUSPENSION INTRA-ARTICULAR; INTRALESIONAL; INTRAMUSCULAR; SOFT TISSUE at 11:04
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
## 2018-04-25 ENCOUNTER — OFFICE VISIT (OUTPATIENT)
Dept: FAMILY MEDICINE | Facility: CLINIC | Age: 68
End: 2018-04-25
Payer: MEDICARE

## 2018-04-25 DIAGNOSIS — M54.12 CERVICAL RADICULOPATHY: Primary | ICD-10-CM

## 2018-04-25 PROCEDURE — 99214 OFFICE O/P EST MOD 30 MIN: CPT | Mod: S$GLB,,, | Performed by: FAMILY MEDICINE

## 2018-04-25 PROCEDURE — 3078F DIAST BP <80 MM HG: CPT | Mod: CPTII,S$GLB,, | Performed by: FAMILY MEDICINE

## 2018-04-25 PROCEDURE — 99999 PR PBB SHADOW E&M-EST. PATIENT-LVL III: CPT | Mod: PBBFAC,,, | Performed by: FAMILY MEDICINE

## 2018-04-25 PROCEDURE — 3075F SYST BP GE 130 - 139MM HG: CPT | Mod: CPTII,S$GLB,, | Performed by: FAMILY MEDICINE

## 2018-04-25 RX ORDER — METHYLPREDNISOLONE 4 MG/1
TABLET ORAL
Qty: 1 PACKAGE | Refills: 0 | Status: SHIPPED | OUTPATIENT
Start: 2018-04-25 | End: 2018-06-28

## 2018-04-25 RX ORDER — IPRATROPIUM BROMIDE AND ALBUTEROL SULFATE 2.5; .5 MG/3ML; MG/3ML
3 SOLUTION RESPIRATORY (INHALATION) EVERY 6 HOURS PRN
Qty: 1 BOX | Refills: 5 | Status: SHIPPED | OUTPATIENT
Start: 2018-04-25 | End: 2018-06-13 | Stop reason: SDUPTHER

## 2018-04-25 RX ORDER — ALPRAZOLAM 1 MG/1
TABLET ORAL
Qty: 90 TABLET | Refills: 1 | Status: SHIPPED | OUTPATIENT
Start: 2018-04-25 | End: 2018-08-09 | Stop reason: SDUPTHER

## 2018-04-25 RX ORDER — TIZANIDINE 4 MG/1
4-8 TABLET ORAL EVERY 8 HOURS PRN
Qty: 60 TABLET | Refills: 0 | Status: SHIPPED | OUTPATIENT
Start: 2018-04-25 | End: 2018-05-05
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
## 2018-04-26 VITALS
HEIGHT: 67 IN | TEMPERATURE: 98 F | SYSTOLIC BLOOD PRESSURE: 136 MMHG | WEIGHT: 123.88 LBS | DIASTOLIC BLOOD PRESSURE: 60 MMHG | OXYGEN SATURATION: 95 % | HEART RATE: 100 BPM | BODY MASS INDEX: 19.44 KG/M2

## 2018-05-29 ENCOUNTER — TELEPHONE (OUTPATIENT)
Dept: FAMILY MEDICINE | Facility: CLINIC | Age: 68
End: 2018-05-29

## 2018-05-29 DIAGNOSIS — R41.82 ALTERED MENTAL STATUS, UNSPECIFIED ALTERED MENTAL STATUS TYPE: Primary | ICD-10-CM

## 2018-05-29 NOTE — TELEPHONE ENCOUNTER
----- Message from Velia Coronado sent at 5/29/2018 12:39 PM CDT -----  Contact: daughter Mckayla   [phone]  Pt daughter is requesting to speak to you regarding the pt, the pt has been acting different, she does not know if it is his meds causing that. Pls call daughter Mckayla [phone]. Thanks......Edna

## 2018-06-04 ENCOUNTER — TELEPHONE (OUTPATIENT)
Dept: ADMINISTRATIVE | Facility: HOSPITAL | Age: 68
End: 2018-06-04

## 2018-06-04 PROBLEM — Z09 HOSPITAL DISCHARGE FOLLOW-UP: Status: RESOLVED | Noted: 2018-03-01 | Resolved: 2018-06-04

## 2018-06-04 NOTE — TELEPHONE ENCOUNTER
Patient wants to know why he needs to be referred to Neurology. He states nothing is wrong with his memory. Please call at [phone] thanks!

## 2018-06-05 NOTE — TELEPHONE ENCOUNTER
Tell him I'm worried that his blood count is causing issue with blood flow to brain, we need that checked.    Dr. Jay

## 2018-06-05 NOTE — TELEPHONE ENCOUNTER
Patient notified. patient stated Dr. Welsh told him  His blood work looks good so do he still needs to see neurologist? Per patient

## 2018-06-07 NOTE — TELEPHONE ENCOUNTER
Informed patient/ however, patient is insisting that Dr. Welsh states that this may be due to patient not using his oxygen as he should have and is refusing to see a neurologist/ patient states that he can make an appointment with Dr. Jay to discuss further if needed/ please advise

## 2018-06-28 ENCOUNTER — TELEPHONE (OUTPATIENT)
Dept: FAMILY MEDICINE | Facility: CLINIC | Age: 68
End: 2018-06-28

## 2018-06-28 DIAGNOSIS — I10 ESSENTIAL HYPERTENSION: Primary | ICD-10-CM

## 2018-06-28 NOTE — TELEPHONE ENCOUNTER
----- Message from Sabas Osullivan sent at 6/28/2018  2:04 PM CDT -----  Contact: Self/[phone]  Patient returned the staff's call. Thank you.

## 2018-06-28 NOTE — TELEPHONE ENCOUNTER
----- Message from Sabas Osullivan sent at 6/28/2018  2:04 PM CDT -----  Contact: Self/[phone]  Patient returned the staff's call. Thank you.

## 2018-08-09 ENCOUNTER — OFFICE VISIT (OUTPATIENT)
Dept: FAMILY MEDICINE | Facility: CLINIC | Age: 68
End: 2018-08-09
Payer: MEDICARE

## 2018-08-09 ENCOUNTER — TELEPHONE (OUTPATIENT)
Dept: FAMILY MEDICINE | Facility: CLINIC | Age: 68
End: 2018-08-09

## 2018-08-09 VITALS
HEART RATE: 65 BPM | BODY MASS INDEX: 20.07 KG/M2 | SYSTOLIC BLOOD PRESSURE: 138 MMHG | TEMPERATURE: 97 F | OXYGEN SATURATION: 100 % | DIASTOLIC BLOOD PRESSURE: 70 MMHG | WEIGHT: 127.88 LBS | HEIGHT: 67 IN

## 2018-08-09 DIAGNOSIS — J43.1 PANLOBULAR EMPHYSEMA: ICD-10-CM

## 2018-08-09 DIAGNOSIS — M54.12 CERVICAL RADICULOPATHY: ICD-10-CM

## 2018-08-09 DIAGNOSIS — J96.11 CHRONIC RESPIRATORY FAILURE WITH HYPOXIA: ICD-10-CM

## 2018-08-09 DIAGNOSIS — J30.1 ALLERGIC RHINITIS DUE TO POLLEN, UNSPECIFIED SEASONALITY: ICD-10-CM

## 2018-08-09 DIAGNOSIS — I70.0 ATHEROSCLEROSIS OF AORTA: Primary | ICD-10-CM

## 2018-08-09 PROCEDURE — 3078F DIAST BP <80 MM HG: CPT | Mod: CPTII,S$GLB,, | Performed by: FAMILY MEDICINE

## 2018-08-09 PROCEDURE — 3075F SYST BP GE 130 - 139MM HG: CPT | Mod: CPTII,S$GLB,, | Performed by: FAMILY MEDICINE

## 2018-08-09 PROCEDURE — 99999 PR PBB SHADOW E&M-EST. PATIENT-LVL III: CPT | Mod: PBBFAC,,, | Performed by: FAMILY MEDICINE

## 2018-08-09 PROCEDURE — 99213 OFFICE O/P EST LOW 20 MIN: CPT | Mod: S$GLB,,, | Performed by: FAMILY MEDICINE

## 2018-08-09 RX ORDER — AZELASTINE 1 MG/ML
1 SPRAY, METERED NASAL 2 TIMES DAILY
Qty: 30 ML | Refills: 1 | Status: SHIPPED | OUTPATIENT
Start: 2018-08-09 | End: 2018-12-06 | Stop reason: SDUPTHER

## 2018-08-09 RX ORDER — ALPRAZOLAM 1 MG/1
TABLET ORAL
Qty: 90 TABLET | Refills: 1 | Status: SHIPPED | OUTPATIENT
Start: 2018-08-09 | End: 2018-09-18 | Stop reason: SDUPTHER

## 2018-08-09 NOTE — PROGRESS NOTES
Chief Complaint   Patient presents with    Annual Exam       SUBJECTIVE:  Chaz Stuart is a 68 y.o. male here for new problem of annual exam and review, needs new portable concentrator and brought the info we will mail off.  Currently has co-morbidities including per prolbme list.      Past Medical History:   Diagnosis Date    Anxiety     COPD (chronic obstructive pulmonary disease)     DDD (degenerative disc disease), cervical     Diverticulitis     GERD (gastroesophageal reflux disease)     Hypertension     Polycythemia vera 1 year     Past Surgical History:   Procedure Laterality Date    CERVICAL SPINE SURGERY  Fusion    CHOLECYSTECTOMY      COLON SURGERY      FINGER SURGERY       Social History     Social History    Marital status: Single     Spouse name: N/A    Number of children: 6    Years of education: N/A     Occupational History    Not on file.     Social History Main Topics    Smoking status: Former Smoker     Packs/day: 3.00     Years: 10.00     Quit date: 12/8/2009    Smokeless tobacco: Never Used      Comment: use to smoke  3-5 packs a day.     Alcohol use No    Drug use: No    Sexual activity: No     Other Topics Concern    Caffeine Use Yes    Financial Status: Disabled Yes    Firearms: Does Patient Have Access To A Firearm? Yes     locked gun cab    Home Situation: Lives Alone Yes    Education: Unfinished High School Yes     8th grade     Service No    Spirituality: Active Participation No    Spirituality: Organized Bahai No    Spirituality: Private Participation No    Legal: Arrest History No     Social History Narrative    No narrative on file     Family History   Problem Relation Age of Onset    Diabetes Mother     Heart disease Mother     Diabetes Father     Hypertension Father     Stroke Father     ADD / ADHD Neg Hx     Alcohol abuse Neg Hx     Anxiety disorder Neg Hx     Bipolar disorder Neg Hx     Dementia Neg Hx     Depression Neg  "Hx     Drug abuse Neg Hx     OCD Neg Hx     Paranoid behavior Neg Hx     Physical abuse Neg Hx     Schizophrenia Neg Hx     Seizures Neg Hx     Self injury Neg Hx     Sexual abuse Neg Hx     Suicide Neg Hx     Melanoma Neg Hx      Current Outpatient Prescriptions on File Prior to Visit   Medication Sig Dispense Refill    acetaminophen (TYLENOL) 325 MG tablet Take 2 tablets (650 mg total) by mouth every 6 (six) hours as needed.  0    albuterol (VENTOLIN HFA) 90 mcg/actuation inhaler Inhale 2 puffs into the lungs every 6 (six) hours as needed. 18 g 3    albuterol-ipratropium (DUO-NEB) 2.5 mg-0.5 mg/3 mL nebulizer solution Take 3 mLs by nebulization every 6 (six) hours as needed for Wheezing. Rescue 1 Box 5    amLODIPine (NORVASC) 5 MG tablet Take 1 tablet (5 mg total) by mouth once daily. 90 tablet 2    aspirin (ECOTRIN) 81 MG EC tablet Take 1 tablet (81 mg total) by mouth once daily. (Patient taking differently: Take 81 mg by mouth 2 (two) times daily. ) 150 tablet 6    diazePAM (VALIUM) 2 MG tablet Take 1 tablet (2 mg total) by mouth daily as needed for Anxiety. 30 tablet 2    dutasteride (AVODART) 0.5 mg capsule Take 1 capsule (0.5 mg total) by mouth once daily. 90 capsule 3    hydroxyurea (HYDREA) 500 mg Cap TAKE 1 CAPSULE(S) BY MOUTH ONCE A DAY 90 capsule 3    SYMBICORT 160-4.5 mcg/actuation HFAA Inhale 2 puffs into the lungs 2 (two) times daily. 10.2 g 3     No current facility-administered medications on file prior to visit.      Review of patient's allergies indicates:   Allergen Reactions    Xanax [alprazolam] Itching         ROS  Per HPI  OBJECTIVE:  /70   Pulse 65   Temp 97 °F (36.1 °C) (Oral)   Ht 5' 7" (1.702 m)   Wt 58 kg (127 lb 13.9 oz)   SpO2 100%   BMI 20.03 kg/m²     Wt Readings from Last 3 Encounters:   08/09/18 59 kg (130 lb)   08/09/18 58 kg (127 lb 13.9 oz)   06/28/18 57.8 kg (127 lb 6.4 oz)     BP Readings from Last 3 Encounters:   08/09/18 119/88   08/09/18 " 138/70   07/11/18 108/70       Chronically ill  Pursed lip breathing  On oxygen  His muscle tone is less and he has less endurance.          Review of old Records:      Review of old labs:      Review of old imaging:      ASSESSMENT:  Problem List Items Addressed This Visit     COPD (chronic obstructive pulmonary disease)      Other Visit Diagnoses     Atherosclerosis of aorta    -  Primary    Relevant Medications    azelastine (ASTELIN) 137 mcg (0.1 %) nasal spray    Other Relevant Orders    Lipid panel    Chronic respiratory failure with hypoxia        Allergic rhinitis due to pollen, unspecified seasonality        Cervical radiculopathy        Relevant Medications    ALPRAZolam (XANAX) 1 MG tablet          ICD-10-CM ICD-9-CM   1. Atherosclerosis of aorta I70.0 440.0   2. Chronic respiratory failure with hypoxia J96.11 518.83     799.02   3. Panlobular emphysema J43.1 492.8   4. Allergic rhinitis due to pollen, unspecified seasonality J30.1 477.0   5. Cervical radiculopathy M54.12 723.4         PLAN:  1. Atherosclerosis of aorta  Statin and bp treatment    Allergic rhinitis treatment  - azelastine (ASTELIN) 137 mcg (0.1 %) nasal spray; 1 spray (137 mcg total) by Nasal route 2 (two) times daily.  Dispense: 30 mL; Refill: 1  - Lipid panel; Future    2. Chronic respiratory failure with hypoxia  The current medical regimen is effective;  continue present plan and medications.      3. Panlobular emphysema  The current medical regimen is effective;  continue present plan and medications.      4. Allergic rhinitis due to pollen, unspecified seasonality  The current medical regimen is effective;  continue present plan and medications.      5. Cervical radiculopathy  The current medical regimen is effective;  continue present plan and medications.    - ALPRAZolam (XANAX) 1 MG tablet; TAKE 1 TABLET BY MOUTH THREE TIMES A DAY AS NEEDED FOR ANXIETY. MAY CAUSE DROWSINESS.  Dispense: 90 tablet; Refill: 1      Medication List  with Changes/Refills   New Medications    AZELASTINE (ASTELIN) 137 MCG (0.1 %) NASAL SPRAY    1 spray (137 mcg total) by Nasal route 2 (two) times daily.   Current Medications    ACETAMINOPHEN (TYLENOL) 325 MG TABLET    Take 2 tablets (650 mg total) by mouth every 6 (six) hours as needed.    ALBUTEROL (VENTOLIN HFA) 90 MCG/ACTUATION INHALER    Inhale 2 puffs into the lungs every 6 (six) hours as needed.    ALBUTEROL-IPRATROPIUM (DUO-NEB) 2.5 MG-0.5 MG/3 ML NEBULIZER SOLUTION    Take 3 mLs by nebulization every 6 (six) hours as needed for Wheezing. Rescue    AMLODIPINE (NORVASC) 5 MG TABLET    Take 1 tablet (5 mg total) by mouth once daily.    ASPIRIN (ECOTRIN) 81 MG EC TABLET    Take 1 tablet (81 mg total) by mouth once daily.    DIAZEPAM (VALIUM) 2 MG TABLET    Take 1 tablet (2 mg total) by mouth daily as needed for Anxiety.    DUTASTERIDE (AVODART) 0.5 MG CAPSULE    Take 1 capsule (0.5 mg total) by mouth once daily.    HYDROXYUREA (HYDREA) 500 MG CAP    TAKE 1 CAPSULE(S) BY MOUTH ONCE A DAY    SYMBICORT 160-4.5 MCG/ACTUATION HFAA    Inhale 2 puffs into the lungs 2 (two) times daily.   Changed and/or Refilled Medications    Modified Medication Previous Medication    ALPRAZOLAM (XANAX) 1 MG TABLET ALPRAZolam (XANAX) 1 MG tablet       TAKE 1 TABLET BY MOUTH THREE TIMES A DAY AS NEEDED FOR ANXIETY. MAY CAUSE DROWSINESS.    TAKE 1 TABLET BY MOUTH THREE TIMES A DAY AS NEEDED FOR ANXIETY. MAY CAUSE DROWSINESS.       No Follow-up on file.

## 2018-08-09 NOTE — TELEPHONE ENCOUNTER
----- Message from Velia Coronado sent at 8/9/2018  3:10 PM CDT -----  Contact: self [phone]  Pt is requesting to speak to you regarding a script for Xanax that was suppose to be called in. Pt was seen today 8-9-18. Pt is at the pharmacy ([facility]). Pls call pt [phone]. Thanks./.......Edna    Pls note : I red flagged message because pt is there at pharmacy

## 2018-08-20 ENCOUNTER — TELEPHONE (OUTPATIENT)
Dept: FAMILY MEDICINE | Facility: CLINIC | Age: 68
End: 2018-08-20

## 2018-08-20 NOTE — TELEPHONE ENCOUNTER
----- Message from Isabel Wolf sent at 8/20/2018  1:28 PM CDT -----  Contact: self  Please call pt to discuss getting testing. Pt states he was to do testing. Please call [phone]

## 2018-08-20 NOTE — TELEPHONE ENCOUNTER
----- Message from Sabas Osullivan sent at 8/20/2018  8:09 AM CDT -----  Contact: Self/[phone]  Patient called to clarify what type of follow up appointment he needed to schedule. Thank you.

## 2018-08-22 ENCOUNTER — LAB VISIT (OUTPATIENT)
Dept: LAB | Facility: HOSPITAL | Age: 68
End: 2018-08-22
Attending: FAMILY MEDICINE
Payer: MEDICARE

## 2018-08-22 DIAGNOSIS — I70.0 ATHEROSCLEROSIS OF AORTA: ICD-10-CM

## 2018-08-22 LAB
CHOLEST SERPL-MCNC: 226 MG/DL
CHOLEST/HDLC SERPL: 4.1 {RATIO}
HDLC SERPL-MCNC: 55 MG/DL
HDLC SERPL: 24.3 %
LDLC SERPL CALC-MCNC: 137.4 MG/DL
NONHDLC SERPL-MCNC: 171 MG/DL
TRIGL SERPL-MCNC: 168 MG/DL

## 2018-08-22 PROCEDURE — 36415 COLL VENOUS BLD VENIPUNCTURE: CPT | Mod: PO

## 2018-08-22 PROCEDURE — 80061 LIPID PANEL: CPT

## 2018-08-23 ENCOUNTER — TELEPHONE (OUTPATIENT)
Dept: FAMILY MEDICINE | Facility: CLINIC | Age: 68
End: 2018-08-23

## 2018-08-23 RX ORDER — PRAVASTATIN SODIUM 10 MG/1
5 TABLET ORAL DAILY
Qty: 45 TABLET | Refills: 3 | Status: SHIPPED | OUTPATIENT
Start: 2018-08-23 | End: 2018-12-06 | Stop reason: SDUPTHER

## 2018-08-23 NOTE — TELEPHONE ENCOUNTER
Let pt know his cholesterol is high for having high blood pressure. He  and Dr. Jay wants to start pravastatin on a statin. Most common side effected is leg cramps. If so come in and we can check a lab to see if the medication is causing the leg cramps. Recheck in 3 months.

## 2018-08-23 NOTE — TELEPHONE ENCOUNTER
I have him scheduled he just needs to start taking the low dose cholesterol medication we will send in.  Dr. Jay

## 2018-08-23 NOTE — TELEPHONE ENCOUNTER
Patient states he already have problems with leg cramps, he drinks pickle juice and take leg cramp pills from the [facility].  Said Dr Jay was suppose to order portable oxygen concentrator and [employer] states they cover 80%.

## 2018-09-18 DIAGNOSIS — M54.12 CERVICAL RADICULOPATHY: ICD-10-CM

## 2018-09-18 DIAGNOSIS — J44.9 CHRONIC OBSTRUCTIVE PULMONARY DISEASE, UNSPECIFIED COPD TYPE: ICD-10-CM

## 2018-09-18 NOTE — TELEPHONE ENCOUNTER
----- Message from Chapito Collins sent at 9/18/2018 11:12 AM CDT -----  Contact: Self/[phone]  Refill:SYMBICORT 160-4.5 mcg/actuation HFAA  Refill:albuterol (VENTOLIN HFA) 90 mcg/actuation inhaler  Refill:ALPRAZolam (XANAX) 1 MG tablet    [facility] [address] [address] [phone] (Phone)  [phone] (Fax)    Thank you

## 2018-09-22 RX ORDER — ALBUTEROL SULFATE 90 UG/1
2 AEROSOL, METERED RESPIRATORY (INHALATION) EVERY 6 HOURS PRN
Qty: 18 G | Refills: 3 | Status: SHIPPED | OUTPATIENT
Start: 2018-09-22 | End: 2018-12-06 | Stop reason: SDUPTHER

## 2018-09-22 RX ORDER — BUDESONIDE AND FORMOTEROL FUMARATE DIHYDRATE 160; 4.5 UG/1; UG/1
2 AEROSOL RESPIRATORY (INHALATION) 2 TIMES DAILY
Qty: 10.2 G | Refills: 3 | Status: SHIPPED | OUTPATIENT
Start: 2018-09-22 | End: 2018-12-06 | Stop reason: SDUPTHER

## 2018-09-22 RX ORDER — ALPRAZOLAM 1 MG/1
TABLET ORAL
Qty: 90 TABLET | Refills: 1 | Status: SHIPPED | OUTPATIENT
Start: 2018-09-22 | End: 2018-11-21 | Stop reason: SDUPTHER

## 2018-09-26 PROBLEM — D45 POLYCYTHEMIA VERA: Status: RESOLVED | Noted: 2018-01-04 | Resolved: 2018-09-26

## 2018-10-19 ENCOUNTER — TELEPHONE (OUTPATIENT)
Dept: FAMILY MEDICINE | Facility: CLINIC | Age: 68
End: 2018-10-19

## 2018-10-19 NOTE — TELEPHONE ENCOUNTER
----- Message from Collins Mcdowell sent at 10/19/2018 11:46 AM CDT -----  Contact: self  Please advise on status of portable oxygen tank. Pt states order should be sent to [facility] [phone].   Pt [phone].

## 2018-10-22 ENCOUNTER — TELEPHONE (OUTPATIENT)
Dept: FAMILY MEDICINE | Facility: CLINIC | Age: 68
End: 2018-10-22

## 2018-10-22 NOTE — TELEPHONE ENCOUNTER
Patient is looking for an order for  An O2 concentrator  To be sent to [facility].

## 2018-10-22 NOTE — TELEPHONE ENCOUNTER
Not sure what they didn't get, he doesn't have the oxygen concentrator at the house?  We can try to call selina and see.

## 2018-10-22 NOTE — TELEPHONE ENCOUNTER
----- Message from Rianna Bunch sent at 10/22/2018 10:09 AM CDT -----  Contact: Self   Patient says Lesley never received his Oxygen Concentrator orders. Please call at [phone].

## 2018-10-22 NOTE — TELEPHONE ENCOUNTER
Portable? As in not the tanks?  I'm not sure if they will cover anything but the tanks.  But let me know if this is what he needs.

## 2018-10-23 NOTE — TELEPHONE ENCOUNTER
----- Message from Velia Coronado sent at 10/23/2018  1:16 PM CDT -----  Contact: self  [phone]  Pt said to pls fax  His oxygen order to [facility] [phone]. Thanks......Edna

## 2018-10-29 ENCOUNTER — TELEPHONE (OUTPATIENT)
Dept: FAMILY MEDICINE | Facility: CLINIC | Age: 68
End: 2018-10-29

## 2018-10-29 PROBLEM — D45 POLYCYTHEMIA VERA: Status: ACTIVE | Noted: 2018-10-29

## 2018-10-29 NOTE — TELEPHONE ENCOUNTER
Patient came into office to inquire about faxed order for the protable O2 ; noted order has been faxed and was re-faxed while here. Patient took the order with him.

## 2018-11-07 ENCOUNTER — TELEPHONE (OUTPATIENT)
Dept: FAMILY MEDICINE | Facility: CLINIC | Age: 68
End: 2018-11-07

## 2018-11-07 DIAGNOSIS — J43.1 PANLOBULAR EMPHYSEMA: Primary | ICD-10-CM

## 2018-11-07 NOTE — TELEPHONE ENCOUNTER
----- Message from Rianna Bunch sent at 11/7/2018 10:58 AM CST -----  Contact: Claudio Bunch says he is a new representative who faxed over an order for patient's oxygen tank he says the patient's old representative is no longer there and he now need a new order for the oxygen tank. He can be reached at [phone]

## 2018-11-08 ENCOUNTER — OFFICE VISIT (OUTPATIENT)
Dept: FAMILY MEDICINE | Facility: CLINIC | Age: 68
End: 2018-11-08
Payer: MEDICARE

## 2018-11-08 VITALS
TEMPERATURE: 99 F | WEIGHT: 135.56 LBS | OXYGEN SATURATION: 97 % | HEART RATE: 87 BPM | BODY MASS INDEX: 21.28 KG/M2 | SYSTOLIC BLOOD PRESSURE: 136 MMHG | HEIGHT: 67 IN | DIASTOLIC BLOOD PRESSURE: 60 MMHG

## 2018-11-08 DIAGNOSIS — N40.1 BENIGN PROSTATIC HYPERPLASIA WITH LOWER URINARY TRACT SYMPTOMS, SYMPTOM DETAILS UNSPECIFIED: ICD-10-CM

## 2018-11-08 DIAGNOSIS — F41.1 GENERALIZED ANXIETY DISORDER: ICD-10-CM

## 2018-11-08 DIAGNOSIS — D45 POLYCYTHEMIA VERA: ICD-10-CM

## 2018-11-08 DIAGNOSIS — S69.91XS INJURY OF FINGER OF RIGHT HAND, SEQUELA: ICD-10-CM

## 2018-11-08 DIAGNOSIS — J43.1 PANLOBULAR EMPHYSEMA: ICD-10-CM

## 2018-11-08 DIAGNOSIS — F33.1 MAJOR DEPRESSIVE DISORDER, RECURRENT EPISODE, MODERATE: ICD-10-CM

## 2018-11-08 DIAGNOSIS — R53.82 CHRONIC FATIGUE: ICD-10-CM

## 2018-11-08 DIAGNOSIS — H53.9 VISUAL DISTURBANCE: ICD-10-CM

## 2018-11-08 DIAGNOSIS — E83.52 HYPERCALCEMIA: ICD-10-CM

## 2018-11-08 DIAGNOSIS — I10 HTN, GOAL BELOW 140/90: ICD-10-CM

## 2018-11-08 DIAGNOSIS — G47.8 SLEEP AROUSAL DISORDER: ICD-10-CM

## 2018-11-08 DIAGNOSIS — D50.9 MICROCYTIC ANEMIA: ICD-10-CM

## 2018-11-08 DIAGNOSIS — G47.30 SLEEP APNEA, UNSPECIFIED TYPE: ICD-10-CM

## 2018-11-08 DIAGNOSIS — E78.2 MIXED DYSLIPIDEMIA: ICD-10-CM

## 2018-11-08 DIAGNOSIS — D47.1 MPN (MYELOPROLIFERATIVE NEOPLASM): ICD-10-CM

## 2018-11-08 DIAGNOSIS — Z98.1 HISTORY OF FUSION OF CERVICAL SPINE: ICD-10-CM

## 2018-11-08 DIAGNOSIS — K21.9 GASTROESOPHAGEAL REFLUX DISEASE, ESOPHAGITIS PRESENCE NOT SPECIFIED: ICD-10-CM

## 2018-11-08 DIAGNOSIS — D75.839 THROMBOCYTHEMIA: ICD-10-CM

## 2018-11-08 DIAGNOSIS — M47.816 LUMBAR FACET ARTHROPATHY: ICD-10-CM

## 2018-11-08 DIAGNOSIS — Z00.00 ANNUAL PHYSICAL EXAM: Primary | ICD-10-CM

## 2018-11-08 DIAGNOSIS — Z99.81 OXYGEN DEPENDENT: ICD-10-CM

## 2018-11-08 PROBLEM — R10.84 GENERALIZED ABDOMINAL PAIN: Status: RESOLVED | Noted: 2017-11-12 | Resolved: 2018-11-08

## 2018-11-08 PROBLEM — R79.89 ELEVATED LFTS: Status: RESOLVED | Noted: 2017-05-23 | Resolved: 2018-11-08

## 2018-11-08 PROBLEM — D72.829 LEUKOCYTOSIS: Status: RESOLVED | Noted: 2017-05-23 | Resolved: 2018-11-08

## 2018-11-08 PROBLEM — L29.9 PRURITUS: Status: RESOLVED | Noted: 2018-02-17 | Resolved: 2018-11-08

## 2018-11-08 PROCEDURE — 99999 PR PBB SHADOW E&M-EST. PATIENT-LVL III: CPT | Mod: PBBFAC,HCNC,, | Performed by: FAMILY MEDICINE

## 2018-11-08 PROCEDURE — 3075F SYST BP GE 130 - 139MM HG: CPT | Mod: CPTII,HCNC,S$GLB, | Performed by: FAMILY MEDICINE

## 2018-11-08 PROCEDURE — 3078F DIAST BP <80 MM HG: CPT | Mod: CPTII,HCNC,S$GLB, | Performed by: FAMILY MEDICINE

## 2018-11-08 PROCEDURE — 99397 PER PM REEVAL EST PAT 65+ YR: CPT | Mod: HCNC,S$GLB,, | Performed by: FAMILY MEDICINE

## 2018-11-08 NOTE — PROGRESS NOTES
Chief Complaint   Patient presents with    Annual Exam     SUBJECTIVE:   Chaz Stuart is a 68 y.o. male presenting for his annual checkup.  Current Outpatient Medications   Medication Sig Dispense Refill    acetaminophen (TYLENOL) 325 MG tablet Take 2 tablets (650 mg total) by mouth every 6 (six) hours as needed.  0    albuterol (VENTOLIN HFA) 90 mcg/actuation inhaler Inhale 2 puffs into the lungs every 6 (six) hours as needed. 18 g 3    albuterol-ipratropium (DUO-NEB) 2.5 mg-0.5 mg/3 mL nebulizer solution Take 3 mLs by nebulization every 6 (six) hours as needed for Wheezing. Rescue 6 Box 3    ALPRAZolam (XANAX) 1 MG tablet TAKE 1 TABLET BY MOUTH THREE TIMES A DAY AS NEEDED FOR ANXIETY. MAY CAUSE DROWSINESS. 90 tablet 1    amLODIPine (NORVASC) 10 MG tablet Take 1 tablet (10 mg total) by mouth once daily. 90 tablet 3    aspirin (ECOTRIN) 81 MG EC tablet Take 1 tablet (81 mg total) by mouth once daily. (Patient taking differently: Take 81 mg by mouth 2 (two) times daily. ) 150 tablet 6    azelastine (ASTELIN) 137 mcg (0.1 %) nasal spray 1 spray (137 mcg total) by Nasal route 2 (two) times daily. 30 mL 1    diazePAM (VALIUM) 2 MG tablet Take 1 tablet (2 mg total) by mouth daily as needed for Anxiety. 30 tablet 2    dutasteride (AVODART) 0.5 mg capsule Take 1 capsule (0.5 mg total) by mouth once daily. 90 capsule 3    hydroxyurea (HYDREA) 500 mg Cap TAKE 1 CAPSULE(S) BY MOUTH ONCE A DAY 90 capsule 3    pravastatin (PRAVACHOL) 10 MG tablet Take 0.5 tablets (5 mg total) by mouth once daily. 45 tablet 3    SYMBICORT 160-4.5 mcg/actuation HFAA Inhale 2 puffs into the lungs 2 (two) times daily. 10.2 g 3     No current facility-administered medications for this visit.      Allergies: Xanax [alprazolam]     ROS:  Feeling well. No dyspnea or chest pain on exertion. No abdominal pain, change in bowel habits, black or bloody stools. No urinary tract or prostatic symptoms. No neurological  "complaints.    OBJECTIVE:   The patient appears well, alert, oriented x 3, in no distress.   /60   Pulse 87   Temp 98.5 °F (36.9 °C) (Oral)   Ht 5' 7" (1.702 m)   Wt 61.5 kg (135 lb 9.3 oz)   SpO2 97%   BMI 21.24 kg/m²      88% without oxygen resting in OP setting.    97% with his 2 liters of oxygen    Chronically ill appearing  ENT normal.  Neck supple. No adenopathy or thyromegaly. NEAL. Lungs are clear, good air entry, no wheezes, rhonchi or rales. S1 and S2 normal, no murmurs, regular rate and rhythm. Abdomen is soft without tenderness, guarding, mass or organomegaly.  exam: deferred.  Extremities show no edema, normal peripheral pulses. Neurological is normal without focal findings.    ASSESSMENT:   1. Annual physical exam    2. Oxygen dependent    3. Polycythemia vera    4. MPN (myeloproliferative neoplasm)    5. Mixed dyslipidemia    6. Microcytic anemia    7. Major depressive disorder, recurrent episode, moderate    8. Lumbar facet arthropathy    9. Injury of finger of right hand, sequela    10. Hypercalcemia    11. HTN, goal below 140/90    12. History of fusion of cervical spine    13. Gastroesophageal reflux disease, esophagitis presence not specified    14. Generalized anxiety disorder    15. Panlobular emphysema    16. Chronic fatigue    17. Benign prostatic hyperplasia with lower urinary tract symptoms, symptom details unspecified    18. Body mass index (BMI) 21.0-21.9, adult    19. Visual disturbance    20. Thrombocythemia    21. Sleep arousal disorder    22. Sleep apnea, unspecified type        Patient has pulse oximeter at home    PLAN:     Chaz was seen today for annual exam.    Diagnoses and all orders for this visit:    Annual physical exam  Counseled on age appropriate medical preventative services, including age appropriate cancer screenings, over all nutritional health, need for a consistent exercise regimen and an over all push towards maintaining a vigorous and active " lifestyle.  Counseled on age appropriate vaccines and discussed upcoming health care needs based on age/gender.  Spent time with patient counseling on need for a good patient/doctor relationship moving forward.  Discussed use of common OTC medications and supplements.  Discussed common dietary aids and use of caffeine and the need for good sleep hygiene and stress management.    Oxygen dependent  Still needs it    Having hard time with the oxygen tank.    Try to get [employer] oxygen machine.  88% for this.  We will get [facility] [phone]  [employer] [phone] Parbhakar  Polycythemia vera  Continue with hematology  MPN (myeloproliferative neoplasm)  F/u with specialist  Mixed dyslipidemia  The current medical regimen is effective;  continue present plan and medications.    Microcytic anemia  F/u hematology  Major depressive disorder, recurrent episode, moderate  The current medical regimen is effective;  continue present plan and medications.    Lumbar facet arthropathy  The current medical regimen is effective;  continue present plan and medications.    Injury of finger of right hand, sequela  The current medical regimen is effective;  continue present plan and medications.    Hypercalcemia  The current medical regimen is effective;  continue present plan and medications.    HTN, goal below 140/90  The current medical regimen is effective;  continue present plan and medications.    History of fusion of cervical spine  The current medical regimen is effective;  continue present plan and medications.    Gastroesophageal reflux disease, esophagitis presence not specified  The current medical regimen is effective;  continue present plan and medications.    Generalized anxiety disorder  The current medical regimen is effective;  continue present plan and medications.    Panlobular emphysema  The current medical regimen is effective;  continue present plan and medications.    Chronic fatigue  Stay active  Benign prostatic  hyperplasia with lower urinary tract symptoms, symptom details unspecified  The current medical regimen is effective;  continue present plan and medications.    Body mass index (BMI) 21.0-21.9, adult  Watch weight loss  Visual disturbance  The current medical regimen is effective;  continue present plan and medications.    Thrombocythemia  The current medical regimen is effective;  continue present plan and medications.    Sleep arousal disorder  The current medical regimen is effective;  continue present plan and medications.    Sleep apnea, unspecified type  The current medical regimen is effective;  continue present plan and medications.      F/u in 6 months.

## 2018-11-13 ENCOUNTER — TELEPHONE (OUTPATIENT)
Dept: FAMILY MEDICINE | Facility: CLINIC | Age: 68
End: 2018-11-13

## 2018-11-13 NOTE — TELEPHONE ENCOUNTER
----- Message from Hue Bull sent at 11/13/2018  8:13 AM CST -----  Contact: self  Pt calling to check on status on order. Please call [employer] [phone]

## 2018-11-19 ENCOUNTER — TELEPHONE (OUTPATIENT)
Dept: FAMILY MEDICINE | Facility: CLINIC | Age: 68
End: 2018-11-19

## 2018-11-19 NOTE — TELEPHONE ENCOUNTER
----- Message from Helena Cody sent at 11/19/2018 12:49 PM CST -----  Contact: pt            Name of Who is Calling: pt      What is the request in detail: pt calling nurse in regards to oxygen concentrator. Call pt      Can the clinic reply by [facility]: no      [facility] [phone]

## 2018-11-19 NOTE — TELEPHONE ENCOUNTER
----- Message from Ellen Vidal sent at 11/19/2018 12:34 PM CST -----  Contact: Leanne/Lesley/[phone] case # [id number]  Lesley called for patient to give the fax number for an authorization for a Oxygen concentrator.  The fax number is [phone] or [phone].  Authorization [id number]

## 2018-11-20 NOTE — TELEPHONE ENCOUNTER
Called [employer], bad communication with [employer] orders has been faxed two time and all we need to do was faxed orders to  DME company will fax orders to [facility] today.

## 2018-11-20 NOTE — TELEPHONE ENCOUNTER
----- Message from Taina Moyer sent at 11/20/2018  8:11 AM CST -----  Contact: Jabari Allison   Asking to send an authorization request for Oxygen Concentrator to [facility]. The fax: [phone]. Call back : [phone] ref: [id number]

## 2018-11-21 DIAGNOSIS — M54.12 CERVICAL RADICULOPATHY: ICD-10-CM

## 2018-11-21 RX ORDER — ALPRAZOLAM 1 MG/1
TABLET ORAL
Qty: 90 TABLET | Refills: 2 | Status: SHIPPED | OUTPATIENT
Start: 2018-11-21 | End: 2018-12-06 | Stop reason: SDUPTHER

## 2018-12-06 DIAGNOSIS — M54.12 CERVICAL RADICULOPATHY: ICD-10-CM

## 2018-12-06 DIAGNOSIS — J44.9 CHRONIC OBSTRUCTIVE PULMONARY DISEASE, UNSPECIFIED COPD TYPE: ICD-10-CM

## 2018-12-06 RX ORDER — PRAVASTATIN SODIUM 10 MG/1
5 TABLET ORAL DAILY
Qty: 45 TABLET | Refills: 3 | Status: SHIPPED | OUTPATIENT
Start: 2018-12-06 | End: 2019-01-14

## 2018-12-06 RX ORDER — ALPRAZOLAM 1 MG/1
TABLET ORAL
Qty: 90 TABLET | Refills: 2 | Status: SHIPPED | OUTPATIENT
Start: 2018-12-06 | End: 2019-01-18 | Stop reason: SDUPTHER

## 2018-12-06 RX ORDER — BUDESONIDE AND FORMOTEROL FUMARATE DIHYDRATE 160; 4.5 UG/1; UG/1
2 AEROSOL RESPIRATORY (INHALATION) 2 TIMES DAILY
Qty: 10.2 G | Refills: 3 | Status: SHIPPED | OUTPATIENT
Start: 2018-12-06 | End: 2020-01-28

## 2018-12-21 ENCOUNTER — TELEPHONE (OUTPATIENT)
Dept: FAMILY MEDICINE | Facility: CLINIC | Age: 68
End: 2018-12-21

## 2018-12-21 DIAGNOSIS — J43.1 PANLOBULAR EMPHYSEMA: Primary | ICD-10-CM

## 2018-12-21 NOTE — TELEPHONE ENCOUNTER
Returned call.. Patient stated that he needs a new nebulizer machine. States that the one he had burned up and is no longer able to use it. He is temporarily using a friends machine in the meantime. Patient needs new order for new machine. Also stated that DME never received fax that was sent for his oxygen . Informed that we will refax this order today and let him know about nebulizer order. Patient voiced understanding.

## 2018-12-21 NOTE — TELEPHONE ENCOUNTER
----- Message from Hue Bull sent at 12/21/2018 11:15 AM CST -----  Contact: self  Pt calling to speak to a nurse regarding health. [phone]

## 2018-12-28 ENCOUNTER — OFFICE VISIT (OUTPATIENT)
Dept: FAMILY MEDICINE | Facility: CLINIC | Age: 68
End: 2018-12-28
Payer: MEDICARE

## 2018-12-28 VITALS
OXYGEN SATURATION: 97 % | BODY MASS INDEX: 21.8 KG/M2 | HEIGHT: 67 IN | TEMPERATURE: 99 F | HEART RATE: 81 BPM | SYSTOLIC BLOOD PRESSURE: 130 MMHG | DIASTOLIC BLOOD PRESSURE: 70 MMHG | WEIGHT: 138.88 LBS

## 2018-12-28 DIAGNOSIS — R09.89 ABNORMAL PERIPHERAL PULSE: ICD-10-CM

## 2018-12-28 DIAGNOSIS — J44.1 COPD EXACERBATION: Primary | ICD-10-CM

## 2018-12-28 DIAGNOSIS — R07.89 ATYPICAL CHEST PAIN: ICD-10-CM

## 2018-12-28 PROCEDURE — 93000 ELECTROCARDIOGRAM COMPLETE: CPT | Mod: HCNC,S$GLB,, | Performed by: INTERNAL MEDICINE

## 2018-12-28 PROCEDURE — 93005 ELECTROCARDIOGRAM TRACING: CPT | Mod: HCNC,S$GLB,, | Performed by: FAMILY MEDICINE

## 2018-12-28 PROCEDURE — 3078F DIAST BP <80 MM HG: CPT | Mod: CPTII,HCNC,S$GLB, | Performed by: FAMILY MEDICINE

## 2018-12-28 PROCEDURE — 99215 OFFICE O/P EST HI 40 MIN: CPT | Mod: HCNC,S$GLB,, | Performed by: FAMILY MEDICINE

## 2018-12-28 PROCEDURE — 3075F SYST BP GE 130 - 139MM HG: CPT | Mod: CPTII,HCNC,S$GLB, | Performed by: FAMILY MEDICINE

## 2018-12-28 PROCEDURE — 99999 PR PBB SHADOW E&M-EST. PATIENT-LVL III: CPT | Mod: PBBFAC,HCNC,, | Performed by: FAMILY MEDICINE

## 2018-12-28 PROCEDURE — 1101F PT FALLS ASSESS-DOCD LE1/YR: CPT | Mod: CPTII,HCNC,S$GLB, | Performed by: FAMILY MEDICINE

## 2018-12-28 RX ORDER — PREDNISONE 20 MG/1
40 TABLET ORAL DAILY
Qty: 20 TABLET | Refills: 0 | Status: SHIPPED | OUTPATIENT
Start: 2018-12-28 | End: 2019-01-08

## 2018-12-28 RX ORDER — AMOXICILLIN AND CLAVULANATE POTASSIUM 875; 125 MG/1; MG/1
1 TABLET, FILM COATED ORAL 2 TIMES DAILY
Qty: 20 TABLET | Refills: 0 | Status: SHIPPED | OUTPATIENT
Start: 2018-12-28 | End: 2019-03-28
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
## 2019-01-02 ENCOUNTER — TELEPHONE (OUTPATIENT)
Dept: FAMILY MEDICINE | Facility: CLINIC | Age: 69
End: 2019-01-02

## 2019-01-02 NOTE — TELEPHONE ENCOUNTER
----- Message from Alba Gtz sent at 1/2/2019  8:32 AM CST -----  Contact: self [phone]  Pt is requesting to get his oxygen changed from [facility] to [facility].

## 2019-01-02 NOTE — TELEPHONE ENCOUNTER
Informed patient that we need to get his new insurance information/ patient will try to bring card over to clinic, but if not, will fax it over a front and back copy

## 2019-01-04 ENCOUNTER — HOSPITAL ENCOUNTER (OUTPATIENT)
Dept: RADIOLOGY | Facility: HOSPITAL | Age: 69
Discharge: HOME | End: 2019-01-04
Attending: FAMILY MEDICINE
Payer: MEDICARE

## 2019-01-04 ENCOUNTER — TELEPHONE (OUTPATIENT)
Dept: FAMILY MEDICINE | Facility: CLINIC | Age: 69
End: 2019-01-04

## 2019-01-04 DIAGNOSIS — R07.89 ATYPICAL CHEST PAIN: ICD-10-CM

## 2019-01-04 DIAGNOSIS — R09.89 ABNORMAL PERIPHERAL PULSE: ICD-10-CM

## 2019-01-04 DIAGNOSIS — J44.1 COPD EXACERBATION: ICD-10-CM

## 2019-01-04 PROCEDURE — 25500020 PHARM REV CODE 255: Performed by: FAMILY MEDICINE

## 2019-01-04 PROCEDURE — 74174 CTA ABD&PLVS W/CONTRAST: CPT | Mod: 26,,, | Performed by: RADIOLOGY

## 2019-01-04 PROCEDURE — 74174 CTA CHEST ABDOMEN PELVIS: ICD-10-PCS | Mod: 26,,, | Performed by: RADIOLOGY

## 2019-01-04 PROCEDURE — 71275 CTA CHEST ABDOMEN PELVIS: ICD-10-PCS | Mod: 26,,, | Performed by: RADIOLOGY

## 2019-01-04 PROCEDURE — 71275 CT ANGIOGRAPHY CHEST: CPT | Mod: TC

## 2019-01-04 PROCEDURE — 71275 CT ANGIOGRAPHY CHEST: CPT | Mod: 26,,, | Performed by: RADIOLOGY

## 2019-01-04 RX ADMIN — IOHEXOL 85 ML: 350 INJECTION, SOLUTION INTRAVENOUS at 10:01

## 2019-01-04 NOTE — PROGRESS NOTES
Let him know he has blockages in his arteries to the legs, we will need to get them looked at the with vascular surgeon

## 2019-01-04 NOTE — TELEPHONE ENCOUNTER
----- Message from Yo Jay MD sent at 1/4/2019 12:21 PM CST -----  Let him know he has blockages in his arteries to the legs, we will need to get them looked at the with vascular surgeon

## 2019-01-04 NOTE — TELEPHONE ENCOUNTER
Gladis Ram Staff             Please be advised this pt requires auth for a cta, phn can take up to 14 business days. Please call me at [phone] to let me know if this case is medically urgent.     Thanks   Gladis

## 2019-01-07 ENCOUNTER — TELEPHONE (OUTPATIENT)
Dept: FAMILY MEDICINE | Facility: CLINIC | Age: 69
End: 2019-01-07

## 2019-01-07 NOTE — TELEPHONE ENCOUNTER
----- Message from Taina Moyer sent at 1/7/2019  9:16 AM CST -----  Contact: [facility] form , clinical, notes, doctors orders for oxygen. Please call at [phone] Fax: [phone].

## 2019-01-09 ENCOUNTER — TELEPHONE (OUTPATIENT)
Dept: FAMILY MEDICINE | Facility: CLINIC | Age: 69
End: 2019-01-09

## 2019-01-09 DIAGNOSIS — Z99.81 OXYGEN DEPENDENT: Primary | ICD-10-CM

## 2019-01-09 DIAGNOSIS — J43.1 PANLOBULAR EMPHYSEMA: ICD-10-CM

## 2019-01-09 NOTE — TELEPHONE ENCOUNTER
----- Message from Anna graceramana sent at 1/9/2019  2:09 PM CST -----  Contact: self  Pt calling to check on the statu of oxygen orders. He states the previous company he used would like to  the one he has now and he would like the new oxygen to be in by the time they come.  [phone]

## 2019-01-14 ENCOUNTER — OFFICE VISIT (OUTPATIENT)
Dept: CARDIOLOGY | Facility: CLINIC | Age: 69
End: 2019-01-14
Payer: MEDICARE

## 2019-01-14 VITALS
BODY MASS INDEX: 21.66 KG/M2 | WEIGHT: 138 LBS | RESPIRATION RATE: 15 BRPM | HEART RATE: 86 BPM | OXYGEN SATURATION: 99 % | HEIGHT: 67 IN | SYSTOLIC BLOOD PRESSURE: 128 MMHG | DIASTOLIC BLOOD PRESSURE: 64 MMHG

## 2019-01-14 DIAGNOSIS — E78.2 MIXED DYSLIPIDEMIA: ICD-10-CM

## 2019-01-14 DIAGNOSIS — M54.12 CERVICAL RADICULOPATHY: ICD-10-CM

## 2019-01-14 DIAGNOSIS — I73.9 PAD (PERIPHERAL ARTERY DISEASE): ICD-10-CM

## 2019-01-14 DIAGNOSIS — D45 POLYCYTHEMIA VERA: ICD-10-CM

## 2019-01-14 DIAGNOSIS — R07.9 CHEST PAIN, UNSPECIFIED TYPE: Primary | ICD-10-CM

## 2019-01-14 DIAGNOSIS — I10 HTN, GOAL BELOW 140/90: ICD-10-CM

## 2019-01-14 DIAGNOSIS — E78.2 MIXED HYPERLIPIDEMIA: ICD-10-CM

## 2019-01-14 PROCEDURE — 99499 RISK ADDL DX/OHS AUDIT: ICD-10-PCS | Mod: S$PBB,,, | Performed by: INTERNAL MEDICINE

## 2019-01-14 PROCEDURE — 3074F PR MOST RECENT SYSTOLIC BLOOD PRESSURE < 130 MM HG: ICD-10-PCS | Mod: CPTII,S$GLB,, | Performed by: INTERNAL MEDICINE

## 2019-01-14 PROCEDURE — 1101F PT FALLS ASSESS-DOCD LE1/YR: CPT | Mod: CPTII,S$GLB,, | Performed by: INTERNAL MEDICINE

## 2019-01-14 PROCEDURE — 99204 PR OFFICE/OUTPT VISIT, NEW, LEVL IV, 45-59 MIN: ICD-10-PCS | Mod: S$GLB,,, | Performed by: INTERNAL MEDICINE

## 2019-01-14 PROCEDURE — 99999 PR PBB SHADOW E&M-EST. PATIENT-LVL III: CPT | Mod: PBBFAC,,, | Performed by: INTERNAL MEDICINE

## 2019-01-14 PROCEDURE — 3074F SYST BP LT 130 MM HG: CPT | Mod: CPTII,S$GLB,, | Performed by: INTERNAL MEDICINE

## 2019-01-14 PROCEDURE — 99499 UNLISTED E&M SERVICE: CPT | Mod: S$PBB,,, | Performed by: INTERNAL MEDICINE

## 2019-01-14 PROCEDURE — 1101F PR PT FALLS ASSESS DOC 0-1 FALLS W/OUT INJ PAST YR: ICD-10-PCS | Mod: CPTII,S$GLB,, | Performed by: INTERNAL MEDICINE

## 2019-01-14 PROCEDURE — 99204 OFFICE O/P NEW MOD 45 MIN: CPT | Mod: S$GLB,,, | Performed by: INTERNAL MEDICINE

## 2019-01-14 PROCEDURE — 99999 PR PBB SHADOW E&M-EST. PATIENT-LVL III: ICD-10-PCS | Mod: PBBFAC,,, | Performed by: INTERNAL MEDICINE

## 2019-01-14 PROCEDURE — 3078F DIAST BP <80 MM HG: CPT | Mod: CPTII,S$GLB,, | Performed by: INTERNAL MEDICINE

## 2019-01-14 PROCEDURE — 3078F PR MOST RECENT DIASTOLIC BLOOD PRESSURE < 80 MM HG: ICD-10-PCS | Mod: CPTII,S$GLB,, | Performed by: INTERNAL MEDICINE

## 2019-01-14 RX ORDER — ATORVASTATIN CALCIUM 40 MG/1
40 TABLET, FILM COATED ORAL NIGHTLY
Qty: 90 TABLET | Refills: 3 | Status: SHIPPED | OUTPATIENT
Start: 2019-01-14 | End: 2019-03-28 | Stop reason: SDUPTHER

## 2019-01-14 NOTE — TELEPHONE ENCOUNTER
----- Message from Colleenjoseph Mathew sent at 1/14/2019  8:45 AM CST -----  Contact: Self  Pt is calling to get a refill on medication ALPRAZolam (XANAX) 1 MG tablet and albuterol-ipratropium (DUO-NEB) 2.5 mg-0.5 mg/3 mL nebulizer solution. Please call pt at [phone]      [facility]  [address]  [address]  [address]  [place] [address]  Phone: [phone] Fax: [phone]

## 2019-01-14 NOTE — PROGRESS NOTES
CARDIOVASCULAR CONSULTATION    REASON FOR CONSULT:   Chaz Stuart is a 68 y.o. male who presents for PAD, ?CAD eval.    Req/PCP: Pierre Monique: Blaise  HISTORY OF PRESENT ILLNESS:   The patient is a very pleasant 68-year-old  man referred at the request his primary care physician for evaluation of PAD as well as potential CAD.  The patient reports a somewhat longstanding history of central chest discomfort.  This seems to come and go, but does have an exertional component to it.  He has significant chronic dyspnea on exertion is on home oxygen for severe COPD.  He denies any palpitations, lightheadedness, dizziness, or syncope.  There has been no PND, orthopnea, or lower extremity edema.  He denies melena, hematuria, or claudicant symptoms.  The patient did recently have a CT of his chest abdomen pelvis noting scattered coronary atherosclerosis, as well as significant lower extremity inflow PAD.    He tells me that he has been told by his physicians that he has had 2 heart attacks in the past.    Family history is notable for father with CAD, although this is not appear to be premature.    The patient is a former smoker, but currently is abstaining.    The patient follows with Dr. Welsh for history of polycythemia vera with episodic phlebotomy as well as Hydrea treatment.    CARDIOVASCULAR HISTORY:   Presumed CAD (CTA 1/2019)  PAD (bilat LE inflow disease CT 1/2019)    PAST MEDICAL HISTORY:     Past Medical History:   Diagnosis Date    Anxiety     COPD (chronic obstructive pulmonary disease)     DDD (degenerative disc disease), cervical     Diverticulitis     GERD (gastroesophageal reflux disease)     Hypertension     Myocardial infarction     Polycythemia vera 1 year       PAST SURGICAL HISTORY:     Past Surgical History:   Procedure Laterality Date    BLOCK, NERVE, FACET JOINT, LUMBAR, MEDIAL BRANCH Bilateral 9/17/2013    [facility]     BLOCK-NERVE-MEDIAL BRANCH-LUMBAR Bilateral 9/30/2014    [facility]    CERVICAL SPINE SURGERY  Fusion    CHOLECYSTECTOMY      COLON SURGERY      DEBRIDEMENT-WOUND  9/5/2017    Performed by Phillip Monson III, MD at [facility] OR    FINGER SURGERY      OPEN REDUCTION INTERNAL FIXATION-FINGER Right 9/5/2017    Performed by Phillip [facility] III, MD at [facility] OR    PINNING-PERCUTANEOUS-FINGER  9/5/2017    Performed by Phillip Monson III, MD at [facility] OR       ALLERGIES AND MEDICATION:   Review of patient's allergies indicates:  No Known Allergies     Medication List           Accurate as of 1/14/19 10:13 AM. If you have any questions, ask your nurse or doctor.               CONTINUE taking these medications    acetaminophen 325 MG tablet  Commonly known as:  TYLENOL  Take 2 tablets (650 mg total) by mouth every 6 (six) hours as needed.     albuterol 90 mcg/actuation inhaler  Commonly known as:  VENTOLIN HFA  Inhale 2 puffs into the lungs every 6 (six) hours as needed.     albuterol-ipratropium 2.5 mg-0.5 mg/3 mL nebulizer solution  Commonly known as:  DUO-NEB  Take 3 mLs by nebulization every 6 (six) hours as needed for Wheezing. Rescue     ALPRAZolam 1 MG tablet  Commonly known as:  XANAX  TAKE 1 TABLET BY MOUTH THREE TIMES A DAY AS NEEDED FOR ANXIETY. MAY CAUSE DROWSINESS.     amLODIPine 10 MG tablet  Commonly known as:  NORVASC  Take 1 tablet (10 mg total) by mouth once daily.     amoxicillin-clavulanate 875-125mg 875-125 mg per tablet  Commonly known as:  AUGMENTIN  Take 1 tablet by mouth 2 (two) times daily.     aspirin 81 MG EC tablet  Commonly known as:  ECOTRIN  Take 1 tablet (81 mg total) by mouth once daily.     azelastine 137 mcg (0.1 %) nasal spray  Commonly known as:  ASTELIN  1 spray (137 mcg total) by Nasal route 2 (two) times daily.     diazePAM 2 MG tablet  Commonly known as:  VALIUM  Take 1 tablet (2 mg total) by mouth daily as needed for Anxiety.      dutasteride 0.5 mg capsule  Commonly known as:  AVODART  Take 1 capsule (0.5 mg total) by mouth once daily.     hydroxyurea 500 mg Cap  Commonly known as:  HYDREA  TAKE 1 CAPSULE(S) BY MOUTH ONCE A DAY.     * pravastatin 10 MG tablet  Commonly known as:  PRAVACHOL  Take 0.5 tablets (5 mg total) by mouth once daily.     * pravastatin 10 MG tablet  Commonly known as:  PRAVACHOL  Take 0.5 tablets (5 mg total) by mouth once daily.     * SYMBICORT 160-4.5 mcg/actuation Hfaa  Generic drug:  budesonide-formoterol 160-4.5 mcg  Inhale 2 puffs into the lungs 2 (two) times daily.     * SYMBICORT 160-4.5 mcg/actuation Hfaa  Generic drug:  budesonide-formoterol 160-4.5 mcg  Inhale 2 puffs into the lungs 2 (two) times daily.         * This list has 4 medication(s) that are the same as other medications prescribed for you. Read the directions carefully, and ask your doctor or other care provider to review them with you.                SOCIAL HISTORY:     Social History     Socioeconomic History    Marital status: Single     Spouse name: Not on file    Number of children: 6    Years of education: Not on file    Highest education level: Not on file   Social Needs    Financial resource strain: Not on file    Food insecurity - worry: Not on file    Food insecurity - inability: Not on file    Transportation needs - medical: Not on file    Transportation needs - non-medical: Not on file   Occupational History    Not on file   Tobacco Use    Smoking status: Former Smoker     Packs/day: 3.00     Years: 10.00     Pack years: 30.00     Last attempt to quit: 2009     Years since quittin.1    Smokeless tobacco: Never Used    Tobacco comment: use to smoke  3-5 packs a day.    Substance and Sexual Activity    Alcohol use: No    Drug use: No    Sexual activity: No     Partners: Female     Birth control/protection: Surgical   Other Topics Concern    Caffeine Use Yes    Financial Status: Disabled Yes    Legal:  Involved in criminal litigation Not Asked    Caffeine Use: Frequent Not Asked    Financial Status: Employed Not Asked    Legal: Other Not Asked    Caffeine Use: Moderate Not Asked    Financial Status: Unemployed Not Asked    Leisure: Exercise Not Asked    Caffeine Use: Substantial Not Asked    Financial Status: Other Not Asked    Leisure: Fishing Not Asked    Childhood History: Adopted Not Asked    Firearms: Does patient have access to a firearm? Yes     Comment: locked gun cab    Leisure: Hunting Not Asked    Childhood History: Early trauma Not Asked    Home situation: homeless Not Asked    Leisure: Movie Watching Not Asked    Childhood History: Raised by parents Not Asked    Home situation: lives alone Yes    Leisure: Shopping Not Asked    Childhood History: Uneventful Not Asked    Home situation: lives in group home Not Asked    Leisure: Sports Not Asked    Childhood History: Other Not Asked    Home situation: lives in nursing home Not Asked    Leisure: Time with family Not Asked    Education: Unfinished High School Yes     Comment: 8th grade    Home situation: lives in shelter Not Asked     Service No    Education: High School Graduate Not Asked    Home situation: lives with family Not Asked    Spirituality: Active Participation No    Education: Unfinished college Not Asked    Home situation: lives with friends Not Asked    Spirituality: Organized Tenriism No    Education: Trade School Not Asked    Home situation: lives with significant other Not Asked    Spirituality: Private Participation No    Education: Associate's Degree Not Asked    Home situation: lives with spouse Not Asked    Patient feels they ought to cut down on drinking/drug use Not Asked    Education: Bachelor's Degree Not Asked    Legal consequences of chemical use Not Asked    Patient annoyed by others criticizing their drinking/drug use Not Asked    Education: More than one Bachelor's or  Professional Not Asked    Legal: Arrest history No    Patient has felt bad or guilty about drinking/drug use Not Asked    Education: Master's, PhD Not Asked    Legal: Involved in civil litigation Not Asked    Patient has had a drink/used drugs as an eye opener in the AM Not Asked   Social History Narrative    Not on file       FAMILY HISTORY:     Family History   Problem Relation Age of Onset    Diabetes Mother     Heart disease Mother     Diabetes Father     Hypertension Father     Stroke Father     ADD / ADHD Neg Hx     Alcohol abuse Neg Hx     Anxiety disorder Neg Hx     Bipolar disorder Neg Hx     Dementia Neg Hx     Depression Neg Hx     Drug abuse Neg Hx     OCD Neg Hx     Paranoid behavior Neg Hx     Physical abuse Neg Hx     Schizophrenia Neg Hx     Seizures Neg Hx     Self injury Neg Hx     Sexual abuse Neg Hx     Suicide Neg Hx     Melanoma Neg Hx        REVIEW OF SYSTEMS:   Review of Systems   Constitutional: Negative for chills, diaphoresis and fever.   HENT: Negative for nosebleeds.    Eyes: Negative for blurred vision, double vision and photophobia.   Respiratory: Positive for shortness of breath. Negative for hemoptysis and wheezing.    Cardiovascular: Positive for chest pain. Negative for palpitations, orthopnea, claudication, leg swelling and PND.   Gastrointestinal: Negative for abdominal pain, blood in stool, heartburn, melena, nausea and vomiting.   Genitourinary: Negative for flank pain and hematuria.   Musculoskeletal: Negative for falls, myalgias and neck pain.   Skin: Negative for rash.   Neurological: Negative for dizziness, seizures, loss of consciousness, weakness and headaches.   Endo/Heme/Allergies: Negative for polydipsia. Does not bruise/bleed easily.   Psychiatric/Behavioral: Negative for depression and memory loss. The patient is not nervous/anxious.        PHYSICAL EXAM:     Vitals:    01/14/19 1003   BP: 128/64   Pulse: 86   Resp: 15    Body mass index is  "21.61 kg/m².  Weight: 62.6 kg (138 lb)   Height: 5' 7" (170.2 cm)     Physical Exam   Constitutional: He is oriented to person, place, and time. He appears well-developed and well-nourished. He is cooperative.  Non-toxic appearance. No distress.   HENT:   Head: Normocephalic and atraumatic.   Eyes: Conjunctivae and EOM are normal. Pupils are equal, round, and reactive to light. No scleral icterus.   Neck: Trachea normal and normal range of motion. Neck supple. Normal carotid pulses and no JVD present. Carotid bruit is not present. No neck rigidity. No tracheal deviation and no edema present. No thyromegaly present.   Cardiovascular: Normal rate, regular rhythm, S1 normal and S2 normal. PMI is not displaced. Exam reveals distant heart sounds. Exam reveals no gallop and no friction rub.   No murmur heard.  Pulses:       Carotid pulses are 2+ on the right side, and 2+ on the left side.  Pulmonary/Chest: Effort normal. No stridor. No respiratory distress. He has no wheezes. He has no rales. He exhibits no tenderness.   Poor air entry bilat   Abdominal: Soft. He exhibits no distension. There is no hepatosplenomegaly.   Musculoskeletal: Normal range of motion. He exhibits no edema or tenderness.   Feet:   Right Foot:   Skin Integrity: Negative for ulcer.   Left Foot:   Skin Integrity: Negative for ulcer.   Neurological: He is alert and oriented to person, place, and time. No cranial nerve deficit.   Skin: Skin is warm and dry. No rash noted. No erythema.   Psychiatric: He has a normal mood and affect. His speech is normal and behavior is normal.   Vitals reviewed.      DATA:   EKG: (personally reviewed tracing)  12/28/18 SR 70, septal q waves    Laboratory:  CBC:  Recent Labs   Lab 08/09/18  1130 09/19/18  1120 11/27/18  1014   WHITE BLOOD CELL COUNT 9.35 10.5 18.3 H   HEMOGLOBIN 11.4 L 12.7 L 12.1 L   HEMATOCRIT 41.2 42.9 38.5   PLATELETS 424 H 118 L 690 H       CHEMISTRIES:  Recent Labs   Lab 11/25/16  1013  " 11/11/17  1613  02/17/18  0500  08/09/18  1130 09/19/18  1120 11/27/18  1014 01/04/19  0835   GLUCOSE 78   < > 121 H   < > 161 H   < > 66 L 70 99  --    SODIUM 144   < > 142   < > 139   < > 139 139 138  --    POTASSIUM 4.3   < > 3.5   < > 4.2   < > 4.2 4.5 5.1  --    BUN BLD 14   < > 13   < > 17   < > 15 14 16  --    CREATININE 0.9   < > 1.0   < > 0.8   < > 0.9 0.82 0.83 0.8   EGFR IF AFRICAN AMERICAN >60   < > >60   < > >60  --  >60  --   --  >60   EGFR IF NON- >60   < > >60   < > >60   < > >60 91 90 >60   CALCIUM 9.8   < > 9.5   < > 10.1   < > 9.8 9.6 10.7 H  --    MAGNESIUM 2.0  --  1.2 L  --   --   --   --   --   --   --     < > = values in this interval not displayed.       CARDIAC BIOMARKERS:  Recent Labs   Lab 11/11/17  2230 11/12/17  0423 02/16/18  1525   TROPONIN I <0.006 <0.006 <0.006       COAGS:  Recent Labs   Lab 08/29/17  1734   INR 1.1       LIPIDS/LFTS:  Recent Labs   Lab 11/02/16  0804  12/27/16  0842  06/21/17  1027 07/19/17  0816  08/09/18  1130 08/22/18  1000 09/19/18  1120 11/27/18  1014   CHOLESTEROL 169  --  142   < > 235 H 194  --   --  226 H  --   --    TRIGLYCERIDES 108  --  68   < > 273 H 322 H  --   --  168 H  --   --    HDL 73  --  60   < > 54 43  --   --  55  --   --    LDL CALC  --   --   --    < > 126 H 87  --   --   --   --   --    LDL CHOLESTEROL 74  --  68  --   --   --   --   --  137.4  --   --    NON-HDL CHOLESTEROL  --   --   --   --   --   --   --   --  171  --   --    NON HDL CHOL. (LDL+ VLDL) 96  --  82  --   --   --   --   --   --   --   --    AST 29   < > 22   < > 20 19   < > 22  --  32 17   ALT 24   < > 17   < > 13 12   < > 15  --  17 12    < > = values in this interval not displayed.       Cardiovascular Testing:  CTA C/A/P 1/4/19:  No aortic aneurysm or dissection.  Atherosclerotic calcification of the bilateral common and external iliac arteries with multifocal short segment stenosis of approximately 50%.  There is scattered calcific coronary  atherosclerosis.  Extensive panlobular emphysema in an apical predominance.  Incidental findings as above.  No concerning abdominal or pelvic findings.    ASSESSMENT:   # CP with both typ and atyp features, presumed CAD (CTA 1/4/2019)  # PAD (LE inflow disease on CTA 1/4/19), essentially asymptomatic  # HTN, controlled  # HLP on prava 5mg qd  # PCV, followed by Dr. Welsh with episodic phlebotomy and hydrea rx  # COPD on home O2    PLAN:   Cont med rx  Stop prava  Start atorva 40mg qhs  Check Fabi MPI (pt unable to exercise), echo and LE art US/JACQUE  Enroll in digital HTN program  RTC 1 month for review  Check lipids/LFT 3 months (mid Apr 2019)    Dario Lynn MD, FACC

## 2019-01-14 NOTE — LETTER
January 14, 2019      Yo Jay MD  [address]  [place] [address]           [facility]  [address] [facility] [address]  [place] [address]  Phone: [phone]          Patient: Chaz Stuart   MR Number: [id number]   YOB: 1950   Date of Visit: 1/14/2019       Dear Dr. Yo Jay:    Thank you for referring Chaz Stuart to me for evaluation. Attached you will find relevant portions of my assessment and plan of care.    If you have questions, please do not hesitate to call me. I look forward to following Chaz Stuart along with you.    Sincerely,    Dario Lynn MD    Enclosure  CC:  No Recipients    If you would like to receive this communication electronically, please contact [email or web address] or [phone] to request more information on [facility] access.    For providers and/or their staff who would like to refer a patient to [facility], please contact us through our one-stop-shop provider referral line, [facility], at [phone].    If you feel you have received this communication in error or would no longer like to receive these types of communications, please e-mail [email or web address]

## 2019-01-15 RX ORDER — IPRATROPIUM BROMIDE AND ALBUTEROL SULFATE 2.5; .5 MG/3ML; MG/3ML
3 SOLUTION RESPIRATORY (INHALATION) EVERY 6 HOURS PRN
Qty: 6 BOX | Refills: 3 | Status: SHIPPED | OUTPATIENT
Start: 2019-01-15 | End: 2019-05-16 | Stop reason: SDUPTHER

## 2019-01-15 RX ORDER — ALPRAZOLAM 1 MG/1
TABLET ORAL
Qty: 90 TABLET | Refills: 2 | OUTPATIENT
Start: 2019-01-15

## 2019-01-16 ENCOUNTER — TELEPHONE (OUTPATIENT)
Dept: FAMILY MEDICINE | Facility: CLINIC | Age: 69
End: 2019-01-16

## 2019-01-16 NOTE — TELEPHONE ENCOUNTER
----- Message from Colleen Mathew sent at 1/16/2019  8:10 AM CST -----  Contact: Self  Pt is returning a call. Please call pt at [phone]

## 2019-01-17 ENCOUNTER — LAB VISIT (OUTPATIENT)
Dept: LAB | Facility: HOSPITAL | Age: 69
End: 2019-01-17
Attending: INTERNAL MEDICINE
Payer: MEDICARE

## 2019-01-17 DIAGNOSIS — D45 POLYCYTHEMIA VERA: ICD-10-CM

## 2019-01-17 LAB
ALBUMIN SERPL BCP-MCNC: 3.8 G/DL
ALP SERPL-CCNC: 59 U/L
ALT SERPL W/O P-5'-P-CCNC: 20 U/L
ANION GAP SERPL CALC-SCNC: 10 MMOL/L
AST SERPL-CCNC: 20 U/L
BASOPHILS # BLD AUTO: 0.06 K/UL
BASOPHILS NFR BLD: 0.4 %
BILIRUB SERPL-MCNC: 0.4 MG/DL
BUN SERPL-MCNC: 12 MG/DL
CALCIUM SERPL-MCNC: 9.2 MG/DL
CHLORIDE SERPL-SCNC: 99 MMOL/L
CO2 SERPL-SCNC: 31 MMOL/L
CREAT SERPL-MCNC: 0.8 MG/DL
DIFFERENTIAL METHOD: ABNORMAL
EOSINOPHIL # BLD AUTO: 0.1 K/UL
EOSINOPHIL NFR BLD: 0.7 %
ERYTHROCYTE [DISTWIDTH] IN BLOOD BY AUTOMATED COUNT: 17.1 %
EST. GFR  (AFRICAN AMERICAN): >60 ML/MIN/1.73 M^2
EST. GFR  (NON AFRICAN AMERICAN): >60 ML/MIN/1.73 M^2
GLUCOSE SERPL-MCNC: 77 MG/DL
HCT VFR BLD AUTO: 41.6 %
HGB BLD-MCNC: 11.5 G/DL
HYPOCHROMIA BLD QL SMEAR: ABNORMAL
LDH SERPL L TO P-CCNC: 221 U/L
LYMPHOCYTES # BLD AUTO: 0.7 K/UL
LYMPHOCYTES NFR BLD: 4.8 %
MCH RBC QN AUTO: 23.8 PG
MCHC RBC AUTO-ENTMCNC: 27.6 G/DL
MCV RBC AUTO: 86 FL
MONOCYTES # BLD AUTO: 0.6 K/UL
MONOCYTES NFR BLD: 3.7 %
NEUTROPHILS # BLD AUTO: 13.9 K/UL
NEUTROPHILS NFR BLD: 91.1 %
PLATELET # BLD AUTO: 183 K/UL
PMV BLD AUTO: 9.6 FL
POTASSIUM SERPL-SCNC: 3.9 MMOL/L
PROT SERPL-MCNC: 7.1 G/DL
RBC # BLD AUTO: 4.84 M/UL
SODIUM SERPL-SCNC: 140 MMOL/L
WBC # BLD AUTO: 15.32 K/UL

## 2019-01-17 PROCEDURE — 36415 COLL VENOUS BLD VENIPUNCTURE: CPT

## 2019-01-17 PROCEDURE — 85025 COMPLETE CBC W/AUTO DIFF WBC: CPT

## 2019-01-17 PROCEDURE — 83615 LACTATE (LD) (LDH) ENZYME: CPT

## 2019-01-17 PROCEDURE — 80053 COMPREHEN METABOLIC PANEL: CPT

## 2019-01-18 DIAGNOSIS — M54.12 CERVICAL RADICULOPATHY: ICD-10-CM

## 2019-01-18 RX ORDER — ALPRAZOLAM 1 MG/1
TABLET ORAL
Qty: 90 TABLET | Refills: 0 | Status: SHIPPED | OUTPATIENT
Start: 2019-01-18 | End: 2019-03-04 | Stop reason: SDUPTHER

## 2019-01-18 NOTE — TELEPHONE ENCOUNTER
----- Message from Isabel Wolf sent at 1/18/2019  9:24 AM CST -----  Contact: self  Pt calling to speak with nurse about medications. Please call [phone].

## 2019-01-18 NOTE — TELEPHONE ENCOUNTER
Pt states his rx for xanax from 12/9/18 with 2 refills is at [facility]; they had hard time getting medication and would give him 10 pills at a time and he had to keep going to pharmacy; he does not want to use this pharmacy anymore and would like you to send one for next month to [facility] on [address]

## 2019-01-29 ENCOUNTER — HOSPITAL ENCOUNTER (OUTPATIENT)
Dept: RADIOLOGY | Facility: HOSPITAL | Age: 69
Discharge: HOME | End: 2019-01-29
Attending: INTERNAL MEDICINE
Payer: MEDICARE

## 2019-01-29 ENCOUNTER — HOSPITAL ENCOUNTER (OUTPATIENT)
Dept: CARDIOLOGY | Facility: HOSPITAL | Age: 69
Discharge: HOME | End: 2019-01-29
Attending: INTERNAL MEDICINE
Payer: MEDICARE

## 2019-01-29 VITALS — HEIGHT: 67 IN | BODY MASS INDEX: 21.66 KG/M2 | WEIGHT: 138 LBS | HEART RATE: 92 BPM

## 2019-01-29 DIAGNOSIS — I73.9 PAD (PERIPHERAL ARTERY DISEASE): ICD-10-CM

## 2019-01-29 DIAGNOSIS — R07.9 CHEST PAIN, UNSPECIFIED TYPE: ICD-10-CM

## 2019-01-29 LAB
CV STRESS BASE HR: 90
DIASTOLIC BLOOD PRESSURE: 84
LEFT ANT TIBIAL SYS PSV: 67 CM/S
LEFT CFA PSV: 63 CM/S
LEFT EXTERNAL ILIAC PSV: 95 CM/S
LEFT PERONEAL SYS PSV: 39 CM/S
LEFT POPLITEAL PSV: 35 CM/S
LEFT POST TIBIAL SYS PSV: 58 CM/S
LEFT PROFUNDA SYS PSV: 62 CM/S
LEFT SUPER FEMORAL DIST SYS PSV: 86 CM/S
LEFT SUPER FEMORAL MID SYS PSV: 111 CM/S
LEFT SUPER FEMORAL OSTIAL SYS PSV: 81 CM/S
LEFT SUPER FEMORAL PROX SYS PSV: 94 CM/S
LEFT TIB/PER TRUNK SYS PSV: 60 CM/S
NUC STRESS EJECTION FRACTION: 69 %
OHS CV CPX 85 PERCENT MAX PREDICTED HEART RATE MALE: 129
OHS CV CPX MAX PREDICTED HEART RATE: 152
OHS CV CPX PATIENT IS FEMALE: 0
OHS CV CPX PATIENT IS MALE: 1
OHS CV CPX PEAK DIASTOLIC BLOOD PRESSURE: 70 MMHG
OHS CV CPX PEAK HEAR RATE: 112
OHS CV CPX PEAK RATE PRESSURE PRODUCT: NORMAL
OHS CV CPX PEAK SYSTOLIC BLOOD PRESSURE: 139
OHS CV CPX PERCENT MAX PREDICTED HEART RATE ACHIEVED: 74
OHS CV CPX RATE PRESSURE PRODUCT PRESENTING: NORMAL
OHS CV LEFT LOWER EXTREMITY ABI (NO CALC): 0.9
OHS CV RIGHT ABI LOWER EXTREMITY (NO CALC): 0.94
RIGHT ANT TIBIAL SYS PSV: 47 CM/S
RIGHT CFA PSV: 197 CM/S
RIGHT EXTERNAL ILLIAC PSV: 128 CM/S
RIGHT PERONEAL SYS PSV: 63 CM/S
RIGHT POPLITEAL PSV: 37 CM/S
RIGHT POST TIBIAL SYS PSV: 88 CM/S
RIGHT PROFUNDA SYS PSV: 81 CM/S
RIGHT SUPER FEMORAL DIST SYS PSV: 85 CM/S
RIGHT SUPER FEMORAL MID SYS PSV: 103 CM/S
RIGHT SUPER FEMORAL OSTIAL SYS PSV: 121 CM/S
RIGHT SUPER FEMORAL PROX SYS PSV: 107 CM/S
RIGHT TIB/PER TRUNK SYS PSV: 67 CM/S
SYSTOLIC BLOOD PRESSURE: 134

## 2019-01-29 PROCEDURE — 93925 LOWER EXTREMITY STUDY: CPT | Mod: 26,,, | Performed by: INTERNAL MEDICINE

## 2019-01-29 PROCEDURE — 93306 TRANSTHORACIC ECHO (TTE) COMPLETE (CUPID ONLY): ICD-10-PCS | Mod: 26,,, | Performed by: INTERNAL MEDICINE

## 2019-01-29 PROCEDURE — 93925 LOWER EXTREMITY STUDY: CPT | Mod: 50

## 2019-01-29 PROCEDURE — 93018 CV STRESS TEST I&R ONLY: CPT | Mod: ,,, | Performed by: INTERNAL MEDICINE

## 2019-01-29 PROCEDURE — 93017 CV STRESS TEST TRACING ONLY: CPT

## 2019-01-29 PROCEDURE — 93306 TTE W/DOPPLER COMPLETE: CPT

## 2019-01-29 PROCEDURE — A9502 TC99M TETROFOSMIN: HCPCS

## 2019-01-29 PROCEDURE — 78452 STRESS TEST WITH MYOCARDIAL PERFUSION (CUPID ONLY): ICD-10-PCS | Mod: 26,,, | Performed by: INTERNAL MEDICINE

## 2019-01-29 PROCEDURE — 63600175 PHARM REV CODE 636 W HCPCS

## 2019-01-29 PROCEDURE — 78452 HT MUSCLE IMAGE SPECT MULT: CPT | Mod: 26,,, | Performed by: INTERNAL MEDICINE

## 2019-01-29 PROCEDURE — 93016 STRESS TEST WITH MYOCARDIAL PERFUSION (CUPID ONLY): ICD-10-PCS | Mod: ,,, | Performed by: INTERNAL MEDICINE

## 2019-01-29 PROCEDURE — 93016 CV STRESS TEST SUPVJ ONLY: CPT | Mod: ,,, | Performed by: INTERNAL MEDICINE

## 2019-01-29 PROCEDURE — 93925 CV US DOPPLER ARTERIAL LEGS BILATERAL (CUPID ONLY): ICD-10-PCS | Mod: 26,,, | Performed by: INTERNAL MEDICINE

## 2019-01-29 PROCEDURE — 93306 TTE W/DOPPLER COMPLETE: CPT | Mod: 26,,, | Performed by: INTERNAL MEDICINE

## 2019-01-29 PROCEDURE — 93018 STRESS TEST WITH MYOCARDIAL PERFUSION (CUPID ONLY): ICD-10-PCS | Mod: ,,, | Performed by: INTERNAL MEDICINE

## 2019-01-29 RX ORDER — REGADENOSON 0.08 MG/ML
INJECTION, SOLUTION INTRAVENOUS
Status: COMPLETED
Start: 2019-01-29 | End: 2019-01-29

## 2019-01-29 RX ADMIN — REGADENOSON 0.4 MG: 0.08 INJECTION, SOLUTION INTRAVENOUS at 08:01

## 2019-01-30 LAB
AORTIC ROOT ANNULUS: 3.52 CM
AORTIC VALVE CUSP SEPERATION: 2.19 CM
AV INDEX (PROSTH): 0.61
AV MEAN GRADIENT: 2.81 MMHG
AV PEAK GRADIENT: 5.11 MMHG
AV VALVE AREA: 2.44 CM2
AV VELOCITY RATIO: 0.55
BSA FOR ECHO PROCEDURE: 1.72 M2
CV ECHO LV RWT: 0.42 CM
DOP CALC AO PEAK VEL: 1.13 M/S
DOP CALC AO VTI: 20.22 CM
DOP CALC LVOT AREA: 3.97 CM2
DOP CALC LVOT DIAMETER: 2.25 CM
DOP CALC LVOT PEAK VEL: 0.62 M/S
DOP CALC LVOT STROKE VOLUME: 49.4 CM3
DOP CALCLVOT PEAK VEL VTI: 12.43 CM
E WAVE DECELERATION TIME: 183.42 MSEC
E/A RATIO: 0.78
ECHO LV POSTERIOR WALL: 0.75 CM (ref 0.6–1.1)
FRACTIONAL SHORTENING: 34 % (ref 28–44)
INTERVENTRICULAR SEPTUM: 0.92 CM (ref 0.6–1.1)
IVRT: 0.09 MSEC
LA MAJOR: 3.13 CM
LA MINOR: 3.52 CM
LA WIDTH: 3.61 CM
LEFT ATRIUM SIZE: 2.79 CM
LEFT ATRIUM VOLUME INDEX: 16.4 ML/M2
LEFT ATRIUM VOLUME: 28.37 CM3
LEFT INTERNAL DIMENSION IN SYSTOLE: 2.37 CM (ref 2.1–4)
LEFT VENTRICLE DIASTOLIC VOLUME INDEX: 31.13 ML/M2
LEFT VENTRICLE DIASTOLIC VOLUME: 53.77 ML
LEFT VENTRICLE MASS INDEX: 48 G/M2
LEFT VENTRICLE SYSTOLIC VOLUME INDEX: 11.3 ML/M2
LEFT VENTRICLE SYSTOLIC VOLUME: 19.59 ML
LEFT VENTRICULAR INTERNAL DIMENSION IN DIASTOLE: 3.58 CM (ref 3.5–6)
LEFT VENTRICULAR MASS: 82.82 G
MV PEAK A VEL: 0.78 M/S
MV PEAK E VEL: 0.61 M/S
PISA TR MAX VEL: 2.06 M/S
PULM VEIN S/D RATIO: 0.96
PV PEAK D VEL: 0.56 M/S
PV PEAK S VEL: 0.54 M/S
PV PEAK VELOCITY: 0.87 CM/S
RA MAJOR: 3.26 CM
RA PRESSURE: 3 MMHG
RA WIDTH: 2.69 CM
RIGHT VENTRICULAR END-DIASTOLIC DIMENSION: 3.42 CM
RV TISSUE DOPPLER FREE WALL SYSTOLIC VELOCITY 1 (APICAL 4 CHAMBER VIEW): 10.69 M/S
SINUS: 3.56 CM
STJ: 3.24 CM
TR MAX PG: 16.97 MMHG
TRICUSPID ANNULAR PLANE SYSTOLIC EXCURSION: 2.15 CM
TV REST PULMONARY ARTERY PRESSURE: 20 MMHG

## 2019-02-10 ENCOUNTER — HOSPITAL ENCOUNTER (EMERGENCY)
Facility: HOSPITAL | Age: 69
Discharge: HOME | End: 2019-02-10
Attending: EMERGENCY MEDICINE
Payer: MEDICARE

## 2019-02-10 VITALS
BODY MASS INDEX: 20.4 KG/M2 | WEIGHT: 130 LBS | HEIGHT: 67 IN | TEMPERATURE: 98 F | DIASTOLIC BLOOD PRESSURE: 86 MMHG | SYSTOLIC BLOOD PRESSURE: 149 MMHG | OXYGEN SATURATION: 100 % | HEART RATE: 62 BPM | RESPIRATION RATE: 20 BRPM

## 2019-02-10 DIAGNOSIS — R06.02 SOB (SHORTNESS OF BREATH): ICD-10-CM

## 2019-02-10 DIAGNOSIS — J20.9 COPD WITH ACUTE BRONCHITIS: Primary | ICD-10-CM

## 2019-02-10 DIAGNOSIS — J44.0 COPD WITH ACUTE BRONCHITIS: Primary | ICD-10-CM

## 2019-02-10 LAB
ALBUMIN SERPL BCP-MCNC: 3.8 G/DL
ALP SERPL-CCNC: 80 U/L
ALT SERPL W/O P-5'-P-CCNC: 19 U/L
ANION GAP SERPL CALC-SCNC: 8 MMOL/L
ANISOCYTOSIS BLD QL SMEAR: SLIGHT
AST SERPL-CCNC: 20 U/L
BASOPHILS # BLD AUTO: 0.04 K/UL
BASOPHILS NFR BLD: 0.2 %
BILIRUB SERPL-MCNC: 0.3 MG/DL
BNP SERPL-MCNC: 81 PG/ML
BUN SERPL-MCNC: 11 MG/DL
CALCIUM SERPL-MCNC: 10 MG/DL
CHLORIDE SERPL-SCNC: 99 MMOL/L
CO2 SERPL-SCNC: 35 MMOL/L
CREAT SERPL-MCNC: 0.8 MG/DL
CTP QC/QA: YES
DIFFERENTIAL METHOD: ABNORMAL
EOSINOPHIL # BLD AUTO: 0 K/UL
EOSINOPHIL NFR BLD: 0.1 %
ERYTHROCYTE [DISTWIDTH] IN BLOOD BY AUTOMATED COUNT: 17.2 %
EST. GFR  (AFRICAN AMERICAN): >60 ML/MIN/1.73 M^2
EST. GFR  (NON AFRICAN AMERICAN): >60 ML/MIN/1.73 M^2
FLUAV AG NPH QL: NEGATIVE
FLUBV AG NPH QL: NEGATIVE
GLUCOSE SERPL-MCNC: 130 MG/DL
HCT VFR BLD AUTO: 40.2 %
HGB BLD-MCNC: 10.9 G/DL
HYPOCHROMIA BLD QL SMEAR: ABNORMAL
INR PPP: 1
LYMPHOCYTES # BLD AUTO: 1.1 K/UL
LYMPHOCYTES NFR BLD: 6.3 %
MCH RBC QN AUTO: 23.4 PG
MCHC RBC AUTO-ENTMCNC: 27.1 G/DL
MCV RBC AUTO: 87 FL
MONOCYTES # BLD AUTO: 0.9 K/UL
MONOCYTES NFR BLD: 5.4 %
NEUTROPHILS # BLD AUTO: 14.6 K/UL
NEUTROPHILS NFR BLD: 89 %
OVALOCYTES BLD QL SMEAR: ABNORMAL
PLATELET # BLD AUTO: 193 K/UL
PLATELET BLD QL SMEAR: ABNORMAL
PMV BLD AUTO: 9.2 FL
POTASSIUM SERPL-SCNC: 4.2 MMOL/L
PROT SERPL-MCNC: 7.5 G/DL
PROTHROMBIN TIME: 10.6 SEC
RBC # BLD AUTO: 4.65 M/UL
SODIUM SERPL-SCNC: 142 MMOL/L
STOMATOCYTES BLD QL SMEAR: PRESENT
TROPONIN I SERPL DL<=0.01 NG/ML-MCNC: <0.006 NG/ML
WBC # BLD AUTO: 16.64 K/UL

## 2019-02-10 PROCEDURE — 93005 ELECTROCARDIOGRAM TRACING: CPT

## 2019-02-10 PROCEDURE — 25000242 PHARM REV CODE 250 ALT 637 W/ HCPCS: Performed by: EMERGENCY MEDICINE

## 2019-02-10 PROCEDURE — 27000221 HC OXYGEN, UP TO 24 HOURS

## 2019-02-10 PROCEDURE — 80053 COMPREHEN METABOLIC PANEL: CPT

## 2019-02-10 PROCEDURE — 63600175 PHARM REV CODE 636 W HCPCS: Performed by: PHYSICIAN ASSISTANT

## 2019-02-10 PROCEDURE — 83880 ASSAY OF NATRIURETIC PEPTIDE: CPT

## 2019-02-10 PROCEDURE — 85025 COMPLETE CBC W/AUTO DIFF WBC: CPT

## 2019-02-10 PROCEDURE — 93010 EKG 12-LEAD: ICD-10-PCS | Mod: ,,, | Performed by: INTERNAL MEDICINE

## 2019-02-10 PROCEDURE — 94644 CONT INHLJ TX 1ST HOUR: CPT

## 2019-02-10 PROCEDURE — 99285 EMERGENCY DEPT VISIT HI MDM: CPT | Mod: 25

## 2019-02-10 PROCEDURE — 94761 N-INVAS EAR/PLS OXIMETRY MLT: CPT

## 2019-02-10 PROCEDURE — 93010 ELECTROCARDIOGRAM REPORT: CPT | Mod: ,,, | Performed by: INTERNAL MEDICINE

## 2019-02-10 PROCEDURE — 85610 PROTHROMBIN TIME: CPT

## 2019-02-10 PROCEDURE — 63600175 PHARM REV CODE 636 W HCPCS: Performed by: EMERGENCY MEDICINE

## 2019-02-10 PROCEDURE — 96365 THER/PROPH/DIAG IV INF INIT: CPT

## 2019-02-10 PROCEDURE — 94645 CONT INHLJ TX EACH ADDL HOUR: CPT

## 2019-02-10 PROCEDURE — 25000242 PHARM REV CODE 250 ALT 637 W/ HCPCS: Performed by: PHYSICIAN ASSISTANT

## 2019-02-10 PROCEDURE — 84484 ASSAY OF TROPONIN QUANT: CPT

## 2019-02-10 RX ORDER — IPRATROPIUM BROMIDE 0.5 MG/2.5ML
0.5 SOLUTION RESPIRATORY (INHALATION)
Status: COMPLETED | OUTPATIENT
Start: 2019-02-10 | End: 2019-02-10

## 2019-02-10 RX ORDER — PREDNISONE 20 MG/1
60 TABLET ORAL
Status: COMPLETED | OUTPATIENT
Start: 2019-02-10 | End: 2019-02-10

## 2019-02-10 RX ORDER — ALBUTEROL SULFATE 2.5 MG/.5ML
10 SOLUTION RESPIRATORY (INHALATION)
Status: COMPLETED | OUTPATIENT
Start: 2019-02-10 | End: 2019-02-10

## 2019-02-10 RX ORDER — IPRATROPIUM BROMIDE 0.5 MG/2.5ML
500 SOLUTION RESPIRATORY (INHALATION)
Status: COMPLETED | OUTPATIENT
Start: 2019-02-10 | End: 2019-02-10

## 2019-02-10 RX ORDER — MAGNESIUM SULFATE 1 G/100ML
1 INJECTION INTRAVENOUS
Status: COMPLETED | OUTPATIENT
Start: 2019-02-10 | End: 2019-02-10

## 2019-02-10 RX ORDER — PREDNISONE 20 MG/1
40 TABLET ORAL DAILY
Qty: 10 TABLET | Refills: 0 | Status: SHIPPED | OUTPATIENT
Start: 2019-02-10 | End: 2019-02-15 | Stop reason: SDUPTHER

## 2019-02-10 RX ADMIN — IPRATROPIUM BROMIDE 0.5 MG: 0.5 SOLUTION RESPIRATORY (INHALATION) at 02:02

## 2019-02-10 RX ADMIN — PREDNISONE 60 MG: 20 TABLET ORAL at 04:02

## 2019-02-10 RX ADMIN — MAGNESIUM SULFATE IN DEXTROSE 1 G: 10 INJECTION, SOLUTION INTRAVENOUS at 04:02

## 2019-02-10 RX ADMIN — IPRATROPIUM BROMIDE 500 MCG: 0.5 SOLUTION RESPIRATORY (INHALATION) at 05:02

## 2019-02-10 RX ADMIN — ALBUTEROL SULFATE 10 MG: 2.5 SOLUTION RESPIRATORY (INHALATION) at 05:02

## 2019-02-10 RX ADMIN — ALBUTEROL SULFATE 10 MG: 2.5 SOLUTION RESPIRATORY (INHALATION) at 02:02

## 2019-02-10 NOTE — ED PROVIDER NOTES
Encounter Date: 2/10/2019    SCRIBE #1 NOTE: I, Rosey Cantu, am scribing for, and in the presence of,  Feliciano Garcia MD. I have scribed the following portions of the note - Other sections scribed: ROS and HPI.       History     Chief Complaint   Patient presents with    Shortness of Breath     pt c/o sob for 4 days, cp for months     CC: Shortness of Breath    HPI: This 68 y.o. male with a past medical history of Anxiety, COPD, DDD , cervical, Diverticulitis, GERD, Hypertension, Myocardial infarction, and Polycythemia vera (1 year), presents to the ED complaining of worsening SOB and cough for last 4 days. Patient thinks he possibly got a flu because of sick exposure. He reports sore throat. Denies fever, rhinorrhea, body aches, and chest pain. No relief with steroids and breathing treatments. The patient's daughter notes the patient's abdomen looks swollen and he is c/o being bloated. Denies abdominal pain, N/V/D/C or any urinary sx.  He reports recent extensive cardiac work up after PCP found his EKG to be abnormal. He is still waiting for results.      The history is provided by the patient and a relative (daughter). No  was used.     Review of patient's allergies indicates:  No Known Allergies  Past Medical History:   Diagnosis Date    Anxiety     COPD (chronic obstructive pulmonary disease)     DDD (degenerative disc disease), cervical     Diverticulitis     GERD (gastroesophageal reflux disease)     Hypertension     Myocardial infarction     Polycythemia vera 1 year     Past Surgical History:   Procedure Laterality Date    BLOCK, NERVE, FACET JOINT, LUMBAR, MEDIAL BRANCH Bilateral 9/17/2013    [facility]formed [facility] at [facility]    BLOCK-NERVE-MEDIAL BRANCH-LUMBAR Bilateral 9/30/2014    [facility] at [facility]    CERVICAL SPINE SURGERY  Fusion    CHOLECYSTECTOMY      COLON SURGERY      DEBRIDEMENT-WOUND  9/5/2017    Performed  by [facility] III, [facility] at [facility]    FINGER SURGERY      OPEN REDUCTION INTERNAL FIXATION-FINGER Right 2017    Performed [facility]    PINNING-PERCUTANEOUS-FINGER  2017    Performed by [facility] at [facility]     Family History   Problem Relation Age of Onset    Diabetes Mother     Heart disease Mother     Diabetes Father     Hypertension Father     Stroke Father     ADD / ADHD Neg Hx     Alcohol abuse Neg Hx     Anxiety disorder Neg Hx     Bipolar disorder Neg Hx     Dementia Neg Hx     Depression Neg Hx     Drug abuse Neg Hx     OCD Neg Hx     Paranoid behavior Neg Hx     Physical abuse Neg Hx     Schizophrenia Neg Hx     Seizures Neg Hx     Self injury Neg Hx     Sexual abuse Neg Hx     Suicide Neg Hx     Melanoma Neg Hx      Social History     Tobacco Use    Smoking status: Former Smoker     Packs/day: 3.00     Years: 10.00     Pack years: 30.00     Last attempt to quit: 2009     Years since quittin.1    Smokeless tobacco: Never Used    Tobacco comment: use to smoke  3-5 packs a day.    Substance Use Topics    Alcohol use: No    Drug use: No     Review of Systems   Constitutional: Negative for chills and fever.   HENT: Positive for sore throat. Negative for congestion, ear pain and rhinorrhea.    Eyes: Negative for pain and visual disturbance.   Respiratory: Positive for cough and shortness of breath.    Cardiovascular: Negative for chest pain.   Gastrointestinal: Negative for abdominal pain, diarrhea, nausea and vomiting.   Genitourinary: Negative for dysuria.   Musculoskeletal: Negative for back pain and neck pain.   Skin: Negative for rash.   Neurological: Negative for headaches.       Physical Exam     Initial Vitals [02/10/19 1341]   BP Pulse Resp Temp SpO2   (!) 195/88 98 (!) 26 98.5 °F (36.9 °C) 95 %      MAP       --         Physical Exam    Vitals reviewed.  Constitutional: He appears well-developed and  well-nourished.   HENT:   Head: Normocephalic and atraumatic.   Eyes: EOM are normal. Pupils are equal, round, and reactive to light.   Neck: Normal range of motion. Neck supple.   Cardiovascular: Normal rate, regular rhythm, normal heart sounds and intact distal pulses.   Pulmonary/Chest: No respiratory distress. He has decreased breath sounds. He has wheezes in the right middle field, the right lower field, the left middle field and the left lower field.   Abdominal: Soft. Bowel sounds are normal. He exhibits no distension. There is no guarding.   Musculoskeletal: Normal range of motion.   Neurological: He is alert and oriented to person, place, and time.   Skin: Skin is warm and dry. Capillary refill takes less than 2 seconds. No erythema. No pallor.   Psychiatric: He has a normal mood and affect.         ED Course   Procedures  Labs Reviewed   COMPREHENSIVE METABOLIC PANEL - Abnormal; Notable for the following components:       Result Value    CO2 35 (*)     Glucose 130 (*)     All other components within normal limits   CBC W/ AUTO DIFFERENTIAL - Abnormal; Notable for the following components:    WBC 16.64 (*)     Hemoglobin 10.9 (*)     MCH 23.4 (*)     MCHC 27.1 (*)     RDW 17.2 (*)     Gran # (ANC) 14.6 (*)     Gran% 89.0 (*)     Lymph% 6.3 (*)     All other components within normal limits    Narrative:     Recoll. [id number] by JAYLAN at 02/10/2019 14:38, reason: VERIFY   RESULTS 02/10/2019  14:38   B-TYPE NATRIURETIC PEPTIDE   TROPONIN I   PROTIME-INR   POCT INFLUENZA A/B     EKG Readings: (Independently Interpreted)   Initial Reading: No STEMI. Rhythm: Normal Sinus Rhythm. Ectopy: No Ectopy. Conduction: Normal. ST Segments: Normal ST Segments. T Waves: Normal. Clinical Impression: Normal Sinus Rhythm       Imaging Results          X-Ray Chest 1 View (Final result)  Result time 02/10/19 14:47:13    Final result by Chente Moore MD (02/10/19 14:47:13)                 Impression:      1. Findings  suggesting emphysematous change/COPD, no large focal consolidation.      Electronically signed by: Chente Moore MD  Date:    02/10/2019  Time:    14:47             Narrative:    EXAMINATION:  XR CHEST 1 VIEW    CLINICAL HISTORY:  Shortness of breath    TECHNIQUE:  Single frontal view of the chest was performed.    COMPARISON:  CT 01/04/2019, radiograph 02/16/2018    FINDINGS:  The cardiomediastinal silhouette is not enlarged noting calcification of the aorta..  There is no pleural effusion.  The trachea is midline.  The lungs are symmetrically hyperexpanded with scattered regions of atelectasis and/or scarring noting multiple lucent regions suggesting underlying emphysema.  There is bilateral basilar subsegmental atelectasis..  No large focal consolidation seen.  There is no pneumothorax.  The osseous structures are remarkable for degenerative changes..                                 Medical Decision Making:   History:   Old Medical Records: I decided to obtain old medical records.  ED Management:  SOB:  Patient has a known history of COPD.  He clinically is having an exacerbation of his COPD.  Chest x-ray does not reveal any acute pathology.  No sign of pneumonia or decompensated heart failure.  No sign of pneumothorax.  I do not think he has a pulmonary embolism.  Patient received several rounds of duo nebs in the emergency department as well as steroids and had drastic improvement of his wheezing.  At the time of discharge the patient is not hypoxic and in no respiratory distress.  Return precautions were discussed.  Follow up with primary care.                Scribe Attestation:   Scribe #1: I performed the above scribed service and the documentation accurately describes the services I performed. I attest to the accuracy of the note.    Attending Attestation:           Physician Attestation for Scribe:  Physician Attestation Statement for Scribe #1: I, Feliciano Garcia MD, reviewed documentation, as  scribed by Rosey Cantu in my presence, and it is both accurate and complete.                    Clinical Impression:   The primary encounter diagnosis was COPD with acute bronchitis. A diagnosis of SOB (shortness of breath) was also pertinent to this visit.                             Feliciano Garcia MD  02/10/19 0455

## 2019-02-10 NOTE — ED TRIAGE NOTES
68 y.o. Male presents to the ED with chief complaint of shortness of breath. Patient has history of MI, and is on O2 at home. Patient reports 4 days of SOB with no relief from home meds. Patient has had chest pressure for months intermittently. Patient resting in bed in NAD. Side rails up x2.

## 2019-02-11 NOTE — DISCHARGE INSTRUCTIONS
"Return to the Emergency Department of any acute worsening of your symptoms or for any other concern.     You should return to the ED for fever/chills, shortness of breath, chest pain, weakness or "passing out".     Pt should take all medications as prescribed.    Pt should follow up with PCP as soon as possible.    The risks associated with not taking your medications as prescribed and not following up with your Primary Care doctor or sub specialist includes worsening of your condition, pain, disability, loss of function or livelihood, and death      KIRILL Garcia M.D. 6:08 PM 2/10/2019      Our goal in the emergency department is to always give you outstanding care and exceptional service. You may receive a survey by mail or e-mail in the next week regarding your experience in our ED. We would greatly appreciate your completing and returning the survey. Your feedback provides us with a way to recognize our staff who give very good care and it helps us learn how to improve when your experience was below our aspiration of excellence.     "

## 2019-02-15 PROBLEM — R05.9 COUGH: Status: ACTIVE | Noted: 2019-02-15

## 2019-02-15 PROBLEM — J20.9 ACUTE BRONCHITIS: Status: ACTIVE | Noted: 2019-02-15

## 2019-02-25 ENCOUNTER — OFFICE VISIT (OUTPATIENT)
Dept: CARDIOLOGY | Facility: CLINIC | Age: 69
End: 2019-02-25
Payer: MEDICARE

## 2019-02-25 VITALS
SYSTOLIC BLOOD PRESSURE: 148 MMHG | RESPIRATION RATE: 15 BRPM | DIASTOLIC BLOOD PRESSURE: 76 MMHG | HEIGHT: 67 IN | OXYGEN SATURATION: 94 % | WEIGHT: 136 LBS | BODY MASS INDEX: 21.35 KG/M2 | HEART RATE: 82 BPM

## 2019-02-25 DIAGNOSIS — R07.9 CHEST PAIN, UNSPECIFIED TYPE: ICD-10-CM

## 2019-02-25 DIAGNOSIS — I10 HTN, GOAL BELOW 140/90: ICD-10-CM

## 2019-02-25 DIAGNOSIS — I73.9 PAD (PERIPHERAL ARTERY DISEASE): ICD-10-CM

## 2019-02-25 DIAGNOSIS — I25.10 CORONARY ARTERY DISEASE INVOLVING NATIVE CORONARY ARTERY OF NATIVE HEART WITHOUT ANGINA PECTORIS: Primary | ICD-10-CM

## 2019-02-25 DIAGNOSIS — J44.1 COPD WITH ACUTE EXACERBATION: ICD-10-CM

## 2019-02-25 DIAGNOSIS — D45 POLYCYTHEMIA VERA: ICD-10-CM

## 2019-02-25 DIAGNOSIS — E78.2 MIXED DYSLIPIDEMIA: ICD-10-CM

## 2019-02-25 PROCEDURE — 99999 PR PBB SHADOW E&M-EST. PATIENT-LVL III: ICD-10-PCS | Mod: PBBFAC,,, | Performed by: INTERNAL MEDICINE

## 2019-02-25 PROCEDURE — 3078F DIAST BP <80 MM HG: CPT | Mod: CPTII,S$GLB,, | Performed by: INTERNAL MEDICINE

## 2019-02-25 PROCEDURE — 1101F PR PT FALLS ASSESS DOC 0-1 FALLS W/OUT INJ PAST YR: ICD-10-PCS | Mod: CPTII,S$GLB,, | Performed by: INTERNAL MEDICINE

## 2019-02-25 PROCEDURE — 3077F SYST BP >= 140 MM HG: CPT | Mod: CPTII,S$GLB,, | Performed by: INTERNAL MEDICINE

## 2019-02-25 PROCEDURE — 3078F PR MOST RECENT DIASTOLIC BLOOD PRESSURE < 80 MM HG: ICD-10-PCS | Mod: CPTII,S$GLB,, | Performed by: INTERNAL MEDICINE

## 2019-02-25 PROCEDURE — 99499 RISK ADDL DX/OHS AUDIT: ICD-10-PCS | Mod: S$GLB,,, | Performed by: INTERNAL MEDICINE

## 2019-02-25 PROCEDURE — 99499 UNLISTED E&M SERVICE: CPT | Mod: S$GLB,,, | Performed by: INTERNAL MEDICINE

## 2019-02-25 PROCEDURE — 3077F PR MOST RECENT SYSTOLIC BLOOD PRESSURE >= 140 MM HG: ICD-10-PCS | Mod: CPTII,S$GLB,, | Performed by: INTERNAL MEDICINE

## 2019-02-25 PROCEDURE — 99999 PR PBB SHADOW E&M-EST. PATIENT-LVL III: CPT | Mod: PBBFAC,,, | Performed by: INTERNAL MEDICINE

## 2019-02-25 PROCEDURE — 99214 OFFICE O/P EST MOD 30 MIN: CPT | Mod: S$GLB,,, | Performed by: INTERNAL MEDICINE

## 2019-02-25 PROCEDURE — 99214 PR OFFICE/OUTPT VISIT, EST, LEVL IV, 30-39 MIN: ICD-10-PCS | Mod: S$GLB,,, | Performed by: INTERNAL MEDICINE

## 2019-02-25 PROCEDURE — 1101F PT FALLS ASSESS-DOCD LE1/YR: CPT | Mod: CPTII,S$GLB,, | Performed by: INTERNAL MEDICINE

## 2019-02-25 RX ORDER — ISOSORBIDE MONONITRATE 30 MG/1
30 TABLET, EXTENDED RELEASE ORAL DAILY
Qty: 90 TABLET | Refills: 3 | Status: SHIPPED | OUTPATIENT
Start: 2019-02-25 | End: 2019-05-06 | Stop reason: SDUPTHER

## 2019-02-25 NOTE — PROGRESS NOTES
CARDIOVASCULAR PROGRESS NOTE    REASON FOR CONSULT:   Chaz Stuart is a 68 y.o. male who presents for f/u PAD, presumed CAD, testing.    PCP: Pierre Welsh  HISTORY OF PRESENT ILLNESS:   The patient returns for follow-up of his testing, accompanied by family member.  He continues to complain of episodic nonexertional chest discomfort.  He predominantly has dyspnea on exertion and describes facial plethora with exertion.  He is on home O2 for severe COPD and in fact was seen in the emergency room for COPD recently.  He otherwise denies palpitations, lightheadedness, dizziness, or syncope.  There has been no PND, orthopnea, or lower extremity edema.  He denies melena, hematuria, or claudicant symptoms.    I reviewed the results of his echocardiogram which were normal.  His nuclear stress test revealed inferior attenuation artifact.    CARDIOVASCULAR HISTORY:   Presumed CAD (CTA 1/2019)  PAD (bilat LE inflow disease CT 1/2019)    PAST MEDICAL HISTORY:     Past Medical History:   Diagnosis Date    Anxiety     COPD (chronic obstructive pulmonary disease)     DDD (degenerative disc disease), cervical     Diverticulitis     GERD (gastroesophageal reflux disease)     Hypertension     Myocardial infarction     Polycythemia vera 1 year       PAST SURGICAL HISTORY:     Past Surgical History:   Procedure Laterality Date    BLOCK, NERVE, FACET JOINT, LUMBAR, MEDIAL BRANCH Bilateral 9/17/2013    [facility]formed [facility]    BLOCK-NERVE-MEDIAL BRANCH-LUMBAR Bilateral 9/30/2014    [facility] at [facility]    CERVICAL SPINE SURGERY  Fusion    CHOLECYSTECTOMY      COLON SURGERY      DEBRIDEMENT-WOUND  9/5/2017    [facility] at [facility] OR    FINGER SURGERY      OPEN REDUCTION INTERNAL FIXATION-FINGER Right 9/5/2017    [facility] at [facility] OR    PINNING-PERCUTANEOUS-FINGER  9/5/2017    Performed by  Phillip [facility]on III, [facility] at [facility] OR       ALLERGIES AND MEDICATION:   Review of patient's allergies indicates:  No Known Allergies     Medication List           Accurate as of 2/25/19 10:02 AM. If you have any questions, ask your nurse or doctor.               CONTINUE taking these medications    albuterol 90 mcg/actuation inhaler  Commonly known as:  VENTOLIN HFA  Inhale 2 puffs into the lungs every 6 (six) hours as needed.     albuterol-ipratropium 2.5 mg-0.5 mg/3 mL nebulizer solution  Commonly known as:  DUO-NEB  Take 3 mLs by nebulization every 6 (six) hours as needed for Wheezing. Rescue     ALPRAZolam 1 MG tablet  Commonly known as:  XANAX  TAKE 1 TABLET BY MOUTH THREE TIMES A DAY AS NEEDED FOR ANXIETY. MAY CAUSE DROWSINESS.     amLODIPine 10 MG tablet  Commonly known as:  NORVASC  Take 1 tablet (10 mg total) by mouth once daily.     amoxicillin-clavulanate 875-125mg 875-125 mg per tablet  Commonly known as:  AUGMENTIN  Take 1 tablet by mouth 2 (two) times daily.     aspirin 81 MG EC tablet  Commonly known as:  ECOTRIN  Take 1 tablet (81 mg total) by mouth once daily.     atorvastatin 40 MG tablet  Commonly known as:  LIPITOR  Take 1 tablet (40 mg total) by mouth every evening.     azelastine 137 mcg (0.1 %) nasal spray  Commonly known as:  ASTELIN  1 spray (137 mcg total) by Nasal route 2 (two) times daily.     dutasteride 0.5 mg capsule  Commonly known as:  AVODART  Take 1 capsule (0.5 mg total) by mouth once daily.     guaifenesin-codeine 100-10 mg/5 ml  mg/5 mL syrup  Commonly known as:  TUSSI-ORGANIDIN NR  Take 5 mLs by mouth every 8 (eight) hours as needed for Cough.     hydroxyurea 500 mg Cap  Commonly known as:  HYDREA  TAKE 1 CAPSULE(S) BY MOUTH ONCE A DAY.     ranitidine 150 MG tablet  Commonly known as:  ZANTAC  Take 1 tablet (150 mg total) by mouth 2 (two) times daily.     * SYMBICORT 160-4.5 mcg/actuation Hfaa  Generic drug:  budesonide-formoterol 160-4.5 mcg  Inhale 2 puffs into the lungs  2 (two) times daily.     * SYMBICORT 160-4.5 mcg/actuation Hfaa  Generic drug:  budesonide-formoterol 160-4.5 mcg  Inhale 2 puffs into the lungs 2 (two) times daily.         * This list has 2 medication(s) that are the same as other medications prescribed for you. Read the directions carefully, and ask your doctor or other care provider to review them with you.            STOP taking these medications    acetaminophen 325 MG tablet  Commonly known as:  TYLENOL  Stopped by:  Dario Lynn MD     diazePAM 2 MG tablet  Commonly known as:  VALIUM  Stopped by:  Dario Lynn MD            SOCIAL HISTORY:     Social History     Socioeconomic History    Marital status: Single     Spouse name: Not on file    Number of children: 6    Years of education: Not on file    Highest education level: Not on file   Social Needs    Financial resource strain: Not on file    Food insecurity - worry: Not on file    Food insecurity - inability: Not on file    Transportation needs - medical: Not on file    Transportation needs - non-medical: Not on file   Occupational History    Not on file   Tobacco Use    Smoking status: Former Smoker     Packs/day: 3.00     Years: 10.00     Pack years: 30.00     Last attempt to quit: 2009     Years since quittin.2    Smokeless tobacco: Never Used    Tobacco comment: use to smoke  3-5 packs a day.    Substance and Sexual Activity    Alcohol use: No    Drug use: No    Sexual activity: No     Partners: Female     Birth control/protection: Surgical   Other Topics Concern    Caffeine Use Yes    Financial Status: Disabled Yes    Legal: Involved in criminal litigation Not Asked    Caffeine Use: Frequent Not Asked    Financial Status: Employed Not Asked    Legal: Other Not Asked    Caffeine Use: Moderate Not Asked    Financial Status: Unemployed Not Asked    Leisure: Exercise Not Asked    Caffeine Use: Substantial Not Asked    Financial Status: Other Not Asked     Leisure: Fishing Not Asked    Childhood History: Adopted Not Asked    Firearms: Does patient have access to a firearm? Yes     Comment: locked gun cab    Leisure: Hunting Not Asked    Childhood History: Early trauma Not Asked    Home situation: homeless Not Asked    Leisure: Movie Watching Not Asked    Childhood History: Raised by parents Not Asked    Home situation: lives alone Yes    Leisure: Shopping Not Asked    Childhood History: Uneventful Not Asked    Home situation: lives in group home Not Asked    Leisure: Sports Not Asked    Childhood History: Other Not Asked    Home situation: lives in nursing home Not Asked    Leisure: Time with family Not Asked    Education: Unfinished High School Yes     Comment: 8th grade    Home situation: lives in shelter Not Asked     Service No    Education: High School Graduate Not Asked    Home situation: lives with family Not Asked    Spirituality: Active Participation No    Education: Unfinished college Not Asked    Home situation: lives with friends Not Asked    Spirituality: Organized Oriental orthodox No    Education: Trade School Not Asked    Home situation: lives with significant other Not Asked    Spirituality: Private Participation No    Education: Associate's Degree Not Asked    Home situation: lives with spouse Not Asked    Patient feels they ought to cut down on drinking/drug use Not Asked    Education: Bachelor's Degree Not Asked    Legal consequences of chemical use Not Asked    Patient annoyed by others criticizing their drinking/drug use Not Asked    Education: More than one Bachelor's or Professional Not Asked    Legal: Arrest history No    Patient has felt bad or guilty about drinking/drug use Not Asked    Education: Master's, PhD Not Asked    Legal: Involved in civil litigation Not Asked    Patient has had a drink/used drugs as an eye opener in the AM Not Asked   Social History Narrative    Not on file       FAMILY  "HISTORY:     Family History   Problem Relation Age of Onset    Diabetes Mother     Heart disease Mother     Diabetes Father     Hypertension Father     Stroke Father     ADD / ADHD Neg Hx     Alcohol abuse Neg Hx     Anxiety disorder Neg Hx     Bipolar disorder Neg Hx     Dementia Neg Hx     Depression Neg Hx     Drug abuse Neg Hx     OCD Neg Hx     Paranoid behavior Neg Hx     Physical abuse Neg Hx     Schizophrenia Neg Hx     Seizures Neg Hx     Self injury Neg Hx     Sexual abuse Neg Hx     Suicide Neg Hx     Melanoma Neg Hx        REVIEW OF SYSTEMS:   Review of Systems   Constitutional: Negative for chills, diaphoresis and fever.   HENT: Negative for nosebleeds.    Eyes: Negative for blurred vision, double vision and photophobia.   Respiratory: Positive for shortness of breath. Negative for hemoptysis and wheezing.    Cardiovascular: Positive for chest pain. Negative for palpitations, orthopnea, claudication, leg swelling and PND.   Gastrointestinal: Negative for abdominal pain, blood in stool, heartburn, melena, nausea and vomiting.   Genitourinary: Negative for flank pain and hematuria.   Musculoskeletal: Negative for falls, myalgias and neck pain.   Skin: Negative for rash.   Neurological: Negative for dizziness, seizures, loss of consciousness, weakness and headaches.   Endo/Heme/Allergies: Negative for polydipsia. Does not bruise/bleed easily.   Psychiatric/Behavioral: Negative for depression and memory loss. The patient is not nervous/anxious.        PHYSICAL EXAM:     Vitals:    02/25/19 0945   BP: (!) 148/76   Pulse: 82   Resp: 15    Body mass index is 21.3 kg/m².  Weight: 61.7 kg (136 lb)   Height: 5' 7" (170.2 cm)     Physical Exam   Constitutional: He is oriented to person, place, and time. He appears well-developed and well-nourished. He is cooperative.  Non-toxic appearance. No distress.   HENT:   Head: Normocephalic and atraumatic.   Eyes: Conjunctivae and EOM are normal. " Pupils are equal, round, and reactive to light. No scleral icterus.   Neck: Trachea normal and normal range of motion. Neck supple. Normal carotid pulses and no JVD present. Carotid bruit is not present. No neck rigidity. No tracheal deviation and no edema present. No thyromegaly present.   Cardiovascular: Normal rate, regular rhythm, S1 normal and S2 normal. PMI is not displaced. Exam reveals distant heart sounds. Exam reveals no gallop and no friction rub.   No murmur heard.  Pulses:       Carotid pulses are 2+ on the right side, and 2+ on the left side.  Pulmonary/Chest: Effort normal. No stridor. No respiratory distress. He has no wheezes. He has no rales. He exhibits no tenderness.   Poor air entry bilat   Abdominal: Soft. He exhibits no distension. There is no hepatosplenomegaly.   Musculoskeletal: Normal range of motion. He exhibits no edema or tenderness.   Feet:   Right Foot:   Skin Integrity: Negative for ulcer.   Left Foot:   Skin Integrity: Negative for ulcer.   Neurological: He is alert and oriented to person, place, and time. No cranial nerve deficit.   Skin: Skin is warm and dry. No rash noted. No erythema.   Psychiatric: He has a normal mood and affect. His speech is normal and behavior is normal.   Vitals reviewed.      DATA:   EKG: (personally reviewed tracing)  12/28/18 SR 70, septal q waves    Laboratory:  CBC:  Recent Labs   Lab 11/27/18  1014 01/17/19  1458 02/10/19  1532   WHITE BLOOD CELL COUNT 18.3 H 15.32 H 16.64 H   HEMOGLOBIN 12.1 L 11.5 L 10.9 L   HEMATOCRIT 38.5 41.6 40.2   PLATELETS 690 H 183 193       CHEMISTRIES:  Recent Labs   Lab 11/25/16  1013  11/11/17  1613  11/27/18  1014 01/04/19  0835 01/17/19  1458 02/10/19  1415   GLUCOSE 78   < > 121 H   < > 99  --  77 130 H   SODIUM 144   < > 142   < > 138  --  140 142   POTASSIUM 4.3   < > 3.5   < > 5.1  --  3.9 4.2   BUN BLD 14   < > 13   < > 16  --  12 11   CREATININE 0.9   < > 1.0   < > 0.83 0.8 0.8 0.8   EGFR IF  >60    < > >60   < >  --  >60 >60 >60   EGFR IF NON- >60   < > >60   < > 90 >60 >60 >60   CALCIUM 9.8   < > 9.5   < > 10.7 H  --  9.2 10.0   MAGNESIUM 2.0  --  1.2 L  --   --   --   --   --     < > = values in this interval not displayed.       CARDIAC BIOMARKERS:  Recent Labs   Lab 11/12/17  0423 02/16/18  1525 02/10/19  1415   TROPONIN I <0.006 <0.006 <0.006       COAGS:  Recent Labs   Lab 08/29/17  1734 02/10/19  1415   INR 1.1 1.0       LIPIDS/LFTS:  Recent Labs   Lab 11/02/16  0804  12/27/16  0842  06/21/17  1027 07/19/17  0816  08/22/18  1000  11/27/18  1014 01/17/19  1458 02/10/19  1415   CHOLESTEROL 169  --  142   < > 235 H 194  --  226 H  --   --   --   --    TRIGLYCERIDES 108  --  68   < > 273 H 322 H  --  168 H  --   --   --   --    HDL 73  --  60   < > 54 43  --  55  --   --   --   --    LDL CALC  --   --   --    < > 126 H 87  --   --   --   --   --   --    LDL CHOLESTEROL 74  --  68  --   --   --   --  137.4  --   --   --   --    NON-HDL CHOLESTEROL  --   --   --   --   --   --   --  171  --   --   --   --    NON HDL CHOL. (LDL+ VLDL) 96  --  82  --   --   --   --   --   --   --   --   --    AST 29   < > 22   < > 20 19   < >  --    < > 17 20 20   ALT 24   < > 17   < > 13 12   < >  --    < > 12 20 19    < > = values in this interval not displayed.       Cardiovascular Testing:  L MPI 1/29/19  Probably normal myocardial perfusion study  There were no arrhythmias during stress.  The patient reported SOB (non-anginal) and chest discomfort during the stress test.  There is a small amount of mild, due to diaphragm shadow defect(s) in the inferoseptal wall(s),  LVEF post-stress is 69%  The EKG portion of this study is negative for myocardial ischemia.  Recommend PET stress or catheterization if further symptoms and clinical indication    Echo 1/29/19  Normal left ventricular systolic function. The estimated ejection fraction is 60%     LE art US/JACQUE 1/29/19  No evidence of hemodynamically significant  infrainguinal PAD bilaterally.  Tri- and (predominantly) biphasic waveforms throughout.  Mildly decreased JACQUE bilaterally (R 0.94, L 0.90).    CTA C/A/P 1/4/19:  No aortic aneurysm or dissection.  Atherosclerotic calcification of the bilateral common and external iliac arteries with multifocal short segment stenosis of approximately 50%.  There is scattered calcific coronary atherosclerosis.  Extensive panlobular emphysema in an apical predominance.  Incidental findings as above.  No concerning abdominal or pelvic findings.    ASSESSMENT:   # CP with both typ and atyp features (predom atyp, persistent), presumed CAD (CTA 1/4/2019).  Echo/MPI 2/2019 normal (?inf atten artifact)  # PAD (LE inflow disease on CTA 1/4/19), essentially asymptomatic.  LE art US/JACQUE without flow limiting disease 2/2019  # HTN, uncontrolled  # HLP on atorva 40mg  # PCV, followed by Dr. Welsh with episodic phlebotomy and hydrea rx  # COPD on home O2    PLAN:   Cont med rx  Start imdur 30mg qd as second antianginal to assess for clinical improvement  Prev enrolled in digital HTN program  RTC 2 months for clinical reassessment.  If persistent CP, will consider PET vs cath.  Check lipids/LFT 2 months (mid Apr 2019)    Dario Lynn MD, FACC

## 2019-03-04 DIAGNOSIS — M54.12 CERVICAL RADICULOPATHY: ICD-10-CM

## 2019-03-04 RX ORDER — ALPRAZOLAM 1 MG/1
TABLET ORAL
Qty: 90 TABLET | Refills: 0 | Status: SHIPPED | OUTPATIENT
Start: 2019-03-04 | End: 2019-03-27 | Stop reason: SDUPTHER

## 2019-03-04 RX ORDER — ALPRAZOLAM 1 MG/1
TABLET ORAL
Qty: 90 TABLET | Refills: 0 | OUTPATIENT
Start: 2019-03-04

## 2019-03-04 NOTE — TELEPHONE ENCOUNTER
----- Message from Chapito Collins sent at 3/4/2019 10:23 AM CST -----  Contact: Self/[phone]  Refill:ALPRAZolam (XANAX) 1 MG tablet        [facility]/pharmacy #[id number] - [place], [place] - [address] [phone] (Phone)  [phone] (Fax)    Thank you

## 2019-03-27 DIAGNOSIS — M54.12 CERVICAL RADICULOPATHY: ICD-10-CM

## 2019-03-28 PROBLEM — J20.9 ACUTE BRONCHITIS: Status: RESOLVED | Noted: 2019-02-15 | Resolved: 2019-03-28

## 2019-03-28 PROBLEM — R05.9 COUGH: Status: RESOLVED | Noted: 2019-02-15 | Resolved: 2019-03-28

## 2019-03-28 PROBLEM — E78.2 MIXED HYPERLIPIDEMIA: Status: RESOLVED | Noted: 2019-01-14 | Resolved: 2019-03-28

## 2019-03-29 RX ORDER — ALPRAZOLAM 1 MG/1
TABLET ORAL
Qty: 90 TABLET | Refills: 0 | Status: SHIPPED | OUTPATIENT
Start: 2019-03-29

## 2019-04-25 ENCOUNTER — LAB VISIT (OUTPATIENT)
Dept: LAB | Facility: HOSPITAL | Age: 69
End: 2019-04-25
Attending: INTERNAL MEDICINE
Payer: MEDICARE

## 2019-04-25 DIAGNOSIS — I25.10 CORONARY ARTERY DISEASE INVOLVING NATIVE CORONARY ARTERY OF NATIVE HEART WITHOUT ANGINA PECTORIS: ICD-10-CM

## 2019-04-25 DIAGNOSIS — E78.2 MIXED DYSLIPIDEMIA: ICD-10-CM

## 2019-04-25 LAB
ALBUMIN SERPL BCP-MCNC: 3.9 G/DL (ref 3.5–5.2)
ALP SERPL-CCNC: 89 U/L (ref 55–135)
ALT SERPL W/O P-5'-P-CCNC: 13 U/L (ref 10–44)
AST SERPL-CCNC: 19 U/L (ref 10–40)
BILIRUB DIRECT SERPL-MCNC: 0.2 MG/DL (ref 0.1–0.3)
BILIRUB SERPL-MCNC: 0.4 MG/DL (ref 0.1–1)
CHOLEST SERPL-MCNC: 122 MG/DL (ref 120–199)
CHOLEST/HDLC SERPL: 2.8 {RATIO} (ref 2–5)
HDLC SERPL-MCNC: 44 MG/DL (ref 40–75)
HDLC SERPL: 36.1 % (ref 20–50)
LDLC SERPL CALC-MCNC: 61.6 MG/DL (ref 63–159)
NONHDLC SERPL-MCNC: 78 MG/DL
PROT SERPL-MCNC: 7.4 G/DL (ref 6–8.4)
TRIGL SERPL-MCNC: 82 MG/DL (ref 30–150)

## 2019-04-25 PROCEDURE — 36415 COLL VENOUS BLD VENIPUNCTURE: CPT

## 2019-04-25 PROCEDURE — 80076 HEPATIC FUNCTION PANEL: CPT

## 2019-04-25 PROCEDURE — 80061 LIPID PANEL: CPT

## 2019-05-06 ENCOUNTER — OFFICE VISIT (OUTPATIENT)
Dept: CARDIOLOGY | Facility: CLINIC | Age: 69
End: 2019-05-06
Payer: MEDICARE

## 2019-05-06 VITALS
WEIGHT: 133.38 LBS | DIASTOLIC BLOOD PRESSURE: 68 MMHG | RESPIRATION RATE: 15 BRPM | BODY MASS INDEX: 20.93 KG/M2 | SYSTOLIC BLOOD PRESSURE: 142 MMHG | HEART RATE: 89 BPM | OXYGEN SATURATION: 96 % | HEIGHT: 67 IN

## 2019-05-06 DIAGNOSIS — D45 POLYCYTHEMIA VERA: ICD-10-CM

## 2019-05-06 DIAGNOSIS — I73.9 PAD (PERIPHERAL ARTERY DISEASE): ICD-10-CM

## 2019-05-06 DIAGNOSIS — I10 HTN, GOAL BELOW 140/90: ICD-10-CM

## 2019-05-06 DIAGNOSIS — E78.2 MIXED DYSLIPIDEMIA: ICD-10-CM

## 2019-05-06 DIAGNOSIS — J44.1 COPD WITH ACUTE EXACERBATION: ICD-10-CM

## 2019-05-06 DIAGNOSIS — R07.89 CHEST DISCOMFORT: ICD-10-CM

## 2019-05-06 DIAGNOSIS — I25.10 CORONARY ARTERY DISEASE INVOLVING NATIVE CORONARY ARTERY OF NATIVE HEART WITHOUT ANGINA PECTORIS: Primary | ICD-10-CM

## 2019-05-06 PROCEDURE — 99214 PR OFFICE/OUTPT VISIT, EST, LEVL IV, 30-39 MIN: ICD-10-PCS | Mod: S$GLB,,, | Performed by: INTERNAL MEDICINE

## 2019-05-06 PROCEDURE — 99999 PR PBB SHADOW E&M-EST. PATIENT-LVL III: CPT | Mod: PBBFAC,,, | Performed by: INTERNAL MEDICINE

## 2019-05-06 PROCEDURE — 3077F PR MOST RECENT SYSTOLIC BLOOD PRESSURE >= 140 MM HG: ICD-10-PCS | Mod: CPTII,S$GLB,, | Performed by: INTERNAL MEDICINE

## 2019-05-06 PROCEDURE — 3077F SYST BP >= 140 MM HG: CPT | Mod: CPTII,S$GLB,, | Performed by: INTERNAL MEDICINE

## 2019-05-06 PROCEDURE — 3078F DIAST BP <80 MM HG: CPT | Mod: CPTII,S$GLB,, | Performed by: INTERNAL MEDICINE

## 2019-05-06 PROCEDURE — 99999 PR PBB SHADOW E&M-EST. PATIENT-LVL III: ICD-10-PCS | Mod: PBBFAC,,, | Performed by: INTERNAL MEDICINE

## 2019-05-06 PROCEDURE — 3078F PR MOST RECENT DIASTOLIC BLOOD PRESSURE < 80 MM HG: ICD-10-PCS | Mod: CPTII,S$GLB,, | Performed by: INTERNAL MEDICINE

## 2019-05-06 PROCEDURE — 99214 OFFICE O/P EST MOD 30 MIN: CPT | Mod: S$GLB,,, | Performed by: INTERNAL MEDICINE

## 2019-05-06 PROCEDURE — 1101F PT FALLS ASSESS-DOCD LE1/YR: CPT | Mod: CPTII,S$GLB,, | Performed by: INTERNAL MEDICINE

## 2019-05-06 PROCEDURE — 1101F PR PT FALLS ASSESS DOC 0-1 FALLS W/OUT INJ PAST YR: ICD-10-PCS | Mod: CPTII,S$GLB,, | Performed by: INTERNAL MEDICINE

## 2019-05-06 RX ORDER — ISOSORBIDE MONONITRATE 60 MG/1
60 TABLET, EXTENDED RELEASE ORAL DAILY
Qty: 90 TABLET | Refills: 3 | Status: SHIPPED | OUTPATIENT
Start: 2019-05-06 | End: 2019-08-13 | Stop reason: SDUPTHER

## 2019-05-06 NOTE — PROGRESS NOTES
CARDIOVASCULAR PROGRESS NOTE    REASON FOR CONSULT:   Chaz Stuart is a 69 y.o. male who presents for f/u PAD, presumed CAD.    PCP: Cynthia Welsh  HISTORY OF PRESENT ILLNESS:   The patient returns for follow-up.  He tells me his angina is much improved.  He continues to have shortness of breath on home O2 dependent COPD.  He has had no palpitations, lightheadedness, dizziness, or syncope.  There has been no PND, orthopnea, or lower extremity edema.  He denies melena, hematuria, or claudicant symptoms.    CARDIOVASCULAR HISTORY:   Presumed CAD (CTA 1/2019), MPI 1/2019 normal.  PAD (bilat LE inflow disease CT 1/2019)    PAST MEDICAL HISTORY:     Past Medical History:   Diagnosis Date    Anxiety     COPD (chronic obstructive pulmonary disease)     DDD (degenerative disc disease), cervical     Diverticulitis     GERD (gastroesophageal reflux disease)     Hypertension     Myocardial infarction     Polycythemia vera 1 year       PAST SURGICAL HISTORY:     Past Surgical History:   Procedure Laterality Date    BLOCK, NERVE, FACET JOINT, LUMBAR, MEDIAL BRANCH Bilateral 9/17/2013    [facility] at [facility]    BLOCK-NERVE-MEDIAL BRANCH-LUMBAR Bilateral 9/30/2014    [facility] at [facility]    CERVICAL SPINE SURGERY  Fusion    CHOLECYSTECTOMY      COLON SURGERY      DEBRIDEMENT-WOUND  9/5/2017    Performed by [facility] III, [facility] at [facility]    FINGER SURGERY      OPEN REDUCTION INTERNAL FIXATION-FINGER Right 9/5/2017    [facility] at [facility]    PINNING-PERCUTANEOUS-FINGER  9/5/2017    Performed by Phillip [facility]on III, [facility] at [facility]       ALLERGIES AND MEDICATION:   Review of patient's allergies indicates:  No Known Allergies     Medication List           Accurate as of 5/6/19  9:42 AM. If you have any questions, ask your nurse or doctor.               CONTINUE taking these medications    albuterol 90  mcg/actuation inhaler  Commonly known as:  VENTOLIN HFA  Inhale 2 puffs into the lungs every 6 (six) hours as needed.     albuterol-ipratropium 2.5 mg-0.5 mg/3 mL nebulizer solution  Commonly known as:  DUO-NEB  Take 3 mLs by nebulization every 6 (six) hours as needed for Wheezing. Rescue     ALPRAZolam 1 MG tablet  Commonly known as:  XANAX  TAKE 1 TABLET BY MOUTH THREE TIMES A DAY AS NEEDED FOR ANXIETY. MAY CAUSE DROWSINESS.     amLODIPine 10 MG tablet  Commonly known as:  NORVASC  Take 1 tablet (10 mg total) by mouth once daily.     aspirin 81 MG EC tablet  Commonly known as:  ECOTRIN  Take 1 tablet (81 mg total) by mouth once daily.     atorvastatin 40 MG tablet  Commonly known as:  LIPITOR  Take 1 tablet (40 mg total) by mouth every evening.     azelastine 137 mcg (0.1 %) nasal spray  Commonly known as:  ASTELIN  USE 1 SPRAY NASALLY TWICE  DAILY     dutasteride 0.5 mg capsule  Commonly known as:  AVODART  Take 1 capsule (0.5 mg total) by mouth once daily.     hydroxyurea 500 mg Cap  Commonly known as:  HYDREA  TAKE 1 CAPSULE(S) BY MOUTH ONCE A DAY.     isosorbide mononitrate 30 MG 24 hr tablet  Commonly known as:  IMDUR  Take 1 tablet (30 mg total) by mouth once daily.     montelukast 10 mg tablet  Commonly known as:  SINGULAIR  Take 1 tablet (10 mg total) by mouth once daily.     predniSONE 20 MG tablet  Commonly known as:  DELTASONE  Take PRN and sparingly  For  SOB  And  Tight musus    1 tab  X 3  Days   Then   1/2 tab  X 2 days  Then  1/4  Tab  Ax  2 days     ranitidine 150 MG tablet  Commonly known as:  ZANTAC  Take 1 tablet (150 mg total) by mouth 2 (two) times daily.     SYMBICORT 160-4.5 mcg/actuation [facility]  Generic drug:  budesonide-formoterol 160-4.5 mcg  Inhale 2 puffs into the lungs 2 (two) times daily.            SOCIAL HISTORY:     Social History     Socioeconomic History    Marital status: Single     Spouse name: Not on file    Number of children: 6    Years of education: Not on file     Highest education level: Not on file   Occupational History    Not on file   Social Needs    Financial resource strain: Not on file    Food insecurity:     Worry: Not on file     Inability: Not on file    Transportation needs:     Medical: Not on file     Non-medical: Not on file   Tobacco Use    Smoking status: Former Smoker     Packs/day: 3.00     Years: 10.00     Pack years: 30.00     Last attempt to quit: 2009     Years since quittin.4    Smokeless tobacco: Never Used    Tobacco comment: use to smoke  3-5 packs a day.    Substance and Sexual Activity    Alcohol use: No    Drug use: No    Sexual activity: Never     Partners: Female     Birth control/protection: Surgical   Lifestyle    Physical activity:     Days per week: Not on file     Minutes per session: Not on file    Stress: Not on file   Relationships    Social connections:     Talks on phone: Not on file     Gets together: Not on file     Attends Druze service: Not on file     Active member of club or organization: Not on file     Attends meetings of clubs or organizations: Not on file     Relationship status: Not on file   Other Topics Concern    Caffeine Use Yes    Financial Status: Disabled Yes    Legal: Involved in criminal litigation Not Asked    Caffeine Use: Frequent Not Asked    Financial Status: Employed Not Asked    Legal: Other Not Asked    Caffeine Use: Moderate Not Asked    Financial Status: Unemployed Not Asked    Leisure: Exercise Not Asked    Caffeine Use: Substantial Not Asked    Financial Status: Other Not Asked    Leisure: Fishing Not Asked    Childhood History: Adopted Not Asked    Firearms: Does patient have access to a firearm? Yes     Comment: locked gun cab    Leisure: Hunting Not Asked    Childhood History: Early trauma Not Asked    Home situation: homeless Not Asked    Leisure: Movie Watching Not Asked    Childhood History: Raised by parents Not Asked    Home situation: lives alone  Yes    Leisure: Shopping Not Asked    Childhood History: Uneventful Not Asked    Home situation: lives in group home Not Asked    Leisure: Sports Not Asked    Childhood History: Other Not Asked    Home situation: lives in nursing home Not Asked    Leisure: Time with family Not Asked    Education: Unfinished High School Yes     Comment: 8th grade    Home situation: lives in shelter Not Asked     Service No    Education: High School Graduate Not Asked    Home situation: lives with family Not Asked    Spirituality: Active Participation No    Education: Unfinished college Not Asked    Home situation: lives with friends Not Asked    Spirituality: Organized Caodaism No    Education: Trade School Not Asked    Home situation: lives with significant other Not Asked    Spirituality: Private Participation No    Education: Associate's Degree Not Asked    Home situation: lives with spouse Not Asked    Patient feels they ought to cut down on drinking/drug use Not Asked    Education: Bachelor's Degree Not Asked    Legal consequences of chemical use Not Asked    Patient annoyed by others criticizing their drinking/drug use Not Asked    Education: More than one Bachelor's or Professional Not Asked    Legal: Arrest history No    Patient has felt bad or guilty about drinking/drug use Not Asked    Education: Master's, PhD Not Asked    Legal: Involved in civil litigation Not Asked    Patient has had a drink/used drugs as an eye opener in the AM Not Asked   Social History Narrative    Not on file       FAMILY HISTORY:     Family History   Problem Relation Age of Onset    Diabetes Mother     Heart disease Mother     Diabetes Father     Hypertension Father     Stroke Father     ADD / ADHD Neg Hx     Alcohol abuse Neg Hx     Anxiety disorder Neg Hx     Bipolar disorder Neg Hx     Dementia Neg Hx     Depression Neg Hx     Drug abuse Neg Hx     OCD Neg Hx     Paranoid behavior Neg Hx      "Physical abuse Neg Hx     Schizophrenia Neg Hx     Seizures Neg Hx     Self injury Neg Hx     Sexual abuse Neg Hx     Suicide Neg Hx     Melanoma Neg Hx        REVIEW OF SYSTEMS:   Review of Systems   Constitutional: Negative for chills, diaphoresis and fever.   HENT: Negative for nosebleeds.    Eyes: Negative for blurred vision, double vision and photophobia.   Respiratory: Positive for shortness of breath. Negative for hemoptysis and wheezing.    Cardiovascular: Positive for chest pain. Negative for palpitations, orthopnea, claudication, leg swelling and PND.   Gastrointestinal: Negative for abdominal pain, blood in stool, heartburn, melena, nausea and vomiting.   Genitourinary: Negative for flank pain and hematuria.   Musculoskeletal: Negative for falls, myalgias and neck pain.   Skin: Negative for rash.   Neurological: Negative for dizziness, seizures, loss of consciousness, weakness and headaches.   Endo/Heme/Allergies: Negative for polydipsia. Does not bruise/bleed easily.   Psychiatric/Behavioral: Negative for depression and memory loss. The patient is not nervous/anxious.        PHYSICAL EXAM:     Vitals:    05/06/19 0921   BP: (!) 142/68   Pulse: 89   Resp: 15    Body mass index is 20.89 kg/m².  Weight: 60.5 kg (133 lb 6.1 oz)   Height: 5' 7" (170.2 cm)     Physical Exam   Constitutional: He is oriented to person, place, and time. He appears well-developed and well-nourished. He is cooperative.  Non-toxic appearance. No distress.   HENT:   Head: Normocephalic and atraumatic.   Eyes: Pupils are equal, round, and reactive to light. Conjunctivae and EOM are normal. No scleral icterus.   Neck: Trachea normal and normal range of motion. Neck supple. Normal carotid pulses and no JVD present. Carotid bruit is not present. No neck rigidity. No tracheal deviation and no edema present. No thyromegaly present.   Cardiovascular: Normal rate, regular rhythm, S1 normal and S2 normal. PMI is not displaced. Exam " reveals distant heart sounds. Exam reveals no gallop and no friction rub.   No murmur heard.  Pulses:       Carotid pulses are 2+ on the right side, and 2+ on the left side.  Pulmonary/Chest: Effort normal. No stridor. No respiratory distress. He has no wheezes. He has no rales. He exhibits no tenderness.   Poor air entry bilat   Abdominal: Soft. He exhibits no distension. There is no hepatosplenomegaly.   Musculoskeletal: Normal range of motion. He exhibits no edema or tenderness.   Feet:   Right Foot:   Skin Integrity: Negative for ulcer.   Left Foot:   Skin Integrity: Negative for ulcer.   Neurological: He is alert and oriented to person, place, and time. No cranial nerve deficit.   Skin: Skin is warm and dry. No rash noted. No erythema.   Psychiatric: He has a normal mood and affect. His speech is normal and behavior is normal.   Vitals reviewed.      DATA:   EKG: (personally reviewed tracing)  12/28/18 SR 70, septal q waves    Laboratory:  CBC:  Recent Labs   Lab 11/27/18  1014 01/17/19  1458 02/10/19  1532   WHITE BLOOD CELL COUNT 18.3 H 15.32 H 16.64 H   HEMOGLOBIN 12.1 L 11.5 L 10.9 L   HEMATOCRIT 38.5 41.6 40.2   PLATELETS 690 H 183 193       CHEMISTRIES:  Recent Labs   Lab 11/25/16  1013  11/11/17  1613  11/27/18  1014 01/04/19  0835 01/17/19  1458 02/10/19  1415   GLUCOSE 78   < > 121 H   < > 99  --  77 130 H   SODIUM 144   < > 142   < > 138  --  140 142   POTASSIUM 4.3   < > 3.5   < > 5.1  --  3.9 4.2   BUN BLD 14   < > 13   < > 16  --  12 11   CREATININE 0.9   < > 1.0   < > 0.83 0.8 0.8 0.8   EGFR IF AFRICAN AMERICAN >60   < > >60   < >  --  >60 >60 >60   EGFR IF NON- >60   < > >60   < > 90 >60 >60 >60   CALCIUM 9.8   < > 9.5   < > 10.7 H  --  9.2 10.0   MAGNESIUM 2.0  --  1.2 L  --   --   --   --   --     < > = values in this interval not displayed.       CARDIAC BIOMARKERS:  Recent Labs   Lab 11/12/17  0423 02/16/18  1525 02/10/19  1415   TROPONIN I <0.006 <0.006 <0.006        COAGS:  Recent Labs   Lab 08/29/17  1734 02/10/19  1415   INR 1.1 1.0       LIPIDS/LFTS:  Recent Labs   Lab 12/27/16  0842  07/19/17  0816  08/22/18  1000  01/17/19  1458 02/10/19  1415 04/25/19  0936   CHOLESTEROL 142   < > 194  --  226 H  --   --   --  122   TRIGLYCERIDES 68   < > 322 H  --  168 H  --   --   --  82   HDL 60   < > 43  --  55  --   --   --  44   LDL CALC  --    < > 87  --   --   --   --   --   --    LDL CHOLESTEROL 68  --   --   --  137.4  --   --   --  61.6 L   NON-HDL CHOLESTEROL  --   --   --   --  171  --   --   --  78   NON HDL CHOL. (LDL+ VLDL) 82  --   --   --   --   --   --   --   --    AST 22   < > 19   < >  --    < > 20 20 19   ALT 17   < > 12   < >  --    < > 20 19 13    < > = values in this interval not displayed.       Cardiovascular Testing:  L MPI 1/29/19  Probably normal myocardial perfusion study  There were no arrhythmias during stress.  The patient reported SOB (non-anginal) and chest discomfort during the stress test.  There is a small amount of mild, due to diaphragm shadow defect(s) in the inferoseptal wall(s),  LVEF post-stress is 69%  The EKG portion of this study is negative for myocardial ischemia.  Recommend PET stress or catheterization if further symptoms and clinical indication    Echo 1/29/19  Normal left ventricular systolic function. The estimated ejection fraction is 60%     LE art US/JACQUE 1/29/19  No evidence of hemodynamically significant infrainguinal PAD bilaterally.  Tri- and (predominantly) biphasic waveforms throughout.  Mildly decreased JACQUE bilaterally (R 0.94, L 0.90).    CTA C/A/P 1/4/19:  No aortic aneurysm or dissection.  Atherosclerotic calcification of the bilateral common and external iliac arteries with multifocal short segment stenosis of approximately 50%.  There is scattered calcific coronary atherosclerosis.  Extensive panlobular emphysema in an apical predominance.  Incidental findings as above.  No concerning abdominal or pelvic  findings.    ASSESSMENT:   # CP with both typ and atyp features (predom atyp, persistent), presumed CAD (CTA 1/4/2019).  Echo/MPI 2/2019 normal (?inf atten artifact), much improved with imdur.  # PAD (LE inflow disease on CTA 1/4/19), essentially asymptomatic.  LE art US/JACQUE without flow limiting disease 2/2019  # HTN, uncontrolled  # HLP on atorva 40mg  # PCV, followed by Dr. Welsh with episodic phlebotomy and hydrea rx  # COPD on home O2    PLAN:   Cont med rx  Inc imdur 60mg qd as second antianginal to assess for clinical improvement  Prev enrolled in digital HTN program  RTC 3 months for clinical reassessment.  If persistent/worsening CP, will consider PET vs cath.    Dario Lynn MD, FACC

## 2019-05-16 DIAGNOSIS — M54.12 CERVICAL RADICULOPATHY: ICD-10-CM

## 2019-05-16 RX ORDER — IPRATROPIUM BROMIDE AND ALBUTEROL SULFATE 2.5; .5 MG/3ML; MG/3ML
3 SOLUTION RESPIRATORY (INHALATION) EVERY 6 HOURS PRN
Qty: 450 ML | Refills: 3 | Status: SHIPPED | OUTPATIENT
Start: 2019-05-16 | End: 2019-10-20 | Stop reason: SDUPTHER

## 2019-08-13 ENCOUNTER — OFFICE VISIT (OUTPATIENT)
Dept: CARDIOLOGY | Facility: CLINIC | Age: 69
End: 2019-08-13
Payer: MEDICARE

## 2019-08-13 VITALS
OXYGEN SATURATION: 96 % | HEIGHT: 67 IN | DIASTOLIC BLOOD PRESSURE: 62 MMHG | WEIGHT: 135.56 LBS | HEART RATE: 64 BPM | SYSTOLIC BLOOD PRESSURE: 140 MMHG | RESPIRATION RATE: 15 BRPM | BODY MASS INDEX: 21.28 KG/M2

## 2019-08-13 DIAGNOSIS — I25.10 CORONARY ARTERY DISEASE INVOLVING NATIVE CORONARY ARTERY OF NATIVE HEART WITHOUT ANGINA PECTORIS: ICD-10-CM

## 2019-08-13 DIAGNOSIS — R07.9 CHEST PAIN, UNSPECIFIED TYPE: Primary | ICD-10-CM

## 2019-08-13 DIAGNOSIS — E78.2 MIXED HYPERLIPIDEMIA: ICD-10-CM

## 2019-08-13 DIAGNOSIS — I73.9 PAD (PERIPHERAL ARTERY DISEASE): ICD-10-CM

## 2019-08-13 DIAGNOSIS — I10 ESSENTIAL HYPERTENSION: ICD-10-CM

## 2019-08-13 DIAGNOSIS — R94.39 ABNORMAL NUCLEAR STRESS TEST: ICD-10-CM

## 2019-08-13 PROCEDURE — 3077F PR MOST RECENT SYSTOLIC BLOOD PRESSURE >= 140 MM HG: ICD-10-PCS | Mod: CPTII,S$GLB,, | Performed by: INTERNAL MEDICINE

## 2019-08-13 PROCEDURE — 3078F DIAST BP <80 MM HG: CPT | Mod: CPTII,S$GLB,, | Performed by: INTERNAL MEDICINE

## 2019-08-13 PROCEDURE — 99214 PR OFFICE/OUTPT VISIT, EST, LEVL IV, 30-39 MIN: ICD-10-PCS | Mod: S$GLB,,, | Performed by: INTERNAL MEDICINE

## 2019-08-13 PROCEDURE — 3078F PR MOST RECENT DIASTOLIC BLOOD PRESSURE < 80 MM HG: ICD-10-PCS | Mod: CPTII,S$GLB,, | Performed by: INTERNAL MEDICINE

## 2019-08-13 PROCEDURE — 93000 EKG 12-LEAD: ICD-10-PCS | Mod: S$GLB,,, | Performed by: INTERNAL MEDICINE

## 2019-08-13 PROCEDURE — 93000 ELECTROCARDIOGRAM COMPLETE: CPT | Mod: S$GLB,,, | Performed by: INTERNAL MEDICINE

## 2019-08-13 PROCEDURE — 3077F SYST BP >= 140 MM HG: CPT | Mod: CPTII,S$GLB,, | Performed by: INTERNAL MEDICINE

## 2019-08-13 PROCEDURE — 1101F PT FALLS ASSESS-DOCD LE1/YR: CPT | Mod: CPTII,S$GLB,, | Performed by: INTERNAL MEDICINE

## 2019-08-13 PROCEDURE — 99214 OFFICE O/P EST MOD 30 MIN: CPT | Mod: S$GLB,,, | Performed by: INTERNAL MEDICINE

## 2019-08-13 PROCEDURE — 99999 PR PBB SHADOW E&M-EST. PATIENT-LVL III: CPT | Mod: PBBFAC,,, | Performed by: INTERNAL MEDICINE

## 2019-08-13 PROCEDURE — 1101F PR PT FALLS ASSESS DOC 0-1 FALLS W/OUT INJ PAST YR: ICD-10-PCS | Mod: CPTII,S$GLB,, | Performed by: INTERNAL MEDICINE

## 2019-08-13 PROCEDURE — 99999 PR PBB SHADOW E&M-EST. PATIENT-LVL III: ICD-10-PCS | Mod: PBBFAC,,, | Performed by: INTERNAL MEDICINE

## 2019-08-13 RX ORDER — ISOSORBIDE MONONITRATE 120 MG/1
120 TABLET, EXTENDED RELEASE ORAL DAILY
Qty: 90 TABLET | Refills: 3 | Status: SHIPPED | OUTPATIENT
Start: 2019-08-13 | End: 2019-09-12 | Stop reason: SDUPTHER

## 2019-08-13 NOTE — PROGRESS NOTES
CARDIOVASCULAR PROGRESS NOTE    REASON FOR CONSULT:   Chaz Stuart is a 69 y.o. male who presents for f/u PAD, presumed CAD.    PCP: Cynthia Welsh  HISTORY OF PRESENT ILLNESS:   The patient returns for follow-up.  He continues to complain of left-sided chest pain of predominantly atypical nature.  It is not exertional and not associated shortness of breath, nor is it of a pressure-like sensation.  He more describes lightning.  He has had no palpitations, lightheadedness, dizziness, or syncope.  There has been no PND, orthopnea, or lower extremity edema.  He denies melena, hematuria, or claudicant symptoms.    CARDIOVASCULAR HISTORY:   Presumed CAD (CTA 1/2019), MPI 1/2019 normal.  PAD (bilat LE inflow disease CT 1/2019)    PAST MEDICAL HISTORY:     Past Medical History:   Diagnosis Date    Anxiety     COPD (chronic obstructive pulmonary disease)     DDD (degenerative disc disease), cervical     Diverticulitis     GERD (gastroesophageal reflux disease)     Hypertension     Myocardial infarction     Polycythemia vera 1 year       PAST SURGICAL HISTORY:     Past Surgical History:   Procedure Laterality Date    BLOCK, NERVE, FACET JOINT, LUMBAR, MEDIAL BRANCH Bilateral 9/17/2013    [facility]    BLOCK-NERVE-MEDIAL BRANCH-LUMBAR Bilateral 9/30/2014    [facility] at [facility]    CERVICAL SPINE SURGERY  Fusion    CHOLECYSTECTOMY      COLON SURGERY      DEBRIDEMENT-WOUND  9/5/2017    Performed [facility] Phillip [facility]on III, [facility] at [facility] OR    FINGER SURGERY      OPEN REDUCTION INTERNAL FIXATION-FINGER Right 9/5/2017    Per[facility] at [facility] OR    PINNING-PERCUTANEOUS-FINGER  9/5/2017    Performed by Phillip [facility]on III, MD at [facility] OR       ALLERGIES AND MEDICATION:   Review of patient's allergies indicates:  No Known Allergies     Medication List           Accurate as of 8/13/19 11:53 AM. If you have  any questions, ask your nurse or doctor.               CONTINUE taking these medications    albuterol 90 mcg/actuation inhaler  Commonly known as:  VENTOLIN HFA  Inhale 2 puffs into the lungs every 6 (six) hours as needed.     albuterol-ipratropium 2.5 mg-0.5 mg/3 mL nebulizer solution  Commonly known as:  DUO-NEB  TAKE 3 MLS BY NEBULIZATION EVERY 6 (SIX) HOURS AS NEEDED FOR WHEEZING. RESCUE     ALPRAZolam 1 MG tablet  Commonly known as:  XANAX  TAKE 1 TABLET BY MOUTH THREE TIMES A DAY AS NEEDED FOR ANXIETY. MAY CAUSE DROWSINESS.     amLODIPine 10 MG tablet  Commonly known as:  NORVASC  Take 1 tablet (10 mg total) by mouth once daily.     aspirin 81 MG EC tablet  Commonly known as:  ECOTRIN  Take 1 tablet (81 mg total) by mouth once daily.     atorvastatin 40 MG tablet  Commonly known as:  LIPITOR  Take 1 tablet (40 mg total) by mouth every evening.     azelastine 137 mcg (0.1 %) nasal spray  Commonly known as:  ASTELIN  USE 1 SPRAY NASALLY TWICE  DAILY     dutasteride 0.5 mg capsule  Commonly known as:  AVODART  Take 1 capsule (0.5 mg total) by mouth once daily.     hydroxyurea 500 mg Cap  Commonly known as:  HYDREA  TAKE 1 CAPSULE(S) BY MOUTH ONCE A DAY.     isosorbide mononitrate 60 MG 24 hr tablet  Commonly known as:  IMDUR  Take 1 tablet (60 mg total) by mouth once daily.     montelukast 10 mg tablet  Commonly known as:  SINGULAIR  Take 1 tablet (10 mg total) by mouth once daily.     predniSONE 20 MG tablet  Commonly known as:  DELTASONE  Take PRN and sparingly  For  SOB  And  Tight musus    1 tab  X 3  Days   Then   1/2 tab  X 2 days  Then  1/4  Tab  Ax  2 days     ranitidine 150 MG tablet  Commonly known as:  ZANTAC  Take 1 tablet (150 mg total) by mouth 2 (two) times daily.     SYMBICORT 160-4.5 mcg/actuation [facility]  Generic drug:  budesonide-formoterol 160-4.5 mcg  Inhale 2 puffs into the lungs 2 (two) times daily.            SOCIAL HISTORY:     Social History     Socioeconomic History    Marital status:  Single     Spouse name: Not on file    Number of children: 6    Years of education: Not on file    Highest education level: Not on file   Occupational History    Not on file   Social Needs    Financial resource strain: Not on file    Food insecurity:     Worry: Not on file     Inability: Not on file    Transportation needs:     Medical: Not on file     Non-medical: Not on file   Tobacco Use    Smoking status: Former Smoker     Packs/day: 3.00     Years: 10.00     Pack years: 30.00     Last attempt to quit: 2009     Years since quittin.6    Smokeless tobacco: Never Used    Tobacco comment: use to smoke  3-5 packs a day.    Substance and Sexual Activity    Alcohol use: No    Drug use: No    Sexual activity: Never     Partners: Female     Birth control/protection: Surgical   Lifestyle    Physical activity:     Days per week: Not on file     Minutes per session: Not on file    Stress: Not on file   Relationships    Social connections:     Talks on phone: Not on file     Gets together: Not on file     Attends Anabaptism service: Not on file     Active member of club or organization: Not on file     Attends meetings of clubs or organizations: Not on file     Relationship status: Not on file   Other Topics Concern    Caffeine Use Yes    Financial Status: Disabled Yes    Legal: Involved in criminal litigation Not Asked    Caffeine Use: Frequent Not Asked    Financial Status: Employed Not Asked    Legal: Other Not Asked    Caffeine Use: Moderate Not Asked    Financial Status: Unemployed Not Asked    Leisure: Exercise Not Asked    Caffeine Use: Substantial Not Asked    Financial Status: Other Not Asked    Leisure: Fishing Not Asked    Childhood History: Adopted Not Asked    Firearms: Does patient have access to a firearm? Yes     Comment: locked gun cab    Leisure: Hunting Not Asked    Childhood History: Early trauma Not Asked    Home situation: homeless Not Asked    Leisure: Movie  Watching Not Asked    Childhood History: Raised by parents Not Asked    Home situation: lives alone Yes    Leisure: Shopping Not Asked    Childhood History: Uneventful Not Asked    Home situation: lives in group home Not Asked    Leisure: Sports Not Asked    Childhood History: Other Not Asked    Home situation: lives in nursing home Not Asked    Leisure: Time with family Not Asked    Education: Unfinished High School Yes     Comment: 8th grade    Home situation: lives in shelter Not Asked     Service No    Education: High School Graduate Not Asked    Home situation: lives with family Not Asked    Spirituality: Active Participation No    Education: Unfinished college Not Asked    Home situation: lives with friends Not Asked    Spirituality: Organized Adventist No    Education: Trade School Not Asked    Home situation: lives with significant other Not Asked    Spirituality: Private Participation No    Education: Associate's Degree Not Asked    Home situation: lives with spouse Not Asked    Patient feels they ought to cut down on drinking/drug use Not Asked    Education: Bachelor's Degree Not Asked    Legal consequences of chemical use Not Asked    Patient annoyed by others criticizing their drinking/drug use Not Asked    Education: More than one Bachelor's or Professional Not Asked    Legal: Arrest history No    Patient has felt bad or guilty about drinking/drug use Not Asked    Education: Master's, PhD Not Asked    Legal: Involved in civil litigation Not Asked    Patient has had a drink/used drugs as an eye opener in the AM Not Asked   Social History Narrative    Not on file       FAMILY HISTORY:     Family History   Problem Relation Age of Onset    Diabetes Mother     Heart disease Mother     Diabetes Father     Hypertension Father     Stroke Father     ADD / ADHD Neg Hx     Alcohol abuse Neg Hx     Anxiety disorder Neg Hx     Bipolar disorder Neg Hx     Dementia  "Neg Hx     Depression Neg Hx     Drug abuse Neg Hx     OCD Neg Hx     Paranoid behavior Neg Hx     Physical abuse Neg Hx     Schizophrenia Neg Hx     Seizures Neg Hx     Self injury Neg Hx     Sexual abuse Neg Hx     Suicide Neg Hx     Melanoma Neg Hx        REVIEW OF SYSTEMS:   Review of Systems   Constitutional: Negative for chills, diaphoresis and fever.   HENT: Negative for nosebleeds.    Eyes: Negative for blurred vision, double vision and photophobia.   Respiratory: Positive for shortness of breath. Negative for hemoptysis and wheezing.    Cardiovascular: Positive for chest pain. Negative for palpitations, orthopnea, claudication, leg swelling and PND.   Gastrointestinal: Negative for abdominal pain, blood in stool, heartburn, melena, nausea and vomiting.   Genitourinary: Negative for flank pain and hematuria.   Musculoskeletal: Negative for falls, myalgias and neck pain.   Skin: Negative for rash.   Neurological: Negative for dizziness, seizures, loss of consciousness, weakness and headaches.   Endo/Heme/Allergies: Negative for polydipsia. Does not bruise/bleed easily.   Psychiatric/Behavioral: Negative for depression and memory loss. The patient is not nervous/anxious.        PHYSICAL EXAM:     Vitals:    08/13/19 1126   BP: (!) 140/62   Pulse: 64   Resp: 15    Body mass index is 21.24 kg/m².  Weight: 61.5 kg (135 lb 9.3 oz)   Height: 5' 7" (170.2 cm)     Physical Exam   Constitutional: He is oriented to person, place, and time. He appears well-developed and well-nourished. He is cooperative.  Non-toxic appearance. No distress.   HENT:   Head: Normocephalic and atraumatic.   Eyes: Pupils are equal, round, and reactive to light. Conjunctivae and EOM are normal. No scleral icterus.   Neck: Trachea normal and normal range of motion. Neck supple. Normal carotid pulses and no JVD present. Carotid bruit is not present. No neck rigidity. No tracheal deviation and no edema present. No thyromegaly present. "   Cardiovascular: Normal rate, regular rhythm, S1 normal and S2 normal. PMI is not displaced. Exam reveals distant heart sounds. Exam reveals no gallop and no friction rub.   No murmur heard.  Pulses:       Carotid pulses are 2+ on the right side, and 2+ on the left side.  Pulmonary/Chest: Effort normal. No stridor. No respiratory distress. He has no wheezes. He has no rales. He exhibits no tenderness.   Poor air entry bilat   Abdominal: Soft. He exhibits no distension. There is no hepatosplenomegaly.   Musculoskeletal: Normal range of motion. He exhibits no edema or tenderness.   Feet:   Right Foot:   Skin Integrity: Negative for ulcer.   Left Foot:   Skin Integrity: Negative for ulcer.   Neurological: He is alert and oriented to person, place, and time. No cranial nerve deficit.   Skin: Skin is warm and dry. No rash noted. No erythema.   Psychiatric: He has a normal mood and affect. His speech is normal and behavior is normal.   Vitals reviewed.      DATA:   EKG: (personally reviewed tracing)  8/13/19 SR 80    Laboratory:  CBC:  Recent Labs   Lab 11/27/18  1014 01/17/19  1458 02/10/19  1532   WBC 18.3 H 15.32 H 16.64 H   Hemoglobin 12.1 L 11.5 L 10.9 L   Hematocrit 38.5 41.6 40.2   Platelets 690 H 183 193       CHEMISTRIES:  Recent Labs   Lab 11/25/16  1013  11/11/17  1613  11/27/18  1014 01/04/19  0835 01/17/19  1458 02/10/19  1415   Glucose 78   < > 121 H   < > 99  --  77 130 H   Sodium 144   < > 142   < > 138  --  140 142   Potassium 4.3   < > 3.5   < > 5.1  --  3.9 4.2   BUN, Bld 14   < > 13   < > 16  --  12 11   Creatinine 0.9   < > 1.0   < > 0.83 0.8 0.8 0.8   eGFR if African American >60   < > >60   < >  --  >60 >60 >60   eGFR if non African American >60   < > >60   < > 90 >60 >60 >60   Calcium 9.8   < > 9.5   < > 10.7 H  --  9.2 10.0   Magnesium 2.0  --  1.2 L  --   --   --   --   --     < > = values in this interval not displayed.       CARDIAC BIOMARKERS:  Recent Labs   Lab 11/12/17  0423 02/16/18  1523  02/10/19  1415   Troponin I <0.006 <0.006 <0.006       COAGS:  Recent Labs   Lab 08/29/17  1734 02/10/19  1415   INR 1.1 1.0       LIPIDS/LFTS:  Recent Labs   Lab 12/27/16  0842  07/19/17  0816  08/22/18  1000  01/17/19  1458 02/10/19  1415 04/25/19  0936   Cholesterol 142   < > 194  --  226 H  --   --   --  122   Triglycerides 68   < > 322 H  --  168 H  --   --   --  82   HDL 60   < > 43  --  55  --   --   --  44   LDL Calculated  --    < > 87  --   --   --   --   --   --    LDL Cholesterol 68  --   --   --  137.4  --   --   --  61.6 L   Non-HDL Cholesterol  --   --   --   --  171  --   --   --  78   Non HDL Chol. (LDL+VLDL) 82  --   --   --   --   --   --   --   --    AST 22   < > 19   < >  --    < > 20 20 19   ALT 17   < > 12   < >  --    < > 20 19 13    < > = values in this interval not displayed.       Cardiovascular Testing:  L MPI 1/29/19  Probably normal myocardial perfusion study  There were no arrhythmias during stress.  The patient reported SOB (non-anginal) and chest discomfort during the stress test.  There is a small amount of mild, due to diaphragm shadow defect(s) in the inferoseptal wall(s),  LVEF post-stress is 69%  The EKG portion of this study is negative for myocardial ischemia.  Recommend PET stress or catheterization if further symptoms and clinical indication    Echo 1/29/19  Normal left ventricular systolic function. The estimated ejection fraction is 60%     LE art US/JACUQE 1/29/19  No evidence of hemodynamically significant infrainguinal PAD bilaterally.  Tri- and (predominantly) biphasic waveforms throughout.  Mildly decreased JACQUE bilaterally (R 0.94, L 0.90).    CTA C/A/P 1/4/19:  No aortic aneurysm or dissection.  Atherosclerotic calcification of the bilateral common and external iliac arteries with multifocal short segment stenosis of approximately 50%.  There is scattered calcific coronary atherosclerosis.  Extensive panlobular emphysema in an apical predominance.  Incidental findings  as above.  No concerning abdominal or pelvic findings.    ASSESSMENT:   # CP with predom atyp features (predom atyp, persistent), presumed CAD (CTA 1/4/2019).  Echo/MPI 2/2019 normal (?inf atten artifact).  # PAD (LE inflow disease on CTA 1/4/19), essentially asymptomatic.  LE art US/JACQUE without flow limiting disease 2/2019  # HTN, uncontrolled  # HLP on atorva 40mg  # PCV, followed by Dr. Welsh with episodic phlebotomy and hydrea rx  # COPD on home O2    PLAN:   Cont med rx  Inc imdur 120mg qd  Prev enrolled in digital HTN program  Cardiac PET for ischemic assessment  RTC 1 month    Dario Lynn MD, FACC

## 2019-09-05 ENCOUNTER — TELEPHONE (OUTPATIENT)
Dept: CARDIOLOGY | Facility: CLINIC | Age: 69
End: 2019-09-05

## 2019-09-09 ENCOUNTER — CLINICAL SUPPORT (OUTPATIENT)
Dept: CARDIOLOGY | Facility: CLINIC | Age: 69
End: 2019-09-09
Attending: INTERNAL MEDICINE
Payer: MEDICARE

## 2019-09-09 VITALS — BODY MASS INDEX: 21.19 KG/M2 | WEIGHT: 135 LBS | HEIGHT: 67 IN

## 2019-09-09 DIAGNOSIS — R94.39 ABNORMAL NUCLEAR STRESS TEST: ICD-10-CM

## 2019-09-09 DIAGNOSIS — R07.9 CHEST PAIN, UNSPECIFIED TYPE: ICD-10-CM

## 2019-09-09 DIAGNOSIS — I25.10 CORONARY ARTERY DISEASE INVOLVING NATIVE CORONARY ARTERY OF NATIVE HEART WITHOUT ANGINA PECTORIS: ICD-10-CM

## 2019-09-09 LAB
CFR FLOW - ANTERIOR: 1.57 CC/MIN/G
CFR FLOW - INFERIOR: 1.46 CC/MIN/G
CFR FLOW - LATERAL: 1.53 CC/MIN/G
CFR FLOW - MAX: 1.8 CC/MIN/G
CFR FLOW - MIN: 1.2 CC/MIN/G
CFR FLOW - SEPTAL: 1.56 CC/MIN/G
CFR FLOW - WHOLE HEART: 1.53 CC/MIN/G
CV PHARM DOSE: 34.4 MG
CV STRESS BASE HR: 92 BPM
DIASTOLIC BLOOD PRESSURE: 65 MMHG
END DIASTOLIC INDEX-HIGH: 170 ML/M2
END SYSTOLIC INDEX-HIGH: 70 ML/M2
NUC REST DIASTOLIC VOLUME INDEX: 40
NUC REST EJECTION FRACTION: 86
NUC REST SYSTOLIC VOLUME INDEX: 6
NUC STRESS DIASTOLIC VOLUME INDEX: 42
NUC STRESS EJECTION FRACTION: 87 %
NUC STRESS SYSTOLIC VOLUME INDEX: 5
OHS CV CPX 85 PERCENT MAX PREDICTED HEART RATE MALE: 128
OHS CV CPX MAX PREDICTED HEART RATE: 151
OHS CV CPX PATIENT IS FEMALE: 0
OHS CV CPX PATIENT IS MALE: 1
OHS CV CPX PEAK DIASTOLIC BLOOD PRESSURE: 71 MMHG
OHS CV CPX PEAK HEAR RATE: 95 BPM
OHS CV CPX PEAK RATE PRESSURE PRODUCT: NORMAL
OHS CV CPX PEAK SYSTOLIC BLOOD PRESSURE: 130 MMHG
OHS CV CPX PERCENT MAX PREDICTED HEART RATE ACHIEVED: 63
OHS CV CPX RATE PRESSURE PRODUCT PRESENTING: NORMAL
REST FLOW - ANTERIOR: 2.41 CC/MIN/G
REST FLOW - INFERIOR: 2.34 CC/MIN/G
REST FLOW - LATERAL: 2.39 CC/MIN/G
REST FLOW - MAX: 3 CC/MIN/G
REST FLOW - MIN: 1 CC/MIN/G
REST FLOW - SEPTAL: 1.95 CC/MIN/G
REST FLOW - WHOLE HEART: 2.27
RETIRED EF AND QEF - SEE NOTES: 51 %
STRESS ECHO TARGET HR: 128.35 BPM
STRESS FLOW - ANTERIOR: 3.79 CC/MIN/G
STRESS FLOW - INFERIOR: 3.4 CC/MIN/G
STRESS FLOW - LATERAL: 3.65 CC/MIN/G
STRESS FLOW - MAX: 4.7 CC/MIN/G
STRESS FLOW - MIN: 1.5 CC/MIN/G
STRESS FLOW - SEPTAL: 3.05 CC/MIN/G
STRESS FLOW - WHOLE HEART: 3.47 CC/MIN/G
SYSTOLIC BLOOD PRESSURE: 128 MMHG

## 2019-09-09 PROCEDURE — A9555 RB82 RUBIDIUM: HCPCS | Mod: S$GLB,,, | Performed by: INTERNAL MEDICINE

## 2019-09-09 PROCEDURE — 99999 PR PBB SHADOW E&M-EST. PATIENT-LVL I: ICD-10-PCS | Mod: PBBFAC,,,

## 2019-09-09 PROCEDURE — 78492 MYOCRD IMG PET MLT RST&STRS: CPT | Mod: S$GLB,,, | Performed by: INTERNAL MEDICINE

## 2019-09-09 PROCEDURE — 99999 PR PBB SHADOW E&M-EST. PATIENT-LVL I: CPT | Mod: PBBFAC,,,

## 2019-09-09 PROCEDURE — 93015 CARDIAC PET SCAN STRESS (CUPID ONLY): ICD-10-PCS | Mod: S$GLB,,, | Performed by: INTERNAL MEDICINE

## 2019-09-09 PROCEDURE — 93015 CV STRESS TEST SUPVJ I&R: CPT | Mod: S$GLB,,, | Performed by: INTERNAL MEDICINE

## 2019-09-09 PROCEDURE — A9555 CARDIAC PET SCAN STRESS (CUPID ONLY): ICD-10-PCS | Mod: S$GLB,,, | Performed by: INTERNAL MEDICINE

## 2019-09-09 PROCEDURE — 78492 CARDIAC PET SCAN STRESS (CUPID ONLY): ICD-10-PCS | Mod: S$GLB,,, | Performed by: INTERNAL MEDICINE

## 2019-09-09 RX ORDER — DIPYRIDAMOLE 5 MG/ML
34.36 INJECTION INTRAVENOUS
Status: COMPLETED | OUTPATIENT
Start: 2019-09-09 | End: 2019-09-09

## 2019-09-09 RX ADMIN — DIPYRIDAMOLE 34.35 MG: 5 INJECTION INTRAVENOUS at 10:09

## 2019-09-12 ENCOUNTER — OFFICE VISIT (OUTPATIENT)
Dept: CARDIOLOGY | Facility: CLINIC | Age: 69
End: 2019-09-12
Payer: MEDICARE

## 2019-09-12 VITALS
RESPIRATION RATE: 15 BRPM | HEIGHT: 67 IN | OXYGEN SATURATION: 91 % | BODY MASS INDEX: 19.96 KG/M2 | WEIGHT: 127.19 LBS | SYSTOLIC BLOOD PRESSURE: 140 MMHG | DIASTOLIC BLOOD PRESSURE: 82 MMHG | HEART RATE: 86 BPM

## 2019-09-12 DIAGNOSIS — R07.9 CHEST PAIN, UNSPECIFIED TYPE: Primary | ICD-10-CM

## 2019-09-12 DIAGNOSIS — I73.9 PAD (PERIPHERAL ARTERY DISEASE): ICD-10-CM

## 2019-09-12 DIAGNOSIS — I10 ESSENTIAL HYPERTENSION: ICD-10-CM

## 2019-09-12 DIAGNOSIS — J43.1 PANLOBULAR EMPHYSEMA: ICD-10-CM

## 2019-09-12 DIAGNOSIS — E78.2 MIXED HYPERLIPIDEMIA: ICD-10-CM

## 2019-09-12 DIAGNOSIS — Z99.81 OXYGEN DEPENDENT: ICD-10-CM

## 2019-09-12 DIAGNOSIS — I25.10 CORONARY ARTERY DISEASE INVOLVING NATIVE CORONARY ARTERY OF NATIVE HEART WITHOUT ANGINA PECTORIS: ICD-10-CM

## 2019-09-12 PROCEDURE — 1101F PR PT FALLS ASSESS DOC 0-1 FALLS W/OUT INJ PAST YR: ICD-10-PCS | Mod: CPTII,S$GLB,, | Performed by: INTERNAL MEDICINE

## 2019-09-12 PROCEDURE — 3079F DIAST BP 80-89 MM HG: CPT | Mod: CPTII,S$GLB,, | Performed by: INTERNAL MEDICINE

## 2019-09-12 PROCEDURE — 3077F PR MOST RECENT SYSTOLIC BLOOD PRESSURE >= 140 MM HG: ICD-10-PCS | Mod: CPTII,S$GLB,, | Performed by: INTERNAL MEDICINE

## 2019-09-12 PROCEDURE — 3079F PR MOST RECENT DIASTOLIC BLOOD PRESSURE 80-89 MM HG: ICD-10-PCS | Mod: CPTII,S$GLB,, | Performed by: INTERNAL MEDICINE

## 2019-09-12 PROCEDURE — 3077F SYST BP >= 140 MM HG: CPT | Mod: CPTII,S$GLB,, | Performed by: INTERNAL MEDICINE

## 2019-09-12 PROCEDURE — 99999 PR PBB SHADOW E&M-EST. PATIENT-LVL III: ICD-10-PCS | Mod: PBBFAC,,, | Performed by: INTERNAL MEDICINE

## 2019-09-12 PROCEDURE — 99999 PR PBB SHADOW E&M-EST. PATIENT-LVL III: CPT | Mod: PBBFAC,,, | Performed by: INTERNAL MEDICINE

## 2019-09-12 PROCEDURE — 99214 OFFICE O/P EST MOD 30 MIN: CPT | Mod: S$GLB,,, | Performed by: INTERNAL MEDICINE

## 2019-09-12 PROCEDURE — 99214 PR OFFICE/OUTPT VISIT, EST, LEVL IV, 30-39 MIN: ICD-10-PCS | Mod: S$GLB,,, | Performed by: INTERNAL MEDICINE

## 2019-09-12 PROCEDURE — 1101F PT FALLS ASSESS-DOCD LE1/YR: CPT | Mod: CPTII,S$GLB,, | Performed by: INTERNAL MEDICINE

## 2019-09-12 RX ORDER — ISOSORBIDE MONONITRATE 120 MG/1
240 TABLET, EXTENDED RELEASE ORAL DAILY
Qty: 180 TABLET | Refills: 3 | Status: SHIPPED | OUTPATIENT
Start: 2019-09-12 | End: 2020-01-01 | Stop reason: SDUPTHER

## 2019-09-12 NOTE — PROGRESS NOTES
CARDIOVASCULAR PROGRESS NOTE    REASON FOR CONSULT:   Chaz Stuart is a 69 y.o. male who presents for f/u PAD, presumed CAD.    PCP: Cynthia Welsh  HISTORY OF PRESENT ILLNESS:   The patient returns for follow-up, accompanied by his live-in girlfriend.  He continues to complain of left-sided chest pain of predominantly atypical nature, although this appears to be improving with dose titration of Imdur or.  It is not exertional and not associated shortness of breath.  He does have chronic dyspnea and is in home O2 for his COPD.  He has had no palpitations, lightheadedness, dizziness, or syncope.  There has been no PND, orthopnea, or lower extremity edema.  He denies melena, hematuria, or claudicant symptoms.    CARDIOVASCULAR HISTORY:   Presumed CAD (CTA 1/2019), MPI 1/2019 normal.  PET 8/2019 normal.  PAD (bilat LE inflow disease CT 1/2019)    PAST MEDICAL HISTORY:     Past Medical History:   Diagnosis Date    Anxiety     COPD (chronic obstructive pulmonary disease)     DDD (degenerative disc disease), cervical     Diverticulitis     GERD (gastroesophageal reflux disease)     Hypertension     Myocardial infarction     Polycythemia vera 1 year       PAST SURGICAL HISTORY:     Past Surgical History:   Procedure Laterality Date    BLOCK, NERVE, FACET JOINT, LUMBAR, MEDIAL BRANCH Bilateral 9/17/2013    [facility] at [facility]    BLOCK-NERVE-MEDIAL BRANCH-LUMBAR Bilateral 9/30/2014    [facility] at [facility]    CERVICAL SPINE SURGERY  Fusion    CHOLECYSTECTOMY      COLON SURGERY      DEBRIDEMENT-WOUND  9/5/2017    [facility] at [facility]    FINGER SURGERY      OPEN REDUCTION INTERNAL FIXATION-FINGER Right 9/5/2017    [facility] at [facility]    PINNING-PERCUTANEOUS-FINGER  9/5/2017    Performed by [facility] III, [facility] at [facility]       ALLERGIES AND MEDICATION:   Review of  patient's allergies indicates:  No Known Allergies     Medication List           Accurate as of 9/12/19 10:00 AM. If you have any questions, ask your nurse or doctor.               CONTINUE taking these medications    albuterol 90 mcg/actuation inhaler  Commonly known as:  VENTOLIN HFA  Inhale 2 puffs into the lungs every 6 (six) hours as needed.     albuterol-ipratropium 2.5 mg-0.5 mg/3 mL nebulizer solution  Commonly known as:  DUO-NEB  TAKE 3 MLS BY NEBULIZATION EVERY 6 (SIX) HOURS AS NEEDED FOR WHEEZING. RESCUE     ALPRAZolam 1 MG tablet  Commonly known as:  XANAX  TAKE 1 TABLET BY MOUTH THREE TIMES A DAY AS NEEDED FOR ANXIETY. MAY CAUSE DROWSINESS.     amLODIPine 10 MG tablet  Commonly known as:  NORVASC  Take 1 tablet (10 mg total) by mouth once daily.     aspirin 81 MG EC tablet  Commonly known as:  ECOTRIN  Take 1 tablet (81 mg total) by mouth once daily.     atorvastatin 40 MG tablet  Commonly known as:  LIPITOR  Take 1 tablet (40 mg total) by mouth every evening.     azelastine 137 mcg (0.1 %) nasal spray  Commonly known as:  ASTELIN  USE 1 SPRAY NASALLY TWICE  DAILY     dutasteride 0.5 mg capsule  Commonly known as:  AVODART  Take 1 capsule (0.5 mg total) by mouth once daily.     hydroxyurea 500 mg Cap  Commonly known as:  HYDREA  TAKE 1 CAPSULE(S) BY MOUTH ONCE A DAY.     isosorbide mononitrate 120 MG 24 hr tablet  Commonly known as:  IMDUR  Take 1 tablet (120 mg total) by mouth once daily.     montelukast 10 mg tablet  Commonly known as:  SINGULAIR  Take 1 tablet (10 mg total) by mouth once daily.     predniSONE 20 MG tablet  Commonly known as:  DELTASONE  Take PRN and sparingly  For  SOB  And  Tight musus    1 tab  X 3  Days   Then   1/2 tab  X 2 days  Then  1/4  Tab  Ax  2 days     ranitidine 150 MG tablet  Commonly known as:  ZANTAC  Take 1 tablet (150 mg total) by mouth 2 (two) times daily.     SYMBICORT 160-4.5 mcg/actuation [facility]  Generic drug:  budesonide-formoterol 160-4.5 mcg  Inhale 2 puffs  into the lungs 2 (two) times daily.            SOCIAL HISTORY:     Social History     Socioeconomic History    Marital status: Single     Spouse name: Not on file    Number of children: 6    Years of education: Not on file    Highest education level: Not on file   Occupational History    Not on file   Social Needs    Financial resource strain: Not on file    Food insecurity:     Worry: Not on file     Inability: Not on file    Transportation needs:     Medical: Not on file     Non-medical: Not on file   Tobacco Use    Smoking status: Former Smoker     Packs/day: 3.00     Years: 10.00     Pack years: 30.00     Last attempt to quit: 2009     Years since quittin.7    Smokeless tobacco: Never Used    Tobacco comment: use to smoke  3-5 packs a day.    Substance and Sexual Activity    Alcohol use: No    Drug use: No    Sexual activity: Never     Partners: Female     Birth control/protection: Surgical   Lifestyle    Physical activity:     Days per week: Not on file     Minutes per session: Not on file    Stress: Not on file   Relationships    Social connections:     Talks on phone: Not on file     Gets together: Not on file     Attends Christianity service: Not on file     Active member of club or organization: Not on file     Attends meetings of clubs or organizations: Not on file     Relationship status: Not on file   Other Topics Concern    Caffeine Use Yes    Financial Status: Disabled Yes    Legal: Involved in criminal litigation Not Asked    Caffeine Use: Frequent Not Asked    Financial Status: Employed Not Asked    Legal: Other Not Asked    Caffeine Use: Moderate Not Asked    Financial Status: Unemployed Not Asked    Leisure: Exercise Not Asked    Caffeine Use: Substantial Not Asked    Financial Status: Other Not Asked    Leisure: Fishing Not Asked    Childhood History: Adopted Not Asked    Firearms: Does patient have access to a firearm? Yes     Comment: locked gun cab     Leisure: Hunting Not Asked    Childhood History: Early trauma Not Asked    Home situation: homeless Not Asked    Leisure: Movie Watching Not Asked    Childhood History: Raised by parents Not Asked    Home situation: lives alone Yes    Leisure: Shopping Not Asked    Childhood History: Uneventful Not Asked    Home situation: lives in group home Not Asked    Leisure: Sports Not Asked    Childhood History: Other Not Asked    Home situation: lives in nursing home Not Asked    Leisure: Time with family Not Asked    Education: Unfinished High School Yes     Comment: 8th grade    Home situation: lives in shelter Not Asked     Service No    Education: High School Graduate Not Asked    Home situation: lives with family Not Asked    Spirituality: Active Participation No    Education: Unfinished college Not Asked    Home situation: lives with friends Not Asked    Spirituality: Organized Church No    Education: Trade School Not Asked    Home situation: lives with significant other Not Asked    Spirituality: Private Participation No    Education: Associate's Degree Not Asked    Home situation: lives with spouse Not Asked    Patient feels they ought to cut down on drinking/drug use Not Asked    Education: Bachelor's Degree Not Asked    Legal consequences of chemical use Not Asked    Patient annoyed by others criticizing their drinking/drug use Not Asked    Education: More than one Bachelor's or Professional Not Asked    Legal: Arrest history No    Patient has felt bad or guilty about drinking/drug use Not Asked    Education: Master's, PhD Not Asked    Legal: Involved in civil litigation Not Asked    Patient has had a drink/used drugs as an eye opener in the AM Not Asked   Social History Narrative    Not on file       FAMILY HISTORY:     Family History   Problem Relation Age of Onset    Diabetes Mother     Heart disease Mother     Diabetes Father     Hypertension Father     Stroke  "Father     ADD / ADHD Neg Hx     Alcohol abuse Neg Hx     Anxiety disorder Neg Hx     Bipolar disorder Neg Hx     Dementia Neg Hx     Depression Neg Hx     Drug abuse Neg Hx     OCD Neg Hx     Paranoid behavior Neg Hx     Physical abuse Neg Hx     Schizophrenia Neg Hx     Seizures Neg Hx     Self injury Neg Hx     Sexual abuse Neg Hx     Suicide Neg Hx     Melanoma Neg Hx        REVIEW OF SYSTEMS:   Review of Systems   Constitutional: Negative for chills, diaphoresis and fever.   HENT: Negative for nosebleeds.    Eyes: Negative for blurred vision, double vision and photophobia.   Respiratory: Positive for shortness of breath. Negative for hemoptysis and wheezing.    Cardiovascular: Positive for chest pain. Negative for palpitations, orthopnea, claudication, leg swelling and PND.   Gastrointestinal: Negative for abdominal pain, blood in stool, heartburn, melena, nausea and vomiting.   Genitourinary: Negative for flank pain and hematuria.   Musculoskeletal: Negative for falls, myalgias and neck pain.   Skin: Negative for rash.   Neurological: Negative for dizziness, seizures, loss of consciousness, weakness and headaches.   Endo/Heme/Allergies: Negative for polydipsia. Does not bruise/bleed easily.   Psychiatric/Behavioral: Negative for depression and memory loss. The patient is not nervous/anxious.        PHYSICAL EXAM:     Vitals:    09/12/19 0934   BP: (!) 140/82   Pulse: 86   Resp: 15    Body mass index is 19.92 kg/m².  Weight: 57.7 kg (127 lb 3.3 oz)   Height: 5' 7" (170.2 cm)     Physical Exam   Constitutional: He is oriented to person, place, and time. He appears well-developed and well-nourished. He is cooperative.  Non-toxic appearance. No distress.   HENT:   Head: Normocephalic and atraumatic.   Eyes: Pupils are equal, round, and reactive to light. Conjunctivae and EOM are normal. No scleral icterus.   Neck: Trachea normal and normal range of motion. Neck supple. Normal carotid pulses and no " JVD present. Carotid bruit is not present. No neck rigidity. No tracheal deviation and no edema present. No thyromegaly present.   Cardiovascular: Normal rate, regular rhythm, S1 normal and S2 normal. PMI is not displaced. Exam reveals distant heart sounds. Exam reveals no gallop and no friction rub.   No murmur heard.  Pulses:       Carotid pulses are 2+ on the right side, and 2+ on the left side.  Pulmonary/Chest: Effort normal. No stridor. No respiratory distress. He has no wheezes. He has no rales. He exhibits no tenderness.   Poor air entry bilat   Abdominal: Soft. He exhibits no distension. There is no hepatosplenomegaly.   Musculoskeletal: Normal range of motion. He exhibits no edema or tenderness.   Feet:   Right Foot:   Skin Integrity: Negative for ulcer.   Left Foot:   Skin Integrity: Negative for ulcer.   Neurological: He is alert and oriented to person, place, and time. No cranial nerve deficit.   Skin: Skin is warm and dry. No rash noted. No erythema.   Psychiatric: He has a normal mood and affect. His speech is normal and behavior is normal.   Vitals reviewed.      DATA:   EKG: (personally reviewed tracing)  8/13/19 SR 80    Laboratory:  CBC:  Recent Labs   Lab 11/27/18  1014 01/17/19  1458 02/10/19  1532   WBC 18.3 H 15.32 H 16.64 H   Hemoglobin 12.1 L 11.5 L 10.9 L   Hematocrit 38.5 41.6 40.2   Platelets 690 H 183 193       CHEMISTRIES:  Recent Labs   Lab 11/25/16  1013  11/11/17  1613  11/27/18  1014 01/04/19  0835 01/17/19  1458 02/10/19  1415   Glucose 78   < > 121 H   < > 99  --  77 130 H   Sodium 144   < > 142   < > 138  --  140 142   Potassium 4.3   < > 3.5   < > 5.1  --  3.9 4.2   BUN, Bld 14   < > 13   < > 16  --  12 11   Creatinine 0.9   < > 1.0   < > 0.83 0.8 0.8 0.8   eGFR if African American >60   < > >60   < >  --  >60 >60 >60   eGFR if non African American >60   < > >60   < > 90 >60 >60 >60   Calcium 9.8   < > 9.5   < > 10.7 H  --  9.2 10.0   Magnesium 2.0  --  1.2 L  --   --   --   --    --     < > = values in this interval not displayed.       CARDIAC BIOMARKERS:  Recent Labs   Lab 11/12/17  0423 02/16/18  1525 02/10/19  1415   Troponin I <0.006 <0.006 <0.006       COAGS:  Recent Labs   Lab 08/29/17  1734 02/10/19  1415   INR 1.1 1.0       LIPIDS/LFTS:  Recent Labs   Lab 12/27/16  0842  07/19/17  0816  08/22/18  1000  01/17/19  1458 02/10/19  1415 04/25/19  0936   Cholesterol 142   < > 194  --  226 H  --   --   --  122   Triglycerides 68   < > 322 H  --  168 H  --   --   --  82   HDL 60   < > 43  --  55  --   --   --  44   LDL Calculated  --    < > 87  --   --   --   --   --   --    LDL Cholesterol 68  --   --   --  137.4  --   --   --  61.6 L   Non-HDL Cholesterol  --   --   --   --  171  --   --   --  78   Non HDL Chol. (LDL+VLDL) 82  --   --   --   --   --   --   --   --    AST 22   < > 19   < >  --    < > 20 20 19   ALT 17   < > 12   < >  --    < > 20 19 13    < > = values in this interval not displayed.       Cardiovascular Testing:  Cardiac PET 8/13/19    The relative PET images are normal showing no clinically significant regional resting or stress induced perfusion defects.    Whole heart absolute myocardial perfusion (cc/min/g) averaged 2.27  at rest (which is elevated), 3.47 cc/min/g at stress (which is normal), and 1.53 cc/min/g CFR (which is moderately reduced in part due to elevated resting flow).    Gated perfusion images showed an ejection fraction of 86.0 % at rest and 87 % during stress. Normal is greater than 51%.    Wall motion was normal at rest and during stress.    LV cavity size is normal at rest and stress.    The EKG portion of this study is negative for ischemia.    There were no arrhythmias during stress.    The patient reported no chest pain during the stress test.    Resting absolute blood flow is markedly elevated.  These findings can be seen with anxiety, marked anemia or AV fistulas.  Resting blood flow in the COPD population has not been defined or  reported and therefore could be a normal variant. Clinical correlation suggested.    There are no prior studies for comparison.    L MPI 1/29/19  Probably normal myocardial perfusion study  There were no arrhythmias during stress.  The patient reported SOB (non-anginal) and chest discomfort during the stress test.  There is a small amount of mild, due to diaphragm shadow defect(s) in the inferoseptal wall(s),  LVEF post-stress is 69%  The EKG portion of this study is negative for myocardial ischemia.  Recommend PET stress or catheterization if further symptoms and clinical indication    Echo 1/29/19  Normal left ventricular systolic function. The estimated ejection fraction is 60%     LE art US/JACQUE 1/29/19  No evidence of hemodynamically significant infrainguinal PAD bilaterally.  Tri- and (predominantly) biphasic waveforms throughout.  Mildly decreased JACQUE bilaterally (R 0.94, L 0.90).    CTA C/A/P 1/4/19:  No aortic aneurysm or dissection.  Atherosclerotic calcification of the bilateral common and external iliac arteries with multifocal short segment stenosis of approximately 50%.  There is scattered calcific coronary atherosclerosis.  Extensive panlobular emphysema in an apical predominance.  Incidental findings as above.  No concerning abdominal or pelvic findings.    ASSESSMENT:   # CP with predom atyp features (nonexertional, persistent), presumed CAD (CTA 1/4/2019).  Echo/MPI 2/2019 normal (?inf atten artifact).  Cardiac PET 8/2019 normal.  Sxs seem to be improving with imdur titration.  # PAD (LE inflow disease on CTA 1/4/19), essentially asymptomatic.  LE art US/JACQUE without flow limiting disease 2/2019  # HTN, uncontrolled  # HLP on atorva 40mg  # PCV, followed by Dr. Welsh with episodic phlebotomy and hydrea rx  # COPD on home O2    PLAN:   Cont med rx  Inc imdur 240mg qd  Prev enrolled in digital HTN program  RTC 3 months or sooner for accelerating sxs, may need cath.    Dario Lynn MD,  FACC

## 2019-10-20 DIAGNOSIS — M54.12 CERVICAL RADICULOPATHY: ICD-10-CM

## 2019-10-21 RX ORDER — IPRATROPIUM BROMIDE AND ALBUTEROL SULFATE 2.5; .5 MG/3ML; MG/3ML
3 SOLUTION RESPIRATORY (INHALATION) EVERY 6 HOURS PRN
Qty: 450 ML | Refills: 3 | Status: SHIPPED | OUTPATIENT
Start: 2019-10-21 | End: 2021-01-01

## 2019-12-23 ENCOUNTER — HOSPITAL ENCOUNTER (INPATIENT)
Facility: HOSPITAL | Age: 69
LOS: 2 days | Discharge: HOME | DRG: 378 | End: 2019-12-25
Attending: EMERGENCY MEDICINE | Admitting: EMERGENCY MEDICINE
Payer: MEDICARE

## 2019-12-23 DIAGNOSIS — K92.1 GASTROINTESTINAL HEMORRHAGE WITH MELENA: Primary | ICD-10-CM

## 2019-12-23 DIAGNOSIS — D64.9 ANEMIA: ICD-10-CM

## 2019-12-23 DIAGNOSIS — R53.1 WEAKNESS: ICD-10-CM

## 2019-12-23 DIAGNOSIS — R07.9 CHEST PAIN: ICD-10-CM

## 2019-12-23 DIAGNOSIS — R53.1 WEAK: ICD-10-CM

## 2019-12-23 DIAGNOSIS — I10 ESSENTIAL HYPERTENSION: ICD-10-CM

## 2019-12-23 PROBLEM — D62 ACUTE BLOOD LOSS ANEMIA: Status: ACTIVE | Noted: 2019-12-23

## 2019-12-23 LAB
ABO + RH BLD: NORMAL
ALBUMIN SERPL BCP-MCNC: 3.5 G/DL (ref 3.5–5.2)
ALP SERPL-CCNC: 69 U/L (ref 55–135)
ALT SERPL W/O P-5'-P-CCNC: 17 U/L (ref 10–44)
ANION GAP SERPL CALC-SCNC: 9 MMOL/L (ref 8–16)
AST SERPL-CCNC: 19 U/L (ref 10–40)
BASOPHILS # BLD AUTO: 0.06 K/UL (ref 0–0.2)
BASOPHILS NFR BLD: 0.5 % (ref 0–1.9)
BILIRUB SERPL-MCNC: 0.4 MG/DL (ref 0.1–1)
BLD GP AB SCN CELLS X3 SERPL QL: NORMAL
BUN SERPL-MCNC: 16 MG/DL (ref 8–23)
CALCIUM SERPL-MCNC: 8.8 MG/DL (ref 8.7–10.5)
CHLORIDE SERPL-SCNC: 103 MMOL/L (ref 95–110)
CO2 SERPL-SCNC: 32 MMOL/L (ref 23–29)
CREAT SERPL-MCNC: 0.7 MG/DL (ref 0.5–1.4)
CTP QC/QA: YES
DIFFERENTIAL METHOD: ABNORMAL
EOSINOPHIL # BLD AUTO: 0.3 K/UL (ref 0–0.5)
EOSINOPHIL NFR BLD: 3.1 % (ref 0–8)
ERYTHROCYTE [DISTWIDTH] IN BLOOD BY AUTOMATED COUNT: 18.3 % (ref 11.5–14.5)
EST. GFR  (AFRICAN AMERICAN): >60 ML/MIN/1.73 M^2
EST. GFR  (NON AFRICAN AMERICAN): >60 ML/MIN/1.73 M^2
GLUCOSE SERPL-MCNC: 90 MG/DL (ref 70–110)
HCT VFR BLD AUTO: 25.6 % (ref 40–54)
HGB BLD-MCNC: 7.1 G/DL (ref 14–18)
HGB BLD-MCNC: 7.8 G/DL (ref 14–18)
IMM GRANULOCYTES # BLD AUTO: 0.09 K/UL (ref 0–0.04)
IMM GRANULOCYTES NFR BLD AUTO: 0.8 % (ref 0–0.5)
LYMPHOCYTES # BLD AUTO: 1.9 K/UL (ref 1–4.8)
LYMPHOCYTES NFR BLD: 17.1 % (ref 18–48)
MCH RBC QN AUTO: 24.1 PG (ref 27–31)
MCHC RBC AUTO-ENTMCNC: 27.7 G/DL (ref 32–36)
MCV RBC AUTO: 87 FL (ref 82–98)
MONOCYTES # BLD AUTO: 0.7 K/UL (ref 0.3–1)
MONOCYTES NFR BLD: 6.5 % (ref 4–15)
NEUTROPHILS # BLD AUTO: 7.9 K/UL (ref 1.8–7.7)
NEUTROPHILS NFR BLD: 72 % (ref 38–73)
NRBC BLD-RTO: 0 /100 WBC
PLATELET # BLD AUTO: 403 K/UL (ref 150–350)
PMV BLD AUTO: 9.4 FL (ref 9.2–12.9)
POC MOLECULAR INFLUENZA A AGN: NEGATIVE
POC MOLECULAR INFLUENZA B AGN: NEGATIVE
POTASSIUM SERPL-SCNC: 4.2 MMOL/L (ref 3.5–5.1)
PROT SERPL-MCNC: 6.6 G/DL (ref 6–8.4)
RBC # BLD AUTO: 2.94 M/UL (ref 4.6–6.2)
SODIUM SERPL-SCNC: 144 MMOL/L (ref 136–145)
WBC # BLD AUTO: 10.97 K/UL (ref 3.9–12.7)

## 2019-12-23 PROCEDURE — 80053 COMPREHEN METABOLIC PANEL: CPT

## 2019-12-23 PROCEDURE — 25000003 PHARM REV CODE 250: Performed by: FAMILY MEDICINE

## 2019-12-23 PROCEDURE — 99291 CRITICAL CARE FIRST HOUR: CPT | Mod: 25

## 2019-12-23 PROCEDURE — 93010 EKG 12-LEAD: ICD-10-PCS | Mod: ,,, | Performed by: INTERNAL MEDICINE

## 2019-12-23 PROCEDURE — 93005 ELECTROCARDIOGRAM TRACING: CPT

## 2019-12-23 PROCEDURE — 86920 COMPATIBILITY TEST SPIN: CPT

## 2019-12-23 PROCEDURE — 85018 HEMOGLOBIN: CPT

## 2019-12-23 PROCEDURE — 86901 BLOOD TYPING SEROLOGIC RH(D): CPT

## 2019-12-23 PROCEDURE — 85025 COMPLETE CBC W/AUTO DIFF WBC: CPT

## 2019-12-23 PROCEDURE — 63600175 PHARM REV CODE 636 W HCPCS: Performed by: EMERGENCY MEDICINE

## 2019-12-23 PROCEDURE — 25000242 PHARM REV CODE 250 ALT 637 W/ HCPCS: Performed by: EMERGENCY MEDICINE

## 2019-12-23 PROCEDURE — 93010 ELECTROCARDIOGRAM REPORT: CPT | Mod: ,,, | Performed by: INTERNAL MEDICINE

## 2019-12-23 PROCEDURE — 27000221 HC OXYGEN, UP TO 24 HOURS

## 2019-12-23 PROCEDURE — 63600175 PHARM REV CODE 636 W HCPCS: Performed by: FAMILY MEDICINE

## 2019-12-23 PROCEDURE — 87502 INFLUENZA DNA AMP PROBE: CPT

## 2019-12-23 PROCEDURE — C9113 INJ PANTOPRAZOLE SODIUM, VIA: HCPCS | Performed by: FAMILY MEDICINE

## 2019-12-23 PROCEDURE — 21400001 HC TELEMETRY ROOM

## 2019-12-23 PROCEDURE — 94640 AIRWAY INHALATION TREATMENT: CPT

## 2019-12-23 PROCEDURE — 96374 THER/PROPH/DIAG INJ IV PUSH: CPT

## 2019-12-23 RX ORDER — SODIUM CHLORIDE 0.9 % (FLUSH) 0.9 %
10 SYRINGE (ML) INJECTION
Status: DISCONTINUED | OUTPATIENT
Start: 2019-12-23 | End: 2019-12-25 | Stop reason: HOSPADM

## 2019-12-23 RX ORDER — ACETAMINOPHEN 325 MG/1
650 TABLET ORAL EVERY 8 HOURS PRN
Status: DISCONTINUED | OUTPATIENT
Start: 2019-12-23 | End: 2019-12-25 | Stop reason: HOSPADM

## 2019-12-23 RX ORDER — FLUTICASONE FUROATE AND VILANTEROL 100; 25 UG/1; UG/1
1 POWDER RESPIRATORY (INHALATION) DAILY
Status: DISCONTINUED | OUTPATIENT
Start: 2019-12-24 | End: 2019-12-25 | Stop reason: HOSPADM

## 2019-12-23 RX ORDER — POTASSIUM CHLORIDE 20 MEQ/15ML
40 SOLUTION ORAL
Status: DISCONTINUED | OUTPATIENT
Start: 2019-12-23 | End: 2019-12-25 | Stop reason: HOSPADM

## 2019-12-23 RX ORDER — LANOLIN ALCOHOL/MO/W.PET/CERES
800 CREAM (GRAM) TOPICAL
Status: DISCONTINUED | OUTPATIENT
Start: 2019-12-23 | End: 2019-12-25 | Stop reason: HOSPADM

## 2019-12-23 RX ORDER — METHYLPREDNISOLONE SOD SUCC 125 MG
125 VIAL (EA) INJECTION
Status: COMPLETED | OUTPATIENT
Start: 2019-12-23 | End: 2019-12-23

## 2019-12-23 RX ORDER — IPRATROPIUM BROMIDE AND ALBUTEROL SULFATE 2.5; .5 MG/3ML; MG/3ML
3 SOLUTION RESPIRATORY (INHALATION) EVERY 6 HOURS PRN
Status: DISCONTINUED | OUTPATIENT
Start: 2019-12-23 | End: 2019-12-25 | Stop reason: HOSPADM

## 2019-12-23 RX ORDER — MONTELUKAST SODIUM 10 MG/1
10 TABLET ORAL DAILY
Status: DISCONTINUED | OUTPATIENT
Start: 2019-12-24 | End: 2019-12-25 | Stop reason: HOSPADM

## 2019-12-23 RX ORDER — ONDANSETRON 2 MG/ML
8 INJECTION INTRAMUSCULAR; INTRAVENOUS EVERY 8 HOURS PRN
Status: DISCONTINUED | OUTPATIENT
Start: 2019-12-23 | End: 2019-12-25 | Stop reason: HOSPADM

## 2019-12-23 RX ORDER — DUTASTERIDE 0.5 MG/1
0.5 CAPSULE, LIQUID FILLED ORAL DAILY
Status: DISCONTINUED | OUTPATIENT
Start: 2019-12-24 | End: 2019-12-25 | Stop reason: HOSPADM

## 2019-12-23 RX ORDER — HYDROXYUREA 500 MG/1
500 CAPSULE ORAL DAILY
Status: DISCONTINUED | OUTPATIENT
Start: 2019-12-24 | End: 2019-12-25 | Stop reason: HOSPADM

## 2019-12-23 RX ORDER — SODIUM CHLORIDE 9 MG/ML
INJECTION, SOLUTION INTRAVENOUS CONTINUOUS
Status: DISCONTINUED | OUTPATIENT
Start: 2019-12-23 | End: 2019-12-25 | Stop reason: HOSPADM

## 2019-12-23 RX ORDER — GLUCAGON 1 MG
1 KIT INJECTION
Status: DISCONTINUED | OUTPATIENT
Start: 2019-12-23 | End: 2019-12-25 | Stop reason: HOSPADM

## 2019-12-23 RX ORDER — IPRATROPIUM BROMIDE AND ALBUTEROL SULFATE 2.5; .5 MG/3ML; MG/3ML
3 SOLUTION RESPIRATORY (INHALATION)
Status: COMPLETED | OUTPATIENT
Start: 2019-12-23 | End: 2019-12-23

## 2019-12-23 RX ORDER — IBUPROFEN 200 MG
16 TABLET ORAL
Status: DISCONTINUED | OUTPATIENT
Start: 2019-12-23 | End: 2019-12-25 | Stop reason: HOSPADM

## 2019-12-23 RX ORDER — ATORVASTATIN CALCIUM 40 MG/1
40 TABLET, FILM COATED ORAL NIGHTLY
Status: DISCONTINUED | OUTPATIENT
Start: 2019-12-23 | End: 2019-12-25 | Stop reason: HOSPADM

## 2019-12-23 RX ORDER — IBUPROFEN 200 MG
24 TABLET ORAL
Status: DISCONTINUED | OUTPATIENT
Start: 2019-12-23 | End: 2019-12-25 | Stop reason: HOSPADM

## 2019-12-23 RX ADMIN — IPRATROPIUM BROMIDE AND ALBUTEROL SULFATE 3 ML: .5; 3 SOLUTION RESPIRATORY (INHALATION) at 12:12

## 2019-12-23 RX ADMIN — SODIUM CHLORIDE: 0.9 INJECTION, SOLUTION INTRAVENOUS at 08:12

## 2019-12-23 RX ADMIN — ATORVASTATIN CALCIUM 40 MG: 40 TABLET, FILM COATED ORAL at 08:12

## 2019-12-23 RX ADMIN — METHYLPREDNISOLONE SODIUM SUCCINATE 125 MG: 125 INJECTION, POWDER, FOR SOLUTION INTRAMUSCULAR; INTRAVENOUS at 12:12

## 2019-12-23 RX ADMIN — DEXTROSE 8 MG/HR: 50 INJECTION, SOLUTION INTRAVENOUS at 08:12

## 2019-12-23 NOTE — H&P
"[facility] Medicine  History & Physical    Patient Name: Chaz Stuart  MRN: [id number]  Admission Date: 12/23/2019  Attending Physician: Kenya Morrison MD  Primary Care Provider: Gil Marie MD         Patient information was obtained from patient, past medical records and ER records.     Subjective:     Principal Problem:Gastrointestinal hemorrhage with melena    Chief Complaint:   Chief Complaint   Patient presents with    Dizziness     Pt reports dizziness for a week and shortness of breath for two days. Pt on 2L home oxygen.        HPI: This is a 69 y.o. M who has Anxiety, COPD, DDD, Diverticulitis, GERD, HTN, Polycythemia vera, and MI who presents to the ED for dizziness and light headedness x2 weeks. It is assoc with b/l lower quadrant pain, trouble urinating, SOB, fatigue, and decrease in appetite. He has been on blood thinner for many years for "thick blood'. He noticed blood in his stool yesterday evening. He is on 2 liters of oxygen at home for COPD. Pt denies fever, nausea, or vomiting, but he does admit to diarrhea.    In the ED, his hgb was decreased at 7.1. GI has been consulted.     Past Medical History:   Diagnosis Date    Anxiety     COPD (chronic obstructive pulmonary disease)     DDD (degenerative disc disease), cervical     Diverticulitis     GERD (gastroesophageal reflux disease)     Hypertension     Myocardial infarction     Polycythemia vera 1 year       Past Surgical History:   Procedure Laterality Date    CERVICAL SPINE SURGERY  Fusion    CHOLECYSTECTOMY      COLON SURGERY      FINGER SURGERY         Review of patient's allergies indicates:  No Known Allergies    No current facility-administered medications on file prior to encounter.      Current Outpatient Medications on File Prior to Encounter   Medication Sig    albuterol (VENTOLIN HFA) 90 mcg/actuation inhaler Inhale 2 puffs into the lungs every 6 (six) hours as needed.    " albuterol-ipratropium (DUO-NEB) 2.5 mg-0.5 mg/3 mL nebulizer solution TAKE 3 MLS BY NEBULIZATION EVERY 6 (SIX) HOURS AS NEEDED FOR WHEEZING. RESCUE    aspirin (ECOTRIN) 81 MG EC tablet Take 1 tablet (81 mg total) by mouth once daily.    atorvastatin (LIPITOR) 40 MG tablet Take 1 tablet (40 mg total) by mouth every evening.    hydroxyurea (HYDREA) 500 mg Cap TAKE 1 CAPSULE(S) BY MOUTH ONCE A DAY.    isosorbide mononitrate (IMDUR) 120 MG 24 hr tablet Take 2 tablets (240 mg total) by mouth once daily.    montelukast (SINGULAIR) 10 mg tablet Take 1 tablet (10 mg total) by mouth once daily.    ranitidine (ZANTAC) 150 MG tablet Take 1 tablet (150 mg total) by mouth 2 (two) times daily.    SYMBICORT 160-4.5 mcg/actuation HFAA Inhale 2 puffs into the lungs 2 (two) times daily.    ALPRAZolam (XANAX) 1 MG tablet TAKE 1 TABLET BY MOUTH THREE TIMES A DAY AS NEEDED FOR ANXIETY. MAY CAUSE DROWSINESS.    amLODIPine (NORVASC) 10 MG tablet Take 1 tablet (10 mg total) by mouth once daily.    azelastine (ASTELIN) 137 mcg (0.1 %) nasal spray USE 1 SPRAY NASALLY TWICE  DAILY    dutasteride (AVODART) 0.5 mg capsule Take 1 capsule (0.5 mg total) by mouth once daily.    predniSONE (DELTASONE) 20 MG tablet Take PRN and sparingly  For  SOB  And  Tight musus    1 tab  X 3  Days   Then   1/2 tab  X 2 days  Then  1/4  Tab  Ax  2 days     Family History     Problem Relation (Age of Onset)    Diabetes Mother, Father    Heart disease Mother    Hypertension Father    Stroke Father        Tobacco Use    Smoking status: Former Smoker     Packs/day: 3.00     Years: 10.00     Pack years: 30.00     Last attempt to quit: 12/8/2009     Years since quitting: 10.0    Smokeless tobacco: Never Used    Tobacco comment: use to smoke  3-5 packs a day.    Substance and Sexual Activity    Alcohol use: No    Drug use: No    Sexual activity: Never     Partners: Female     Birth control/protection: Surgical     Review of Systems   Constitutional:  Negative for chills and fatigue.   HENT: Negative for rhinorrhea.    Eyes: Negative for pain.   Respiratory: Positive for shortness of breath. Negative for cough.    Cardiovascular: Negative for chest pain.   Gastrointestinal: Positive for abdominal pain. Negative for abdominal distention.   Endocrine: Negative for polydipsia and polyphagia.   Genitourinary: Positive for difficulty urinating.   Musculoskeletal: Positive for back pain.   Skin: Negative for rash and wound.   Neurological: Positive for dizziness and light-headedness.   Psychiatric/Behavioral: Negative for confusion.     Objective:     Vital Signs (Most Recent):  Temp: 97.8 °F (36.6 °C) (12/23/19 1138)  Pulse: 75 (12/23/19 1631)  Resp: 20 (12/23/19 1402)  BP: 130/63 (12/23/19 1631)  SpO2: 97 % (12/23/19 1631) Vital Signs (24h Range):  Temp:  [97.8 °F (36.6 °C)] 97.8 °F (36.6 °C)  Pulse:  [69-87] 75  Resp:  [14-24] 20  SpO2:  [89 %-100 %] 97 %  BP: (121-147)/(58-66) 130/63     Weight: 61.2 kg (135 lb)  Body mass index is 21.14 kg/m².    Physical Exam   Constitutional: He appears well-developed and well-nourished. He is cooperative.  Non-toxic appearance. He does not have a sickly appearance. He does not appear ill. No distress.   HENT:   Head: Normocephalic and atraumatic.   Right Ear: External ear normal.   Left Ear: External ear normal.   Nose: Nose normal.   Mouth/Throat: Uvula is midline, oropharynx is clear and moist and mucous membranes are normal. He has dentures.   Eyes: Pupils are equal, round, and reactive to light. Conjunctivae, EOM and lids are normal.   Neck: Trachea normal, normal range of motion and full passive range of motion without pain. Neck supple. No JVD present. No thyroid mass present.   Cardiovascular: Normal rate, regular rhythm, S1 normal, S2 normal and normal heart sounds.   Pulmonary/Chest: Effort normal and breath sounds normal.   Abdominal: Soft. Normal appearance and bowel sounds are normal. He exhibits no distension.  There is no tenderness.   Musculoskeletal:        Right lower leg: He exhibits no edema.        Left lower leg: He exhibits no edema.   Neurological: He is alert. He has normal strength. No cranial nerve deficit or sensory deficit. Coordination normal.   Skin: Skin is intact. No cyanosis.   Psychiatric: He has a normal mood and affect. His speech is normal and behavior is normal. Thought content normal. Cognition and memory are normal.   Nursing note and vitals reviewed.        CRANIAL NERVES     CN III, IV, VI   Pupils are equal, round, and reactive to light.  Extraocular motions are normal.        Significant Labs:   Recent Lab Results       12/23/19  1351   12/23/19  1205        POC Molecular Influenza A Ag Negative       POC Molecular Influenza B Ag Negative       Albumin   3.5     Alkaline Phosphatase   69     ALT   17     Anion Gap   9     AST   19     Baso #   0.06     Basophil%   0.5     BILIRUBIN TOTAL   0.4  Comment:  For infants and newborns, interpretation of results should be based  on gestational age, weight and in agreement with clinical  observations.  Premature Infant recommended reference ranges:  Up to 24 hours.............<8.0 mg/dL  Up to 48 hours............<12.0 mg/dL  3-5 days..................<15.0 mg/dL  6-29 days.................<15.0 mg/dL       BUN, Bld   16     Calcium   8.8     Chloride   103     CO2   32     Creatinine   0.7     Differential Method   Automated     eGFR if    >60     eGFR if non    >60  Comment:  Calculation used to obtain the estimated glomerular filtration  rate (eGFR) is the CKD-EPI equation.        Eos #   0.3     Eosinophil%   3.1     Glucose   90     Gran # (ANC)   7.9     Gran%   72.0     Hematocrit   25.6     Hemoglobin   7.1     Immature Grans (Abs)   0.09  Comment:  Mild elevation in immature granulocytes is non specific and   can be seen in a variety of conditions including stress response,   acute inflammation, trauma and  pregnancy. Correlation with other   laboratory and clinical findings is essential.       Immature Granulocytes   0.8     Lymph #   1.9     Lymph%   17.1     MCH   24.1     MCHC   27.7     MCV   87     Mono #   0.7     Mono%   6.5     MPV   9.4     nRBC   0     Platelets   403     Potassium   4.2     PROTEIN TOTAL   6.6      Acceptable Yes       RBC   2.94     RDW   18.3     Sodium   144     WBC   10.97           Significant Imaging: I have reviewed all pertinent imaging results/findings within the past 24 hours.    Assessment/Plan:     * Gastrointestinal hemorrhage with melena  GI to evaluate. NPO at midnight for possible EGD.       Acute blood loss anemia  Secondary to GI bleed. Repeat Hgb tonight. Transfuse for level <7. Patient has signed consent for blood if needed.       Chronic respiratory failure with hypoxia  Stable. Continue O2 via NC at 2 L.       Polycythemia vera  Chronic/ stable. Hold anticoagulation for now.      COPD (chronic obstructive pulmonary disease)  Stable. Nebs prn.         VTE Risk Mitigation (From admission, onward)    SCDs          Kenya Morrison MD  Department of Hospital Medicine   [facility]

## 2019-12-23 NOTE — SUBJECTIVE & OBJECTIVE
Past Medical History:   Diagnosis Date    Anxiety     COPD (chronic obstructive pulmonary disease)     DDD (degenerative disc disease), cervical     Diverticulitis     GERD (gastroesophageal reflux disease)     Hypertension     Myocardial infarction     Polycythemia vera 1 year       Past Surgical History:   Procedure Laterality Date    CERVICAL SPINE SURGERY  Fusion    CHOLECYSTECTOMY      COLON SURGERY      FINGER SURGERY         Review of patient's allergies indicates:  No Known Allergies    No current facility-administered medications on file prior to encounter.      Current Outpatient Medications on File Prior to Encounter   Medication Sig    albuterol (VENTOLIN HFA) 90 mcg/actuation inhaler Inhale 2 puffs into the lungs every 6 (six) hours as needed.    albuterol-ipratropium (DUO-NEB) 2.5 mg-0.5 mg/3 mL nebulizer solution TAKE 3 MLS BY NEBULIZATION EVERY 6 (SIX) HOURS AS NEEDED FOR WHEEZING. RESCUE    aspirin (ECOTRIN) 81 MG EC tablet Take 1 tablet (81 mg total) by mouth once daily.    atorvastatin (LIPITOR) 40 MG tablet Take 1 tablet (40 mg total) by mouth every evening.    hydroxyurea (HYDREA) 500 mg Cap TAKE 1 CAPSULE(S) BY MOUTH ONCE A DAY.    isosorbide mononitrate (IMDUR) 120 MG 24 hr tablet Take 2 tablets (240 mg total) by mouth once daily.    montelukast (SINGULAIR) 10 mg tablet Take 1 tablet (10 mg total) by mouth once daily.    ranitidine (ZANTAC) 150 MG tablet Take 1 tablet (150 mg total) by mouth 2 (two) times daily.    SYMBICORT 160-4.5 mcg/actuation HFAA Inhale 2 puffs into the lungs 2 (two) times daily.    ALPRAZolam (XANAX) 1 MG tablet TAKE 1 TABLET BY MOUTH THREE TIMES A DAY AS NEEDED FOR ANXIETY. MAY CAUSE DROWSINESS.    amLODIPine (NORVASC) 10 MG tablet Take 1 tablet (10 mg total) by mouth once daily.    azelastine (ASTELIN) 137 mcg (0.1 %) nasal spray USE 1 SPRAY NASALLY TWICE  DAILY    dutasteride (AVODART) 0.5 mg capsule Take 1 capsule (0.5 mg total) by mouth once  daily.    predniSONE (DELTASONE) 20 MG tablet Take PRN and sparingly  For  SOB  And  Tight musus    1 tab  X 3  Days   Then   1/2 tab  X 2 days  Then  1/4  Tab  Ax  2 days     Family History     Problem Relation (Age of Onset)    Diabetes Mother, Father    Heart disease Mother    Hypertension Father    Stroke Father        Tobacco Use    Smoking status: Former Smoker     Packs/day: 3.00     Years: 10.00     Pack years: 30.00     Last attempt to quit: 12/8/2009     Years since quitting: 10.0    Smokeless tobacco: Never Used    Tobacco comment: use to smoke  3-5 packs a day.    Substance and Sexual Activity    Alcohol use: No    Drug use: No    Sexual activity: Never     Partners: Female     Birth control/protection: Surgical     Review of Systems   Constitutional: Negative for chills and fatigue.   HENT: Negative for rhinorrhea.    Eyes: Negative for pain.   Respiratory: Positive for shortness of breath. Negative for cough.    Cardiovascular: Negative for chest pain.   Gastrointestinal: Positive for abdominal pain. Negative for abdominal distention.   Endocrine: Negative for polydipsia and polyphagia.   Genitourinary: Positive for difficulty urinating.   Musculoskeletal: Positive for back pain.   Skin: Negative for rash and wound.   Neurological: Positive for dizziness and light-headedness.   Psychiatric/Behavioral: Negative for confusion.     Objective:     Vital Signs (Most Recent):  Temp: 97.8 °F (36.6 °C) (12/23/19 1138)  Pulse: 75 (12/23/19 1631)  Resp: 20 (12/23/19 1402)  BP: 130/63 (12/23/19 1631)  SpO2: 97 % (12/23/19 1631) Vital Signs (24h Range):  Temp:  [97.8 °F (36.6 °C)] 97.8 °F (36.6 °C)  Pulse:  [69-87] 75  Resp:  [14-24] 20  SpO2:  [89 %-100 %] 97 %  BP: (121-147)/(58-66) 130/63     Weight: 61.2 kg (135 lb)  Body mass index is 21.14 kg/m².    Physical Exam   Constitutional: He appears well-developed and well-nourished. He is cooperative.  Non-toxic appearance. He does not have a sickly  appearance. He does not appear ill. No distress.   HENT:   Head: Normocephalic and atraumatic.   Right Ear: External ear normal.   Left Ear: External ear normal.   Nose: Nose normal.   Mouth/Throat: Uvula is midline, oropharynx is clear and moist and mucous membranes are normal. He has dentures.   Eyes: Pupils are equal, round, and reactive to light. Conjunctivae, EOM and lids are normal.   Neck: Trachea normal, normal range of motion and full passive range of motion without pain. Neck supple. No JVD present. No thyroid mass present.   Cardiovascular: Normal rate, regular rhythm, S1 normal, S2 normal and normal heart sounds.   Pulmonary/Chest: Effort normal and breath sounds normal.   Abdominal: Soft. Normal appearance and bowel sounds are normal. He exhibits no distension. There is no tenderness.   Musculoskeletal:        Right lower leg: He exhibits no edema.        Left lower leg: He exhibits no edema.   Neurological: He is alert. He has normal strength. No cranial nerve deficit or sensory deficit. Coordination normal.   Skin: Skin is intact. No cyanosis.   Psychiatric: He has a normal mood and affect. His speech is normal and behavior is normal. Thought content normal. Cognition and memory are normal.   Nursing note and vitals reviewed.        CRANIAL NERVES     CN III, IV, VI   Pupils are equal, round, and reactive to light.  Extraocular motions are normal.        Significant Labs:   Recent Lab Results       12/23/19  1351   12/23/19  1205        POC Molecular Influenza A Ag Negative       POC Molecular Influenza B Ag Negative       Albumin   3.5     Alkaline Phosphatase   69     ALT   17     Anion Gap   9     AST   19     Baso #   0.06     Basophil%   0.5     BILIRUBIN TOTAL   0.4  Comment:  For infants and newborns, interpretation of results should be based  on gestational age, weight and in agreement with clinical  observations.  Premature Infant recommended reference ranges:  Up to 24 hours.............<8.0  mg/dL  Up to 48 hours............<12.0 mg/dL  3-5 days..................<15.0 mg/dL  6-29 days.................<15.0 mg/dL       BUN, Bld   16     Calcium   8.8     Chloride   103     CO2   32     Creatinine   0.7     Differential Method   Automated     eGFR if    >60     eGFR if non    >60  Comment:  Calculation used to obtain the estimated glomerular filtration  rate (eGFR) is the CKD-EPI equation.        Eos #   0.3     Eosinophil%   3.1     Glucose   90     Gran # (ANC)   7.9     Gran%   72.0     Hematocrit   25.6     Hemoglobin   7.1     Immature Grans (Abs)   0.09  Comment:  Mild elevation in immature granulocytes is non specific and   can be seen in a variety of conditions including stress response,   acute inflammation, trauma and pregnancy. Correlation with other   laboratory and clinical findings is essential.       Immature Granulocytes   0.8     Lymph #   1.9     Lymph%   17.1     MCH   24.1     MCHC   27.7     MCV   87     Mono #   0.7     Mono%   6.5     MPV   9.4     nRBC   0     Platelets   403     Potassium   4.2     PROTEIN TOTAL   6.6      Acceptable Yes       RBC   2.94     RDW   18.3     Sodium   144     WBC   10.97           Significant Imaging: I have reviewed all pertinent imaging results/findings within the past 24 hours.

## 2019-12-23 NOTE — ED NOTES
Pt placed on cardiac monitor, NIBP and pulse ox, HOB elevated, 2L nc continued on patient, pt updated on plan of care and verbalized understanding, friend present and supportive at bedside, bed locked in lowest position, side rails up x2, call light within reach, will continue to monitor.

## 2019-12-23 NOTE — ASSESSMENT & PLAN NOTE
Secondary to GI bleed. Repeat Hgb tonight. Transfuse for level <7. Patient has signed consent for blood if needed.

## 2019-12-23 NOTE — ED PROVIDER NOTES
Encounter Date: 12/23/2019    SCRIBE #1 NOTE: I, Austen Padilla, am scribing for, and in the presence of,  Anabela Lagos MD. I have scribed the entire note.       History     Chief Complaint   Patient presents with    Dizziness     Pt reports dizziness for a week and shortness of breath for two days. Pt on 2L home oxygen.     CC: Dizziness    HPI: This is a 69 y.o. M who has Anxiety, COPD, DDD, Diverticulitis, GERD, HTN, Polycythemia vera, and MI who presents to the ED for emergent evaluation of 1 week history of acute dizziness. Pt has associated SOB, fatigue, and decrease in appetite. He also reports chills. Per friend, the pt typically experiences current symptoms during the winter, but during the summer the pt is typically fine. He is on 2 liters of oxygen at home. He has not increased his oxygen level since onset of SOB. Pt completes Albuterol treatments often and uses an inhaler often. He takes Prednisone as needed, which he was taking and stopped taking all of sudden. Pt consumes Pedialyte occasionally. Additionally, the pt was recently seen by his PCP and prescribed medication for back pain. His blood pressure medication was also reduced to 5mg at that visit. Pt denies fever, nausea, or vomiting, but he does admit to diarrhea.    The history is provided by the patient and a friend. No  was used.     Review of patient's allergies indicates:  No Known Allergies  Past Medical History:   Diagnosis Date    Anxiety     COPD (chronic obstructive pulmonary disease)     DDD (degenerative disc disease), cervical     Diverticulitis     GERD (gastroesophageal reflux disease)     Hypertension     Myocardial infarction     Polycythemia vera 1 year     Past Surgical History:   Procedure Laterality Date    CERVICAL SPINE SURGERY  Fusion    CHOLECYSTECTOMY      COLON SURGERY      FINGER SURGERY       Family History   Problem Relation Age of Onset    Diabetes Mother     Heart disease  Mother     Diabetes Father     Hypertension Father     Stroke Father     ADD / ADHD Neg Hx     Alcohol abuse Neg Hx     Anxiety disorder Neg Hx     Bipolar disorder Neg Hx     Dementia Neg Hx     Depression Neg Hx     Drug abuse Neg Hx     OCD Neg Hx     Paranoid behavior Neg Hx     Physical abuse Neg Hx     Schizophrenia Neg Hx     Seizures Neg Hx     Self injury Neg Hx     Sexual abuse Neg Hx     Suicide Neg Hx     Melanoma Neg Hx      Social History     Tobacco Use    Smoking status: Former Smoker     Packs/day: 3.00     Years: 10.00     Pack years: 30.00     Last attempt to quit: 12/8/2009     Years since quitting: 10.0    Smokeless tobacco: Never Used    Tobacco comment: use to smoke  3-5 packs a day.    Substance Use Topics    Alcohol use: No    Drug use: No     Review of Systems   Constitutional: Positive for appetite change, chills and fatigue. Negative for fever.   HENT: Negative for sore throat.    Respiratory: Positive for shortness of breath. Negative for cough.    Cardiovascular: Negative for chest pain.   Gastrointestinal: Positive for diarrhea. Negative for abdominal pain, nausea and vomiting.   Genitourinary: Negative for dysuria.   Musculoskeletal: Negative for back pain.   Skin: Negative for rash.   Neurological: Positive for dizziness. Negative for weakness.   Hematological: Does not bruise/bleed easily.       Physical Exam     Initial Vitals [12/23/19 1138]   BP Pulse Resp Temp SpO2   139/62 87 (!) 24 97.8 °F (36.6 °C) (!) 89 %      MAP       --         Physical Exam    Nursing note and vitals reviewed.  Constitutional: He appears well-nourished. He does not appear ill.   HENT:   Head: Normocephalic.   Eyes: Conjunctivae are normal. No scleral icterus.   Neck: Normal range of motion. Neck supple.   Cardiovascular: Normal rate, regular rhythm, normal heart sounds and intact distal pulses.   No murmur heard.  Pulmonary/Chest:   Coarse breath sounds throughout.   Abdominal:  Soft. Bowel sounds are normal. He exhibits no distension. There is no tenderness.   Musculoskeletal: Normal range of motion. He exhibits no edema.   Neurological: He is alert.   Skin: Skin is warm and dry.   Psychiatric: He has a normal mood and affect. His behavior is normal. Judgment and thought content normal.         ED Course   Critical Care  Date/Time: 12/23/2019 5:41 PM  Performed by: Anabela Lagos MD  Authorized by: Anabela Lagos MD   Direct patient critical care time: 5 minutes  Additional history critical care time: 5 minutes  Ordering / reviewing critical care time: 5 minutes  Documentation critical care time: 5 minutes  Consulting other physicians critical care time: 5 minutes  Consult with family critical care time: 5 minutes  Other critical care time: 5 minutes  Total critical care time (exclusive of procedural time) : 35 minutes  Critical care was necessary to treat or prevent imminent or life-threatening deterioration of the following conditions: anemia.  Critical care was time spent personally by me on the following activities: blood draw for specimens, development of treatment plan with patient or surrogate, interpretation of cardiac output measurements, evaluation of patient's response to treatment, examination of patient, obtaining history from patient or surrogate, ordering and performing treatments and interventions, ordering and review of laboratory studies, pulse oximetry, re-evaluation of patient's condition, review of old charts and ordering and review of radiographic studies.        Labs Reviewed - No data to display       Imaging Results    None          Medical Decision Making:   Initial Assessment:   This is a 69 y.o. M who has Anxiety, COPD, DDD, Diverticulitis, GERD, HTN, Polycythemia vera, and MI who presents to the ED for emergent evaluation of 1 week history of acute dizziness.  Differential Diagnosis:   Differential diagnosis includes COPD exacerbation, ACS, or steroid  withdrawals. Less likely to be heart failure.  Clinical Tests:   Lab Tests: Ordered  Radiological Study: Ordered  Medical Tests: Ordered      Pt presented with weeks of worsening weakness and no other symptoms. Worse with cold weather. No increased wheezing with COPD. Tried taking steroids with no improvement at home. Using nebulized albuterol at home too. Labs oredered and reveal low h/h. Type and cross ordered. FOBT +. GI consulted. NPO at midnight for scope tomorrow. Discussed with Hospital medicine, will hold transfusion for now as his vitals are reassuring.                  Scribe attestation: I, Anabela Lagos, personally performed the services described in this documentation. All medical record entries made by the scribe were at my direction and in my presence.  I have reviewed the chart and agree that the record reflects my personal performance and is accurate and complete.                     Clinical Impression:       ICD-10-CM ICD-9-CM   1. Weak R53.1 780.79   2. Weakness R53.1 780.79                             Anabela Lagos MD  12/23/19 [id number]

## 2019-12-23 NOTE — ED TRIAGE NOTES
Pt arrived to ER with complaints of dizziness and shortness of breath. Pt reports history of COPD with emphysema and presents on 2L oxygen. Pt rates the pain as 10/10 at this time in the back. Pt denies nausea or vomiting.

## 2019-12-23 NOTE — HPI
"This is a 69 y.o. M who has Anxiety, COPD, DDD, Diverticulitis, GERD, HTN, Polycythemia vera, and MI who presents to the ED for dizziness and light headedness x2 weeks. It is assoc with b/l lower quadrant pain, trouble urinating, SOB, fatigue, and decrease in appetite. He has been on blood thinner for many years for "thick blood'. He noticed blood in his stool yesterday evening. He is on 2 liters of oxygen at home for COPD. Pt denies fever, nausea, or vomiting, but he does admit to diarrhea.    In the ED, his hgb was decreased at 7.1. GI has been consulted.   "

## 2019-12-24 LAB
ALBUMIN SERPL BCP-MCNC: 3.4 G/DL (ref 3.5–5.2)
ALP SERPL-CCNC: 68 U/L (ref 55–135)
ALT SERPL W/O P-5'-P-CCNC: 17 U/L (ref 10–44)
ANION GAP SERPL CALC-SCNC: 11 MMOL/L (ref 8–16)
AST SERPL-CCNC: 14 U/L (ref 10–40)
BASOPHILS # BLD AUTO: 0.01 K/UL (ref 0–0.2)
BASOPHILS # BLD AUTO: 0.01 K/UL (ref 0–0.2)
BASOPHILS NFR BLD: 0.1 % (ref 0–1.9)
BASOPHILS NFR BLD: 0.1 % (ref 0–1.9)
BILIRUB SERPL-MCNC: 0.5 MG/DL (ref 0.1–1)
BLD PROD TYP BPU: NORMAL
BLD PROD TYP BPU: NORMAL
BLOOD UNIT EXPIRATION DATE: NORMAL
BLOOD UNIT EXPIRATION DATE: NORMAL
BLOOD UNIT TYPE CODE: 6200
BLOOD UNIT TYPE CODE: 6200
BLOOD UNIT TYPE: NORMAL
BLOOD UNIT TYPE: NORMAL
BUN SERPL-MCNC: 19 MG/DL (ref 8–23)
CALCIUM SERPL-MCNC: 8.9 MG/DL (ref 8.7–10.5)
CHLORIDE SERPL-SCNC: 103 MMOL/L (ref 95–110)
CO2 SERPL-SCNC: 26 MMOL/L (ref 23–29)
CODING SYSTEM: NORMAL
CODING SYSTEM: NORMAL
CREAT SERPL-MCNC: 0.8 MG/DL (ref 0.5–1.4)
DIFFERENTIAL METHOD: ABNORMAL
DIFFERENTIAL METHOD: ABNORMAL
DISPENSE STATUS: NORMAL
DISPENSE STATUS: NORMAL
EOSINOPHIL # BLD AUTO: 0 K/UL (ref 0–0.5)
EOSINOPHIL # BLD AUTO: 0 K/UL (ref 0–0.5)
EOSINOPHIL NFR BLD: 0 % (ref 0–8)
EOSINOPHIL NFR BLD: 0 % (ref 0–8)
ERYTHROCYTE [DISTWIDTH] IN BLOOD BY AUTOMATED COUNT: 17.8 % (ref 11.5–14.5)
ERYTHROCYTE [DISTWIDTH] IN BLOOD BY AUTOMATED COUNT: 17.8 % (ref 11.5–14.5)
EST. GFR  (AFRICAN AMERICAN): >60 ML/MIN/1.73 M^2
EST. GFR  (NON AFRICAN AMERICAN): >60 ML/MIN/1.73 M^2
GLUCOSE SERPL-MCNC: 133 MG/DL (ref 70–110)
HCT VFR BLD AUTO: 24 % (ref 40–54)
HCT VFR BLD AUTO: 26.6 % (ref 40–54)
HCT VFR BLD AUTO: 26.6 % (ref 40–54)
HGB BLD-MCNC: 6.8 G/DL (ref 14–18)
HGB BLD-MCNC: 7.4 G/DL (ref 14–18)
HGB BLD-MCNC: 7.4 G/DL (ref 14–18)
IMM GRANULOCYTES # BLD AUTO: 0.16 K/UL (ref 0–0.04)
IMM GRANULOCYTES # BLD AUTO: 0.16 K/UL (ref 0–0.04)
IMM GRANULOCYTES NFR BLD AUTO: 1.4 % (ref 0–0.5)
IMM GRANULOCYTES NFR BLD AUTO: 1.4 % (ref 0–0.5)
LYMPHOCYTES # BLD AUTO: 0.8 K/UL (ref 1–4.8)
LYMPHOCYTES # BLD AUTO: 0.8 K/UL (ref 1–4.8)
LYMPHOCYTES NFR BLD: 7 % (ref 18–48)
LYMPHOCYTES NFR BLD: 7 % (ref 18–48)
MCH RBC QN AUTO: 23.9 PG (ref 27–31)
MCH RBC QN AUTO: 23.9 PG (ref 27–31)
MCHC RBC AUTO-ENTMCNC: 27.8 G/DL (ref 32–36)
MCHC RBC AUTO-ENTMCNC: 27.8 G/DL (ref 32–36)
MCV RBC AUTO: 86 FL (ref 82–98)
MCV RBC AUTO: 86 FL (ref 82–98)
MONOCYTES # BLD AUTO: 0.2 K/UL (ref 0.3–1)
MONOCYTES # BLD AUTO: 0.2 K/UL (ref 0.3–1)
MONOCYTES NFR BLD: 1.9 % (ref 4–15)
MONOCYTES NFR BLD: 1.9 % (ref 4–15)
NEUTROPHILS # BLD AUTO: 10.5 K/UL (ref 1.8–7.7)
NEUTROPHILS # BLD AUTO: 10.5 K/UL (ref 1.8–7.7)
NEUTROPHILS NFR BLD: 89.6 % (ref 38–73)
NEUTROPHILS NFR BLD: 89.6 % (ref 38–73)
NRBC BLD-RTO: 0 /100 WBC
NRBC BLD-RTO: 0 /100 WBC
PLATELET # BLD AUTO: 437 K/UL (ref 150–350)
PLATELET # BLD AUTO: 437 K/UL (ref 150–350)
PMV BLD AUTO: 9.4 FL (ref 9.2–12.9)
PMV BLD AUTO: 9.4 FL (ref 9.2–12.9)
POTASSIUM SERPL-SCNC: 4.9 MMOL/L (ref 3.5–5.1)
PROT SERPL-MCNC: 6.3 G/DL (ref 6–8.4)
RBC # BLD AUTO: 3.09 M/UL (ref 4.6–6.2)
RBC # BLD AUTO: 3.09 M/UL (ref 4.6–6.2)
SODIUM SERPL-SCNC: 140 MMOL/L (ref 136–145)
TRANS ERYTHROCYTES VOL PATIENT: NORMAL ML
TRANS ERYTHROCYTES VOL PATIENT: NORMAL ML
WBC # BLD AUTO: 11.72 K/UL (ref 3.9–12.7)
WBC # BLD AUTO: 11.72 K/UL (ref 3.9–12.7)

## 2019-12-24 PROCEDURE — 85018 HEMOGLOBIN: CPT

## 2019-12-24 PROCEDURE — 80053 COMPREHEN METABOLIC PANEL: CPT

## 2019-12-24 PROCEDURE — 85025 COMPLETE CBC W/AUTO DIFF WBC: CPT

## 2019-12-24 PROCEDURE — P9021 RED BLOOD CELLS UNIT: HCPCS

## 2019-12-24 PROCEDURE — 63600175 PHARM REV CODE 636 W HCPCS: Performed by: FAMILY MEDICINE

## 2019-12-24 PROCEDURE — 36415 COLL VENOUS BLD VENIPUNCTURE: CPT

## 2019-12-24 PROCEDURE — 94640 AIRWAY INHALATION TREATMENT: CPT

## 2019-12-24 PROCEDURE — 94761 N-INVAS EAR/PLS OXIMETRY MLT: CPT

## 2019-12-24 PROCEDURE — 25000242 PHARM REV CODE 250 ALT 637 W/ HCPCS: Performed by: FAMILY MEDICINE

## 2019-12-24 PROCEDURE — C9113 INJ PANTOPRAZOLE SODIUM, VIA: HCPCS | Performed by: FAMILY MEDICINE

## 2019-12-24 PROCEDURE — 36430 TRANSFUSION BLD/BLD COMPNT: CPT

## 2019-12-24 PROCEDURE — 27000221 HC OXYGEN, UP TO 24 HOURS

## 2019-12-24 PROCEDURE — 21400001 HC TELEMETRY ROOM

## 2019-12-24 PROCEDURE — 25000003 PHARM REV CODE 250: Performed by: FAMILY MEDICINE

## 2019-12-24 PROCEDURE — 27201040 HC RC 50 FILTER: Mod: 91

## 2019-12-24 PROCEDURE — 85014 HEMATOCRIT: CPT

## 2019-12-24 RX ORDER — HYDROCODONE BITARTRATE AND ACETAMINOPHEN 500; 5 MG/1; MG/1
TABLET ORAL
Status: DISCONTINUED | OUTPATIENT
Start: 2019-12-24 | End: 2019-12-25 | Stop reason: HOSPADM

## 2019-12-24 RX ADMIN — IPRATROPIUM BROMIDE AND ALBUTEROL SULFATE 3 ML: .5; 3 SOLUTION RESPIRATORY (INHALATION) at 09:12

## 2019-12-24 RX ADMIN — DEXTROSE 8 MG/HR: 50 INJECTION, SOLUTION INTRAVENOUS at 06:12

## 2019-12-24 RX ADMIN — IPRATROPIUM BROMIDE AND ALBUTEROL SULFATE 3 ML: .5; 3 SOLUTION RESPIRATORY (INHALATION) at 12:12

## 2019-12-24 RX ADMIN — FLUTICASONE FUROATE AND VILANTEROL TRIFENATATE 1 PUFF: 100; 25 POWDER RESPIRATORY (INHALATION) at 09:12

## 2019-12-24 RX ADMIN — DEXTROSE 8 MG/HR: 50 INJECTION, SOLUTION INTRAVENOUS at 02:12

## 2019-12-24 RX ADMIN — DUTASTERIDE 0.5 MG: 0.5 CAPSULE, LIQUID FILLED ORAL at 09:12

## 2019-12-24 RX ADMIN — ATORVASTATIN CALCIUM 40 MG: 40 TABLET, FILM COATED ORAL at 10:12

## 2019-12-24 RX ADMIN — HYDROXYUREA 500 MG: 500 CAPSULE ORAL at 09:12

## 2019-12-24 RX ADMIN — MONTELUKAST 10 MG: 10 TABLET, FILM COATED ORAL at 09:12

## 2019-12-24 RX ADMIN — DEXTROSE 8 MG/HR: 50 INJECTION, SOLUTION INTRAVENOUS at 11:12

## 2019-12-24 RX ADMIN — SODIUM CHLORIDE: 0.9 INJECTION, SOLUTION INTRAVENOUS at 09:12

## 2019-12-24 NOTE — PROGRESS NOTES
Patient stated he takes his isosorbide mononitrate nightly. This is on his home med list, but not yet ordered for him.

## 2019-12-24 NOTE — ASSESSMENT & PLAN NOTE
Stable. Will transfuse 2 units of PRBCs today. GI will need his hgb at least 9 for intervention if needed.

## 2019-12-24 NOTE — PLAN OF CARE
12/24/19 1427   Discharge Assessment   Assessment Type Discharge Planning Assessment   Assessment information obtained from? Medical Record   Prior to hospitilization cognitive status: Alert/Oriented   Prior to hospitalization functional status: Independent;Assistive Equipment   Current cognitive status: Alert/Oriented   Current Functional Status: Independent;Assistive Equipment   Facility Arrived From: home   Lives With other (see comments)   Able to Return to Prior Arrangements yes   Is patient able to care for self after discharge? Yes   Who are your caregiver(s) and their phone number(s)? [phone]   Patient's perception of discharge disposition home or selfcare   Readmission Within the Last 30 Days no previous admission in last 30 days   Patient currently being followed by outpatient case management? No   Patient currently receives any other outside agency services? No   Equipment Currently Used at Home nebulizer;oxygen   Do you have any problems affording any of your prescribed medications? No   Is the patient taking medications as prescribed? yes   Does the patient have transportation home? Yes   Transportation Anticipated family or friend will provide   Does the patient receive services at the [facility]? No   Discharge Plan A Home with family  (with follow up)   DME Needed Upon Discharge  none   Patient/Family in Agreement with Plan yes     [facility] - [place] - [address]  [address]  [address]  [facility] [address]  Phone: [phone] Fax: [phone]    [facility] - [place], [place] - [address] AT Portal to Registered [facility] Sites  [address]  [facility] [address]  Phone: [phone] Fax: [phone]    [id number] - [place] - [address] AT [facility] [address]  [address]  [place] [address]  Phone: [phone] Fax: [phone]    [id number] - [place] - [address]  AT [facility] [address]  [address] [address]  Phone: [phone] Fax: [phone]    [facility] #[id number] - [place] - [address]  [address] [address]  Phone: [phone] Fax: [phone]

## 2019-12-24 NOTE — PROGRESS NOTES
"[facility] Hospital Medicine  Progress Note    Patient Name: Chaz Stuart  MRN: [id number]  Patient Class: IP- Inpatient   Admission Date: 12/23/2019  Length of Stay: 1 days  Attending Physician: Kenya Morrison MD  Primary Care Provider: Gil Marie MD      Subjective:     Principal Problem:Gastrointestinal hemorrhage with melena      HPI:  This is a 69 y.o. M who has Anxiety, COPD, DDD, Diverticulitis, GERD, HTN, Polycythemia vera, and MI who presents to the ED for dizziness and light headedness x2 weeks. It is assoc with b/l lower quadrant pain, trouble urinating, SOB, fatigue, and decrease in appetite. He has been on blood thinner for many years for "thick blood'. He noticed blood in his stool yesterday evening. He is on 2 liters of oxygen at home for COPD. Pt denies fever, nausea, or vomiting, but he does admit to diarrhea.    In the ED, his hgb was decreased at 7.1. GI has been consulted.       Interval History: No acute events overnight. No further BMs or blood from rectum.     Review of Systems   Respiratory: Negative for shortness of breath.    Cardiovascular: Negative for chest pain.   Gastrointestinal: Negative for abdominal pain.     Objective:     Vital Signs (Most Recent):  Temp: 97.8 °F (36.6 °C) (12/24/19 1128)  Pulse: 70 (12/24/19 1225)  Resp: 17 (12/24/19 1225)  BP: (!) 121/59 (12/24/19 1128)  SpO2: 96 % (12/24/19 1225) Vital Signs (24h Range):  Temp:  [97.5 °F (36.4 °C)-98.8 °F (37.1 °C)] 97.8 °F (36.6 °C)  Pulse:  [68-95] 70  Resp:  [16-20] 17  SpO2:  [96 %-100 %] 96 %  BP: (108-141)/(55-67) 121/59     Weight: 57.2 kg (126 lb 1.7 oz)  Body mass index is 19.75 kg/m².    Intake/Output Summary (Last 24 hours) at 12/24/2019 1522  Last data filed at 12/24/2019 0700  Gross per 24 hour   Intake 1201.08 ml   Output 1125 ml   Net 76.08 ml      Physical Exam   Constitutional: He appears well-developed and well-nourished. No distress.   HENT:   Head: Normocephalic and " atraumatic.   Eyes: Conjunctivae are normal.   Neck: Neck supple. No JVD present.   Cardiovascular: Normal rate, regular rhythm and normal heart sounds.   Pulmonary/Chest: Effort normal and breath sounds normal.   Abdominal: Soft. Bowel sounds are normal. He exhibits no distension. There is no tenderness.   Musculoskeletal: He exhibits no edema.   Neurological: He is alert.   Psychiatric: He has a normal mood and affect. His behavior is normal.   Nursing note and vitals reviewed.      Significant Labs:   CBC:   Recent Labs   Lab 12/23/19  1205 12/23/19  2107 12/24/19  0404   WBC 10.97  --  11.72  11.72   HGB 7.1* 7.8* 7.4*  7.4*   HCT 25.6*  --  26.6*  26.6*   *  --  437*  437*       Significant Imaging: none      Assessment/Plan:      * Gastrointestinal hemorrhage with melena  Discussed with GI- Dr. Landaverde. Transfuse and monitor for further bleeding. Continue PPI infusion. DC tomorrow if stable. Outpt follow up.       Acute blood loss anemia  Stable. Will transfuse 2 units of PRBCs today. GI will need his hgb at least 9 for intervention if needed.       Chronic respiratory failure with hypoxia  Stable. Continue O2 via NC at 2 L.       Polycythemia vera  Chronic/ stable. Hold anticoagulation for now.      Essential hypertension  Stable. Home meds on hold secondary to GI bleed. Monitor.       COPD (chronic obstructive pulmonary disease)  Stable. Nebs prn.         VTE Risk Mitigation (From admission, onward)         Ordered     IP VTE HIGH RISK PATIENT  Once      12/23/19 2037     Place JAE hose  Until discontinued      12/23/19 2037     Place sequential compression device  Until discontinued      12/23/19 2037     Place sequential compression device  Until discontinued      12/23/19 2037              Kenya Morrison MD  Department of Hospital Medicine   [facility]

## 2019-12-24 NOTE — PROGRESS NOTES
Patient arrived to floor from ED via stretcher. Settled into bed, oriented to room and equipment and applied telemetry. Patient resting comfortably with bed in low position, low and locked and call bell in reach. No distress noted. Will continue to monitor.

## 2019-12-24 NOTE — ASSESSMENT & PLAN NOTE
Discussed with GI- Dr. Landaverde. Transfuse and monitor for further bleeding. Continue PPI infusion. DC tomorrow if stable. Outpt follow up.

## 2019-12-24 NOTE — CONSULTS
.[facility]  Gastroenterology  Consult Note    Patient Name: Chaz Stuart  MRN: [id number]  Admission Date: 12/23/2019  Hospital Length of Stay: 1 days  Code Status: Full Code   Primary Care Physician: Gil Marie MD  Principal Problem:Gastrointestinal hemorrhage with melena    Consults  Subjective:     Chief complaint:  Weak and short of breath    HPI:  69-year-old man who presents weak and dizzy with shortness of breath for approximately 2-3 days.  Patient is already on home oxygen.  Laboratory data revealed a hemoglobin of 7.6 g.  He denies any active GI bleeding.  Has intermittent loose stools.  His stool is fecal positive but not black.  No nausea vomiting no other upper lower GI complaints.    Past medical history:  Past Medical History:   Diagnosis Date    Anxiety     COPD (chronic obstructive pulmonary disease)     DDD (degenerative disc disease), cervical     Diverticulitis     GERD (gastroesophageal reflux disease)     Hypertension     Myocardial infarction     On home oxygen therapy     PAD (peripheral artery disease)     Polycythemia vera 1 year       Past surgical history:  Past Surgical History:   Procedure Laterality Date    CERVICAL SPINE SURGERY  Fusion    CHOLECYSTECTOMY      COLON SURGERY      FINGER SURGERY Right 09/05/2017    ORIF       Social history:  Social History     Socioeconomic History    Marital status: Single     Spouse name: Not on file    Number of children: 6    Years of education: Not on file    Highest education level: Not on file   Occupational History    Not on file   Social Needs    Financial resource strain: Not on file    Food insecurity:     Worry: Not on file     Inability: Not on file    Transportation needs:     Medical: Not on file     Non-medical: Not on file   Tobacco Use    Smoking status: Former Smoker     Packs/day: 3.00     Years: 10.00     Pack years: 30.00     Last attempt to quit: 12/8/2009     Years since  quitting: 10.0    Smokeless tobacco: Never Used    Tobacco comment: use to smoke  3-5 packs a day.    Substance and Sexual Activity    Alcohol use: No    Drug use: No    Sexual activity: Never     Partners: Female     Birth control/protection: Surgical   Lifestyle    Physical activity:     Days per week: Not on file     Minutes per session: Not on file    Stress: Not on file   Relationships    Social connections:     Talks on phone: Not on file     Gets together: Not on file     Attends Anabaptist service: Not on file     Active member of club or organization: Not on file     Attends meetings of clubs or organizations: Not on file     Relationship status: Not on file   Other Topics Concern    Caffeine Use Yes    Financial Status: Disabled Yes    Legal: Involved in criminal litigation Not Asked    Caffeine Use: Frequent Not Asked    Financial Status: Employed Not Asked    Legal: Other Not Asked    Caffeine Use: Moderate Not Asked    Financial Status: Unemployed Not Asked    Leisure: Exercise Not Asked    Caffeine Use: Substantial Not Asked    Financial Status: Other Not Asked    Leisure: Fishing Not Asked    Childhood History: Adopted Not Asked    Firearms: Does patient have access to a firearm? Yes     Comment: locked gun cab    Leisure: Hunting Not Asked    Childhood History: Early trauma Not Asked    Home situation: homeless Not Asked    Leisure: Movie Watching Not Asked    Childhood History: Raised by parents Not Asked    Home situation: lives alone Yes    Leisure: Shopping Not Asked    Childhood History: Uneventful Not Asked    Home situation: lives in group home Not Asked    Leisure: Sports Not Asked    Childhood History: Other Not Asked    Home situation: lives in nursing home Not Asked    Leisure: Time with family Not Asked    Education: Unfinished High School Yes     Comment: 8th grade    Home situation: lives in shelter Not Asked     Service No    Education:  High School Graduate Not Asked    Home situation: lives with family Not Asked    Spirituality: Active Participation No    Education: Unfinished college Not Asked    Home situation: lives with friends Not Asked    Spirituality: Organized Buddhist No    Education: Trade School Not Asked    Home situation: lives with significant other Not Asked    Spirituality: Private Participation No    Education: Associate's Degree Not Asked    Home situation: lives with spouse Not Asked    Patient feels they ought to cut down on drinking/drug use Not Asked    Education: Bachelor's Degree Not Asked    Legal consequences of chemical use Not Asked    Patient annoyed by others criticizing their drinking/drug use Not Asked    Education: More than one Bachelor's or Professional Not Asked    Legal: Arrest history No    Patient has felt bad or guilty about drinking/drug use Not Asked    Education: Master's, PhD Not Asked    Legal: Involved in civil litigation Not Asked    Patient has had a drink/used drugs as an eye opener in the AM Not Asked   Social History Narrative    Not on file       Family history:  Family History   Problem Relation Age of Onset    Diabetes Mother     Heart disease Mother     Diabetes Father     Hypertension Father     Stroke Father     ADD / ADHD Neg Hx     Alcohol abuse Neg Hx     Anxiety disorder Neg Hx     Bipolar disorder Neg Hx     Dementia Neg Hx     Depression Neg Hx     Drug abuse Neg Hx     OCD Neg Hx     Paranoid behavior Neg Hx     Physical abuse Neg Hx     Schizophrenia Neg Hx     Seizures Neg Hx     Self injury Neg Hx     Sexual abuse Neg Hx     Suicide Neg Hx     Melanoma Neg Hx        Medications:  Medications Prior to Admission   Medication Sig Dispense Refill Last Dose    albuterol (VENTOLIN HFA) 90 mcg/actuation inhaler Inhale 2 puffs into the lungs every 6 (six) hours as needed. 18 g 3 12/23/2019    albuterol-ipratropium (DUO-NEB) 2.5 mg-0.5 mg/3 mL  nebulizer solution TAKE 3 MLS BY NEBULIZATION EVERY 6 (SIX) HOURS AS NEEDED FOR WHEEZING. RESCUE 450 mL 3 12/23/2019    aspirin (ECOTRIN) 81 MG EC tablet Take 1 tablet (81 mg total) by mouth once daily. 150 tablet 6 12/23/2019    atorvastatin (LIPITOR) 40 MG tablet Take 1 tablet (40 mg total) by mouth every evening. 90 tablet 3 12/22/2019    hydroxyurea (HYDREA) 500 mg Cap TAKE 1 CAPSULE(S) BY MOUTH ONCE A DAY. 90 capsule 3 12/22/2019    isosorbide mononitrate (IMDUR) 120 MG 24 hr tablet Take 2 tablets (240 mg total) by mouth once daily. 180 tablet 3 12/22/2019    montelukast (SINGULAIR) 10 mg tablet Take 1 tablet (10 mg total) by mouth once daily. 90 tablet 2 12/22/2019    ranitidine (ZANTAC) 150 MG tablet Take 1 tablet (150 mg total) by mouth 2 (two) times daily. 60 tablet 1 12/22/2019    SYMBICORT 160-4.5 mcg/actuation HFAA Inhale 2 puffs into the lungs 2 (two) times daily. 10.2 g 3 12/22/2019    ALPRAZolam (XANAX) 1 MG tablet TAKE 1 TABLET BY MOUTH THREE TIMES A DAY AS NEEDED FOR ANXIETY. MAY CAUSE DROWSINESS. 90 tablet 0 Taking    amLODIPine (NORVASC) 10 MG tablet Take 1 tablet (10 mg total) by mouth once daily. (Patient taking differently: Take 5 mg by mouth once daily. ) 90 tablet 3 Taking    azelastine (ASTELIN) 137 mcg (0.1 %) nasal spray USE 1 SPRAY NASALLY TWICE  DAILY 30 mL 1 Taking    dutasteride (AVODART) 0.5 mg capsule Take 1 capsule (0.5 mg total) by mouth once daily. 90 capsule 3 Taking    predniSONE (DELTASONE) 20 MG tablet Take PRN and sparingly  For  SOB  And  Tight musus    1 tab  X 3  Days   Then   1/2 tab  X 2 days  Then  1/4  Tab  Ax  2 days 10 tablet 0 More than a month       Allergies:  Review of patient's allergies indicates:  No Known Allergies    Review of systems:  CONSTITUTIONAL: Negative for fever, chills, positive weakness, weight loss, weight gain.  HEENT: Negative for blurred vision, hearing loss, nasal congestion, dry mouth, sore throat.  CARDIOVASCULAR: Negative for  chest pain or palpitations.  RESPIRATORY:  Positive for SOB , no cough.  GASTROINTESTINAL: See HPI  GENITOURINARY: Negative for dysuria or hematuria.  MUSCULOSKELETAL: Negative for osteoarthritis or muscle pain.  SKIN: Negative for rashes/lesions.  NEUROLOGIC: Negative for headaches, numbness/tingling.  ENDOCRINE: Negative for diabetes or thyroid abnormalities.  HEMATOLOGIC: Negative for anemia or blood dyscrasias.  Aside from above positives, complete 10 point review of systems negative.    Objective:     Vital Signs (Most Recent):  Temp: 97.8 °F (36.6 °C) (12/24/19 1128)  Pulse: 70 (12/24/19 1225)  Resp: 17 (12/24/19 1225)  BP: (!) 121/59 (12/24/19 1128)  SpO2: 96 % (12/24/19 1225) Vital Signs (24h Range):  Temp:  [97.5 °F (36.4 °C)-98.8 °F (37.1 °C)] 97.8 °F (36.6 °C)  Pulse:  [68-95] 70  Resp:  [14-20] 17  SpO2:  [96 %-100 %] 96 %  BP: (108-147)/(55-67) 121/59     Physical examination:  General: well developed, well nourished, no apparent distress wearing O2 nasal cannula  HENT: NCAT, hearing grossly intact, no palpable or visible thyroid mass  Eyes: PERRL, EOMI, anicteric sclera  LYMH: No cervical, supraclavicular or axillary lymphadenopathy   Cardiovascular: Regular rate and rhythm. No murmurs appreciated.  Lungs: Non-labored respirations. Breath sounds equal.   Abdomen: soft, NTND, normoactive BS  Extremities: No C/C/E, 2+ dorsalis pedis pulses bilaterally  Neuro: AA&O x 3, no asterixes or tremors  Psych: Appropriate mood and affect. No SI.  Skin: No jaundice, rashes or lesions  Musculoskeletal: 5/5 strength bilaterally    Labs:  Hemoglobin 7.4 g, hematocrit 26%, BUN normal at 11, last night's hemoglobin 7.8 g.    Imaging:  Chest x-ray read as emphysema      Assessment:   69-year-old man presented with weakness and shortness of breath, has baseline emphysema on oxygen.  Is on prednisone.  Denies any bleeding or bloody stools.  Stool is fecal occult blood positive.  Hemoglobin is 7 point 6 g yesterday and 7.4  g today.  BUN is normal.  No active bleeding.  No overall GI complaints.    Plan:   Would recommend transfusing to hemoglobin of 9 g, this would be 2 units of blood.  Proton pump inhibitor therapy, liquid diet.  If no bleeding overnight then would recommend discharge with outpatient follow-up for consideration of endoscopy.  Should there be any demonstrate bowel bleeding we would proceed with an EGD but today there has been none.  Zantac needs to be stopped        Thank you for your consult.     Mahamed Landaverde Ii, MD  Gastroenterology  [facility]

## 2019-12-24 NOTE — PROGRESS NOTES
Patient reported a bowel movement with no blood present. Did not save it for me to look at it. Day shift nurse informed him to save it if he happened to have another one. Patient resting comfortably. Gave report to day shift nurse.

## 2019-12-24 NOTE — SUBJECTIVE & OBJECTIVE
Interval History: No acute events overnight. No further BMs or blood from rectum.     Review of Systems   Respiratory: Negative for shortness of breath.    Cardiovascular: Negative for chest pain.   Gastrointestinal: Negative for abdominal pain.     Objective:     Vital Signs (Most Recent):  Temp: 97.8 °F (36.6 °C) (12/24/19 1128)  Pulse: 70 (12/24/19 1225)  Resp: 17 (12/24/19 1225)  BP: (!) 121/59 (12/24/19 1128)  SpO2: 96 % (12/24/19 1225) Vital Signs (24h Range):  Temp:  [97.5 °F (36.4 °C)-98.8 °F (37.1 °C)] 97.8 °F (36.6 °C)  Pulse:  [68-95] 70  Resp:  [16-20] 17  SpO2:  [96 %-100 %] 96 %  BP: (108-141)/(55-67) 121/59     Weight: 57.2 kg (126 lb 1.7 oz)  Body mass index is 19.75 kg/m².    Intake/Output Summary (Last 24 hours) at 12/24/2019 1522  Last data filed at 12/24/2019 0700  Gross per 24 hour   Intake 1201.08 ml   Output 1125 ml   Net 76.08 ml      Physical Exam   Constitutional: He appears well-developed and well-nourished. No distress.   HENT:   Head: Normocephalic and atraumatic.   Eyes: Conjunctivae are normal.   Neck: Neck supple. No JVD present.   Cardiovascular: Normal rate, regular rhythm and normal heart sounds.   Pulmonary/Chest: Effort normal and breath sounds normal.   Abdominal: Soft. Bowel sounds are normal. He exhibits no distension. There is no tenderness.   Musculoskeletal: He exhibits no edema.   Neurological: He is alert.   Psychiatric: He has a normal mood and affect. His behavior is normal.   Nursing note and vitals reviewed.      Significant Labs:   CBC:   Recent Labs   Lab 12/23/19  1205 12/23/19  2107 12/24/19  0404   WBC 10.97  --  11.72  11.72   HGB 7.1* 7.8* 7.4*  7.4*   HCT 25.6*  --  26.6*  26.6*   *  --  437*  437*       Significant Imaging: none

## 2019-12-25 VITALS
OXYGEN SATURATION: 98 % | SYSTOLIC BLOOD PRESSURE: 141 MMHG | BODY MASS INDEX: 19.8 KG/M2 | RESPIRATION RATE: 16 BRPM | HEIGHT: 67 IN | HEART RATE: 68 BPM | WEIGHT: 126.13 LBS | DIASTOLIC BLOOD PRESSURE: 73 MMHG | TEMPERATURE: 98 F

## 2019-12-25 LAB
ALBUMIN SERPL BCP-MCNC: 3.1 G/DL (ref 3.5–5.2)
ALP SERPL-CCNC: 63 U/L (ref 55–135)
ALT SERPL W/O P-5'-P-CCNC: 11 U/L (ref 10–44)
ANION GAP SERPL CALC-SCNC: 6 MMOL/L (ref 8–16)
AST SERPL-CCNC: 13 U/L (ref 10–40)
BILIRUB SERPL-MCNC: 1 MG/DL (ref 0.1–1)
BUN SERPL-MCNC: 17 MG/DL (ref 8–23)
CALCIUM SERPL-MCNC: 8.2 MG/DL (ref 8.7–10.5)
CHLORIDE SERPL-SCNC: 103 MMOL/L (ref 95–110)
CO2 SERPL-SCNC: 30 MMOL/L (ref 23–29)
CREAT SERPL-MCNC: 0.8 MG/DL (ref 0.5–1.4)
EST. GFR  (AFRICAN AMERICAN): >60 ML/MIN/1.73 M^2
EST. GFR  (NON AFRICAN AMERICAN): >60 ML/MIN/1.73 M^2
GLUCOSE SERPL-MCNC: 84 MG/DL (ref 70–110)
HCT VFR BLD AUTO: 32.2 % (ref 40–54)
HCT VFR BLD AUTO: 34.8 % (ref 40–54)
HGB BLD-MCNC: 10.6 G/DL (ref 14–18)
HGB BLD-MCNC: 9.7 G/DL (ref 14–18)
POTASSIUM SERPL-SCNC: 3.5 MMOL/L (ref 3.5–5.1)
PROT SERPL-MCNC: 5.7 G/DL (ref 6–8.4)
SODIUM SERPL-SCNC: 139 MMOL/L (ref 136–145)

## 2019-12-25 PROCEDURE — 63600175 PHARM REV CODE 636 W HCPCS: Performed by: FAMILY MEDICINE

## 2019-12-25 PROCEDURE — 85014 HEMATOCRIT: CPT

## 2019-12-25 PROCEDURE — 80053 COMPREHEN METABOLIC PANEL: CPT

## 2019-12-25 PROCEDURE — C9113 INJ PANTOPRAZOLE SODIUM, VIA: HCPCS | Performed by: FAMILY MEDICINE

## 2019-12-25 PROCEDURE — 25000003 PHARM REV CODE 250: Performed by: INTERNAL MEDICINE

## 2019-12-25 PROCEDURE — 85014 HEMATOCRIT: CPT | Mod: 91

## 2019-12-25 PROCEDURE — 36415 COLL VENOUS BLD VENIPUNCTURE: CPT

## 2019-12-25 PROCEDURE — 99900035 HC TECH TIME PER 15 MIN (STAT)

## 2019-12-25 PROCEDURE — 85018 HEMOGLOBIN: CPT | Mod: 91

## 2019-12-25 PROCEDURE — 25000003 PHARM REV CODE 250: Performed by: FAMILY MEDICINE

## 2019-12-25 PROCEDURE — 85018 HEMOGLOBIN: CPT

## 2019-12-25 RX ORDER — DIAZEPAM 5 MG/1
10 TABLET ORAL ONCE
Status: COMPLETED | OUTPATIENT
Start: 2019-12-25 | End: 2019-12-25

## 2019-12-25 RX ORDER — AMLODIPINE BESYLATE 5 MG/1
5 TABLET ORAL DAILY
Status: DISCONTINUED | OUTPATIENT
Start: 2019-12-25 | End: 2019-12-25 | Stop reason: HOSPADM

## 2019-12-25 RX ORDER — PANTOPRAZOLE SODIUM 40 MG/1
40 TABLET, DELAYED RELEASE ORAL DAILY
Qty: 30 TABLET | Refills: 0 | Status: SHIPPED | OUTPATIENT
Start: 2019-12-25 | End: 2021-01-01

## 2019-12-25 RX ORDER — CLONIDINE HYDROCHLORIDE 0.1 MG/1
0.1 TABLET ORAL 3 TIMES DAILY PRN
Status: DISCONTINUED | OUTPATIENT
Start: 2019-12-25 | End: 2019-12-25 | Stop reason: HOSPADM

## 2019-12-25 RX ORDER — ISOSORBIDE MONONITRATE 30 MG/1
240 TABLET, EXTENDED RELEASE ORAL DAILY
Status: DISCONTINUED | OUTPATIENT
Start: 2019-12-25 | End: 2019-12-25

## 2019-12-25 RX ADMIN — DEXTROSE 8 MG/HR: 50 INJECTION, SOLUTION INTRAVENOUS at 02:12

## 2019-12-25 RX ADMIN — SODIUM CHLORIDE: 0.9 INJECTION, SOLUTION INTRAVENOUS at 01:12

## 2019-12-25 RX ADMIN — CLONIDINE HYDROCHLORIDE 0.1 MG: 0.1 TABLET ORAL at 12:12

## 2019-12-25 RX ADMIN — DUTASTERIDE 0.5 MG: 0.5 CAPSULE, LIQUID FILLED ORAL at 09:12

## 2019-12-25 RX ADMIN — HYDROXYUREA 500 MG: 500 CAPSULE ORAL at 09:12

## 2019-12-25 RX ADMIN — DIAZEPAM 10 MG: 5 TABLET ORAL at 12:12

## 2019-12-25 RX ADMIN — MONTELUKAST 10 MG: 10 TABLET, FILM COATED ORAL at 09:12

## 2019-12-25 NOTE — PLAN OF CARE
Received 2 units of PRBC without incidence. Had an anxiety episode and was given a single dose of Valium which was effective. In addition patient received one dose of clonidine for a SBP greater than 160 with good results. Stable hours  Problem: Fall Injury Risk  Goal: Absence of Fall and Fall-Related Injury  Outcome: Ongoing, Progressing  Intervention: Identify and Manage Contributors to Fall Injury Risk  Flowsheets (Taken 12/25/2019 0613)  Self-Care Promotion: independence encouraged; BADL personal objects within reach; BADL personal routines maintained; safe use of adaptive equipment encouraged  Medication Review/Management: medications reviewed     Problem: Skin Injury Risk Increased  Goal: Skin Health and Integrity  Outcome: Ongoing, Progressing  Intervention: Optimize Skin Protection  Flowsheets (Taken 12/25/2019 0613)  Pressure Reduction Techniques: frequent weight shift encouraged; weight shift assistance provided  Pressure Reduction Devices: pressure-redistributing mattress utilized  Skin Protection: adhesive use limited; electrode sites changed  Head of Bed (HOB): HOB at 20-30 degrees  Intervention: Promote and Optimize Oral Intake  Flowsheets (Taken 12/25/2019 0613)  Oral Nutrition Promotion: safe use of adaptive equipment encouraged; social interaction promoted     Problem: Bleeding (Gastrointestinal Bleeding)  Goal: Hemostasis  Outcome: Ongoing, Progressing  Intervention: Manage Gastrointestinal Bleeding  Flowsheets (Taken 12/25/2019 0613)  Bleeding Management: blood products administered  Stabilization Measures: respiratory support increased  Environmental Support: calm environment promoted; caregiver consistency promoted; distractions minimized; environmental consistency promoted; personal routine supported; rest periods encouraged

## 2019-12-25 NOTE — PROGRESS NOTES
Chief Complaint / Reason for Consult:  Anemia    No reports of any overt GI bleeding    ROS:  No CP, F/C, stable chronic shortness of breath    Patient Vitals for the past 24 hrs:   BP Temp Temp src Pulse Resp SpO2   12/25/19 0755 129/63 98 °F (36.7 °C) Oral 69 16 98 %   12/25/19 0439 137/70 97.4 °F (36.3 °C) Oral 74 18 97 %   12/25/19 0147 (!) 124/59 98.4 °F (36.9 °C) Oral 74 18 96 %   12/25/19 0124 -- -- -- 72 20 96 %   12/25/19 0115 (!) 148/66 98 °F (36.7 °C) Oral 71 18 97 %   12/25/19 0010 (!) 164/74 98.2 °F (36.8 °C) Oral 71 18 96 %   12/24/19 2332 (!) 169/79 97.4 °F (36.3 °C) Oral 76 18 95 %   12/24/19 2308 (!) 164/77 97.6 °F (36.4 °C) Oral 75 18 97 %   12/24/19 2248 (!) 159/74 98.1 °F (36.7 °C) Oral 69 18 97 %   12/24/19 2115 -- -- -- 66 20 96 %   12/24/19 2030 (!) 159/77 98.1 °F (36.7 °C) Oral 69 18 97 %   12/24/19 1936 (!) 158/75 97.6 °F (36.4 °C) Oral 73 18 95 %   12/24/19 1830 (!) 140/68 98.4 °F (36.9 °C) Oral 72 18 (!) 94 %   12/24/19 1700 -- 98.1 °F (36.7 °C) Oral 78 18 95 %   12/24/19 1645 (!) 125/59 98.2 °F (36.8 °C) Oral 78 -- (!) 94 %   12/24/19 1225 -- -- -- 70 17 96 %   12/24/19 1128 (!) 121/59 97.8 °F (36.6 °C) Oral 73 18 99 %       Physical Exam:  Gen - Well developed, well nourished, no apparent distress  HEENT - Anicteric  CV - S1, S2, no murmurs/rubs  Lungs - CTA-B, normal excursion  Abd - Soft, NT, ND, normal BS's, no HSM.  Ext - No c/c/e  Neuro - No asterixis    Labs:  Recent Labs   Lab 12/25/19  0819   HGB 10.6*   HCT 34.8*     Recent Labs   Lab 12/25/19  0420   CALCIUM 8.2*   PROT 5.7*      K 3.5   CO2 30*      BUN 17   CREATININE 0.8   ALKPHOS 63   ALT 11   AST 13   BILITOT 1.0       Imaging:  Review chest x-ray, consistent with emphysema    Assessment:  This patient is a 69 y.o. male with:   1.  Anemia, normocytic-no symptoms of overt GI bleeding.  Patient with relatively recent EGD and colonoscopy showing no obvious etiology.  2.  History of COPD on home oxygen, HTN, CAD,  PAD, polycythemia vera.....    Recommendations:  1.  Monitor for symptoms of GI bleeding.  2.  Transfusions, per hospitalist.  3.  Okay for diet as tolerated.  4.  Patient can be discharged to home, can be seen in GI Clinic in 2 weeks, will discuss with patient about further endoscopic evaluation (small-bowel evaluation)

## 2019-12-25 NOTE — PLAN OF CARE
Patient is currently on a 2 lpm nasal cannula with oxygen saturation of 96%.  No apparent distress noted at present time.  Will administer PRN Respiratory treatments as required.  Will continue to monitor.

## 2019-12-25 NOTE — PLAN OF CARE
Problem: Fall Injury Risk  Goal: Absence of Fall and Fall-Related Injury  Outcome: Ongoing, Progressing     Problem: Adult Inpatient Plan of Care  Goal: Plan of Care Review  Outcome: Ongoing, Progressing  Goal: Patient-Specific Goal (Individualization)  Outcome: Ongoing, Progressing  Goal: Absence of Hospital-Acquired Illness or Injury  Outcome: Ongoing, Progressing  Goal: Optimal Comfort and Wellbeing  Outcome: Ongoing, Progressing  Goal: Readiness for Transition of Care  Outcome: Ongoing, Progressing  Goal: Rounds/Family Conference  Outcome: Ongoing, Progressing     Problem: Skin Injury Risk Increased  Goal: Skin Health and Integrity  Outcome: Ongoing, Progressing     Problem: Bleeding (Gastrointestinal Bleeding)  Goal: Hemostasis  Outcome: Ongoing, Progressing  Intervention: Manage Gastrointestinal Bleeding  Flowsheets (Taken 12/24/2019 2020)  Bleeding Management: blood products administered; dressing monitored  Stabilization Measures: blood products administered  Environmental Support: calm environment promoted; rest periods encouraged

## 2019-12-25 NOTE — NURSING
Received order from Dr Bradley for Valium 10 mg po times one dose- given for anxiety, also order received for Clonidine 0.1 mg po  3 X daily prn SBP greater than 160- dose given

## 2019-12-25 NOTE — NURSING
Patient escorted to vehicle via w/c for discharge home. Patient escorted by transport and accompanied by family. No apparent distress noted. Patient on home 02.

## 2019-12-25 NOTE — PLAN OF CARE
12/25/19 1432   Final Note   Assessment Type Final Discharge Note   Anticipated Discharge Disposition Home   What phone number can be called within the next 1-3 days to see how you are doing after discharge?   ([phone] )   Hospital Follow Up  Appt(s) scheduled? No  (Unable)   Discharge plans and expectations educations in teach back method with documentation complete? Yes   Right Care Referral Info   Post Acute Recommendation No Care

## 2019-12-26 NOTE — DISCHARGE SUMMARY
"[facility]  Discharge Summary      Patient Name: Chaz Stuart  MRN: [id number]  Admission Date: 12/23/2019  Hospital Length of Stay: 2 days  Discharge Date and Time: 12/25/2019  3:56 PM   Discharging Provider: Kenya Morrison MD  Primary Care Provider: Gil Marie MD      HPI:   This is a 69 y.o. M who has Anxiety, COPD, DDD, Diverticulitis, GERD, HTN, Polycythemia vera, and MI who presents to the ED for dizziness and light headedness x2 weeks. It is assoc with b/l lower quadrant pain, trouble urinating, SOB, fatigue, and decrease in appetite. He has been on blood thinner for many years for "thick blood'. He noticed blood in his stool yesterday evening. He is on 2 liters of oxygen at home for COPD. Pt denies fever, nausea, or vomiting, but he does admit to diarrhea.    In the ED, his hgb was decreased at 7.1. GI has been consulted.         Hospital Course:   He was given IV Protonix and IVFs. He required 2 units of PRBCs. He had no further bleeding. GI recommended outpt f/u. He was deemed to have reached maximal benefit of hospitalization.      Consults:   Consults (From admission, onward)        Status Ordering Provider     Inpatient consult to Gastroenterology  Once     Provider:  Greg Sales MD    Completed VIV BULLOCK          * Gastrointestinal hemorrhage with melena  Outpt follow up.       Acute blood loss anemia  Stable.      Chronic respiratory failure with hypoxia  Stable.       Polycythemia vera  Chronic/ stable.       Essential hypertension  Stable.       COPD (chronic obstructive pulmonary disease)  Stable.        Final Active Diagnoses:    Diagnosis Date Noted POA    PRINCIPAL PROBLEM:  Gastrointestinal hemorrhage with melena [K92.1] 12/23/2019 Yes    Acute blood loss anemia [D62] 12/23/2019 Yes    Chronic respiratory failure with hypoxia [J96.11] 09/18/2014 Yes    Polycythemia vera [D45] 10/29/2018 Yes    Essential hypertension " [I10] 05/31/2017 Yes    COPD (chronic obstructive pulmonary disease) [J44.9] 12/04/2012 Yes      Problems Resolved During this Admission:       Discharged Condition: stable    Disposition: Home or Self Care    Follow Up:  Follow-up Information     Gil Marie MD In 1 week.    Specialty:  Internal Medicine  Contact information:  [address] [facility]  [place] [id number] [phone]             Deepak Olivares MD In 2 weeks.    Specialty:  Gastroenterology  Contact information:  [address] [place] [address]  [id number]  [facility]  [place] [id number] [phone]                 Patient Instructions:      Diet Cardiac     Activity as tolerated       Significant Diagnostic Studies: Labs:   CBC   Recent Labs   Lab 12/24/19  1632 12/25/19  0420 12/25/19  0819   HGB 6.8* 9.7* 10.6*   HCT 24.0* 32.2* 34.8*       Pending Diagnostic Studies:     None         Medications:  Reconciled Home Medications:      Medication List      START taking these medications    pantoprazole 40 MG tablet  Commonly known as:  PROTONIX  Take 1 tablet (40 mg total) by mouth once daily.        CHANGE how you take these medications    amLODIPine 10 MG tablet  Commonly known as:  NORVASC  Take 1 tablet (10 mg total) by mouth once daily.  What changed:  how much to take        CONTINUE taking these medications    albuterol 90 mcg/actuation inhaler  Commonly known as:  Ventolin HFA  Inhale 2 puffs into the lungs every 6 (six) hours as needed.     albuterol-ipratropium 2.5 mg-0.5 mg/3 mL nebulizer solution  Commonly known as:  DUO-NEB  TAKE 3 MLS BY NEBULIZATION EVERY 6 (SIX) HOURS AS NEEDED FOR WHEEZING. RESCUE     ALPRAZolam 1 MG tablet  Commonly known as:  XANAX  TAKE 1 TABLET BY MOUTH THREE TIMES A DAY AS NEEDED FOR ANXIETY. MAY CAUSE DROWSINESS.     aspirin 81 MG EC tablet  Commonly known as:  ECOTRIN  Take 1 tablet (81 mg total) by mouth once daily.     atorvastatin 40 MG tablet  Commonly known as:   LIPITOR  Take 1 tablet (40 mg total) by mouth every evening.     azelastine 137 mcg (0.1 %) nasal spray  Commonly known as:  ASTELIN  USE 1 SPRAY NASALLY TWICE  DAILY     dutasteride 0.5 mg capsule  Commonly known as:  AVODART  Take 1 capsule (0.5 mg total) by mouth once daily.     hydroxyurea 500 mg Cap  Commonly known as:  HYDREA  TAKE 1 CAPSULE(S) BY MOUTH ONCE A DAY.     isosorbide mononitrate 120 MG 24 hr tablet  Commonly known as:  IMDUR  Take 2 tablets (240 mg total) by mouth once daily.     montelukast 10 mg tablet  Commonly known as:  SINGULAIR  Take 1 tablet (10 mg total) by mouth once daily.     predniSONE 20 MG tablet  Commonly known as:  DELTASONE  Take PRN and sparingly  For  SOB  And  Tight musus    1 tab  X 3  Days   Then   1/2 tab  X 2 days  Then  1/4  Tab  Ax  2 days     Symbicort 160-4.5 mcg/actuation Hfaa  Generic drug:  budesonide-formoterol 160-4.5 mcg  Inhale 2 puffs into the lungs 2 (two) times daily.        STOP taking these medications    ranitidine 150 MG tablet  Commonly known as:  Zantac            Indwelling Lines/Drains at time of discharge:   Lines/Drains/Airways     None             Time spent on the discharge of patient: 32 minutes  Patient was seen and examined on the date of discharge and determined to be suitable for discharge.         Kenya Morrison MD  Department of Hospital Medicine  [facility]

## 2019-12-26 NOTE — HOSPITAL COURSE
He was given IV Protonix and IVFs. He required 2 units of PRBCs. He had no further bleeding. GI recommended outpt f/u. He was deemed to have reached maximal benefit of hospitalization.

## 2019-12-27 ENCOUNTER — PATIENT OUTREACH (OUTPATIENT)
Dept: ADMINISTRATIVE | Facility: CLINIC | Age: 69
End: 2019-12-27

## 2019-12-27 NOTE — PATIENT INSTRUCTIONS
When You Have Gastrointestinal (GI) Bleeding    Blood in your vomit or stool can be a sign of gastrointestinal (GI) bleeding. GI bleeding can be scary. But the cause may not be serious. You should always see a doctor if GI bleeding occurs.  The GI tract  The GI tract is the path through which food travels in the body. Food passes from the mouth down the esophagus (the tube from the mouth to the stomach). Food begins to break down in the stomach. It then moves through the duodenum, the first part of the small intestine. Nutrients are absorbed as food travels through the small intestine. What is left passes into the colon (large intestine) as waste. The colon removes water from the waste. Waste continues from the colon to the rectum (where stool is stored). Waste then leaves the body through the anus.  Causes of GI bleeding  GI bleeding can be caused by many different problems. Some of the more common causes include:  · Swollen veins in the anus (hemorrhoids)  · Swollen veins in the esophagus (varices)  · Sore on the lining of the GI tract (ulcer)  · Cuts or scrapes in the mouth or throat  · Infection caused by germs such as bacteria or parasites  · Food allergies, such as milk allergy in young children  · Medicines  · Inflammation of the GI tract (gastritis or esophagitis)  · Colitis (Crohn's disease or ulcerative colitis)  · Cancer (tumors or polyps)  · Abnormal pouches in the colon (diverticula)  · Tears in the esophagus or anus  · Nosebleed  · Abnormal blood vessels in the GI tract (angiodysplasia)  Diagnosing the cause of blood in stool  If blood is coming out in your stool, you may have a lower GI tract problem or a very fast upper GI tract bleed. Bleeding from the GI tract can be bright red. Or it may look dark and tarry. Tests may also find blood in your stool that cant be seen with the eye (occult blood). To find out the cause, tests that may be ordered include:  · Blood tests. A blood sample is taken and  sent to a lab for exam.  · Hemoccult test. Checks a stool sample for blood.  · Stool culture. Checks a stool sample for bacteria or parasites.  · X-ray, ultrasound, or CT scan. Imaging tests that take pictures of the digestive tract.  · Colonoscopy or sigmoidoscopy. This test uses a flexible tube with a tiny camera. The tube is inserted through your anus into your rectum to see the inside of your colon. Your provider can also take a tiny tissue sample (biopsy) and treat a bleeding source  Diagnosing the cause of blood in vomit  If you are vomiting blood or something that looks like coffee grounds, you may have an upper GI tract problem. To find the cause, tests that may be done include:  · Upper Endoscopy. A flexible tube with a tiny camera is inserted through your mouth and throat to see inside your upper GI tract. This lets your provider take a tiny tissue sample (biopsy) and treat a bleeding source.  · Nasogastric lavage. This can tell if you have upper GI or lower GI bleeding.  · X-ray, ultrasound, or CT scan. Imaging tests that take pictures of your digestive tract.  · Upper GI series. X-rays of the upper part of your GI tract taken from inside your body.  · Enteroscopy. This sends a flexible tube or a small, swallowed capsule camera into your small intestine.  When to call your healthcare provider  Call your healthcare provider right away if you have any of the following:  · Bleeding from your mouth or anus that can't be stopped  · Fever of 100.4°F (38.0°) or higher  · Bleeding along with feeling lightheaded or dizzy  · Signs of fluid loss (dehydration). These include a dry, sticky mouth, decreased urine output; and very dark urine.  · Belly (abdominal) pain   Date Last Reviewed: 7/1/2016  © 6850-9859 [employer]. [address] [place] [address], [place] [address]. All rights reserved. This information is not intended as a substitute for professional medical care. Always follow your healthcare  professional's instructions.

## 2019-12-31 NOTE — PHYSICIAN QUERY
"PT Name: Chaz Stuart  MR #: [id number]     Physician Query Form - Etiology of Condition Clarification      CDS: Gabriella TSANG RN  Contact information: [email or web address]  Phone number: [phone]  This form is a permanent document in the medical record.     Query Date: December 31, 2019    By submitting this query, we are merely seeking further clarification of documentation.  Please utilize your independent clinical judgment when addressing the question(s) below.     The medical record contains the following:    Findings Supporting Clinical Information Location in Medical Record   Gastrointestinal hemorrhage with melena       HPI: This is a 69 y.o. M who has Anxiety, COPD, DDD, Diverticulitis, GERD, HTN, Polycythemia vera, and MI who presents to the ED for dizziness and light headedness x2 weeks. It is assoc with b/l lower quadrant pain, trouble urinating, SOB, fatigue, and decrease in appetite. He has been on blood thinner for many years for "thick blood'. He noticed blood in his stool yesterday evening.     Laboratory data revealed a hemoglobin of 7.6 g.  He denies any active GI bleeding.  Has intermittent loose stools.  His stool is fecal positive but not black.  No nausea vomiting no other upper lower GI complaints.    H&H= 7.4/26.6 -- >6.8/24-- >9.7/32.2-- >10.6/34.8    Anemia, normocytic-no symptoms of overt GI bleeding.  Patient with relatively recent EGD and colonoscopy showing no obvious etiology.    Hospital Course:   He was given IV Protonix and IVFs. He required 2 units of PRBCs. He had no further bleeding. GI recommended outpt f/u.  [facility] 12/23/19    [facility] 12/23/19            Consults GI 12/24/19        Labs 12/24-12/25    Consults GI 12/25/19    Discharge Summary Hosp Med 12/26/19       Please document your best medical opinion regarding the etiology of _melena__ for which treatment is/was directed.     Provider use only     [   ] Due to diverticulitis      [   ] Due to blood " antonio     [   ] Other, please specify _______________       [ x ] Clinically Undetermined     Please document in your progress notes daily for the duration of treatment, until resolved, and include in your discharge summary.

## 2020-01-01 ENCOUNTER — PATIENT OUTREACH (OUTPATIENT)
Dept: ADMINISTRATIVE | Facility: OTHER | Age: 70
End: 2020-01-01

## 2020-01-01 ENCOUNTER — TELEPHONE (OUTPATIENT)
Dept: CARDIOLOGY | Facility: CLINIC | Age: 70
End: 2020-01-01

## 2020-01-01 ENCOUNTER — OFFICE VISIT (OUTPATIENT)
Dept: CARDIOLOGY | Facility: CLINIC | Age: 70
End: 2020-01-01
Payer: MEDICARE

## 2020-01-01 VITALS
SYSTOLIC BLOOD PRESSURE: 125 MMHG | DIASTOLIC BLOOD PRESSURE: 60 MMHG | BODY MASS INDEX: 20.62 KG/M2 | WEIGHT: 131.38 LBS | OXYGEN SATURATION: 94 % | HEIGHT: 67 IN | HEART RATE: 73 BPM

## 2020-01-01 DIAGNOSIS — E78.2 MIXED DYSLIPIDEMIA: ICD-10-CM

## 2020-01-01 DIAGNOSIS — I73.9 PAD (PERIPHERAL ARTERY DISEASE): ICD-10-CM

## 2020-01-01 DIAGNOSIS — K21.9 GASTROESOPHAGEAL REFLUX DISEASE, UNSPECIFIED WHETHER ESOPHAGITIS PRESENT: ICD-10-CM

## 2020-01-01 DIAGNOSIS — I10 ESSENTIAL HYPERTENSION: ICD-10-CM

## 2020-01-01 DIAGNOSIS — J43.1 PANLOBULAR EMPHYSEMA: ICD-10-CM

## 2020-01-01 DIAGNOSIS — I25.118 CORONARY ARTERY DISEASE OF NATIVE ARTERY OF NATIVE HEART WITH STABLE ANGINA PECTORIS: Primary | ICD-10-CM

## 2020-01-01 PROCEDURE — 3074F SYST BP LT 130 MM HG: CPT | Mod: CPTII,S$GLB,, | Performed by: INTERNAL MEDICINE

## 2020-01-01 PROCEDURE — 99999 PR PBB SHADOW E&M-EST. PATIENT-LVL V: CPT | Mod: PBBFAC,,, | Performed by: INTERNAL MEDICINE

## 2020-01-01 PROCEDURE — 3008F BODY MASS INDEX DOCD: CPT | Mod: CPTII,S$GLB,, | Performed by: INTERNAL MEDICINE

## 2020-01-01 PROCEDURE — 99999 PR PBB SHADOW E&M-EST. PATIENT-LVL V: ICD-10-PCS | Mod: PBBFAC,,, | Performed by: INTERNAL MEDICINE

## 2020-01-01 PROCEDURE — 99204 PR OFFICE/OUTPT VISIT, NEW, LEVL IV, 45-59 MIN: ICD-10-PCS | Mod: S$GLB,,, | Performed by: INTERNAL MEDICINE

## 2020-01-01 PROCEDURE — 99204 OFFICE O/P NEW MOD 45 MIN: CPT | Mod: S$GLB,,, | Performed by: INTERNAL MEDICINE

## 2020-01-01 PROCEDURE — 1159F PR MEDICATION LIST DOCUMENTED IN MEDICAL RECORD: ICD-10-PCS | Mod: S$GLB,,, | Performed by: INTERNAL MEDICINE

## 2020-01-01 PROCEDURE — 3074F PR MOST RECENT SYSTOLIC BLOOD PRESSURE < 130 MM HG: ICD-10-PCS | Mod: CPTII,S$GLB,, | Performed by: INTERNAL MEDICINE

## 2020-01-01 PROCEDURE — 3078F DIAST BP <80 MM HG: CPT | Mod: CPTII,S$GLB,, | Performed by: INTERNAL MEDICINE

## 2020-01-01 PROCEDURE — 3078F PR MOST RECENT DIASTOLIC BLOOD PRESSURE < 80 MM HG: ICD-10-PCS | Mod: CPTII,S$GLB,, | Performed by: INTERNAL MEDICINE

## 2020-01-01 PROCEDURE — 3008F PR BODY MASS INDEX (BMI) DOCUMENTED: ICD-10-PCS | Mod: CPTII,S$GLB,, | Performed by: INTERNAL MEDICINE

## 2020-01-01 PROCEDURE — 1126F PR PAIN SEVERITY QUANTIFIED, NO PAIN PRESENT: ICD-10-PCS | Mod: S$GLB,,, | Performed by: INTERNAL MEDICINE

## 2020-01-01 PROCEDURE — 1159F MED LIST DOCD IN RCRD: CPT | Mod: S$GLB,,, | Performed by: INTERNAL MEDICINE

## 2020-01-01 PROCEDURE — 1126F AMNT PAIN NOTED NONE PRSNT: CPT | Mod: S$GLB,,, | Performed by: INTERNAL MEDICINE

## 2020-01-01 RX ORDER — DIPHENHYDRAMINE HCL 25 MG
50 CAPSULE ORAL ONCE
Status: CANCELLED | OUTPATIENT
Start: 2020-01-01 | End: 2020-01-01

## 2020-01-01 RX ORDER — PREDNISONE 20 MG/1
20 TABLET ORAL
COMMUNITY

## 2020-01-01 RX ORDER — BUDESONIDE AND FORMOTEROL FUMARATE DIHYDRATE 160; 4.5 UG/1; UG/1
2 AEROSOL RESPIRATORY (INHALATION) EVERY 12 HOURS
COMMUNITY

## 2020-01-01 RX ORDER — NITROGLYCERIN 0.4 MG/1
0.4 TABLET SUBLINGUAL EVERY 5 MIN PRN
Qty: 30 TABLET | Refills: 3 | Status: SHIPPED | OUTPATIENT
Start: 2020-01-01 | End: 2021-12-21

## 2020-01-01 RX ORDER — ISOSORBIDE MONONITRATE 120 MG/1
240 TABLET, EXTENDED RELEASE ORAL DAILY
Qty: 180 TABLET | Refills: 0 | Status: SHIPPED | OUTPATIENT
Start: 2020-01-01 | End: 2021-01-01 | Stop reason: SDUPTHER

## 2020-01-01 RX ORDER — CLOPIDOGREL BISULFATE 75 MG/1
75 TABLET ORAL DAILY
Qty: 30 TABLET | Refills: 11 | Status: SHIPPED | OUTPATIENT
Start: 2020-01-01 | End: 2020-01-01

## 2020-01-01 RX ORDER — SODIUM CHLORIDE 9 MG/ML
INJECTION, SOLUTION INTRAVENOUS CONTINUOUS
Status: CANCELLED | OUTPATIENT
Start: 2020-01-01 | End: 2020-01-01

## 2020-01-06 ENCOUNTER — OFFICE VISIT (OUTPATIENT)
Dept: FAMILY MEDICINE | Facility: CLINIC | Age: 70
End: 2020-01-06
Payer: MEDICARE

## 2020-01-06 VITALS
HEIGHT: 67 IN | WEIGHT: 133.19 LBS | SYSTOLIC BLOOD PRESSURE: 162 MMHG | BODY MASS INDEX: 20.9 KG/M2 | OXYGEN SATURATION: 94 % | TEMPERATURE: 98 F | DIASTOLIC BLOOD PRESSURE: 90 MMHG | HEART RATE: 85 BPM

## 2020-01-06 DIAGNOSIS — D45 POLYCYTHEMIA VERA: ICD-10-CM

## 2020-01-06 DIAGNOSIS — J43.1 PANLOBULAR EMPHYSEMA: ICD-10-CM

## 2020-01-06 DIAGNOSIS — Z99.81 OXYGEN DEPENDENT: ICD-10-CM

## 2020-01-06 DIAGNOSIS — J96.11 CHRONIC RESPIRATORY FAILURE WITH HYPOXIA, ON HOME O2 THERAPY: ICD-10-CM

## 2020-01-06 DIAGNOSIS — F33.1 MAJOR DEPRESSIVE DISORDER, RECURRENT EPISODE, MODERATE: ICD-10-CM

## 2020-01-06 DIAGNOSIS — Z99.81 CHRONIC RESPIRATORY FAILURE WITH HYPOXIA, ON HOME O2 THERAPY: ICD-10-CM

## 2020-01-06 DIAGNOSIS — I25.10 CORONARY ARTERY DISEASE, ANGINA PRESENCE UNSPECIFIED, UNSPECIFIED VESSEL OR LESION TYPE, UNSPECIFIED WHETHER NATIVE OR TRANSPLANTED HEART: ICD-10-CM

## 2020-01-06 DIAGNOSIS — R07.9 CHEST PAIN, UNSPECIFIED TYPE: ICD-10-CM

## 2020-01-06 DIAGNOSIS — F41.9 ANXIETY: ICD-10-CM

## 2020-01-06 DIAGNOSIS — K92.2 GASTROINTESTINAL HEMORRHAGE, UNSPECIFIED GASTROINTESTINAL HEMORRHAGE TYPE: Primary | ICD-10-CM

## 2020-01-06 DIAGNOSIS — I73.9 PERIPHERAL VASCULAR DISEASE, UNSPECIFIED: ICD-10-CM

## 2020-01-06 PROCEDURE — 3077F PR MOST RECENT SYSTOLIC BLOOD PRESSURE >= 140 MM HG: ICD-10-PCS | Mod: CPTII,S$GLB,, | Performed by: FAMILY MEDICINE

## 2020-01-06 PROCEDURE — 3080F PR MOST RECENT DIASTOLIC BLOOD PRESSURE >= 90 MM HG: ICD-10-PCS | Mod: CPTII,S$GLB,, | Performed by: FAMILY MEDICINE

## 2020-01-06 PROCEDURE — 1101F PT FALLS ASSESS-DOCD LE1/YR: CPT | Mod: CPTII,S$GLB,, | Performed by: FAMILY MEDICINE

## 2020-01-06 PROCEDURE — 1159F MED LIST DOCD IN RCRD: CPT | Mod: S$GLB,,, | Performed by: FAMILY MEDICINE

## 2020-01-06 PROCEDURE — 99499 UNLISTED E&M SERVICE: CPT | Mod: S$GLB,,, | Performed by: FAMILY MEDICINE

## 2020-01-06 PROCEDURE — 99999 PR PBB SHADOW E&M-EST. PATIENT-LVL V: CPT | Mod: PBBFAC,,, | Performed by: FAMILY MEDICINE

## 2020-01-06 PROCEDURE — 3080F DIAST BP >= 90 MM HG: CPT | Mod: CPTII,S$GLB,, | Performed by: FAMILY MEDICINE

## 2020-01-06 PROCEDURE — 1101F PR PT FALLS ASSESS DOC 0-1 FALLS W/OUT INJ PAST YR: ICD-10-PCS | Mod: CPTII,S$GLB,, | Performed by: FAMILY MEDICINE

## 2020-01-06 PROCEDURE — 99214 PR OFFICE/OUTPT VISIT, EST, LEVL IV, 30-39 MIN: ICD-10-PCS | Mod: S$GLB,,, | Performed by: FAMILY MEDICINE

## 2020-01-06 PROCEDURE — 99214 OFFICE O/P EST MOD 30 MIN: CPT | Mod: S$GLB,,, | Performed by: FAMILY MEDICINE

## 2020-01-06 PROCEDURE — 1159F PR MEDICATION LIST DOCUMENTED IN MEDICAL RECORD: ICD-10-PCS | Mod: S$GLB,,, | Performed by: FAMILY MEDICINE

## 2020-01-06 PROCEDURE — 3077F SYST BP >= 140 MM HG: CPT | Mod: CPTII,S$GLB,, | Performed by: FAMILY MEDICINE

## 2020-01-06 PROCEDURE — 99499 RISK ADDL DX/OHS AUDIT: ICD-10-PCS | Mod: S$GLB,,, | Performed by: FAMILY MEDICINE

## 2020-01-06 PROCEDURE — 99999 PR PBB SHADOW E&M-EST. PATIENT-LVL V: ICD-10-PCS | Mod: PBBFAC,,, | Performed by: FAMILY MEDICINE

## 2020-01-06 RX ORDER — DIAZEPAM 10 MG/1
TABLET ORAL
COMMUNITY
Start: 2019-12-23 | End: 2020-01-06

## 2020-01-06 RX ORDER — MELOXICAM 15 MG/1
TABLET ORAL
COMMUNITY
Start: 2019-11-21 | End: 2021-01-01

## 2020-01-06 RX ORDER — GABAPENTIN 100 MG/1
CAPSULE ORAL
COMMUNITY
Start: 2019-11-25

## 2020-01-06 RX ORDER — AMLODIPINE BESYLATE 10 MG/1
TABLET ORAL
COMMUNITY
Start: 2020-01-03

## 2020-01-06 RX ORDER — DICLOFENAC SODIUM 50 MG/1
50 TABLET, DELAYED RELEASE ORAL 2 TIMES DAILY PRN
Refills: 0 | COMMUNITY
Start: 2019-10-17 | End: 2020-01-06

## 2020-01-06 RX ORDER — DULOXETIN HYDROCHLORIDE 30 MG/1
CAPSULE, DELAYED RELEASE ORAL
COMMUNITY
Start: 2019-12-23 | End: 2021-01-01

## 2020-01-06 NOTE — PROGRESS NOTES
Appt with [facility]- on 2019    Routine Office Visit    Patient Name: Chaz Stuart    : 1950  MRN: [id number]    Subjective:  Chaz is a 69 y.o. male who presents today for:   Chief Complaint   Patient presents with    [facility]       69 year old male with COPD, dependent on oxygen, anxiety, depression, GERD, hypertension, and polycythemia vera comes in to establish car with new PCP. He reports that he was recently hospitalized with a GI bleed. He states that hew was admitted for two days. After he was stable he was advised that he can have outpatient follow up for this. He has an appointment with Faisal on 2020. He is followed by Dr. Rk Welsh for hematology and reports that he has phlebotomy done every 1.5-2 weeks for his polycythemia. He is managed with benzodiazepines for his anxiety and states that this is also for his depression. He has never seen a psychiatrist according to him. He also states that his last pulmonary provider told him he did not need further appointments despite tosha oxygen dependant.  The patient does not know what medication he is taking. His girlfriend, who is present states that the patient is not taking amlodipine, because previous primary told him to stop it. She states that in the hospital she was told to have him start at 1/2 the dose though the discharge summary shows that he was to continue the full dose. He is not taking it at all.      Past Medical History  Past Medical History:   Diagnosis Date    Anxiety     COPD (chronic obstructive pulmonary disease)     DDD (degenerative disc disease), cervical     Diverticulitis     GERD (gastroesophageal reflux disease)     Hypertension     Myocardial infarction     On home oxygen therapy     PAD (peripheral artery disease)     Polycythemia vera 1 year       Past Surgical History  Past Surgical History:   Procedure Laterality Date    CERVICAL SPINE SURGERY  Fusion    CHOLECYSTECTOMY      COLON  SURGERY      FINGER SURGERY Right 09/05/2017    ORIF        Family History  Family History   Problem Relation Age of Onset    Diabetes Mother     Heart disease Mother     Diabetes Father     Hypertension Father     Stroke Father     ADD / ADHD Neg Hx     Alcohol abuse Neg Hx     Anxiety disorder Neg Hx     Bipolar disorder Neg Hx     Dementia Neg Hx     Depression Neg Hx     Drug abuse Neg Hx     OCD Neg Hx     Paranoid behavior Neg Hx     Physical abuse Neg Hx     Schizophrenia Neg Hx     Seizures Neg Hx     Self injury Neg Hx     Sexual abuse Neg Hx     Suicide Neg Hx     Melanoma Neg Hx        Social History  Social History     Socioeconomic History    Marital status: Single     Spouse name: Not on file    Number of children: 6    Years of education: Not on file    Highest education level: Not on file   Occupational History    Not on file   Social Needs    Financial resource strain: Not on file    Food insecurity:     Worry: Not on file     Inability: Not on file    Transportation needs:     Medical: Not on file     Non-medical: Not on file   Tobacco Use    Smoking status: Former Smoker     Packs/day: 3.00     Years: 10.00     Pack years: 30.00     Last attempt to quit: 12/8/2009     Years since quitting: 10.1    Smokeless tobacco: Never Used    Tobacco comment: use to smoke  3-5 packs a day.    Substance and Sexual Activity    Alcohol use: No    Drug use: No    Sexual activity: Never     Partners: Female     Birth control/protection: Surgical   Lifestyle    Physical activity:     Days per week: Not on file     Minutes per session: Not on file    Stress: Not on file   Relationships    Social connections:     Talks on phone: Not on file     Gets together: Not on file     Attends [employer] service: Not on file     Active member of club or organization: Not on file     Attends meetings of clubs or organizations: Not on file     Relationship status: Not on file   Other Topics  Concern    Caffeine Use Yes    Financial Status: Disabled Yes    Legal: Involved in criminal litigation Not Asked    Caffeine Use: Frequent Not Asked    Financial Status: Employed Not Asked    Legal: Other Not Asked    Caffeine Use: Moderate Not Asked    Financial Status: Unemployed Not Asked    Leisure: Exercise Not Asked    Caffeine Use: Substantial Not Asked    Financial Status: Other Not Asked    Leisure: Fishing Not Asked    Childhood History: Adopted Not Asked    Firearms: Does patient have access to a firearm? Yes     Comment: locked gun cab    Leisure: Hunting Not Asked    Childhood History: Early trauma Not Asked    Home situation: homeless Not Asked    Leisure: Movie Watching Not Asked    Childhood History: Raised by parents Not Asked    Home situation: lives alone Yes    Leisure: Shopping Not Asked    Childhood History: Uneventful Not Asked    Home situation: lives in group home Not Asked    Leisure: Sports Not Asked    Childhood History: Other Not Asked    Home situation: lives in nursing home Not Asked    Leisure: Time with family Not Asked    Education: Unfinished High School Yes     Comment: 8th grade    Home situation: lives in shelter Not Asked     Service No    Education: High School Graduate Not Asked    Home situation: lives with family Not Asked    Spirituality: Active Participation No    Education: Unfinished college Not Asked    Home situation: lives with friends Not Asked    Spirituality: Organized Spiritism No    Education: Trade School Not Asked    Home situation: lives with significant other Not Asked    Spirituality: Private Participation No    Education: Associate's Degree Not Asked    Home situation: lives with spouse Not Asked    Patient feels they ought to cut down on drinking/drug use Not Asked    Education: Bachelor's Degree Not Asked    Legal consequences of chemical use Not Asked    Patient annoyed by others criticizing their  drinking/drug use Not Asked    Education: More than one Bachelor's or Professional Not Asked    Legal: Arrest history No    Patient has felt bad or guilty about drinking/drug use Not Asked    Education: Master's, PhD Not Asked    Legal: Involved in civil litigation Not Asked    Patient has had a drink/used drugs as an eye opener in the AM Not Asked   Social History Narrative    Not on file       Current Medications  Current Outpatient Medications on File Prior to Visit   Medication Sig Dispense Refill    albuterol-ipratropium (DUO-NEB) 2.5 mg-0.5 mg/3 mL nebulizer solution TAKE 3 MLS BY NEBULIZATION EVERY 6 (SIX) HOURS AS NEEDED FOR WHEEZING. RESCUE 450 mL 3    ALPRAZolam (XANAX) 1 MG tablet TAKE 1 TABLET BY MOUTH THREE TIMES A DAY AS NEEDED FOR ANXIETY. MAY CAUSE DROWSINESS. 90 tablet 0    amLODIPine (NORVASC) 10 MG tablet TK 1 T PO QD      aspirin (ECOTRIN) 81 MG EC tablet Take 1 tablet (81 mg total) by mouth once daily. 150 tablet 6    atorvastatin (LIPITOR) 40 MG tablet Take 1 tablet (40 mg total) by mouth every evening. 90 tablet 3    azelastine (ASTELIN) 137 mcg (0.1 %) nasal spray USE 1 SPRAY NASALLY TWICE  DAILY 30 mL 1    DULoxetine (CYMBALTA) 30 MG capsule TK ONE C PO QD      gabapentin (NEURONTIN) 100 MG capsule       hydroxyurea (HYDREA) 500 mg Cap TAKE 1 CAPSULE(S) BY MOUTH ONCE A DAY. 90 capsule 3    isosorbide mononitrate (IMDUR) 120 MG 24 hr tablet Take 2 tablets (240 mg total) by mouth once daily. 180 tablet 3    meloxicam (MOBIC) 15 MG tablet       montelukast (SINGULAIR) 10 mg tablet Take 1 tablet (10 mg total) by mouth once daily. 90 tablet 2    pantoprazole (PROTONIX) 40 MG tablet Take 1 tablet (40 mg total) by mouth once daily. 30 tablet 0    predniSONE (DELTASONE) 20 MG tablet Take PRN and sparingly  For  SOB  And  Tight musus    1 tab  X 3  Days   Then   1/2 tab  X 2 days  Then  1/4  Tab  Ax  2 days 10 tablet 0    SYMBICORT 160-4.5 mcg/actuation HFAA Inhale 2 puffs into  "the lungs 2 (two) times daily. 10.2 g 3    albuterol (VENTOLIN HFA) 90 mcg/actuation inhaler Inhale 2 puffs into the lungs every 6 (six) hours as needed. 18 g 3    dutasteride (AVODART) 0.5 mg capsule Take 1 capsule (0.5 mg total) by mouth once daily. 90 capsule 3     No current facility-administered medications on file prior to visit.        Allergies   Review of patient's allergies indicates:  No Known Allergies    Review of Systems   Constitutional: Positive for fatigue. Negative for chills, fever and unexpected weight change.   HENT: Negative for hearing loss, rhinorrhea and sore throat.    Eyes: Positive for visual disturbance (seeing ophtho).   Respiratory: Positive for shortness of breath. Negative for cough, chest tightness and wheezing.    Cardiovascular: Negative for chest pain, palpitations and leg swelling.   Gastrointestinal: Positive for blood in stool. Negative for abdominal pain, constipation, diarrhea, nausea and vomiting.   Endocrine: Negative for polydipsia, polyphagia and polyuria.   Genitourinary: Negative for dysuria and hematuria.   Musculoskeletal: Positive for arthralgias, back pain, gait problem and myalgias.   Skin: Negative for rash.   Allergic/Immunologic: Negative for food allergies and immunocompromised state.   Neurological: Positive for weakness. Negative for tremors, seizures, syncope, light-headedness and headaches.   Hematological: Negative for adenopathy. Bruises/bleeds easily.   Psychiatric/Behavioral: Positive for agitation, decreased concentration, dysphoric mood and sleep disturbance. Negative for hallucinations, self-injury and suicidal ideas. The patient is nervous/anxious.      BP (!) 162/90 (BP Location: Left arm, Patient Position: Sitting, BP Method: Small (Manual))   Pulse 85   Temp 97.6 °F (36.4 °C) (Oral)   Ht 5' 7" (1.702 m)   Wt 60.4 kg (133 lb 2.5 oz)   SpO2 (!) 94%   BMI 20.86 kg/m²     Physical Exam   Constitutional: He is oriented to person, place, and " time. He appears ill (chronic). No distress. Nasal cannula in place.   HENT:   Head: Normocephalic and atraumatic.   Right Ear: Ear canal normal.   Left Ear: Ear canal normal.   Nose: Nose normal.   Neck: Trachea normal and normal range of motion. No thyroid mass present.   Cardiovascular: Normal rate, S1 normal, S2 normal and intact distal pulses.   Pulmonary/Chest: Effort normal. He has no wheezes. He has no rhonchi. He has no rales.   Abdominal: Soft. Normal appearance and bowel sounds are normal. There is no tenderness.   Musculoskeletal: He exhibits no edema.   Neurological: He is alert and oriented to person, place, and time.   Psychiatric: His speech is not rapid and/or pressured and not slurred. He is not agitated, not aggressive and not hyperactive. Thought content is not paranoid. He expresses no homicidal and no suicidal ideation. He is communicative.   Poor insight to his medical conditions         Assessment/Plan:  Chaz was seen today for establish care.    Diagnoses and all orders for this visit:    Gastrointestinal hemorrhage, unspecified gastrointestinal hemorrhage type  Patient to follow up with GI.  Continue Protonix.  Offered to repeat labs today but patient declined.    Oxygen dependent  -     Ambulatory referral to Pulmonology  Will refer to a new pulmonary provider.    Panlobular emphysema  -     Ambulatory referral to Pulmonology  As above    Major depressive disorder, recurrent episode, moderate  -     Ambulatory referral to Psychiatry  Advised patient that I do not feel that Xanax is an appropriate management of depression. He states that he will get this from another of his providers. I educated him of the potential of respiratory depression of this medication which can be dangerous given his oxygen dependent status. His girlfriend insists that no psychiatrists accepts his insurance, however, they have never attempted to make an appointment with [facility].   Number was given  To them and  they were strongly encouraged to make an appointment.    Anxiety  -     Ambulatory referral to Psychiatry  As above    Peripheral vascular disease, unspecified  Contninue atorvastatin    Coronary artery disease, angina presence unspecified, unspecified vessel or lesion type, unspecified whether native or transplanted heart  -     Ambulatory referral to Cardiology  Continue atorvastatin    Chronic respiratory failure with hypoxia, on home O2 therapy  -     Ambulatory referral to Pulmonology    Polycythemia vera  Management as per hematology.    Chest pain, unspecified type  -     Ambulatory referral to Cardiology  Not new.  Patient due for his cardiology appt as per last note.              -Warner Obregon Jr., MD, AAHIVS          This office note has been dictated.  This dictation has been generated using M-Modal Fluency Direct dictation; some phonetic errors may occur.

## 2020-01-14 ENCOUNTER — OFFICE VISIT (OUTPATIENT)
Dept: CARDIOLOGY | Facility: CLINIC | Age: 70
End: 2020-01-14
Payer: MEDICARE

## 2020-01-14 VITALS
HEIGHT: 67 IN | WEIGHT: 132.06 LBS | DIASTOLIC BLOOD PRESSURE: 64 MMHG | RESPIRATION RATE: 15 BRPM | BODY MASS INDEX: 20.73 KG/M2 | SYSTOLIC BLOOD PRESSURE: 148 MMHG | HEART RATE: 88 BPM | OXYGEN SATURATION: 91 %

## 2020-01-14 DIAGNOSIS — D62 ACUTE BLOOD LOSS ANEMIA: ICD-10-CM

## 2020-01-14 DIAGNOSIS — I10 ESSENTIAL HYPERTENSION: ICD-10-CM

## 2020-01-14 DIAGNOSIS — E78.2 MIXED DYSLIPIDEMIA: ICD-10-CM

## 2020-01-14 DIAGNOSIS — J43.1 PANLOBULAR EMPHYSEMA: ICD-10-CM

## 2020-01-14 DIAGNOSIS — J96.11 CHRONIC RESPIRATORY FAILURE WITH HYPOXIA: ICD-10-CM

## 2020-01-14 DIAGNOSIS — I25.10 CORONARY ARTERY DISEASE INVOLVING NATIVE CORONARY ARTERY OF NATIVE HEART WITHOUT ANGINA PECTORIS: Primary | ICD-10-CM

## 2020-01-14 DIAGNOSIS — I73.9 PAD (PERIPHERAL ARTERY DISEASE): ICD-10-CM

## 2020-01-14 PROCEDURE — 1101F PT FALLS ASSESS-DOCD LE1/YR: CPT | Mod: CPTII,S$GLB,, | Performed by: INTERNAL MEDICINE

## 2020-01-14 PROCEDURE — 3078F DIAST BP <80 MM HG: CPT | Mod: CPTII,S$GLB,, | Performed by: INTERNAL MEDICINE

## 2020-01-14 PROCEDURE — 3077F PR MOST RECENT SYSTOLIC BLOOD PRESSURE >= 140 MM HG: ICD-10-PCS | Mod: CPTII,S$GLB,, | Performed by: INTERNAL MEDICINE

## 2020-01-14 PROCEDURE — 1159F MED LIST DOCD IN RCRD: CPT | Mod: S$GLB,,, | Performed by: INTERNAL MEDICINE

## 2020-01-14 PROCEDURE — 3077F SYST BP >= 140 MM HG: CPT | Mod: CPTII,S$GLB,, | Performed by: INTERNAL MEDICINE

## 2020-01-14 PROCEDURE — 1159F PR MEDICATION LIST DOCUMENTED IN MEDICAL RECORD: ICD-10-PCS | Mod: S$GLB,,, | Performed by: INTERNAL MEDICINE

## 2020-01-14 PROCEDURE — 99999 PR PBB SHADOW E&M-EST. PATIENT-LVL IV: ICD-10-PCS | Mod: PBBFAC,,, | Performed by: INTERNAL MEDICINE

## 2020-01-14 PROCEDURE — 99999 PR PBB SHADOW E&M-EST. PATIENT-LVL IV: CPT | Mod: PBBFAC,,, | Performed by: INTERNAL MEDICINE

## 2020-01-14 PROCEDURE — 99214 PR OFFICE/OUTPT VISIT, EST, LEVL IV, 30-39 MIN: ICD-10-PCS | Mod: S$GLB,,, | Performed by: INTERNAL MEDICINE

## 2020-01-14 PROCEDURE — 1101F PR PT FALLS ASSESS DOC 0-1 FALLS W/OUT INJ PAST YR: ICD-10-PCS | Mod: CPTII,S$GLB,, | Performed by: INTERNAL MEDICINE

## 2020-01-14 PROCEDURE — 1126F AMNT PAIN NOTED NONE PRSNT: CPT | Mod: S$GLB,,, | Performed by: INTERNAL MEDICINE

## 2020-01-14 PROCEDURE — 3078F PR MOST RECENT DIASTOLIC BLOOD PRESSURE < 80 MM HG: ICD-10-PCS | Mod: CPTII,S$GLB,, | Performed by: INTERNAL MEDICINE

## 2020-01-14 PROCEDURE — 1126F PR PAIN SEVERITY QUANTIFIED, NO PAIN PRESENT: ICD-10-PCS | Mod: S$GLB,,, | Performed by: INTERNAL MEDICINE

## 2020-01-14 PROCEDURE — 99214 OFFICE O/P EST MOD 30 MIN: CPT | Mod: S$GLB,,, | Performed by: INTERNAL MEDICINE

## 2020-01-14 RX ORDER — METOPROLOL SUCCINATE 25 MG/1
25 TABLET, EXTENDED RELEASE ORAL DAILY
Qty: 90 TABLET | Refills: 3 | Status: SHIPPED | OUTPATIENT
Start: 2020-01-14 | End: 2021-01-01

## 2020-01-14 NOTE — PROGRESS NOTES
CARDIOVASCULAR PROGRESS NOTE    REASON FOR CONSULT:   Chaz Stuart is a 69 y.o. male who presents for f/u PAD, presumed CAD.    PCP: Sabas Welsh  HISTORY OF PRESENT ILLNESS:   The patient returns for follow-up, accompanied by his live-in girlfriend.  In the interim since his last office visit he was hospitalized with anemia.  His hemoglobin was 7 and he was transfused.  There does not appear to be a source of bleeding.  Follow up with GI as planned.  He denies any exertional angina, but did describe some chest discomfort for several minutes when driving in today to the office.  His has not recurred.  He has had no palpitations, lightheadedness, dizziness, or syncope.  There has been no PND, orthopnea, or lower extremity edema.  He denies any claudicant symptoms.  There does not appear to be any melena or hematuria.  He does have baseline dyspnea and is on home oxygen.    CARDIOVASCULAR HISTORY:   Presumed CAD (CTA 1/2019), MPI 1/2019 normal.  PET 8/2019 normal.  PAD (bilat LE inflow disease CT 1/2019)    PAST MEDICAL HISTORY:     Past Medical History:   Diagnosis Date    Anxiety     COPD (chronic obstructive pulmonary disease)     DDD (degenerative disc disease), cervical     Diverticulitis     GERD (gastroesophageal reflux disease)     Hypertension     Myocardial infarction     On home oxygen therapy     PAD (peripheral artery disease)     Polycythemia vera 1 year       PAST SURGICAL HISTORY:     Past Surgical History:   Procedure Laterality Date    CERVICAL SPINE SURGERY  Fusion    CHOLECYSTECTOMY      COLON SURGERY      FINGER SURGERY Right 09/05/2017    [facility]       ALLERGIES AND MEDICATION:   Review of patient's allergies indicates:  No Known Allergies     Medication List           Accurate as of January 14, 2020  9:56 AM. If you have any questions, ask your nurse or doctor.               CONTINUE taking these medications    albuterol 90 mcg/actuation inhaler  Commonly known as:   Ventolin HFA  Inhale 2 puffs into the lungs every 6 (six) hours as needed.     albuterol-ipratropium 2.5 mg-0.5 mg/3 mL nebulizer solution  Commonly known as:  DUO-NEB  TAKE 3 MLS BY NEBULIZATION EVERY 6 (SIX) HOURS AS NEEDED FOR WHEEZING. RESCUE     ALPRAZolam 1 MG tablet  Commonly known as:  XANAX  TAKE 1 TABLET BY MOUTH THREE TIMES A DAY AS NEEDED FOR ANXIETY. MAY CAUSE DROWSINESS.     amLODIPine 10 MG tablet  Commonly known as:  NORVASC     aspirin 81 MG EC tablet  Commonly known as:  ECOTRIN  Take 1 tablet (81 mg total) by mouth once daily.     atorvastatin 40 MG tablet  Commonly known as:  LIPITOR  Take 1 tablet (40 mg total) by mouth every evening.     azelastine 137 mcg (0.1 %) nasal spray  Commonly known as:  ASTELIN  USE 1 SPRAY NASALLY TWICE  DAILY     DULoxetine 30 MG capsule  Commonly known as:  CYMBALTA     dutasteride 0.5 mg capsule  Commonly known as:  AVODART  Take 1 capsule (0.5 mg total) by mouth once daily.     gabapentin 100 MG capsule  Commonly known as:  NEURONTIN     hydroxyurea 500 mg Cap  Commonly known as:  HYDREA  TAKE 1 CAPSULE(S) BY MOUTH ONCE A DAY.     isosorbide mononitrate 120 MG 24 hr tablet  Commonly known as:  IMDUR  Take 2 tablets (240 mg total) by mouth once daily.     meloxicam 15 MG tablet  Commonly known as:  MOBIC     montelukast 10 mg tablet  Commonly known as:  SINGULAIR  Take 1 tablet (10 mg total) by mouth once daily.     pantoprazole 40 MG tablet  Commonly known as:  PROTONIX  Take 1 tablet (40 mg total) by mouth once daily.     predniSONE 20 MG tablet  Commonly known as:  DELTASONE  Take PRN and sparingly  For  SOB  And  Tight musus    1 tab  X 3  Days   Then   1/2 tab  X 2 days  Then  1/4  Tab  Ax  2 days     Symbicort 160-4.5 mcg/actuation Hfaa  Generic drug:  budesonide-formoterol 160-4.5 mcg  Inhale 2 puffs into the lungs 2 (two) times daily.            SOCIAL HISTORY:     Social History     Socioeconomic History    Marital status: Single     Spouse name: Not on  file    Number of children: 6    Years of education: Not on file    Highest education level: Not on file   Occupational History    Not on file   Social Needs    Financial resource strain: Not on file    Food insecurity:     Worry: Not on file     Inability: Not on file    Transportation needs:     Medical: Not on file     Non-medical: Not on file   Tobacco Use    Smoking status: Former Smoker     Packs/day: 3.00     Years: 10.00     Pack years: 30.00     Last attempt to quit: 12/8/2009     Years since quitting: 10.1    Smokeless tobacco: Never Used    Tobacco comment: use to smoke  3-5 packs a day.    Substance and Sexual Activity    Alcohol use: No    Drug use: No    Sexual activity: Never     Partners: Female     Birth control/protection: Surgical   Lifestyle    Physical activity:     Days per week: Not on file     Minutes per session: Not on file    Stress: Not on file   Relationships    Social connections:     Talks on phone: Not on file     Gets together: Not on file     Attends Hoahaoism service: Not on file     Active member of club or organization: Not on file     Attends meetings of clubs or organizations: Not on file     Relationship status: Not on file   Other Topics Concern    Caffeine Use Yes    Financial Status: Disabled Yes    Legal: Involved in criminal litigation Not Asked    Caffeine Use: Frequent Not Asked    Financial Status: Employed Not Asked    Legal: Other Not Asked    Caffeine Use: Moderate Not Asked    Financial Status: Unemployed Not Asked    Leisure: Exercise Not Asked    Caffeine Use: Substantial Not Asked    Financial Status: Other Not Asked    Leisure: Fishing Not Asked    Childhood History: Adopted Not Asked    Firearms: Does patient have access to a firearm? Yes     Comment: locked gun cab    Leisure: Hunting Not Asked    Childhood History: Early trauma Not Asked    Home situation: homeless Not Asked    Leisure: Movie Watching Not Asked    Childhood  History: Raised by parents Not Asked    Home situation: lives alone Yes    Leisure: Shopping Not Asked    Childhood History: Uneventful Not Asked    Home situation: lives in group home Not Asked    Leisure: Sports Not Asked    Childhood History: Other Not Asked    Home situation: lives in nursing home Not Asked    Leisure: Time with family Not Asked    Education: Unfinished High School Yes     Comment: 8th grade    Home situation: lives in shelter Not Asked     Service No    Education: High School Graduate Not Asked    Home situation: lives with family Not Asked    Spirituality: Active Participation No    Education: Unfinished college Not Asked    Home situation: lives with friends Not Asked    Spirituality: Organized Zoroastrian No    Education: Trade School Not Asked    Home situation: lives with significant other Not Asked    Spirituality: Private Participation No    Education: Associate's Degree Not Asked    Home situation: lives with spouse Not Asked    Patient feels they ought to cut down on drinking/drug use Not Asked    Education: Bachelor's Degree Not Asked    Legal consequences of chemical use Not Asked    Patient annoyed by others criticizing their drinking/drug use Not Asked    Education: More than one Bachelor's or Professional Not Asked    Legal: Arrest history No    Patient has felt bad or guilty about drinking/drug use Not Asked    Education: Master's, PhD Not Asked    Legal: Involved in civil litigation Not Asked    Patient has had a drink/used drugs as an eye opener in the AM Not Asked   Social History Narrative    Not on file       FAMILY HISTORY:     Family History   Problem Relation Age of Onset    Diabetes Mother     Heart disease Mother     Diabetes Father     Hypertension Father     Stroke Father     ADD / ADHD Neg Hx     Alcohol abuse Neg Hx     Anxiety disorder Neg Hx     Bipolar disorder Neg Hx     Dementia Neg Hx     Depression Neg Hx      "Drug abuse Neg Hx     OCD Neg Hx     Paranoid behavior Neg Hx     Physical abuse Neg Hx     Schizophrenia Neg Hx     Seizures Neg Hx     Self injury Neg Hx     Sexual abuse Neg Hx     Suicide Neg Hx     Melanoma Neg Hx        REVIEW OF SYSTEMS:   Review of Systems   Constitutional: Negative for chills, diaphoresis and fever.   HENT: Negative for nosebleeds.    Eyes: Negative for blurred vision, double vision and photophobia.   Respiratory: Positive for shortness of breath. Negative for hemoptysis and wheezing.    Cardiovascular: Positive for chest pain. Negative for palpitations, orthopnea, claudication, leg swelling and PND.   Gastrointestinal: Negative for abdominal pain, blood in stool, heartburn, melena, nausea and vomiting.   Genitourinary: Negative for flank pain and hematuria.   Musculoskeletal: Negative for falls, myalgias and neck pain.   Skin: Negative for rash.   Neurological: Negative for dizziness, seizures, loss of consciousness, weakness and headaches.   Endo/Heme/Allergies: Negative for polydipsia. Does not bruise/bleed easily.   Psychiatric/Behavioral: Negative for depression and memory loss. The patient is not nervous/anxious.        PHYSICAL EXAM:     Vitals:    01/14/20 0943   BP: (!) 148/64   Pulse: 88   Resp: 15    Body mass index is 20.68 kg/m².  Weight: 59.9 kg (132 lb 0.9 oz)   Height: 5' 7" (170.2 cm)     Physical Exam   Constitutional: He is oriented to person, place, and time. He appears well-developed and well-nourished. He is cooperative.  Non-toxic appearance. No distress.   HENT:   Head: Normocephalic and atraumatic.   Eyes: Pupils are equal, round, and reactive to light. Conjunctivae and EOM are normal. No scleral icterus.   Neck: Trachea normal and normal range of motion. Neck supple. Normal carotid pulses and no JVD present. Carotid bruit is not present. No neck rigidity. No tracheal deviation and no edema present. No thyromegaly present.   Cardiovascular: Normal rate, " regular rhythm, S1 normal and S2 normal. PMI is not displaced. Exam reveals distant heart sounds. Exam reveals no gallop and no friction rub.   No murmur heard.  Pulses:       Carotid pulses are 2+ on the right side, and 2+ on the left side.  Pulmonary/Chest: Effort normal. No stridor. No respiratory distress. He has no wheezes. He has no rales. He exhibits no tenderness.   Poor air entry bilat   Abdominal: Soft. He exhibits no distension. There is no hepatosplenomegaly.   Musculoskeletal: Normal range of motion. He exhibits no edema or tenderness.   Feet:   Right Foot:   Skin Integrity: Negative for ulcer.   Left Foot:   Skin Integrity: Negative for ulcer.   Neurological: He is alert and oriented to person, place, and time. No cranial nerve deficit.   Skin: Skin is warm and dry. No rash noted. No erythema.   Psychiatric: He has a normal mood and affect. His speech is normal and behavior is normal.   Vitals reviewed.      DATA:   EKG: (personally reviewed tracing)  8/13/19 SR 80    Laboratory:  CBC:  Recent Labs   Lab 02/10/19  1532 12/23/19  1205  12/24/19  0404 12/24/19  1632 12/25/19  0420 12/25/19  0819   WBC 16.64 H 10.97  --  11.72  11.72  --   --   --    Hemoglobin 10.9 L 7.1 L   < > 7.4 L  7.4 L 6.8 L 9.7 L 10.6 L   Hematocrit 40.2 25.6 L  --  26.6 L  26.6 L 24.0 L 32.2 L 34.8 L   Platelets 193 403 H  --  437 H  437 H  --   --   --     < > = values in this interval not displayed.       CHEMISTRIES:  Recent Labs   Lab 11/11/17  1613  12/23/19  1205 12/24/19  0404 12/25/19  0420   Glucose 121 H   < > 90 133 H 84   Sodium 142   < > 144 140 139   Potassium 3.5   < > 4.2 4.9 3.5   BUN, Bld 13   < > 16 19 17   Creatinine 1.0   < > 0.7 0.8 0.8   eGFR if African American >60   < > >60 >60 >60   eGFR if non African American >60   < > >60 >60 >60   Calcium 9.5   < > 8.8 8.9 8.2 L   Magnesium 1.2 L  --   --   --   --     < > = values in this interval not displayed.       CARDIAC BIOMARKERS:  Recent Labs   Lab  11/12/17  0423 02/16/18  1525 02/10/19  1415   Troponin I <0.006 <0.006 <0.006       COAGS:  Recent Labs   Lab 08/29/17  1734 02/10/19  1415   INR 1.1 1.0       LIPIDS/LFTS:  Recent Labs   Lab 07/19/17  0816  08/22/18  1000  04/25/19  0936 12/23/19  1205 12/24/19  0404 12/25/19  0420   Cholesterol 194  --  226 H  --  122  --   --   --    Triglycerides 322 H  --  168 H  --  82  --   --   --    HDL 43  --  55  --  44  --   --   --    LDL Calculated 87  --   --   --   --   --   --   --    LDL Cholesterol  --   --  137.4  --  61.6 L  --   --   --    Non-HDL Cholesterol  --   --  171  --  78  --   --   --    AST 19   < >  --    < > 19 19 14 13   ALT 12   < >  --    < > 13 17 17 11    < > = values in this interval not displayed.       Cardiovascular Testing:  Cardiac PET 8/13/19    The relative PET images are normal showing no clinically significant regional resting or stress induced perfusion defects.    Whole heart absolute myocardial perfusion (cc/min/g) averaged 2.27  at rest (which is elevated), 3.47 cc/min/g at stress (which is normal), and 1.53 cc/min/g CFR (which is moderately reduced in part due to elevated resting flow).    Gated perfusion images showed an ejection fraction of 86.0 % at rest and 87 % during stress. Normal is greater than 51%.    Wall motion was normal at rest and during stress.    LV cavity size is normal at rest and stress.    The EKG portion of this study is negative for ischemia.    There were no arrhythmias during stress.    The patient reported no chest pain during the stress test.    Resting absolute blood flow is markedly elevated.  These findings can be seen with anxiety, marked anemia or AV fistulas.  Resting blood flow in the COPD population has not been defined or reported and therefore could be a normal variant. Clinical correlation suggested.    There are no prior studies for comparison.    L MPI 1/29/19  Probably normal myocardial perfusion study  There were no  arrhythmias during stress.  The patient reported SOB (non-anginal) and chest discomfort during the stress test.  There is a small amount of mild, due to diaphragm shadow defect(s) in the inferoseptal wall(s),  LVEF post-stress is 69%  The EKG portion of this study is negative for myocardial ischemia.  Recommend PET stress or catheterization if further symptoms and clinical indication    Echo 1/29/19  Normal left ventricular systolic function. The estimated ejection fraction is 60%     LE art US/JACQUE 1/29/19  No evidence of hemodynamically significant infrainguinal PAD bilaterally.  Tri- and (predominantly) biphasic waveforms throughout.  Mildly decreased JACQUE bilaterally (R 0.94, L 0.90).    CTA C/A/P 1/4/19:  No aortic aneurysm or dissection.  Atherosclerotic calcification of the bilateral common and external iliac arteries with multifocal short segment stenosis of approximately 50%.  There is scattered calcific coronary atherosclerosis.  Extensive panlobular emphysema in an apical predominance.  Incidental findings as above.  No concerning abdominal or pelvic findings.    ASSESSMENT:   # CP with predom atyp features (nonexertional, persistent), presumed CAD (CTA 1/4/2019).  Echo/MPI 2/2019 normal (?inf atten artifact).  Cardiac PET 8/2019 normal.    # PAD (LE inflow disease on CTA 1/4/19), essentially asymptomatic.  LE art US/JACQUE without flow limiting disease 2/2019  # HTN, uncontrolled  # HLP on atorva 40mg  # PCV, followed by Dr. Welsh with episodic phlebotomy and hydrea rx  # COPD on home O2  # recent hospitalization for anemia (12/2019), GI eval ongoing with plan for capsule endoscopy.    PLAN:   Cont med rx  Add toprol XL 25mg qd  Follow up with GI as planned  RTC 3 months or sooner for accelerating sxs, may need cath.    Dario Lynn MD, [facility]

## 2020-01-14 NOTE — LETTER
January 14, 2020      Warner Obregon Jr., MD  [address]  [place] [address]           [facility]  [address]  [place] [address]  Phone: [phone]          Patient: Chaz Stuart   MR Number: [id number]   YOB: 1950   Date of Visit: 1/14/2020       Dear Dr. Warner Obregon Jr.:    Thank you for referring Chaz Stuart to me for evaluation. Attached you will find relevant portions of my assessment and plan of care.    If you have questions, please do not hesitate to call me. I look forward to following Chaz Stuart along with you.    Sincerely,    Dario Lynn MD    Enclosure  CC:  No Recipients    If you would like to receive this communication electronically, please contact [email or web address] or [phone] to request more information on [employer] access.    For providers and/or their staff who would like to refer a patient to [facility], please contact us through our one-stop-shop provider referral line, [facility] , at [phone].    If you feel you have received this communication in error or would no longer like to receive these types of communications, please e-mail [email or web address]

## 2020-01-15 DIAGNOSIS — J43.1 PANLOBULAR EMPHYSEMA: ICD-10-CM

## 2020-01-15 RX ORDER — ATORVASTATIN CALCIUM 40 MG/1
TABLET, FILM COATED ORAL
Qty: 90 TABLET | Refills: 3 | Status: SHIPPED | OUTPATIENT
Start: 2020-01-15 | End: 2021-01-01

## 2020-01-17 ENCOUNTER — HOSPITAL ENCOUNTER (EMERGENCY)
Facility: HOSPITAL | Age: 70
Discharge: HOME | End: 2020-01-20
Attending: EMERGENCY MEDICINE
Payer: MEDICARE

## 2020-01-17 VITALS
WEIGHT: 135 LBS | DIASTOLIC BLOOD PRESSURE: 59 MMHG | SYSTOLIC BLOOD PRESSURE: 129 MMHG | RESPIRATION RATE: 27 BRPM | HEIGHT: 67 IN | HEART RATE: 86 BPM | TEMPERATURE: 99 F | OXYGEN SATURATION: 97 % | BODY MASS INDEX: 21.19 KG/M2

## 2020-01-17 DIAGNOSIS — R06.02 SOB (SHORTNESS OF BREATH): ICD-10-CM

## 2020-01-17 DIAGNOSIS — J18.9 PNEUMONIA OF RIGHT LOWER LOBE DUE TO INFECTIOUS ORGANISM: Primary | ICD-10-CM

## 2020-01-17 DIAGNOSIS — R05.9 COUGH: ICD-10-CM

## 2020-01-17 DIAGNOSIS — R07.9 CHEST PAIN: ICD-10-CM

## 2020-01-17 LAB
ALBUMIN SERPL BCP-MCNC: 3.6 G/DL (ref 3.5–5.2)
ALP SERPL-CCNC: 77 U/L (ref 55–135)
ALT SERPL W/O P-5'-P-CCNC: 13 U/L (ref 10–44)
ANION GAP SERPL CALC-SCNC: 8 MMOL/L (ref 8–16)
AST SERPL-CCNC: 17 U/L (ref 10–40)
BASOPHILS # BLD AUTO: 0.05 K/UL (ref 0–0.2)
BASOPHILS NFR BLD: 0.6 % (ref 0–1.9)
BILIRUB SERPL-MCNC: 0.3 MG/DL (ref 0.1–1)
BILIRUB UR QL STRIP: NEGATIVE
BNP SERPL-MCNC: 49 PG/ML (ref 0–99)
BUN SERPL-MCNC: 7 MG/DL (ref 8–23)
CALCIUM SERPL-MCNC: 9.5 MG/DL (ref 8.7–10.5)
CHLORIDE SERPL-SCNC: 101 MMOL/L (ref 95–110)
CLARITY UR: CLEAR
CO2 SERPL-SCNC: 30 MMOL/L (ref 23–29)
COLOR UR: YELLOW
CREAT SERPL-MCNC: 0.8 MG/DL (ref 0.5–1.4)
CTP QC/QA: YES
DIFFERENTIAL METHOD: ABNORMAL
EOSINOPHIL # BLD AUTO: 0.1 K/UL (ref 0–0.5)
EOSINOPHIL NFR BLD: 0.9 % (ref 0–8)
ERYTHROCYTE [DISTWIDTH] IN BLOOD BY AUTOMATED COUNT: 17.8 % (ref 11.5–14.5)
EST. GFR  (AFRICAN AMERICAN): >60 ML/MIN/1.73 M^2
EST. GFR  (NON AFRICAN AMERICAN): >60 ML/MIN/1.73 M^2
GLUCOSE SERPL-MCNC: 91 MG/DL (ref 70–110)
GLUCOSE UR QL STRIP: NEGATIVE
HCT VFR BLD AUTO: 36.5 % (ref 40–54)
HGB BLD-MCNC: 10.7 G/DL (ref 14–18)
HGB UR QL STRIP: NEGATIVE
IMM GRANULOCYTES # BLD AUTO: 0.04 K/UL (ref 0–0.04)
IMM GRANULOCYTES NFR BLD AUTO: 0.5 % (ref 0–0.5)
KETONES UR QL STRIP: NEGATIVE
LEUKOCYTE ESTERASE UR QL STRIP: NEGATIVE
LIPASE SERPL-CCNC: 11 U/L (ref 4–60)
LYMPHOCYTES # BLD AUTO: 1.2 K/UL (ref 1–4.8)
LYMPHOCYTES NFR BLD: 15 % (ref 18–48)
MCH RBC QN AUTO: 25.5 PG (ref 27–31)
MCHC RBC AUTO-ENTMCNC: 29.3 G/DL (ref 32–36)
MCV RBC AUTO: 87 FL (ref 82–98)
MONOCYTES # BLD AUTO: 0.9 K/UL (ref 0.3–1)
MONOCYTES NFR BLD: 11.7 % (ref 4–15)
NEUTROPHILS # BLD AUTO: 5.7 K/UL (ref 1.8–7.7)
NEUTROPHILS NFR BLD: 71.3 % (ref 38–73)
NITRITE UR QL STRIP: NEGATIVE
NRBC BLD-RTO: 0 /100 WBC
PH UR STRIP: 6 [PH] (ref 5–8)
PLATELET # BLD AUTO: 794 K/UL (ref 150–350)
PMV BLD AUTO: 8.9 FL (ref 9.2–12.9)
POC MOLECULAR INFLUENZA A AGN: NEGATIVE
POC MOLECULAR INFLUENZA B AGN: NEGATIVE
POTASSIUM SERPL-SCNC: 3.7 MMOL/L (ref 3.5–5.1)
PROT SERPL-MCNC: 7.2 G/DL (ref 6–8.4)
PROT UR QL STRIP: NEGATIVE
RBC # BLD AUTO: 4.19 M/UL (ref 4.6–6.2)
SODIUM SERPL-SCNC: 139 MMOL/L (ref 136–145)
SP GR UR STRIP: >1.06 (ref 1–1.03)
TROPONIN I SERPL DL<=0.01 NG/ML-MCNC: 0.01 NG/ML (ref 0–0.03)
TROPONIN I SERPL DL<=0.01 NG/ML-MCNC: <0.006 NG/ML (ref 0–0.03)
URN SPEC COLLECT METH UR: NORMAL
UROBILINOGEN UR STRIP-ACNC: NEGATIVE EU/DL
WBC # BLD AUTO: 7.95 K/UL (ref 3.9–12.7)

## 2020-01-17 PROCEDURE — 93005 ELECTROCARDIOGRAM TRACING: CPT

## 2020-01-17 PROCEDURE — 87502 INFLUENZA DNA AMP PROBE: CPT

## 2020-01-17 PROCEDURE — 94640 AIRWAY INHALATION TREATMENT: CPT

## 2020-01-17 PROCEDURE — 93010 EKG 12-LEAD: ICD-10-PCS | Mod: ,,, | Performed by: INTERNAL MEDICINE

## 2020-01-17 PROCEDURE — 93010 ELECTROCARDIOGRAM REPORT: CPT | Mod: ,,, | Performed by: INTERNAL MEDICINE

## 2020-01-17 PROCEDURE — 99285 EMERGENCY DEPT VISIT HI MDM: CPT | Mod: 25

## 2020-01-17 PROCEDURE — 83690 ASSAY OF LIPASE: CPT

## 2020-01-17 PROCEDURE — 84484 ASSAY OF TROPONIN QUANT: CPT | Mod: 91

## 2020-01-17 PROCEDURE — 85025 COMPLETE CBC W/AUTO DIFF WBC: CPT

## 2020-01-17 PROCEDURE — 25500020 PHARM REV CODE 255: Performed by: EMERGENCY MEDICINE

## 2020-01-17 PROCEDURE — 83880 ASSAY OF NATRIURETIC PEPTIDE: CPT

## 2020-01-17 PROCEDURE — 63600175 PHARM REV CODE 636 W HCPCS: Performed by: EMERGENCY MEDICINE

## 2020-01-17 PROCEDURE — 81003 URINALYSIS AUTO W/O SCOPE: CPT

## 2020-01-17 PROCEDURE — 80053 COMPREHEN METABOLIC PANEL: CPT

## 2020-01-17 PROCEDURE — 36000 PLACE NEEDLE IN VEIN: CPT

## 2020-01-17 PROCEDURE — 25000242 PHARM REV CODE 250 ALT 637 W/ HCPCS: Performed by: EMERGENCY MEDICINE

## 2020-01-17 RX ORDER — LEVOFLOXACIN 750 MG/1
750 TABLET ORAL
Status: COMPLETED | OUTPATIENT
Start: 2020-01-17 | End: 2020-01-17

## 2020-01-17 RX ORDER — LEVOFLOXACIN 750 MG/1
750 TABLET ORAL DAILY
Qty: 4 TABLET | Refills: 0 | Status: SHIPPED | OUTPATIENT
Start: 2020-01-18 | End: 2020-01-22

## 2020-01-17 RX ORDER — IPRATROPIUM BROMIDE AND ALBUTEROL SULFATE 2.5; .5 MG/3ML; MG/3ML
3 SOLUTION RESPIRATORY (INHALATION)
Status: COMPLETED | OUTPATIENT
Start: 2020-01-17 | End: 2020-01-17

## 2020-01-17 RX ADMIN — IPRATROPIUM BROMIDE AND ALBUTEROL SULFATE 3 ML: .5; 3 SOLUTION RESPIRATORY (INHALATION) at 12:01

## 2020-01-17 RX ADMIN — LEVOFLOXACIN 750 MG: 750 TABLET, FILM COATED ORAL at 03:01

## 2020-01-17 RX ADMIN — IOHEXOL 75 ML: 350 INJECTION, SOLUTION INTRAVENOUS at 11:01

## 2020-01-17 NOTE — ED PROVIDER NOTES
Encounter Date: 1/17/2020       History     Chief Complaint   Patient presents with    Dizziness     pt report SOB, dizziness, chills, cold like s/s x2 days. pt on O2 at home. pt report he gets dizzy when his blood counts get low.     Nasal Congestion     This is a 69-year-old male with past medical history of COPD on 2 L home O2, diverticulitis, anxiety, acid reflux, hypertension, mi x4 without any stent placement, polycythemia vera with recent anemia requiring 2 units blood transfusion in December who presents with multiple complaints. Patient states over the last 2-3 days he has felt lightheaded with standing which is how he felt when he received his blood transfusion.  He denies any melena or bright red blood per rectum.  He has not had a colonoscopy or EGD done yet.  He also reports some shortness of breath and fatigue but denies any palpitations.  He reports intermittent chest pain for which she was seen by Dr. Lynn this week and was started on metoprolol.  He said his last episode of chest pain was a couple of days ago but then during my exam he states that he did have little substernal chest discomfort.  He also reports cold-like symptoms with noted nasal congestion, chest congestion, cough productive of yellow white sputum, sore throat, diarrhea x2 in the last 1 day and bilateral lower abdominal pain.  He denies any urinary symptoms, nausea, vomiting, fever.  Denies any known sick contacts.  He denies alcohol or illicit drug use.  Reports he smoked tobacco but quit 10 years ago.  He believes that he had a cholecystectomy but is unsure.  He denies any allergies.  He has not recently been on antibiotics and has not recently taken prednisone.        Review of patient's allergies indicates:  No Known Allergies  Past Medical History:   Diagnosis Date    Anxiety     COPD (chronic obstructive pulmonary disease)     DDD (degenerative disc disease), cervical     Diverticulitis     GERD (gastroesophageal  reflux disease)     Hypertension     Myocardial infarction     On home oxygen therapy     PAD (peripheral artery disease)     Polycythemia vera 1 year     Past Surgical History:   Procedure Laterality Date    CERVICAL SPINE SURGERY  Fusion    CHOLECYSTECTOMY      COLON SURGERY      FINGER SURGERY Right 09/05/2017    ORIF     Family History   Problem Relation Age of Onset    Diabetes Mother     Heart disease Mother     Diabetes Father     Hypertension Father     Stroke Father     ADD / ADHD Neg Hx     Alcohol abuse Neg Hx     Anxiety disorder Neg Hx     Bipolar disorder Neg Hx     Dementia Neg Hx     Depression Neg Hx     Drug abuse Neg Hx     OCD Neg Hx     Paranoid behavior Neg Hx     Physical abuse Neg Hx     Schizophrenia Neg Hx     Seizures Neg Hx     Self injury Neg Hx     Sexual abuse Neg Hx     Suicide Neg Hx     Melanoma Neg Hx      Social History     Tobacco Use    Smoking status: Former Smoker     Packs/day: 3.00     Years: 10.00     Pack years: 30.00     Last attempt to quit: 12/8/2009     Years since quitting: 10.1    Smokeless tobacco: Never Used    Tobacco comment: use to smoke  3-5 packs a day.    Substance Use Topics    Alcohol use: No    Drug use: No     Review of Systems   Constitutional: Positive for fatigue. Negative for chills, diaphoresis and fever.   HENT: Positive for congestion and sore throat.    Eyes: Negative for photophobia and visual disturbance.   Respiratory: Positive for cough, chest tightness and shortness of breath.    Cardiovascular: Negative for chest pain and leg swelling.   Gastrointestinal: Positive for abdominal pain and diarrhea. Negative for blood in stool, constipation, nausea and vomiting.   Genitourinary: Negative for dysuria, frequency, hematuria and urgency.   Musculoskeletal: Negative for neck pain and neck stiffness.   Skin: Negative for rash and wound.   Neurological: Positive for light-headedness. Negative for weakness, numbness  and headaches.   Hematological: Does not bruise/bleed easily.   Psychiatric/Behavioral: Negative for confusion and suicidal ideas.   All other systems reviewed and are negative.      Physical Exam     Initial Vitals [01/17/20 1010]   BP Pulse Resp Temp SpO2   130/63 91 20 98.5 °F (36.9 °C) 95 %      MAP       --         Physical Exam    Nursing note and vitals reviewed.  Constitutional: He appears well-developed and well-nourished. He is not diaphoretic. No distress.   Patient appears in no apparent distress on exam.   HENT:   Head: Normocephalic and atraumatic.   Right Ear: External ear normal.   Left Ear: External ear normal.   Mouth/Throat: Oropharynx is clear and moist. No oropharyngeal exudate.   Posterior oropharynx clear.  No exudate or erythema noted.   Eyes: Conjunctivae and EOM are normal. Pupils are equal, round, and reactive to light. Right eye exhibits no discharge. Left eye exhibits no discharge.   Neck: Normal range of motion. Neck supple. No JVD present.   Cardiovascular: Normal rate, regular rhythm, normal heart sounds and intact distal pulses. Exam reveals no gallop and no friction rub.    No murmur heard.  Pulmonary/Chest: Breath sounds normal. No respiratory distress. He has no wheezes. He has no rhonchi. He has no rales. He exhibits no tenderness.   No respiratory distress satting 100% on 2 L home O2.   Abdominal: Soft. Bowel sounds are normal. He exhibits no distension. There is tenderness (Generalized abdominal tenderness.). There is no rebound and no guarding.   Negative Mireles's sign, no CVA tenderness   Musculoskeletal: Normal range of motion. He exhibits no edema or tenderness.   Lymphadenopathy:     He has no cervical adenopathy.   Neurological: He is alert and oriented to person, place, and time. No cranial nerve deficit. GCS score is 15. GCS eye subscore is 4. GCS verbal subscore is 5. GCS motor subscore is 6.   Skin: Skin is warm and dry. Capillary refill takes less than 2 seconds.    Psychiatric: He has a normal mood and affect. Thought content normal.         ED Course   Procedures  Labs Reviewed   CBC W/ AUTO DIFFERENTIAL   COMPREHENSIVE METABOLIC PANEL   TROPONIN I   TROPONIN I   B-TYPE NATRIURETIC PEPTIDE   LIPASE   URINALYSIS, REFLEX TO URINE CULTURE   POCT INFLUENZA A/B MOLECULAR     EKG Readings: (Independently Interpreted)   Normal sinus rhythm at 81 with no significant ST elevation or depression.  T-wave inversions in V1 V2 aVL.  Normal axis.  Normal intervals.  This is similar to his EKG from December 23, 2019.       Imaging Results    None     MDM  This is a 69-year-old male with past medical history of COPD on 2 L home O2, diverticulitis, anxiety, GERD, hypertension, PCV, recent anemia with 2 unit blood transfusion who presents with multiple complaints.  Patient reports lightheadedness for 2 days with associated shortness of breath and fatigue.  Also reports cold-like symptoms with diarrhea and abdominal pain. Reports he feels as though his blood count is low again.  On exam patient with clear lung sounds, normal posterior oropharynx.  Diffuse abdominal tenderness. Normal vital signs.  Differential diagnosis includes but is not limited to anemia, cold-like symptoms, influenza a, diverticulitis, ACS, pneumonia, pneumothorax, COPD, CHF.  Will obtain labs, chest x-ray, CT abdomen pelvis for further evaluation.     Results with negative trop x 2, doubt ACS. Urine without signs of infection. Influenza negative. Mild anemia at baseline. Lipase WNL. BNP WNL. CT without process to explain abd pain, however RLL changes likely representing PNA in this patient presentation.     Discussed with patient results. Able to walk to bathroom on exam with mild SOB. Offered outpatient treatment with antibiotics (patient with duonebs and O2 at home) vs observation. Patient girlfriend will be staying with him and daughter lives down the street. He feels comfortable going home with antibiotics. Will give  first dose of levofloxacin here. (discussed risk of tendon/ligament injury). Recommend scheduled duoneb treatment for next 2-3 days and return for worsening of symptoms, fever, increasing SOB or any other concerns.                                      Clinical Impression:       ICD-10-CM ICD-9-CM   1. Pneumonia of right lower lobe due to infectious organism J18.1 486   2. Chest pain R07.9 786.50   3. Cough R05 786.2   4. SOB (shortness of breath) R06.02 786.05                             Marizol Cortes MD  01/18/20 1127

## 2020-01-17 NOTE — DISCHARGE INSTRUCTIONS
Please return to the ED immediately for worsening shortness of breath, chest pain, fever > 100.4, or any other concerns. Please take all antibiotics as prescribed even if you feel better. See your primary care doctor in 1-2 days for recheck of symptoms. Take albuterol scheduled for the next 2-3 days.    Thank you for coming to our Emergency Department today. It is important to remember that some problems are difficult to diagnose and may not be found during your first visit. Be sure to follow up with your primary care doctor and review any labs/imaging that was performed with them. If you do not have a primary care doctor, you may contact the one listed on your discharge paperwork or you may also call the [facility] Appointment Desk at [phone] to schedule an appointment with one.     All medications may potentially have side effects and it is impossible to predict which medications may give you side effects. If you feel that you are having a negative effect of any medication you should immediately stop taking them and seek medical attention.    Return to the ER with any questions/concerns, new/concerning symptoms, worsening or failure to improve. Do not drive or make any important decisions for 24 hours if you have received any pain medications, sedatives or mood altering drugs during your ER visit.

## 2020-01-17 NOTE — ED NOTES
Pt unable to urinate at this time, call light is within patient reach to notify staff when he does make urine.

## 2020-01-24 ENCOUNTER — TELEPHONE (OUTPATIENT)
Dept: FAMILY MEDICINE | Facility: CLINIC | Age: 70
End: 2020-01-24

## 2020-01-24 NOTE — TELEPHONE ENCOUNTER
----- Message from Juanito Feliz sent at 1/24/2020  9:34 AM CST -----  Contact: INDRA ALTAMIRANO [[id number]]  Name of Who is Calling: INDRA ALTAMIRANO [[id number]]      What is the request in detail: Would like to speak with staff in regards to scheduling a follow up appointment on 01/28. Patient refused next available of 1/29, only wants to see provider. Please advise      Can the clinic reply by [facility]: no      What Number to Call Back if not in [facility]: [phone]

## 2020-01-28 ENCOUNTER — TELEPHONE (OUTPATIENT)
Dept: ADMINISTRATIVE | Facility: HOSPITAL | Age: 70
End: 2020-01-28

## 2020-01-28 ENCOUNTER — OFFICE VISIT (OUTPATIENT)
Dept: FAMILY MEDICINE | Facility: CLINIC | Age: 70
End: 2020-01-28
Payer: MEDICARE

## 2020-01-28 VITALS
HEART RATE: 70 BPM | SYSTOLIC BLOOD PRESSURE: 120 MMHG | DIASTOLIC BLOOD PRESSURE: 78 MMHG | WEIGHT: 131.19 LBS | TEMPERATURE: 98 F | OXYGEN SATURATION: 97 % | RESPIRATION RATE: 17 BRPM | BODY MASS INDEX: 20.59 KG/M2 | HEIGHT: 67 IN

## 2020-01-28 DIAGNOSIS — N40.0 BENIGN PROSTATIC HYPERPLASIA, UNSPECIFIED WHETHER LOWER URINARY TRACT SYMPTOMS PRESENT: ICD-10-CM

## 2020-01-28 DIAGNOSIS — Z12.5 SCREENING FOR PROSTATE CANCER: ICD-10-CM

## 2020-01-28 DIAGNOSIS — Z99.81 OXYGEN DEPENDENT: ICD-10-CM

## 2020-01-28 DIAGNOSIS — I25.10 CORONARY ARTERY DISEASE, ANGINA PRESENCE UNSPECIFIED, UNSPECIFIED VESSEL OR LESION TYPE, UNSPECIFIED WHETHER NATIVE OR TRANSPLANTED HEART: ICD-10-CM

## 2020-01-28 DIAGNOSIS — J43.1 PANLOBULAR EMPHYSEMA: ICD-10-CM

## 2020-01-28 DIAGNOSIS — I10 ESSENTIAL HYPERTENSION: Primary | ICD-10-CM

## 2020-01-28 DIAGNOSIS — M46.96 UNSPECIFIED INFLAMMATORY SPONDYLOPATHY, LUMBAR REGION: ICD-10-CM

## 2020-01-28 PROCEDURE — 3074F SYST BP LT 130 MM HG: CPT | Mod: CPTII,S$GLB,, | Performed by: FAMILY MEDICINE

## 2020-01-28 PROCEDURE — 3078F PR MOST RECENT DIASTOLIC BLOOD PRESSURE < 80 MM HG: ICD-10-PCS | Mod: CPTII,S$GLB,, | Performed by: FAMILY MEDICINE

## 2020-01-28 PROCEDURE — 1101F PR PT FALLS ASSESS DOC 0-1 FALLS W/OUT INJ PAST YR: ICD-10-PCS | Mod: CPTII,S$GLB,, | Performed by: FAMILY MEDICINE

## 2020-01-28 PROCEDURE — 99214 PR OFFICE/OUTPT VISIT, EST, LEVL IV, 30-39 MIN: ICD-10-PCS | Mod: S$GLB,,, | Performed by: FAMILY MEDICINE

## 2020-01-28 PROCEDURE — 99214 OFFICE O/P EST MOD 30 MIN: CPT | Mod: S$GLB,,, | Performed by: FAMILY MEDICINE

## 2020-01-28 PROCEDURE — 99999 PR PBB SHADOW E&M-EST. PATIENT-LVL III: CPT | Mod: PBBFAC,,, | Performed by: FAMILY MEDICINE

## 2020-01-28 PROCEDURE — 99499 RISK ADDL DX/OHS AUDIT: ICD-10-PCS | Mod: S$GLB,,, | Performed by: FAMILY MEDICINE

## 2020-01-28 PROCEDURE — 1159F MED LIST DOCD IN RCRD: CPT | Mod: S$GLB,,, | Performed by: FAMILY MEDICINE

## 2020-01-28 PROCEDURE — 1101F PT FALLS ASSESS-DOCD LE1/YR: CPT | Mod: CPTII,S$GLB,, | Performed by: FAMILY MEDICINE

## 2020-01-28 PROCEDURE — 1159F PR MEDICATION LIST DOCUMENTED IN MEDICAL RECORD: ICD-10-PCS | Mod: S$GLB,,, | Performed by: FAMILY MEDICINE

## 2020-01-28 PROCEDURE — 99999 PR PBB SHADOW E&M-EST. PATIENT-LVL III: ICD-10-PCS | Mod: PBBFAC,,, | Performed by: FAMILY MEDICINE

## 2020-01-28 PROCEDURE — 3078F DIAST BP <80 MM HG: CPT | Mod: CPTII,S$GLB,, | Performed by: FAMILY MEDICINE

## 2020-01-28 PROCEDURE — 99499 UNLISTED E&M SERVICE: CPT | Mod: S$GLB,,, | Performed by: FAMILY MEDICINE

## 2020-01-28 PROCEDURE — 3074F PR MOST RECENT SYSTOLIC BLOOD PRESSURE < 130 MM HG: ICD-10-PCS | Mod: CPTII,S$GLB,, | Performed by: FAMILY MEDICINE

## 2020-02-10 NOTE — PROGRESS NOTES
Subjective:       Patient ID: Chaz Stuart is a 69 y.o. male.    Chief Complaint: Follow-up    HPI   69 year old male comes in for follow up on hypertension, back pain, coronary artery disease, and oxygen dependent emphysema. At the last visit, the patient was referred to his cardiologist for continued chest pains, to pulmonary disease and to psychiatry. He did not call to make psychiatrist appointment as he does not feel he needs it although he reports that he takes Xanax, and needs to be on it. He also reports that he will not make pulmonary appointment because he was previously told he does not need pulmonary appointment despite being oxygen dependent.     Review of Systems   Constitutional: Positive for fatigue. Negative for chills, fever and unexpected weight change.   HENT: Negative for hearing loss, rhinorrhea and sore throat.    Eyes: Positive for visual disturbance (seeing ophtho).   Respiratory: Positive for shortness of breath. Negative for cough, chest tightness and wheezing.    Cardiovascular: Negative for chest pain, palpitations and leg swelling.   Gastrointestinal: Positive for blood in stool. Negative for abdominal pain, constipation, diarrhea, nausea and vomiting.   Endocrine: Negative for polydipsia, polyphagia and polyuria.   Genitourinary: Negative for dysuria and hematuria.        Hesitancy     Musculoskeletal: Positive for arthralgias, back pain, gait problem and myalgias.   Skin: Negative for rash.   Allergic/Immunologic: Negative for food allergies and immunocompromised state.   Neurological: Positive for weakness. Negative for tremors, seizures, syncope, light-headedness and headaches.   Hematological: Negative for adenopathy. Bruises/bleeds easily.   Psychiatric/Behavioral: Positive for agitation, decreased concentration, dysphoric mood and sleep disturbance. Negative for hallucinations, self-injury and suicidal ideas. The patient is nervous/anxious.        Objective:     /78  "(BP Location: Left arm, Patient Position: Sitting, BP Method: Small (Automatic))   Pulse 70   Temp 97.7 °F (36.5 °C) (Oral)   Resp 17   Ht 5' 7" (1.702 m)   Wt 59.5 kg (131 lb 2.8 oz)   SpO2 97%   BMI 20.54 kg/m²     Physical Exam   Constitutional: He is oriented to person, place, and time. He appears ill (chronic). No distress. Nasal cannula in place.   HENT:   Head: Normocephalic and atraumatic.   Right Ear: Ear canal normal.   Left Ear: Ear canal normal.   Nose: Nose normal.   Neck: Trachea normal and normal range of motion. No thyroid mass present.   Cardiovascular: Normal rate, S1 normal, S2 normal and intact distal pulses.   Pulmonary/Chest: Effort normal. He has no wheezes. He has no rhonchi. He has no rales.   Abdominal: Soft. Normal appearance and bowel sounds are normal. There is no tenderness.   Musculoskeletal: He exhibits no edema.   Neurological: He is alert and oriented to person, place, and time.   Psychiatric: His speech is not rapid and/or pressured and not slurred. He is not agitated, not aggressive and not hyperactive. Thought content is not paranoid. He expresses no homicidal and no suicidal ideation. He is communicative.   Poor insight to his medical conditions       Assessment:       1. Essential hypertension    2. Unspecified inflammatory spondylopathy, lumbar region    3. Benign prostatic hyperplasia, unspecified whether lower urinary tract symptoms present    4. Screening for prostate cancer    5. Oxygen dependent    6. Panlobular emphysema    7. Coronary artery disease, angina presence unspecified, unspecified vessel or lesion type, unspecified whether native or transplanted heart        Plan:       Chaz was seen today for follow-up.    Diagnoses and all orders for this visit:    Essential hypertension  Continue current regimen.    Unspecified inflammatory spondylopathy, lumbar region  Patient was offered referral to pain management but he declined this.     Benign prostatic " hyperplasia, unspecified whether lower urinary tract symptoms present  -     Ambulatory referral to Urology  Patient with history of BPH.  Referral to urology    Screening for prostate cancer  -     PSA, Screening; Future  Screen for prostate cancer    Oxygen dependent  -     fluticasone-umeclidin-vilanter (TRELEGY ELLIPTA) 100-62.5-25 mcg DsDv; Inhale 1 puff into the lungs once daily.  Urged patient to make appt witrh pulmonary.  Importance of this discussed, and patient and partner continue to refuse.  Symbicort no longer covered; change to Trelegy    Panlobular emphysema  -     fluticasone-umeclidin-vilanter (TRELEGY ELLIPTA) 100-62.5-25 mcg DsDv; Inhale 1 puff into the lungs once daily.  As above    Coronary artery disease, angina presence unspecified, unspecified vessel or lesion type, unspecified whether native or transplanted heart  Management as per cardiology

## 2020-02-17 ENCOUNTER — TELEPHONE (OUTPATIENT)
Dept: FAMILY MEDICINE | Facility: CLINIC | Age: 70
End: 2020-02-17

## 2020-02-17 NOTE — TELEPHONE ENCOUNTER
----- Message from Helena Cody sent at 2/17/2020 10:32 AM CST -----  Contact: pt  Type: Patient Call Back    Who called:pt    What is the request in detail:pt is requesting to get medical clearance. Call pt    Can the clinic reply by [facility]?    Would the patient rather a call back or a response via [facility]? call    Best call back number:[phone]    Additional Information:

## 2020-02-21 ENCOUNTER — OFFICE VISIT (OUTPATIENT)
Dept: FAMILY MEDICINE | Facility: CLINIC | Age: 70
End: 2020-02-21
Payer: MEDICARE

## 2020-02-21 VITALS
DIASTOLIC BLOOD PRESSURE: 66 MMHG | HEIGHT: 67 IN | TEMPERATURE: 98 F | SYSTOLIC BLOOD PRESSURE: 128 MMHG | BODY MASS INDEX: 21.07 KG/M2 | WEIGHT: 134.25 LBS | HEART RATE: 84 BPM | OXYGEN SATURATION: 94 % | RESPIRATION RATE: 16 BRPM

## 2020-02-21 DIAGNOSIS — H25.11 AGE-RELATED NUCLEAR CATARACT OF RIGHT EYE: Primary | ICD-10-CM

## 2020-02-21 DIAGNOSIS — Z99.81 OXYGEN DEPENDENT: ICD-10-CM

## 2020-02-21 DIAGNOSIS — J43.1 PANLOBULAR EMPHYSEMA: ICD-10-CM

## 2020-02-21 DIAGNOSIS — Z01.818 PREOP EXAMINATION: ICD-10-CM

## 2020-02-21 DIAGNOSIS — I10 ESSENTIAL HYPERTENSION: ICD-10-CM

## 2020-02-21 DIAGNOSIS — I25.10 CORONARY ARTERY DISEASE, ANGINA PRESENCE UNSPECIFIED, UNSPECIFIED VESSEL OR LESION TYPE, UNSPECIFIED WHETHER NATIVE OR TRANSPLANTED HEART: ICD-10-CM

## 2020-02-21 PROCEDURE — 1159F MED LIST DOCD IN RCRD: CPT | Mod: S$GLB,,, | Performed by: FAMILY MEDICINE

## 2020-02-21 PROCEDURE — 1101F PT FALLS ASSESS-DOCD LE1/YR: CPT | Mod: CPTII,S$GLB,, | Performed by: FAMILY MEDICINE

## 2020-02-21 PROCEDURE — 99999 PR PBB SHADOW E&M-EST. PATIENT-LVL III: ICD-10-PCS | Mod: PBBFAC,,, | Performed by: FAMILY MEDICINE

## 2020-02-21 PROCEDURE — 99999 PR PBB SHADOW E&M-EST. PATIENT-LVL III: CPT | Mod: PBBFAC,,, | Performed by: FAMILY MEDICINE

## 2020-02-21 PROCEDURE — 3078F DIAST BP <80 MM HG: CPT | Mod: CPTII,S$GLB,, | Performed by: FAMILY MEDICINE

## 2020-02-21 PROCEDURE — 3074F PR MOST RECENT SYSTOLIC BLOOD PRESSURE < 130 MM HG: ICD-10-PCS | Mod: CPTII,S$GLB,, | Performed by: FAMILY MEDICINE

## 2020-02-21 PROCEDURE — 3078F PR MOST RECENT DIASTOLIC BLOOD PRESSURE < 80 MM HG: ICD-10-PCS | Mod: CPTII,S$GLB,, | Performed by: FAMILY MEDICINE

## 2020-02-21 PROCEDURE — 1159F PR MEDICATION LIST DOCUMENTED IN MEDICAL RECORD: ICD-10-PCS | Mod: S$GLB,,, | Performed by: FAMILY MEDICINE

## 2020-02-21 PROCEDURE — 99214 PR OFFICE/OUTPT VISIT, EST, LEVL IV, 30-39 MIN: ICD-10-PCS | Mod: S$GLB,,, | Performed by: FAMILY MEDICINE

## 2020-02-21 PROCEDURE — 3074F SYST BP LT 130 MM HG: CPT | Mod: CPTII,S$GLB,, | Performed by: FAMILY MEDICINE

## 2020-02-21 PROCEDURE — 99214 OFFICE O/P EST MOD 30 MIN: CPT | Mod: S$GLB,,, | Performed by: FAMILY MEDICINE

## 2020-02-21 PROCEDURE — 1101F PR PT FALLS ASSESS DOC 0-1 FALLS W/OUT INJ PAST YR: ICD-10-PCS | Mod: CPTII,S$GLB,, | Performed by: FAMILY MEDICINE

## 2020-02-21 NOTE — PROGRESS NOTES
"Subjective:       Patient ID: Chaz Stuart is a 69 y.o. male.    Chief Complaint: Surgical Consult    HPI   69 year old male with multiple comorbidities comes in for cataract surgery clearance. He ha left side done 2 weeks ago and he and his girlfriend, who is present, report that it went without concern. The right is scheduled for 3/6/2020. He reports no new concerns today. He reports taking all his medications as prescribed.     Review of Systems   Constitutional: Positive for fatigue. Negative for chills, fever and unexpected weight change.   HENT: Negative for hearing loss, rhinorrhea and sore throat.    Eyes: Positive for visual disturbance (seeing ophtho).   Respiratory: Negative for cough, chest tightness, shortness of breath and wheezing.    Cardiovascular: Negative for chest pain, palpitations and leg swelling.   Gastrointestinal: Negative for abdominal pain and blood in stool.   Endocrine: Negative for polydipsia, polyphagia and polyuria.   Genitourinary: Negative for dysuria and hematuria.   Musculoskeletal: Positive for arthralgias.   Skin: Negative for rash.       Objective:     /66 (BP Location: Left arm, Patient Position: Sitting, BP Method: Small (Manual))   Pulse 84   Temp 98.4 °F (36.9 °C) (Oral)   Resp 16   Ht 5' 7" (1.702 m)   Wt 60.9 kg (134 lb 4.2 oz)   SpO2 (!) 94%   BMI 21.03 kg/m²     Physical Exam   Constitutional: He is oriented to person, place, and time. He appears ill (chronic). No distress. Nasal cannula in place.   HENT:   Head: Normocephalic and atraumatic.   Right Ear: Ear canal normal.   Left Ear: Ear canal normal.   Nose: Nose normal.   Neck: Trachea normal and normal range of motion. No thyroid mass present.   Cardiovascular: Normal rate, S1 normal, S2 normal and intact distal pulses.   Pulmonary/Chest: Effort normal. He has no wheezes. He has no rhonchi. He has no rales.   Abdominal: Soft. Normal appearance and bowel sounds are normal. There is no tenderness. "   Musculoskeletal: He exhibits no edema.   Neurological: He is alert and oriented to person, place, and time.   Psychiatric: His speech is not rapid and/or pressured and not slurred. He is not agitated, not aggressive and not hyperactive. Thought content is not paranoid. He expresses no homicidal and no suicidal ideation. He is communicative.   Poor insight to his medical conditions       Assessment:       1. Age-related nuclear cataract of right eye    2. Preop examination    3. Essential hypertension    4. Panlobular emphysema    5. Oxygen dependent    6. Coronary artery disease, angina presence unspecified, unspecified vessel or lesion type, unspecified whether native or transplanted heart        Plan:       Chaz was seen today for surgical consult.    Diagnoses and all orders for this visit:    Age-related nuclear cataract of right eye  Patient stable for surgery.  Form completed and faxed, and original given to patient    Preop examination  As above    Essential hypertension  Continue current regimen.;  BP stable    Panlobular emphysema  Patient continues to decline pulmonary consult    Oxygen dependent  Continue oxygen    Coronary artery disease, angina presence unspecified, unspecified vessel or lesion type, unspecified whether native or transplanted heart  Continue HTN regimen, and statin  No cardiac symptoms reported

## 2020-07-31 ENCOUNTER — TELEPHONE (OUTPATIENT)
Dept: FAMILY MEDICINE | Facility: CLINIC | Age: 70
End: 2020-07-31

## 2020-07-31 ENCOUNTER — OFFICE VISIT (OUTPATIENT)
Dept: FAMILY MEDICINE | Facility: CLINIC | Age: 70
End: 2020-07-31
Payer: MEDICARE

## 2020-07-31 DIAGNOSIS — J44.9 CHRONIC OBSTRUCTIVE PULMONARY DISEASE, UNSPECIFIED COPD TYPE: ICD-10-CM

## 2020-07-31 DIAGNOSIS — J43.1 PANLOBULAR EMPHYSEMA: ICD-10-CM

## 2020-07-31 DIAGNOSIS — J44.1 COPD WITH ACUTE EXACERBATION: Primary | ICD-10-CM

## 2020-07-31 PROCEDURE — 1159F MED LIST DOCD IN RCRD: CPT | Mod: 95,,, | Performed by: FAMILY MEDICINE

## 2020-07-31 PROCEDURE — 1101F PR PT FALLS ASSESS DOC 0-1 FALLS W/OUT INJ PAST YR: ICD-10-PCS | Mod: CPTII,95,, | Performed by: FAMILY MEDICINE

## 2020-07-31 PROCEDURE — 99442 PR PHYSICIAN TELEPHONE EVALUATION 11-20 MIN: ICD-10-PCS | Mod: 95,,, | Performed by: FAMILY MEDICINE

## 2020-07-31 PROCEDURE — 1159F PR MEDICATION LIST DOCUMENTED IN MEDICAL RECORD: ICD-10-PCS | Mod: 95,,, | Performed by: FAMILY MEDICINE

## 2020-07-31 PROCEDURE — 1101F PT FALLS ASSESS-DOCD LE1/YR: CPT | Mod: CPTII,95,, | Performed by: FAMILY MEDICINE

## 2020-07-31 PROCEDURE — 99442 PR PHYSICIAN TELEPHONE EVALUATION 11-20 MIN: CPT | Mod: 95,,, | Performed by: FAMILY MEDICINE

## 2020-07-31 RX ORDER — PREDNISONE 20 MG/1
TABLET ORAL
Qty: 10 TABLET | Refills: 0 | OUTPATIENT
Start: 2020-07-31

## 2020-07-31 RX ORDER — PREDNISONE 20 MG/1
20 TABLET ORAL DAILY
Qty: 5 TABLET | Refills: 0 | Status: SHIPPED | OUTPATIENT
Start: 2020-07-31 | End: 2020-08-05

## 2020-07-31 RX ORDER — ALBUTEROL SULFATE 90 UG/1
2 AEROSOL, METERED RESPIRATORY (INHALATION) EVERY 4 HOURS PRN
Qty: 8 G | Refills: 5 | Status: SHIPPED | OUTPATIENT
Start: 2020-07-31

## 2020-07-31 RX ORDER — AZITHROMYCIN 250 MG/1
TABLET, FILM COATED ORAL
Qty: 6 TABLET | Refills: 0 | Status: SHIPPED | OUTPATIENT
Start: 2020-07-31 | End: 2020-08-05

## 2020-07-31 NOTE — TELEPHONE ENCOUNTER
"I spoke to the pt and advised per PCP that the refill is not appropriate as we have not seen him since February. He was advised he needs an OV for evaluation. Pt states that he can "barely breathe" advised that is why we need to see him. Offered appointment. Pt refused and states"I will just go somewhere else then"  "

## 2020-07-31 NOTE — TELEPHONE ENCOUNTER
----- Message from Samia Hernandez sent at 7/31/2020  9:52 AM CDT -----  Regarding: refill  Type: RX Refill Request    Who Called:self    Have you contacted your pharmacy:no    Refill or New Rx:refill    RX Name and Strength:predniSONE (DELTASONE) 20 MG tablet      Preferred Pharmacy with phone number:..  [id number] - [place] [address] [place] [address]  [place] [address]  Phone: [phone] Fax: [phone]        Local or Mail Order: local      Would the patient rather a call back or a response via My [facility]? josé antonio    Best Call Back Number:[phone]

## 2020-07-31 NOTE — TELEPHONE ENCOUNTER
----- Message from Helena Cody sent at 7/31/2020  3:23 PM CDT -----  Regarding: medication refill  Type: RX Refill Request    Who Called:pt    Have you contacted your pharmacy:    Pt is in quarantine and is asking doctor to fill this. Cannot come into office    Refill or New Rx:predniSONE (DELTASONE) 20 MG tablet    RX Name and Strength:    How is the patient currently taking it? (ex. 1XDay):    Is this a 30 day or 90 day RX:    Preferred Pharmacy with phone number:  [id number] - [place], [place] - [facility] [employer] AT [address]  [facility] [address]  Phone: [phone] Fax: [phone]        Local or Mail Order:    Ordering Provider:    Would the patient rather a call back or a response via [facility]? call    Best Call Back Number:[phone] (home)       Additional Information:

## 2020-07-31 NOTE — PROGRESS NOTES
Established Patient - Audio Only Telehealth Visit     The patient location is: [facility]  The chief complaint leading to consultation is: shortness of breath  Visit type: Virtual visit with audio only (telephone)  Total time spent with patient: 15 minutes       The reason for the audio only service rather than synchronous audio and video virtual visit was related to technical difficulties or patient preference/necessity.     Each patient to whom I provide medical services by telemedicine is:  (1) informed of the relationship between the physician and patient and the respective role of any other health care provider with respect to management of the patient; and (2) notified that they may decline to receive medical services by telemedicine and may withdraw from such care at any time. Patient verbally consented to receive this service via voice-only telephone call.        This service was not originating from a related E/M service provided within the previous 7 days nor will  to an E/M service or procedure within the next 24 hours or my soonest available appointment.  Prevailing standard of care was able to be met in this audio-only visit.      Subjective:       Patient ID: Chaz Stuart is a 70 y.o. male.    Chief Complaint: Shortness of Breath    Shortness of Breath  This is a new problem. Episode onset: 2 weeks. Associated symptoms include rhinorrhea, sputum production (yellow in the morning, clear afternoon) and wheezing. Pertinent negatives include no abdominal pain, ear pain, fever (highest temperature 97.5), headaches, orthopnea, swollen glands or syncope. Treatments tried: Mucinex DM, Tylenol.   Of notes, he stopped Trelegy 2-4 weeks ago and restarted Symbicort. States that he felt Trelegy wasn't working, yet he had not complained of any COPD exacerbations in that time.      Review of Systems   Constitutional: Negative for fever (highest temperature 97.5).   HENT: Positive for rhinorrhea.  Negative for ear pain.    Respiratory: Positive for sputum production (yellow in the morning, clear afternoon), shortness of breath and wheezing.    Cardiovascular: Negative for orthopnea and syncope.   Gastrointestinal: Negative for abdominal pain.   Neurological: Negative for headaches.         Objective:      Physical Exam    Assessment:       1. COPD with acute exacerbation        Plan:       Chaz was seen today for shortness of breath.    Diagnoses and all orders for this visit:    COPD with acute exacerbation  -     predniSONE (DELTASONE) 20 MG tablet; Take 1 tablet (20 mg total) by mouth once daily. for 5 days  -     azithromycin (Z-ADRIANA) 250 MG tablet; Take 2 tablets by mouth on day 1; Take 1 tablet by mouth on days 2-5  -     albuterol (VENTOLIN HFA) 90 mcg/actuation inhaler; Inhale 2 puffs into the lungs every 4 (four) hours as needed for Wheezing or Shortness of Breath.    Recommended getting a COVID-19 but patient insists he will not have one done because he knows he does not have it. Informed him we can arrange for a drive up test but he still refuses.  Advised him to restart Trelegy. States he has at home.  Will start on Z-pack and short course of oral steroid for possible COPD exacerbation.  Advised if any worsening to go to nearest ED/call 911.

## 2020-07-31 NOTE — TELEPHONE ENCOUNTER
Spoke with pt and advised that an appt was needed with his PCP (in person or virtual) to follow-up - pt stated he was in quarantine and unable to come into office - offered a virtual appt, but pt stated that the PCP could call him (no computer access) - audio appt scheduled for 4p today with PCP.

## 2020-12-17 NOTE — PROGRESS NOTES
Requested updates within Care Everywhere.  Patient's chart was reviewed for overdue ANETA topics.  Immunizations reconciled.

## 2020-12-21 PROBLEM — I25.118 CORONARY ARTERY DISEASE OF NATIVE ARTERY OF NATIVE HEART WITH STABLE ANGINA PECTORIS: Status: ACTIVE | Noted: 2020-01-01

## 2020-12-21 NOTE — PATIENT INSTRUCTIONS
Take sublingual nitroglycerin as needed when you have chest pain.  Stop taking pantoprazole, you may take an antacid (Tums) if needed.  Follow up in 2 weeks.

## 2020-12-21 NOTE — ASSESSMENT & PLAN NOTE
- Will stop pantoprazole as above to determine if is is causing patient's chest pain  - May try antacids (Tums) as needed

## 2020-12-21 NOTE — ASSESSMENT & PLAN NOTE
- B/L iliac disease seen on CTA chest/abdomen/pelvis in the past  - Patient is asymptomatic, denies claudication  - On ASA and statin, stopped smoking in 2009

## 2020-12-21 NOTE — PROGRESS NOTES
Interventional Cardiology Clinic Note  Reason for Visit: Atypical Chest Pain  Referring Physician: Self, follow with Dr. Lynn    HPI:   Chaz Stuart is a 70 y.o. male who presents for atypical chest pain.    Patient has a PMHx of severe COPD on 2L home O2, HTN, HLD, PAD, and presumed CAD. He presents to interventional clinic c/o atypical chest pain that has been increasing in frequency. Chest pain occurs at rest about 1-2 times a day, is never exertional, and may last up to 2 hours. He has been on Imdur 120 mg Daily but has never taken SL NTG. He also has been on a PPI (pantoprazole). He has baseline SOB from his COPD. He denies any syncope, PND, orthopnea, or LE edema.    Patient has had a cardiac workup done bu Dr. Lynn in the past. A SPECT stress in 1/2019 was negative, and a subsequent PET stress test in 8/2019 was also negative. A CTA chest/abdomen/pelvis in 1/2019 demonstrated coronary atherosclerosis as well as B/L iliac disease. Patient denies any claudication.    Patient also had a Hx of anemia with HGB as low as 6.8 about 1 year ago. GI workup including a VCE was negative, and last HGB in 1/2020 was 10.4.    ROS:    Constitution: Negative for fever, chills, weight loss or gain.   HENT: Negative for sore throat, rhinorrhea, or headache.  Eyes: Negative for blurred or double vision.   Cardiovascular: See above  Pulmonary: Positive for SOB   Gastrointestinal: Negative for abdominal pain, nausea, vomiting, or diarrhea.   : Negative for dysuria.   Neurological: Negative for focal weakness or sensory changes.  PMH:     Past Medical History:   Diagnosis Date    Anxiety     COPD (chronic obstructive pulmonary disease)     DDD (degenerative disc disease), cervical     Diverticulitis     GERD (gastroesophageal reflux disease)     Hypertension     Myocardial infarction     On home oxygen therapy     PAD (peripheral artery disease)     Polycythemia vera 1 year     Past Surgical History:    Procedure Laterality Date    CERVICAL SPINE SURGERY  Fusion    CHOLECYSTECTOMY      COLON SURGERY      FING[facility]/05/2017    [facility]     Allergies:   Review of patient's allergies indicates:  No Known Allergies  Medications:     Current Outpatient Medications on File Prior to Visit   Medication Sig Dispense Refill    albuterol (VENTOLIN HFA) 90 mcg/actuation inhaler Inhale 2 puffs into the lungs every 4 (four) hours as needed for Wheezing or Shortness of Breath. 8 g 5    albuterol-ipratropium (DUO-NEB) 2.5 mg-0.5 mg/3 mL nebulizer solution TAKE 3 MLS BY NEBULIZATION EVERY 6 (SIX) HOURS AS NEEDED FOR WHEEZING. RESCUE 450 mL 3    ALPRAZolam (XANAX) 1 MG tablet TAKE 1 TABLET BY MOUTH THREE TIMES A DAY AS NEEDED FOR ANXIETY. MAY CAUSE DROWSINESS. (Patient taking differently: 1 mg 2 (two) times daily as needed. TAKE 1 TABLET BY MOUTH THREE TIMES A DAY AS NEEDED FOR ANXIETY. MAY CAUSE DROWSINESS.) 90 tablet 0    amLODIPine (NORVASC) 10 MG tablet TK 1 T PO QD      aspirin (ECOTRIN) 81 MG EC tablet Take 1 tablet (81 mg total) by mouth once daily. 150 tablet 6    atorvastatin (LIPITOR) 40 MG tablet TAKE 1 TABLET BY MOUTH EVERY EVENING 90 tablet 3    azelastine (ASTELIN) 137 mcg (0.1 %) nasal spray USE 1 SPRAY NASALLY TWICE  DAILY 30 mL 1    budesonide-formoterol 160-4.5 mcg (SYMBICORT) 160-4.5 mcg/actuation HFAA Inhale 2 puffs into the lungs every 12 (twelve) hours. Controller      gabapentin (NEURONTIN) 100 MG capsule       hydroxyurea (HYDREA) 500 mg Cap TAKE 1 CAPSULE(S) BY MOUTH ONCE A DAY. 90 capsule 3    isosorbide mononitrate (IMDUR) 120 MG 24 hr tablet Take 2 tablets (240 mg total) by mouth once daily. 180 tablet 0    metoprolol succinate (TOPROL-XL) 25 MG 24 hr tablet Take 1 tablet (25 mg total) by mouth once daily. 90 tablet 3    predniSONE (DELTASONE) 20 MG tablet Take 20 mg by mouth as needed.      DULoxetine (CYMBALTA) 30 MG capsule TK ONE C PO QD      dutasteride (AVODART) 0.5 mg  "capsule Take 1 capsule (0.5 mg total) by mouth once daily. (Patient not taking: Reported on 2020) 90 capsule 3    fluticasone-umeclidin-vilanter (TRELEGY ELLIPTA) 100-62.5-25 mcg DsDv Inhale 1 puff into the lungs once daily. (Patient not taking: Reported on 2020) 1 each 5    meloxicam (MOBIC) 15 MG tablet       pantoprazole (PROTONIX) 40 MG tablet Take 1 tablet (40 mg total) by mouth once daily. 30 tablet 0     No current facility-administered medications on file prior to visit.      Social History:     Social History     Tobacco Use    Smoking status: Former Smoker     Packs/day: 3.00     Years: 10.00     Pack years: 30.00     Quit date: 2009     Years since quittin.0    Smokeless tobacco: Never Used    Tobacco comment: use to smoke  3-5 packs a day.    Substance Use Topics    Alcohol use: No     Family History:     Family History   Problem Relation Age of Onset    Diabetes Mother     Heart disease Mother     Diabetes Father     Hypertension Father     Stroke Father     ADD / ADHD Neg Hx     Alcohol abuse Neg Hx     Anxiety disorder Neg Hx     Bipolar disorder Neg Hx     Dementia Neg Hx     Depression Neg Hx     Drug abuse Neg Hx     OCD Neg Hx     Paranoid behavior Neg Hx     Physical abuse Neg Hx     Schizophrenia Neg Hx     Seizures Neg Hx     Self injury Neg Hx     Sexual abuse Neg Hx     Suicide Neg Hx     Melanoma Neg Hx      Physical Exam:   /60 (BP Location: Left arm, Patient Position: Sitting, BP Method: Large (Automatic))   Pulse 73   Ht 5' 7" (1.702 m)   Wt 59.6 kg (131 lb 6.3 oz)   SpO2 (!) 94%   BMI 20.58 kg/m²      Constitutional: NAD, conversant  HEENT: Sclera anicteric, PERRLA, EOMI  Neck: No JVD, no carotid bruits  CV: RRR, no murmur, normal S1/S2  Pulm: Poor air entry B/L  GI: Abdomen soft, NTND, +BS  Extremities: No LE edema, warm and well perfused; 2+ radial pulses B/L, 2+ DP and PT pulses B/L  Skin: No ecchymosis, erythema, or " ulcers  Psych: AOx3, appropriate affect  Neuro: No gross deficits    Labs:     Lab Results   Component Value Date     01/17/2020    K 3.7 01/17/2020     01/17/2020    CO2 30 (H) 01/17/2020    BUN 7 (L) 01/17/2020    CREATININE 0.8 01/17/2020    ANIONGAP 8 01/17/2020     No results found for: HGBA1C  Lab Results   Component Value Date    BNP 49 01/17/2020    BNP 81 02/10/2019    BNP 38 02/16/2018    Lab Results   Component Value Date    WBC 7.95 01/17/2020    HGB 10.7 (L) 01/17/2020    HCT 36.5 (L) 01/17/2020     (H) 01/17/2020    GRAN 5.7 01/17/2020    GRAN 71.3 01/17/2020     Lab Results   Component Value Date    CHOL 122 04/25/2019    HDL 44 04/25/2019    LDLCALC 61.6 (L) 04/25/2019    LDLCALC 87 07/19/2017    TRIG 82 04/25/2019          Imaging:     PET Stress (9/9/2019)    The relative PET images are normal showing no clinically significant regional resting or stress induced perfusion defects.    Whole heart absolute myocardial perfusion (cc/min/g) averaged 2.27  at rest (which is elevated), 3.47 cc/min/g at stress (which is normal), and 1.53 cc/min/g CFR (which is moderately reduced in part due to elevated resting flow).    Gated perfusion images showed an ejection fraction of 86.0 % at rest and 87 % during stress. Normal is greater than 51%.    Wall motion was normal at rest and during stress.    LV cavity size is normal at rest and stress.    The EKG portion of this study is negative for ischemia.    There were no arrhythmias during stress.    The patient reported no chest pain during the stress test.    Resting absolute blood flow is markedly elevated.  These findings can be seen with anxiety, marked anemia or AV fistulas.  Resting blood flow in the COPD population has not been defined or reported and therefore could be a normal variant. Clinical correlation suggested.    There are no prior studies for comparison.    Assessment:     1. Coronary artery disease of native artery of  native heart with stable angina pectoris    2. Mixed dyslipidemia    3. Essential hypertension    4. PAD (peripheral artery disease)    5. Panlobular emphysema    6. Gastroesophageal reflux disease, unspecified whether esophagitis present      Plan:     Coronary artery disease of native artery of native heart with stable angina pectoris  - Patient has presumed CAD with coronary calcifications seen on prior CTA chest/abdomen/pelvis  - He now has atypical chest pain, occurs at rest and never exertional, may last up to 2 hours  - Continue Toprol XL 25 mg Daily and Imdur 120 mg Daily  - Will prescribe SL NTG PRN  - Will stop pantoprazole as his chest pain may be a side effect from this medication  - Plan to follow up in 2 weeks to see if chest pain has improved off PPI; if not, may consider a coronary angiogram    Mixed dyslipidemia  - Lipid panel as above  - On atorvastatin 40 mg Daily    Essential hypertension  - Well-controlled in clinic today  - On amlodipine 10 mg Daily and above medications    PAD (peripheral artery disease)  - B/L iliac disease seen on CTA chest/abdomen/pelvis in the past  - Patient is asymptomatic, denies claudication  - On ASA and statin, stopped smoking in 2009    COPD (chronic obstructive pulmonary disease)  - Severe COPD on 2L home O2    GERD (gastroesophageal reflux disease)  - Will stop pantoprazole as above to determine if is is causing patient's chest pain  - May try antacids (Tums) as needed      Signed:  Mahamed Ramirez MD  Advanced Interventional Cardiology Fellow, PGY-8  Pager: [phone]  12/21/2020 11:43 AM    Staff:  I have personally taken the history and examined this patient and agree with the fellow's note as stated above and amended it accordingly :-)

## 2020-12-21 NOTE — ASSESSMENT & PLAN NOTE
- Patient has presumed CAD with coronary calcifications seen on prior CTA chest/abdomen/pelvis  - He now has atypical chest pain, occurs at rest and never exertional, may last up to 2 hours  - Continue Toprol XL 25 mg Daily and Imdur 120 mg Daily  - Will prescribe SL NTG PRN  - Will stop pantoprazole as his chest pain may be a side effect from this medication  - Plan to follow up in 2 weeks to see if chest pain has improved off PPI; if not, may consider a coronary angiogram

## 2020-12-21 NOTE — LETTER
December 21, 2020      Warner Obregon Jr., MD  [address]  [place] [address]           [facility] 3rd Fl  [address]  [facility] [address]  Phone: [phone]          Patient: Chaz Stuart   MR Number: [id number]   YOB: 1950   Date of Visit: 12/21/2020       Dear Dr. Warner Obregon Jr.:    Thank you for referring Chaz Stuart to me for evaluation. Attached you will find relevant portions of my assessment and plan of care.    If you have questions, please do not hesitate to call me. I look forward to following Chaz Stuart along with you.    Sincerely,    Claudio Acosta MD    Enclosure  CC:  No Recipients    If you would like to receive this communication electronically, please contact [email or web address] or [phone] to request more information on [employer] access.    For providers and/or their staff who would like to refer a patient to [facility], please contact us through our one-stop-shop provider referral line, [facility], at [phone].    If you feel you have received this communication in error or would no longer like to receive these types of communications, please e-mail [email or web address]

## 2021-01-01 ENCOUNTER — HOSPITAL ENCOUNTER (INPATIENT)
Facility: HOSPITAL | Age: 71
LOS: 7 days | DRG: 871 | End: 2021-10-03
Attending: EMERGENCY MEDICINE | Admitting: FAMILY MEDICINE
Payer: MEDICARE

## 2021-01-01 ENCOUNTER — PATIENT MESSAGE (OUTPATIENT)
Dept: ADMINISTRATIVE | Facility: OTHER | Age: 71
End: 2021-01-01

## 2021-01-01 ENCOUNTER — PATIENT OUTREACH (OUTPATIENT)
Dept: ADMINISTRATIVE | Facility: HOSPITAL | Age: 71
End: 2021-01-01

## 2021-01-01 ENCOUNTER — PES CALL (OUTPATIENT)
Dept: ADMINISTRATIVE | Facility: CLINIC | Age: 71
End: 2021-01-01

## 2021-01-01 ENCOUNTER — LAB VISIT (OUTPATIENT)
Dept: LAB | Facility: HOSPITAL | Age: 71
End: 2021-01-01
Payer: MEDICARE

## 2021-01-01 ENCOUNTER — OFFICE VISIT (OUTPATIENT)
Dept: CARDIOLOGY | Facility: CLINIC | Age: 71
End: 2021-01-01
Payer: MEDICARE

## 2021-01-01 ENCOUNTER — TELEPHONE (OUTPATIENT)
Dept: ADMINISTRATIVE | Facility: CLINIC | Age: 71
End: 2021-01-01

## 2021-01-01 ENCOUNTER — TELEPHONE (OUTPATIENT)
Dept: CARDIOLOGY | Facility: CLINIC | Age: 71
End: 2021-01-01

## 2021-01-01 ENCOUNTER — PATIENT MESSAGE (OUTPATIENT)
Dept: CARDIOLOGY | Facility: CLINIC | Age: 71
End: 2021-01-01

## 2021-01-01 ENCOUNTER — LAB VISIT (OUTPATIENT)
Dept: FAMILY MEDICINE | Facility: CLINIC | Age: 71
End: 2021-01-01
Payer: MEDICARE

## 2021-01-01 ENCOUNTER — OFFICE VISIT (OUTPATIENT)
Dept: FAMILY MEDICINE | Facility: CLINIC | Age: 71
End: 2021-01-01
Payer: MEDICARE

## 2021-01-01 ENCOUNTER — HOSPITAL ENCOUNTER (OUTPATIENT)
Facility: HOSPITAL | Age: 71
Discharge: HOME | End: 2021-01-07
Attending: INTERNAL MEDICINE | Admitting: INTERNAL MEDICINE
Payer: MEDICARE

## 2021-01-01 VITALS
BODY MASS INDEX: 20.28 KG/M2 | HEART RATE: 74 BPM | HEIGHT: 67 IN | WEIGHT: 129.19 LBS | DIASTOLIC BLOOD PRESSURE: 61 MMHG | OXYGEN SATURATION: 96 % | SYSTOLIC BLOOD PRESSURE: 117 MMHG

## 2021-01-01 VITALS
BODY MASS INDEX: 18.68 KG/M2 | WEIGHT: 119 LBS | DIASTOLIC BLOOD PRESSURE: 48 MMHG | TEMPERATURE: 97 F | SYSTOLIC BLOOD PRESSURE: 69 MMHG | HEIGHT: 67 IN | OXYGEN SATURATION: 14 %

## 2021-01-01 VITALS
SYSTOLIC BLOOD PRESSURE: 127 MMHG | OXYGEN SATURATION: 97 % | RESPIRATION RATE: 20 BRPM | TEMPERATURE: 97 F | HEIGHT: 67 IN | DIASTOLIC BLOOD PRESSURE: 60 MMHG | BODY MASS INDEX: 20.4 KG/M2 | WEIGHT: 130 LBS | HEART RATE: 63 BPM

## 2021-01-01 VITALS
DIASTOLIC BLOOD PRESSURE: 60 MMHG | HEART RATE: 65 BPM | BODY MASS INDEX: 20.4 KG/M2 | SYSTOLIC BLOOD PRESSURE: 118 MMHG | WEIGHT: 130 LBS | HEIGHT: 67 IN | RESPIRATION RATE: 18 BRPM | OXYGEN SATURATION: 97 %

## 2021-01-01 DIAGNOSIS — J96.22 ACUTE ON CHRONIC RESPIRATORY FAILURE WITH HYPOXIA AND HYPERCAPNIA: ICD-10-CM

## 2021-01-01 DIAGNOSIS — Z01.812 PRE-PROCEDURE LAB EXAM: ICD-10-CM

## 2021-01-01 DIAGNOSIS — J44.1 COPD EXACERBATION: Primary | ICD-10-CM

## 2021-01-01 DIAGNOSIS — R06.02 SHORTNESS OF BREATH: ICD-10-CM

## 2021-01-01 DIAGNOSIS — J96.21 ACUTE ON CHRONIC RESPIRATORY FAILURE WITH HYPOXIA AND HYPERCAPNIA: ICD-10-CM

## 2021-01-01 DIAGNOSIS — J96.10 CHRONIC RESPIRATORY FAILURE, UNSPECIFIED WHETHER WITH HYPOXIA OR HYPERCAPNIA: ICD-10-CM

## 2021-01-01 DIAGNOSIS — I10 ESSENTIAL HYPERTENSION: ICD-10-CM

## 2021-01-01 DIAGNOSIS — J18.9 PNEUMONIA OF RIGHT LUNG DUE TO INFECTIOUS ORGANISM, UNSPECIFIED PART OF LUNG: ICD-10-CM

## 2021-01-01 DIAGNOSIS — I25.118 CORONARY ARTERY DISEASE OF NATIVE ARTERY OF NATIVE HEART WITH STABLE ANGINA PECTORIS: Primary | ICD-10-CM

## 2021-01-01 DIAGNOSIS — I25.118 CORONARY ARTERY DISEASE OF NATIVE ARTERY OF NATIVE HEART WITH STABLE ANGINA PECTORIS: ICD-10-CM

## 2021-01-01 DIAGNOSIS — I99.8 OTHER DISORDER OF CIRCULATORY SYSTEM: ICD-10-CM

## 2021-01-01 DIAGNOSIS — Z01.812 PRE-PROCEDURE LAB EXAM: Primary | ICD-10-CM

## 2021-01-01 DIAGNOSIS — J43.1 PANLOBULAR EMPHYSEMA: Primary | ICD-10-CM

## 2021-01-01 DIAGNOSIS — J43.1 PANLOBULAR EMPHYSEMA: ICD-10-CM

## 2021-01-01 DIAGNOSIS — I25.10 CAD (CORONARY ARTERY DISEASE): ICD-10-CM

## 2021-01-01 DIAGNOSIS — I73.9 PAD (PERIPHERAL ARTERY DISEASE): ICD-10-CM

## 2021-01-01 LAB
ABO + RH BLD: NORMAL
ADENOVIRUS: NOT DETECTED
ALBUMIN SERPL BCP-MCNC: 1.8 G/DL (ref 3.5–5.2)
ALBUMIN SERPL BCP-MCNC: 1.9 G/DL (ref 3.5–5.2)
ALBUMIN SERPL BCP-MCNC: 2.1 G/DL (ref 3.5–5.2)
ALBUMIN SERPL BCP-MCNC: 2.1 G/DL (ref 3.5–5.2)
ALBUMIN SERPL BCP-MCNC: 2.2 G/DL (ref 3.5–5.2)
ALBUMIN SERPL BCP-MCNC: 2.8 G/DL (ref 3.5–5.2)
ALBUMIN SERPL BCP-MCNC: 4.2 G/DL (ref 3.5–5.2)
ALLENS TEST: ABNORMAL
ALP SERPL-CCNC: 133 U/L (ref 55–135)
ALP SERPL-CCNC: 61 U/L (ref 55–135)
ALP SERPL-CCNC: 70 U/L (ref 55–135)
ALP SERPL-CCNC: 73 U/L (ref 55–135)
ALP SERPL-CCNC: 86 U/L (ref 55–135)
ALP SERPL-CCNC: 93 U/L (ref 55–135)
ALT SERPL W/O P-5'-P-CCNC: 20 U/L (ref 10–44)
ALT SERPL W/O P-5'-P-CCNC: 27 U/L (ref 10–44)
ALT SERPL W/O P-5'-P-CCNC: 30 U/L (ref 10–44)
ALT SERPL W/O P-5'-P-CCNC: 35 U/L (ref 10–44)
ALT SERPL W/O P-5'-P-CCNC: 53 U/L (ref 10–44)
ALT SERPL W/O P-5'-P-CCNC: 70 U/L (ref 10–44)
ANION GAP SERPL CALC-SCNC: 10 MMOL/L (ref 8–16)
ANION GAP SERPL CALC-SCNC: 11 MMOL/L (ref 8–16)
ANION GAP SERPL CALC-SCNC: 11 MMOL/L (ref 8–16)
ANION GAP SERPL CALC-SCNC: 12 MMOL/L (ref 8–16)
ANION GAP SERPL CALC-SCNC: 13 MMOL/L (ref 8–16)
ANION GAP SERPL CALC-SCNC: 8 MMOL/L (ref 8–16)
ANISOCYTOSIS BLD QL SMEAR: SLIGHT
AORTIC ROOT ANNULUS: 3.75 CM
AORTIC VALVE CUSP SEPERATION: 3.67 CM
APTT BLDCRRT: 25.7 SEC (ref 21–32)
ASCENDING AORTA: 2.75 CM
AST SERPL-CCNC: 11 U/L (ref 10–40)
AST SERPL-CCNC: 15 U/L (ref 10–40)
AST SERPL-CCNC: 22 U/L (ref 10–40)
AST SERPL-CCNC: 27 U/L (ref 10–40)
AST SERPL-CCNC: 29 U/L (ref 10–40)
AST SERPL-CCNC: 47 U/L (ref 10–40)
AV INDEX (PROSTH): 1.04
AV MEAN GRADIENT: 4 MMHG
AV PEAK GRADIENT: 5 MMHG
AV VALVE AREA: 3.87 CM2
AV VELOCITY RATIO: 0.98
BACTERIA BLD CULT: ABNORMAL
BACTERIA BLD CULT: NORMAL
BACTERIA SPEC AEROBE CULT: NORMAL
BASOPHILS # BLD AUTO: 0.03 K/UL (ref 0–0.2)
BASOPHILS # BLD AUTO: 0.07 K/UL (ref 0–0.2)
BASOPHILS # BLD AUTO: 0.09 K/UL (ref 0–0.2)
BASOPHILS # BLD AUTO: ABNORMAL K/UL (ref 0–0.2)
BASOPHILS # BLD AUTO: ABNORMAL K/UL (ref 0–0.2)
BASOPHILS NFR BLD: 0 % (ref 0–1.9)
BASOPHILS NFR BLD: 0.3 % (ref 0–1.9)
BASOPHILS NFR BLD: 0.5 % (ref 0–1.9)
BASOPHILS NFR BLD: 0.5 % (ref 0–1.9)
BILIRUB SERPL-MCNC: 0.5 MG/DL (ref 0.1–1)
BILIRUB SERPL-MCNC: 0.5 MG/DL (ref 0.1–1)
BILIRUB SERPL-MCNC: 0.6 MG/DL (ref 0.1–1)
BILIRUB SERPL-MCNC: 0.6 MG/DL (ref 0.1–1)
BILIRUB SERPL-MCNC: 0.8 MG/DL (ref 0.1–1)
BILIRUB SERPL-MCNC: 1 MG/DL (ref 0.1–1)
BILIRUB UR QL STRIP: NEGATIVE
BLD GP AB SCN CELLS X3 SERPL QL: NORMAL
BNP SERPL-MCNC: 78 PG/ML (ref 0–99)
BORDETELLA PARAPERTUSSIS (IS1001): NOT DETECTED
BORDETELLA PERTUSSIS (PTXP): NOT DETECTED
BSA FOR ECHO PROCEDURE: 1.6 M2
BUN SERPL-MCNC: 13 MG/DL (ref 8–23)
BUN SERPL-MCNC: 15 MG/DL (ref 8–23)
BUN SERPL-MCNC: 16 MG/DL (ref 8–23)
BUN SERPL-MCNC: 17 MG/DL (ref 8–23)
BUN SERPL-MCNC: 19 MG/DL (ref 8–23)
BUN SERPL-MCNC: 21 MG/DL (ref 8–23)
BUN SERPL-MCNC: 21 MG/DL (ref 8–23)
BUN SERPL-MCNC: 24 MG/DL (ref 8–23)
CALCIUM SERPL-MCNC: 10.2 MG/DL (ref 8.7–10.5)
CALCIUM SERPL-MCNC: 10.3 MG/DL (ref 8.7–10.5)
CALCIUM SERPL-MCNC: 9.6 MG/DL (ref 8.7–10.5)
CALCIUM SERPL-MCNC: 9.7 MG/DL (ref 8.7–10.5)
CALCIUM SERPL-MCNC: 9.8 MG/DL (ref 8.7–10.5)
CALCIUM SERPL-MCNC: 9.9 MG/DL (ref 8.7–10.5)
CHLAMYDIA PNEUMONIAE: NOT DETECTED
CHLORIDE SERPL-SCNC: 100 MMOL/L (ref 95–110)
CHLORIDE SERPL-SCNC: 102 MMOL/L (ref 95–110)
CHLORIDE SERPL-SCNC: 92 MMOL/L (ref 95–110)
CHLORIDE SERPL-SCNC: 94 MMOL/L (ref 95–110)
CHLORIDE SERPL-SCNC: 97 MMOL/L (ref 95–110)
CHLORIDE SERPL-SCNC: 98 MMOL/L (ref 95–110)
CHLORIDE SERPL-SCNC: 98 MMOL/L (ref 95–110)
CHLORIDE SERPL-SCNC: 99 MMOL/L (ref 95–110)
CHOL/HDLC RATIO: 2.5
CHOLEST SERPL-MSCNC: 102 MG/DL (ref 0–200)
CLARITY UR: CLEAR
CO2 SERPL-SCNC: 29 MMOL/L (ref 23–29)
CO2 SERPL-SCNC: 30 MMOL/L (ref 23–29)
CO2 SERPL-SCNC: 31 MMOL/L (ref 23–29)
CO2 SERPL-SCNC: 32 MMOL/L (ref 23–29)
CO2 SERPL-SCNC: 33 MMOL/L (ref 23–29)
CO2 SERPL-SCNC: 36 MMOL/L (ref 23–29)
COLOR UR: YELLOW
CORONAVIRUS 229E, COMMON COLD VIRUS: NOT DETECTED
CORONAVIRUS HKU1, COMMON COLD VIRUS: NOT DETECTED
CORONAVIRUS NL63, COMMON COLD VIRUS: NOT DETECTED
CORONAVIRUS OC43, COMMON COLD VIRUS: NOT DETECTED
CREAT SERPL-MCNC: 0.6 MG/DL (ref 0.5–1.4)
CREAT SERPL-MCNC: 0.7 MG/DL (ref 0.5–1.4)
CREAT SERPL-MCNC: 0.8 MG/DL (ref 0.5–1.4)
CREAT SERPL-MCNC: 0.8 MG/DL (ref 0.5–1.4)
CREAT SERPL-MCNC: 0.9 MG/DL (ref 0.5–1.4)
CTP QC/QA: YES
CV ECHO LV RWT: 0.89 CM
D DIMER PPP IA.FEU-MCNC: 2.25 MG/L FEU
DACRYOCYTES BLD QL SMEAR: ABNORMAL
DELSYS: ABNORMAL
DIFFERENTIAL METHOD: ABNORMAL
DOP CALC AO PEAK VEL: 1.16 M/S
DOP CALC AO VTI: 19.65 CM
DOP CALC LVOT AREA: 3.7 CM2
DOP CALC LVOT DIAMETER: 2.18 CM
DOP CALC LVOT PEAK VEL: 1.14 M/S
DOP CALC LVOT STROKE VOLUME: 75.96 CM3
DOP CALCLVOT PEAK VEL VTI: 20.36 CM
E WAVE DECELERATION TIME: 216.21 MSEC
E/A RATIO: 0.75
E/E' RATIO: 12.18 M/S
ECHO LV POSTERIOR WALL: 1.28 CM (ref 0.6–1.1)
EJECTION FRACTION: 65 %
EOSINOPHIL # BLD AUTO: 0 K/UL (ref 0–0.5)
EOSINOPHIL # BLD AUTO: 0.1 K/UL (ref 0–0.5)
EOSINOPHIL # BLD AUTO: 0.1 K/UL (ref 0–0.5)
EOSINOPHIL # BLD AUTO: ABNORMAL K/UL (ref 0–0.5)
EOSINOPHIL # BLD AUTO: ABNORMAL K/UL (ref 0–0.5)
EOSINOPHIL NFR BLD: 0 % (ref 0–8)
EOSINOPHIL NFR BLD: 0.3 % (ref 0–8)
EOSINOPHIL NFR BLD: 0.4 % (ref 0–8)
EP: 5
EP: 5
ERYTHROCYTE [DISTWIDTH] IN BLOOD BY AUTOMATED COUNT: 13.3 % (ref 11.5–14.5)
ERYTHROCYTE [DISTWIDTH] IN BLOOD BY AUTOMATED COUNT: 13.5 % (ref 11.5–14.5)
ERYTHROCYTE [DISTWIDTH] IN BLOOD BY AUTOMATED COUNT: 16.3 % (ref 11.5–14.5)
ERYTHROCYTE [DISTWIDTH] IN BLOOD BY AUTOMATED COUNT: 16.3 % (ref 11.5–14.5)
ERYTHROCYTE [DISTWIDTH] IN BLOOD BY AUTOMATED COUNT: 16.5 % (ref 11.5–14.5)
ERYTHROCYTE [DISTWIDTH] IN BLOOD BY AUTOMATED COUNT: 16.6 % (ref 11.5–14.5)
ERYTHROCYTE [DISTWIDTH] IN BLOOD BY AUTOMATED COUNT: 16.6 % (ref 11.5–14.5)
ERYTHROCYTE [DISTWIDTH] IN BLOOD BY AUTOMATED COUNT: 17 % (ref 11.5–14.5)
ERYTHROCYTE [SEDIMENTATION RATE] IN BLOOD BY WESTERGREN METHOD: 8 MM/H
ERYTHROCYTE [SEDIMENTATION RATE] IN BLOOD BY WESTERGREN METHOD: 8 MM/H
EST. GFR  (AFRICAN AMERICAN): >60 ML/MIN/1.73 M^2
EST. GFR  (NON AFRICAN AMERICAN): >60 ML/MIN/1.73 M^2
FIO2: 60
FIO2: 60
FLOW: 3
FLUBV RNA NPH QL NAA+NON-PROBE: NOT DETECTED
FOLATE SERPL-MCNC: 3.7 NG/ML (ref 4–24)
FRACTIONAL SHORTENING: 33 % (ref 28–44)
GLUCOSE SERPL-MCNC: 103 MG/DL (ref 70–110)
GLUCOSE SERPL-MCNC: 104 MG/DL (ref 70–110)
GLUCOSE SERPL-MCNC: 111 MG/DL (ref 70–110)
GLUCOSE SERPL-MCNC: 116 MG/DL (ref 70–110)
GLUCOSE SERPL-MCNC: 121 MG/DL (ref 70–110)
GLUCOSE SERPL-MCNC: 124 MG/DL (ref 70–110)
GLUCOSE SERPL-MCNC: 188 MG/DL (ref 70–110)
GLUCOSE SERPL-MCNC: 97 MG/DL (ref 70–110)
GLUCOSE UR QL STRIP: ABNORMAL
GRAM STN SPEC: NORMAL
HBA1C MFR BLD: 5.1 % (ref 4–6)
HCO3 UR-SCNC: 40.4 MMOL/L (ref 24–28)
HCO3 UR-SCNC: 40.4 MMOL/L (ref 24–28)
HCO3 UR-SCNC: 42.1 MMOL/L (ref 24–28)
HCO3 UR-SCNC: 44 MMOL/L (ref 24–28)
HCT VFR BLD AUTO: 43.5 % (ref 40–54)
HCT VFR BLD AUTO: 44.2 % (ref 40–54)
HCT VFR BLD AUTO: 46.8 % (ref 40–54)
HCT VFR BLD AUTO: 47.4 % (ref 40–54)
HCT VFR BLD AUTO: 47.9 % (ref 40–54)
HCT VFR BLD AUTO: 47.9 % (ref 40–54)
HCT VFR BLD AUTO: 50.4 % (ref 40–54)
HCT VFR BLD AUTO: 50.4 % (ref 40–54)
HDLC SERPL-MCNC: 41 MG/DL
HGB BLD-MCNC: 13.5 G/DL (ref 14–18)
HGB BLD-MCNC: 13.6 G/DL (ref 14–18)
HGB BLD-MCNC: 14.4 G/DL (ref 14–18)
HGB BLD-MCNC: 14.8 G/DL (ref 14–18)
HGB BLD-MCNC: 15.2 G/DL (ref 14–18)
HGB BLD-MCNC: 15.4 G/DL (ref 14–18)
HGB BLD-MCNC: 15.6 G/DL (ref 14–18)
HGB BLD-MCNC: 15.7 G/DL (ref 14–18)
HGB UR QL STRIP: NEGATIVE
HPIV1 RNA NPH QL NAA+NON-PROBE: NOT DETECTED
HPIV2 RNA NPH QL NAA+NON-PROBE: NOT DETECTED
HPIV3 RNA NPH QL NAA+NON-PROBE: NOT DETECTED
HPIV4 RNA NPH QL NAA+NON-PROBE: NOT DETECTED
HUMAN METAPNEUMOVIRUS: NOT DETECTED
IMM GRANULOCYTES # BLD AUTO: 0.08 K/UL (ref 0–0.04)
IMM GRANULOCYTES # BLD AUTO: 0.29 K/UL (ref 0–0.04)
IMM GRANULOCYTES # BLD AUTO: 0.35 K/UL (ref 0–0.04)
IMM GRANULOCYTES # BLD AUTO: ABNORMAL K/UL (ref 0–0.04)
IMM GRANULOCYTES NFR BLD AUTO: 0.9 % (ref 0–0.5)
IMM GRANULOCYTES NFR BLD AUTO: 2.1 % (ref 0–0.5)
IMM GRANULOCYTES NFR BLD AUTO: 2.1 % (ref 0–0.5)
IMM GRANULOCYTES NFR BLD AUTO: ABNORMAL % (ref 0–0.5)
INFLUENZA A (SUBTYPES H1,H1-2009,H3): NOT DETECTED
INR PPP: 1.1 (ref 0.8–1.2)
INR PPP: 1.1 (ref 0.8–1.2)
INTERVENTRICULAR SEPTUM: 1.08 CM (ref 0.6–1.1)
IP: 10
IP: 10
IVRT: 148.79 MSEC
KETONES UR QL STRIP: NEGATIVE
LA MAJOR: 4.41 CM
LA MINOR: 4.26 CM
LA WIDTH: 3.63 CM
LACTATE SERPL-SCNC: 0.9 MMOL/L (ref 0.5–2.2)
LACTATE SERPL-SCNC: 1.1 MMOL/L (ref 0.5–2.2)
LACTATE SERPL-SCNC: 2.5 MMOL/L (ref 0.5–2.2)
LDLC SERPL CALC-MCNC: 46 MG/DL
LEFT ATRIUM SIZE: 3.01 CM
LEFT ATRIUM VOLUME INDEX: 24.8 ML/M2
LEFT ATRIUM VOLUME: 40.25 CM3
LEFT INTERNAL DIMENSION IN SYSTOLE: 1.93 CM (ref 2.1–4)
LEFT VENTRICLE DIASTOLIC VOLUME INDEX: 19.69 ML/M2
LEFT VENTRICLE DIASTOLIC VOLUME: 31.89 ML
LEFT VENTRICLE MASS INDEX: 62 G/M2
LEFT VENTRICLE SYSTOLIC VOLUME INDEX: 7.2 ML/M2
LEFT VENTRICLE SYSTOLIC VOLUME: 11.59 ML
LEFT VENTRICULAR INTERNAL DIMENSION IN DIASTOLE: 2.89 CM (ref 3.5–6)
LEFT VENTRICULAR MASS: 100.91 G
LEUKOCYTE ESTERASE UR QL STRIP: NEGATIVE
LV LATERAL E/E' RATIO: 11.17 M/S
LV SEPTAL E/E' RATIO: 13.4 M/S
LYMPHOCYTES # BLD AUTO: 0.3 K/UL (ref 1–4.8)
LYMPHOCYTES # BLD AUTO: 0.3 K/UL (ref 1–4.8)
LYMPHOCYTES # BLD AUTO: 1 K/UL (ref 1–4.8)
LYMPHOCYTES # BLD AUTO: ABNORMAL K/UL (ref 1–4.8)
LYMPHOCYTES # BLD AUTO: ABNORMAL K/UL (ref 1–4.8)
LYMPHOCYTES NFR BLD: 1 % (ref 18–48)
LYMPHOCYTES NFR BLD: 1 % (ref 18–48)
LYMPHOCYTES NFR BLD: 1.8 % (ref 18–48)
LYMPHOCYTES NFR BLD: 1.9 % (ref 18–48)
LYMPHOCYTES NFR BLD: 11.2 % (ref 18–48)
LYMPHOCYTES NFR BLD: 2 % (ref 18–48)
LYMPHOCYTES NFR BLD: 4 % (ref 18–48)
MCH RBC QN AUTO: 31.6 PG (ref 27–31)
MCH RBC QN AUTO: 32.2 PG (ref 27–31)
MCH RBC QN AUTO: 32.3 PG (ref 27–31)
MCH RBC QN AUTO: 32.4 PG (ref 27–31)
MCH RBC QN AUTO: 32.5 PG (ref 27–31)
MCH RBC QN AUTO: 32.5 PG (ref 27–31)
MCH RBC QN AUTO: 32.6 PG (ref 27–31)
MCH RBC QN AUTO: 33 PG (ref 27–31)
MCHC RBC AUTO-ENTMCNC: 30.5 G/DL (ref 32–36)
MCHC RBC AUTO-ENTMCNC: 30.6 G/DL (ref 32–36)
MCHC RBC AUTO-ENTMCNC: 30.8 G/DL (ref 32–36)
MCHC RBC AUTO-ENTMCNC: 31 G/DL (ref 32–36)
MCHC RBC AUTO-ENTMCNC: 31.2 G/DL (ref 32–36)
MCHC RBC AUTO-ENTMCNC: 31.3 G/DL (ref 32–36)
MCHC RBC AUTO-ENTMCNC: 31.7 G/DL (ref 32–36)
MCHC RBC AUTO-ENTMCNC: 32.8 G/DL (ref 32–36)
MCV RBC AUTO: 102 FL (ref 82–98)
MCV RBC AUTO: 103 FL (ref 82–98)
MCV RBC AUTO: 104 FL (ref 82–98)
MCV RBC AUTO: 106 FL (ref 82–98)
MCV RBC AUTO: 108 FL (ref 82–98)
MCV RBC AUTO: 99 FL (ref 82–98)
MODE: ABNORMAL
MONOCYTES # BLD AUTO: 0.4 K/UL (ref 0.3–1)
MONOCYTES # BLD AUTO: 0.6 K/UL (ref 0.3–1)
MONOCYTES # BLD AUTO: 0.7 K/UL (ref 0.3–1)
MONOCYTES # BLD AUTO: ABNORMAL K/UL (ref 0.3–1)
MONOCYTES # BLD AUTO: ABNORMAL K/UL (ref 0.3–1)
MONOCYTES NFR BLD: 3 % (ref 4–15)
MONOCYTES NFR BLD: 3 % (ref 4–15)
MONOCYTES NFR BLD: 4 % (ref 4–15)
MONOCYTES NFR BLD: 4 % (ref 4–15)
MONOCYTES NFR BLD: 4.2 % (ref 4–15)
MONOCYTES NFR BLD: 4.3 % (ref 4–15)
MONOCYTES NFR BLD: 8 % (ref 4–15)
MV PEAK A VEL: 0.89 M/S
MV PEAK E VEL: 0.67 M/S
MV STENOSIS PRESSURE HALF TIME: 62.7 MS
MV VALVE AREA P 1/2 METHOD: 3.51 CM2
MYCOPLASMA PNEUMONIAE: NOT DETECTED
NEUTROPHILS # BLD AUTO: 12.6 K/UL (ref 1.8–7.7)
NEUTROPHILS # BLD AUTO: 15.1 K/UL (ref 1.8–7.7)
NEUTROPHILS # BLD AUTO: 7.5 K/UL (ref 1.8–7.7)
NEUTROPHILS NFR BLD: 79 % (ref 38–73)
NEUTROPHILS NFR BLD: 83.6 % (ref 38–73)
NEUTROPHILS NFR BLD: 90 % (ref 38–73)
NEUTROPHILS NFR BLD: 90.9 % (ref 38–73)
NEUTROPHILS NFR BLD: 91 % (ref 38–73)
NEUTROPHILS NFR BLD: 92 % (ref 38–73)
NEUTROPHILS NFR BLD: 94 % (ref 38–73)
NEUTS BAND NFR BLD MANUAL: 17 %
NEUTS BAND NFR BLD MANUAL: 2 %
NITRITE UR QL STRIP: NEGATIVE
NON HDL CHOL (CALC): 61
NRBC BLD-RTO: 0 /100 WBC
PCO2 BLDA: 62.9 MMHG (ref 35–45)
PCO2 BLDA: 63.8 MMHG (ref 35–45)
PCO2 BLDA: 71 MMHG (ref 35–45)
PCO2 BLDA: 72.6 MMHG (ref 35–45)
PH SMN: 7.35 [PH] (ref 7.35–7.45)
PH SMN: 7.38 [PH] (ref 7.35–7.45)
PH SMN: 7.42 [PH] (ref 7.35–7.45)
PH SMN: 7.45 [PH] (ref 7.35–7.45)
PH UR STRIP: 6 [PH] (ref 5–8)
PISA TR MAX VEL: 2.88 M/S
PLATELET # BLD AUTO: 155 K/UL (ref 150–450)
PLATELET # BLD AUTO: 170 K/UL (ref 150–450)
PLATELET # BLD AUTO: 171 K/UL (ref 150–450)
PLATELET # BLD AUTO: 172 K/UL (ref 150–450)
PLATELET # BLD AUTO: 186 K/UL (ref 150–350)
PLATELET # BLD AUTO: 187 K/UL (ref 150–450)
PLATELET # BLD AUTO: 191 K/UL (ref 150–350)
PLATELET # BLD AUTO: ABNORMAL K/UL (ref 150–450)
PLATELET BLD QL SMEAR: ABNORMAL
PLATELET RESPONSE PLAVIX: 112 PRU (ref 194–418)
PMV BLD AUTO: 10 FL (ref 9.2–12.9)
PMV BLD AUTO: 10.1 FL (ref 9.2–12.9)
PMV BLD AUTO: 10.4 FL (ref 9.2–12.9)
PMV BLD AUTO: 10.6 FL (ref 9.2–12.9)
PMV BLD AUTO: 10.7 FL (ref 9.2–12.9)
PMV BLD AUTO: 9.4 FL (ref 9.2–12.9)
PMV BLD AUTO: 9.7 FL (ref 9.2–12.9)
PMV BLD AUTO: ABNORMAL FL (ref 9.2–12.9)
PO2 BLDA: 39 MMHG (ref 40–60)
PO2 BLDA: 43 MMHG (ref 40–60)
PO2 BLDA: 48 MMHG (ref 80–100)
PO2 BLDA: 72 MMHG (ref 80–100)
POC BE: 11 MMOL/L
POC BE: 12 MMOL/L
POC BE: 13 MMOL/L
POC BE: 16 MMOL/L
POC SATURATED O2: 69 % (ref 95–100)
POC SATURATED O2: 73 % (ref 95–100)
POC SATURATED O2: 83 % (ref 95–100)
POC SATURATED O2: 94 % (ref 95–100)
POC TCO2: 42 MMOL/L (ref 23–27)
POC TCO2: 43 MMOL/L (ref 24–29)
POC TCO2: 44 MMOL/L (ref 24–29)
POC TCO2: 46 MMOL/L (ref 23–27)
POTASSIUM SERPL-SCNC: 3.6 MMOL/L (ref 3.5–5.1)
POTASSIUM SERPL-SCNC: 3.7 MMOL/L (ref 3.5–5.1)
POTASSIUM SERPL-SCNC: 3.7 MMOL/L (ref 3.5–5.1)
POTASSIUM SERPL-SCNC: 3.8 MMOL/L (ref 3.5–5.1)
POTASSIUM SERPL-SCNC: 3.9 MMOL/L (ref 3.5–5.1)
POTASSIUM SERPL-SCNC: 4.2 MMOL/L (ref 3.5–5.1)
POTASSIUM SERPL-SCNC: 4.2 MMOL/L (ref 3.5–5.1)
POTASSIUM SERPL-SCNC: 4.9 MMOL/L (ref 3.5–5.1)
PROT SERPL-MCNC: 5.2 G/DL (ref 6–8.4)
PROT SERPL-MCNC: 5.7 G/DL (ref 6–8.4)
PROT SERPL-MCNC: 5.8 G/DL (ref 6–8.4)
PROT SERPL-MCNC: 6.1 G/DL (ref 6–8.4)
PROT SERPL-MCNC: 6.2 G/DL (ref 6–8.4)
PROT SERPL-MCNC: 6.2 G/DL (ref 6–8.4)
PROT UR QL STRIP: ABNORMAL
PROTHROMBIN TIME: 11.7 SEC (ref 9–12.5)
PROTHROMBIN TIME: 11.8 SEC (ref 9–12.5)
PULM VEIN S/D RATIO: 1.7
PV PEAK D VEL: 0.37 M/S
PV PEAK S VEL: 0.63 M/S
PV PEAK VELOCITY: 1.01 CM/S
RA MAJOR: 3.87 CM
RA PRESSURE: 3 MMHG
RA WIDTH: 3.24 CM
RBC # BLD AUTO: 4.17 M/UL (ref 4.6–6.2)
RBC # BLD AUTO: 4.19 M/UL (ref 4.6–6.2)
RBC # BLD AUTO: 4.55 M/UL (ref 4.6–6.2)
RBC # BLD AUTO: 4.58 M/UL (ref 4.6–6.2)
RBC # BLD AUTO: 4.67 M/UL (ref 4.6–6.2)
RBC # BLD AUTO: 4.68 M/UL (ref 4.6–6.2)
RBC # BLD AUTO: 4.82 M/UL (ref 4.6–6.2)
RBC # BLD AUTO: 4.85 M/UL (ref 4.6–6.2)
RESPIRATORY INFECTION PANEL SOURCE: NORMAL
RIGHT VENTRICULAR END-DIASTOLIC DIMENSION: 3.62 CM
RSV RNA NPH QL NAA+NON-PROBE: NOT DETECTED
RV TISSUE DOPPLER FREE WALL SYSTOLIC VELOCITY 1 (APICAL 4 CHAMBER VIEW): 14.42 CM/S
RV+EV RNA NPH QL NAA+NON-PROBE: NOT DETECTED
SAMPLE: ABNORMAL
SARS-COV-2 RDRP RESP QL NAA+PROBE: NEGATIVE
SARS-COV-2 RNA RESP QL NAA+PROBE: NOT DETECTED
SINUS: 3.77 CM
SITE: ABNORMAL
SODIUM SERPL-SCNC: 136 MMOL/L (ref 136–145)
SODIUM SERPL-SCNC: 139 MMOL/L (ref 136–145)
SODIUM SERPL-SCNC: 139 MMOL/L (ref 136–145)
SODIUM SERPL-SCNC: 140 MMOL/L (ref 136–145)
SODIUM SERPL-SCNC: 140 MMOL/L (ref 136–145)
SODIUM SERPL-SCNC: 141 MMOL/L (ref 136–145)
SODIUM SERPL-SCNC: 141 MMOL/L (ref 136–145)
SODIUM SERPL-SCNC: 142 MMOL/L (ref 136–145)
SP GR UR STRIP: >1.03 (ref 1–1.03)
SP02: 98
SPONT RATE: 22
STJ: 2.49 CM
STOMATOCYTES BLD QL SMEAR: PRESENT
T4 FREE SERPL-MCNC: 0.82 NG/DL (ref 0.71–1.51)
TDI LATERAL: 0.06 M/S
TDI SEPTAL: 0.05 M/S
TDI: 0.06 M/S
TR MAX PG: 33 MMHG
TRICUSPID ANNULAR PLANE SYSTOLIC EXCURSION: 1.76 CM
TRIGLYCERIDE (LIPID PAN): 74
TROPONIN I SERPL DL<=0.01 NG/ML-MCNC: 0.04 NG/ML (ref 0–0.03)
TROPONIN I SERPL DL<=0.01 NG/ML-MCNC: 0.04 NG/ML (ref 0–0.03)
TSH SERPL DL<=0.005 MIU/L-ACNC: 0.23 UIU/ML (ref 0.4–4)
TV REST PULMONARY ARTERY PRESSURE: 36 MMHG
URN SPEC COLLECT METH UR: ABNORMAL
UROBILINOGEN UR STRIP-ACNC: NEGATIVE EU/DL
VANCOMYCIN TROUGH SERPL-MCNC: 10 UG/ML (ref 10–22)
VANCOMYCIN TROUGH SERPL-MCNC: 8.8 UG/ML (ref 10–22)
VIT B12 SERPL-MCNC: 319 PG/ML (ref 210–950)
WBC # BLD AUTO: 13.41 K/UL (ref 3.9–12.7)
WBC # BLD AUTO: 13.8 K/UL (ref 3.9–12.7)
WBC # BLD AUTO: 14.66 K/UL (ref 3.9–12.7)
WBC # BLD AUTO: 16.63 K/UL (ref 3.9–12.7)
WBC # BLD AUTO: 17.01 K/UL (ref 3.9–12.7)
WBC # BLD AUTO: 20.01 K/UL (ref 3.9–12.7)
WBC # BLD AUTO: 8.61 K/UL (ref 3.9–12.7)
WBC # BLD AUTO: 8.99 K/UL (ref 3.9–12.7)

## 2021-01-01 PROCEDURE — 87070 CULTURE OTHR SPECIMN AEROBIC: CPT | Performed by: INTERNAL MEDICINE

## 2021-01-01 PROCEDURE — 27100171 HC OXYGEN HIGH FLOW UP TO 24 HOURS

## 2021-01-01 PROCEDURE — 85007 BL SMEAR W/DIFF WBC COUNT: CPT | Performed by: FAMILY MEDICINE

## 2021-01-01 PROCEDURE — 99213 PR OFFICE/OUTPT VISIT, EST, LEVL III, 20-29 MIN: ICD-10-PCS | Mod: HCNC,S$GLB,, | Performed by: NURSE PRACTITIONER

## 2021-01-01 PROCEDURE — 99900035 HC TECH TIME PER 15 MIN (STAT)

## 2021-01-01 PROCEDURE — 25000242 PHARM REV CODE 250 ALT 637 W/ HCPCS: Performed by: FAMILY MEDICINE

## 2021-01-01 PROCEDURE — 1159F MED LIST DOCD IN RCRD: CPT | Mod: HCNC,S$GLB,, | Performed by: INTERNAL MEDICINE

## 2021-01-01 PROCEDURE — 25000003 PHARM REV CODE 250: Performed by: EMERGENCY MEDICINE

## 2021-01-01 PROCEDURE — 36415 COLL VENOUS BLD VENIPUNCTURE: CPT | Mod: HCNC

## 2021-01-01 PROCEDURE — 87077 CULTURE AEROBIC IDENTIFY: CPT | Mod: 59 | Performed by: EMERGENCY MEDICINE

## 2021-01-01 PROCEDURE — 93010 EKG 12-LEAD: ICD-10-PCS | Mod: HCNC,,, | Performed by: INTERNAL MEDICINE

## 2021-01-01 PROCEDURE — 94640 AIRWAY INHALATION TREATMENT: CPT

## 2021-01-01 PROCEDURE — 25000003 PHARM REV CODE 250: Performed by: FAMILY MEDICINE

## 2021-01-01 PROCEDURE — 96365 THER/PROPH/DIAG IV INF INIT: CPT

## 2021-01-01 PROCEDURE — 83605 ASSAY OF LACTIC ACID: CPT | Performed by: FAMILY MEDICINE

## 2021-01-01 PROCEDURE — 99999 PR PBB SHADOW E&M-EST. PATIENT-LVL IV: CPT | Mod: PBBFAC,HCNC,, | Performed by: NURSE PRACTITIONER

## 2021-01-01 PROCEDURE — 36415 COLL VENOUS BLD VENIPUNCTURE: CPT | Performed by: STUDENT IN AN ORGANIZED HEALTH CARE EDUCATION/TRAINING PROGRAM

## 2021-01-01 PROCEDURE — 63600175 PHARM REV CODE 636 W HCPCS: Performed by: STUDENT IN AN ORGANIZED HEALTH CARE EDUCATION/TRAINING PROGRAM

## 2021-01-01 PROCEDURE — 27000221 HC OXYGEN, UP TO 24 HOURS

## 2021-01-01 PROCEDURE — 25000003 PHARM REV CODE 250: Mod: HCNC | Performed by: INTERNAL MEDICINE

## 2021-01-01 PROCEDURE — 25000003 PHARM REV CODE 250: Performed by: STUDENT IN AN ORGANIZED HEALTH CARE EDUCATION/TRAINING PROGRAM

## 2021-01-01 PROCEDURE — 94760 N-INVAS EAR/PLS OXIMETRY 1: CPT

## 2021-01-01 PROCEDURE — 80202 ASSAY OF VANCOMYCIN: CPT | Performed by: STUDENT IN AN ORGANIZED HEALTH CARE EDUCATION/TRAINING PROGRAM

## 2021-01-01 PROCEDURE — C1874 STENT, COATED/COV W/DEL SYS: HCPCS | Mod: HCNC | Performed by: INTERNAL MEDICINE

## 2021-01-01 PROCEDURE — 94761 N-INVAS EAR/PLS OXIMETRY MLT: CPT

## 2021-01-01 PROCEDURE — 1126F AMNT PAIN NOTED NONE PRSNT: CPT | Mod: HCNC,S$GLB,, | Performed by: NURSE PRACTITIONER

## 2021-01-01 PROCEDURE — 94660 CPAP INITIATION&MGMT: CPT

## 2021-01-01 PROCEDURE — 85379 FIBRIN DEGRADATION QUANT: CPT | Performed by: EMERGENCY MEDICINE

## 2021-01-01 PROCEDURE — 87633 RESP VIRUS 12-25 TARGETS: CPT | Performed by: INTERNAL MEDICINE

## 2021-01-01 PROCEDURE — C9600 PERC DRUG-EL COR STENT SING: HCPCS | Mod: LC,59,HCNC | Performed by: INTERNAL MEDICINE

## 2021-01-01 PROCEDURE — 85025 COMPLETE CBC W/AUTO DIFF WBC: CPT | Performed by: FAMILY MEDICINE

## 2021-01-01 PROCEDURE — 80048 BASIC METABOLIC PNL TOTAL CA: CPT | Mod: HCNC

## 2021-01-01 PROCEDURE — 25000003 PHARM REV CODE 250: Performed by: HOSPITALIST

## 2021-01-01 PROCEDURE — 3078F PR MOST RECENT DIASTOLIC BLOOD PRESSURE < 80 MM HG: ICD-10-PCS | Mod: HCNC,CPTII,S$GLB, | Performed by: NURSE PRACTITIONER

## 2021-01-01 PROCEDURE — 25000242 PHARM REV CODE 250 ALT 637 W/ HCPCS: Performed by: EMERGENCY MEDICINE

## 2021-01-01 PROCEDURE — 93454 CORONARY ARTERY ANGIO S&I: CPT | Mod: 26,59,HCNC, | Performed by: INTERNAL MEDICINE

## 2021-01-01 PROCEDURE — 92928 PR STENT: ICD-10-PCS | Mod: LC,51,HCNC, | Performed by: INTERNAL MEDICINE

## 2021-01-01 PROCEDURE — U0003 INFECTIOUS AGENT DETECTION BY NUCLEIC ACID (DNA OR RNA); SEVERE ACUTE RESPIRATORY SYNDROME CORONAVIRUS 2 (SARS-COV-2) (CORONAVIRUS DISEASE [COVID-19]), AMPLIFIED PROBE TECHNIQUE, MAKING USE OF HIGH THROUGHPUT TECHNOLOGIES AS DESCRIBED BY CMS-2020-01-R: HCPCS | Mod: HCNC

## 2021-01-01 PROCEDURE — 99152 MOD SED SAME PHYS/QHP 5/>YRS: CPT | Mod: HCNC,,, | Performed by: INTERNAL MEDICINE

## 2021-01-01 PROCEDURE — 1126F PR PAIN SEVERITY QUANTIFIED, NO PAIN PRESENT: ICD-10-PCS | Mod: HCNC,S$GLB,, | Performed by: NURSE PRACTITIONER

## 2021-01-01 PROCEDURE — 99291 PR CRITICAL CARE, E/M 30-74 MINUTES: ICD-10-PCS | Mod: ,,, | Performed by: INTERNAL MEDICINE

## 2021-01-01 PROCEDURE — 20000000 HC ICU ROOM

## 2021-01-01 PROCEDURE — 99233 SBSQ HOSP IP/OBS HIGH 50: CPT | Mod: ,,, | Performed by: INTERNAL MEDICINE

## 2021-01-01 PROCEDURE — 80053 COMPREHEN METABOLIC PANEL: CPT | Performed by: FAMILY MEDICINE

## 2021-01-01 PROCEDURE — 3074F SYST BP LT 130 MM HG: CPT | Mod: HCNC,CPTII,S$GLB, | Performed by: INTERNAL MEDICINE

## 2021-01-01 PROCEDURE — 63600175 PHARM REV CODE 636 W HCPCS: Performed by: FAMILY MEDICINE

## 2021-01-01 PROCEDURE — U0002 COVID-19 LAB TEST NON-CDC: HCPCS | Performed by: EMERGENCY MEDICINE

## 2021-01-01 PROCEDURE — 93010 ELECTROCARDIOGRAM REPORT: CPT | Mod: HCNC,,, | Performed by: INTERNAL MEDICINE

## 2021-01-01 PROCEDURE — 92928 PRQ TCAT PLMT NTRAC ST 1 LES: CPT | Mod: LC,51,HCNC, | Performed by: INTERNAL MEDICINE

## 2021-01-01 PROCEDURE — 36415 COLL VENOUS BLD VENIPUNCTURE: CPT | Performed by: FAMILY MEDICINE

## 2021-01-01 PROCEDURE — 85576 BLOOD PLATELET AGGREGATION: CPT | Mod: HCNC

## 2021-01-01 PROCEDURE — 99233 PR SUBSEQUENT HOSPITAL CARE,LEVL III: ICD-10-PCS | Mod: ,,, | Performed by: INTERNAL MEDICINE

## 2021-01-01 PROCEDURE — 85027 COMPLETE CBC AUTOMATED: CPT | Performed by: FAMILY MEDICINE

## 2021-01-01 PROCEDURE — 25000242 PHARM REV CODE 250 ALT 637 W/ HCPCS: Performed by: STUDENT IN AN ORGANIZED HEALTH CARE EDUCATION/TRAINING PROGRAM

## 2021-01-01 PROCEDURE — 99999 PR PBB SHADOW E&M-EST. PATIENT-LVL IV: ICD-10-PCS | Mod: PBBFAC,HCNC,, | Performed by: INTERNAL MEDICINE

## 2021-01-01 PROCEDURE — 99233 PR SUBSEQUENT HOSPITAL CARE,LEVL III: ICD-10-PCS | Mod: ,,, | Performed by: NURSE PRACTITIONER

## 2021-01-01 PROCEDURE — C1769 GUIDE WIRE: HCPCS | Mod: HCNC | Performed by: INTERNAL MEDICINE

## 2021-01-01 PROCEDURE — C1760 CLOSURE DEV, VASC: HCPCS | Mod: HCNC | Performed by: INTERNAL MEDICINE

## 2021-01-01 PROCEDURE — 81003 URINALYSIS AUTO W/O SCOPE: CPT | Performed by: EMERGENCY MEDICINE

## 2021-01-01 PROCEDURE — 85027 COMPLETE CBC AUTOMATED: CPT | Performed by: EMERGENCY MEDICINE

## 2021-01-01 PROCEDURE — 82803 BLOOD GASES ANY COMBINATION: CPT

## 2021-01-01 PROCEDURE — 85007 BL SMEAR W/DIFF WBC COUNT: CPT | Performed by: EMERGENCY MEDICINE

## 2021-01-01 PROCEDURE — 63600175 PHARM REV CODE 636 W HCPCS: Performed by: INTERNAL MEDICINE

## 2021-01-01 PROCEDURE — 82607 VITAMIN B-12: CPT | Performed by: STUDENT IN AN ORGANIZED HEALTH CARE EDUCATION/TRAINING PROGRAM

## 2021-01-01 PROCEDURE — 3008F PR BODY MASS INDEX (BMI) DOCUMENTED: ICD-10-PCS | Mod: HCNC,CPTII,S$GLB, | Performed by: NURSE PRACTITIONER

## 2021-01-01 PROCEDURE — 3078F DIAST BP <80 MM HG: CPT | Mod: HCNC,CPTII,S$GLB, | Performed by: INTERNAL MEDICINE

## 2021-01-01 PROCEDURE — 1159F PR MEDICATION LIST DOCUMENTED IN MEDICAL RECORD: ICD-10-PCS | Mod: HCNC,S$GLB,, | Performed by: INTERNAL MEDICINE

## 2021-01-01 PROCEDURE — 87040 BLOOD CULTURE FOR BACTERIA: CPT | Performed by: INTERNAL MEDICINE

## 2021-01-01 PROCEDURE — 27000190 HC CPAP FULL FACE MASK W/VALVE

## 2021-01-01 PROCEDURE — 85610 PROTHROMBIN TIME: CPT | Mod: HCNC

## 2021-01-01 PROCEDURE — 80053 COMPREHEN METABOLIC PANEL: CPT | Performed by: EMERGENCY MEDICINE

## 2021-01-01 PROCEDURE — 85730 THROMBOPLASTIN TIME PARTIAL: CPT | Mod: HCNC

## 2021-01-01 PROCEDURE — 1159F MED LIST DOCD IN RCRD: CPT | Mod: HCNC,S$GLB,, | Performed by: NURSE PRACTITIONER

## 2021-01-01 PROCEDURE — C1894 INTRO/SHEATH, NON-LASER: HCPCS | Mod: HCNC | Performed by: INTERNAL MEDICINE

## 2021-01-01 PROCEDURE — 93458 L HRT ARTERY/VENTRICLE ANGIO: CPT | Mod: 59,HCNC | Performed by: INTERNAL MEDICINE

## 2021-01-01 PROCEDURE — 93005 ELECTROCARDIOGRAM TRACING: CPT

## 2021-01-01 PROCEDURE — 27100107 HC POCKET PEAK FLOW METER

## 2021-01-01 PROCEDURE — 63600175 PHARM REV CODE 636 W HCPCS: Mod: HCNC | Performed by: INTERNAL MEDICINE

## 2021-01-01 PROCEDURE — 3074F PR MOST RECENT SYSTOLIC BLOOD PRESSURE < 130 MM HG: ICD-10-PCS | Mod: HCNC,CPTII,S$GLB, | Performed by: NURSE PRACTITIONER

## 2021-01-01 PROCEDURE — 99213 OFFICE O/P EST LOW 20 MIN: CPT | Mod: HCNC,S$GLB,, | Performed by: NURSE PRACTITIONER

## 2021-01-01 PROCEDURE — 99999 PR PBB SHADOW E&M-EST. PATIENT-LVL IV: ICD-10-PCS | Mod: PBBFAC,HCNC,, | Performed by: NURSE PRACTITIONER

## 2021-01-01 PROCEDURE — 99499 RISK ADDL DX/OHS AUDIT: ICD-10-PCS | Mod: S$GLB,,, | Performed by: INTERNAL MEDICINE

## 2021-01-01 PROCEDURE — 84484 ASSAY OF TROPONIN QUANT: CPT | Mod: 91 | Performed by: EMERGENCY MEDICINE

## 2021-01-01 PROCEDURE — 3078F DIAST BP <80 MM HG: CPT | Mod: HCNC,CPTII,S$GLB, | Performed by: NURSE PRACTITIONER

## 2021-01-01 PROCEDURE — 36600 WITHDRAWAL OF ARTERIAL BLOOD: CPT

## 2021-01-01 PROCEDURE — 83605 ASSAY OF LACTIC ACID: CPT | Mod: 91 | Performed by: EMERGENCY MEDICINE

## 2021-01-01 PROCEDURE — 99497 PR ADVNCD CARE PLAN 30 MIN: ICD-10-PCS | Mod: 25,,, | Performed by: NURSE PRACTITIONER

## 2021-01-01 PROCEDURE — 1159F PR MEDICATION LIST DOCUMENTED IN MEDICAL RECORD: ICD-10-PCS | Mod: HCNC,S$GLB,, | Performed by: NURSE PRACTITIONER

## 2021-01-01 PROCEDURE — 99999 PR PBB SHADOW E&M-EST. PATIENT-LVL IV: CPT | Mod: PBBFAC,HCNC,, | Performed by: INTERNAL MEDICINE

## 2021-01-01 PROCEDURE — 84439 ASSAY OF FREE THYROXINE: CPT | Performed by: STUDENT IN AN ORGANIZED HEALTH CARE EDUCATION/TRAINING PROGRAM

## 2021-01-01 PROCEDURE — 25000242 PHARM REV CODE 250 ALT 637 W/ HCPCS

## 2021-01-01 PROCEDURE — 82746 ASSAY OF FOLIC ACID SERUM: CPT | Performed by: STUDENT IN AN ORGANIZED HEALTH CARE EDUCATION/TRAINING PROGRAM

## 2021-01-01 PROCEDURE — 3074F PR MOST RECENT SYSTOLIC BLOOD PRESSURE < 130 MM HG: ICD-10-PCS | Mod: HCNC,CPTII,S$GLB, | Performed by: INTERNAL MEDICINE

## 2021-01-01 PROCEDURE — 84443 ASSAY THYROID STIM HORMONE: CPT | Performed by: STUDENT IN AN ORGANIZED HEALTH CARE EDUCATION/TRAINING PROGRAM

## 2021-01-01 PROCEDURE — 99291 CRITICAL CARE FIRST HOUR: CPT | Mod: 25

## 2021-01-01 PROCEDURE — 96375 TX/PRO/DX INJ NEW DRUG ADDON: CPT

## 2021-01-01 PROCEDURE — 3078F PR MOST RECENT DIASTOLIC BLOOD PRESSURE < 80 MM HG: ICD-10-PCS | Mod: HCNC,CPTII,S$GLB, | Performed by: INTERNAL MEDICINE

## 2021-01-01 PROCEDURE — 12000002 HC ACUTE/MED SURGE SEMI-PRIVATE ROOM

## 2021-01-01 PROCEDURE — 85025 COMPLETE CBC W/AUTO DIFF WBC: CPT | Mod: HCNC

## 2021-01-01 PROCEDURE — 93010 ELECTROCARDIOGRAM REPORT: CPT | Mod: ,,, | Performed by: INTERNAL MEDICINE

## 2021-01-01 PROCEDURE — 87040 BLOOD CULTURE FOR BACTERIA: CPT | Mod: 59 | Performed by: EMERGENCY MEDICINE

## 2021-01-01 PROCEDURE — 82040 ASSAY OF SERUM ALBUMIN: CPT | Mod: HCNC

## 2021-01-01 PROCEDURE — 25500020 PHARM REV CODE 255: Mod: HCNC | Performed by: INTERNAL MEDICINE

## 2021-01-01 PROCEDURE — 99222 PR INITIAL HOSPITAL CARE,LEVL II: ICD-10-PCS | Mod: ,,, | Performed by: NURSE PRACTITIONER

## 2021-01-01 PROCEDURE — 99222 1ST HOSP IP/OBS MODERATE 55: CPT | Mod: ,,, | Performed by: NURSE PRACTITIONER

## 2021-01-01 PROCEDURE — 93454 PR CATH PLACE/CORONARY ANGIO, IMG SUPER/INTERP: ICD-10-PCS | Mod: 26,59,HCNC, | Performed by: INTERNAL MEDICINE

## 2021-01-01 PROCEDURE — 99291 CRITICAL CARE FIRST HOUR: CPT | Mod: ,,, | Performed by: INTERNAL MEDICINE

## 2021-01-01 PROCEDURE — 99499 UNLISTED E&M SERVICE: CPT | Mod: S$GLB,,, | Performed by: INTERNAL MEDICINE

## 2021-01-01 PROCEDURE — 93010 EKG 12-LEAD: ICD-10-PCS | Mod: ,,, | Performed by: INTERNAL MEDICINE

## 2021-01-01 PROCEDURE — 99152 PR MOD CONSCIOUS SEDATION, SAME PHYS, 5+ YRS, FIRST 15 MIN: ICD-10-PCS | Mod: HCNC,,, | Performed by: INTERNAL MEDICINE

## 2021-01-01 PROCEDURE — 87205 SMEAR GRAM STAIN: CPT | Performed by: INTERNAL MEDICINE

## 2021-01-01 PROCEDURE — 85027 COMPLETE CBC AUTOMATED: CPT | Mod: HCNC

## 2021-01-01 PROCEDURE — 63600175 PHARM REV CODE 636 W HCPCS: Performed by: EMERGENCY MEDICINE

## 2021-01-01 PROCEDURE — 93005 ELECTROCARDIOGRAM TRACING: CPT | Mod: 59,HCNC

## 2021-01-01 PROCEDURE — 99233 SBSQ HOSP IP/OBS HIGH 50: CPT | Mod: ,,, | Performed by: NURSE PRACTITIONER

## 2021-01-01 PROCEDURE — 36415 COLL VENOUS BLD VENIPUNCTURE: CPT | Performed by: INTERNAL MEDICINE

## 2021-01-01 PROCEDURE — 3074F SYST BP LT 130 MM HG: CPT | Mod: HCNC,CPTII,S$GLB, | Performed by: NURSE PRACTITIONER

## 2021-01-01 PROCEDURE — 3008F BODY MASS INDEX DOCD: CPT | Mod: HCNC,CPTII,S$GLB, | Performed by: NURSE PRACTITIONER

## 2021-01-01 PROCEDURE — 80053 COMPREHEN METABOLIC PANEL: CPT | Performed by: STUDENT IN AN ORGANIZED HEALTH CARE EDUCATION/TRAINING PROGRAM

## 2021-01-01 PROCEDURE — 83880 ASSAY OF NATRIURETIC PEPTIDE: CPT | Performed by: EMERGENCY MEDICINE

## 2021-01-01 PROCEDURE — 86900 BLOOD TYPING SEROLOGIC ABO: CPT | Mod: HCNC

## 2021-01-01 PROCEDURE — 99214 PR OFFICE/OUTPT VISIT, EST, LEVL IV, 30-39 MIN: ICD-10-PCS | Mod: HCNC,S$GLB,, | Performed by: INTERNAL MEDICINE

## 2021-01-01 PROCEDURE — 87186 SC STD MICRODIL/AGAR DIL: CPT | Mod: 59 | Performed by: EMERGENCY MEDICINE

## 2021-01-01 PROCEDURE — 27201423 OPTIME MED/SURG SUP & DEVICES STERILE SUPPLY: Mod: HCNC | Performed by: INTERNAL MEDICINE

## 2021-01-01 PROCEDURE — 99497 ADVNCD CARE PLAN 30 MIN: CPT | Mod: 25,,, | Performed by: NURSE PRACTITIONER

## 2021-01-01 PROCEDURE — 99214 OFFICE O/P EST MOD 30 MIN: CPT | Mod: HCNC,S$GLB,, | Performed by: INTERNAL MEDICINE

## 2021-01-01 DEVICE — STENT RONYX25018UX RESOLUTE ONYX 2.50X18
Type: IMPLANTABLE DEVICE | Site: CORONARY | Status: FUNCTIONAL
Brand: RESOLUTE ONYX™

## 2021-01-01 DEVICE — STENT RONYX25008UX RESOLUTE ONYX 2.50X08
Type: IMPLANTABLE DEVICE | Site: CORONARY | Status: FUNCTIONAL
Brand: RESOLUTE ONYX™

## 2021-01-01 RX ORDER — ALPRAZOLAM 0.5 MG/1
1 TABLET ORAL
Status: COMPLETED | OUTPATIENT
Start: 2021-01-01 | End: 2021-01-01

## 2021-01-01 RX ORDER — MORPHINE SULFATE 4 MG/ML
4 INJECTION, SOLUTION INTRAMUSCULAR; INTRAVENOUS
Status: DISCONTINUED | OUTPATIENT
Start: 2021-01-01 | End: 2021-01-01

## 2021-01-01 RX ORDER — ACETAMINOPHEN 325 MG/1
650 TABLET ORAL EVERY 4 HOURS PRN
Status: DISCONTINUED | OUTPATIENT
Start: 2021-01-01 | End: 2021-01-01 | Stop reason: HOSPADM

## 2021-01-01 RX ORDER — METOPROLOL SUCCINATE 25 MG/1
25 TABLET, EXTENDED RELEASE ORAL DAILY
Status: DISCONTINUED | OUTPATIENT
Start: 2021-01-01 | End: 2021-01-01

## 2021-01-01 RX ORDER — PREDNISONE 20 MG/1
60 TABLET ORAL DAILY
Status: COMPLETED | OUTPATIENT
Start: 2021-01-01 | End: 2021-01-01

## 2021-01-01 RX ORDER — AMLODIPINE BESYLATE 5 MG/1
10 TABLET ORAL DAILY
Status: DISCONTINUED | OUTPATIENT
Start: 2021-01-01 | End: 2021-01-01

## 2021-01-01 RX ORDER — LEVOFLOXACIN 5 MG/ML
750 INJECTION, SOLUTION INTRAVENOUS
Status: DISCONTINUED | OUTPATIENT
Start: 2021-01-01 | End: 2021-01-01

## 2021-01-01 RX ORDER — GUAIFENESIN/DEXTROMETHORPHAN 100-10MG/5
5 SYRUP ORAL EVERY 4 HOURS PRN
Status: DISCONTINUED | OUTPATIENT
Start: 2021-01-01 | End: 2021-01-01 | Stop reason: HOSPADM

## 2021-01-01 RX ORDER — IPRATROPIUM BROMIDE AND ALBUTEROL SULFATE 2.5; .5 MG/3ML; MG/3ML
3 SOLUTION RESPIRATORY (INHALATION)
Status: COMPLETED | OUTPATIENT
Start: 2021-01-01 | End: 2021-01-01

## 2021-01-01 RX ORDER — HALOPERIDOL 5 MG/ML
2 INJECTION INTRAMUSCULAR EVERY 6 HOURS PRN
Status: DISCONTINUED | OUTPATIENT
Start: 2021-01-01 | End: 2021-01-01 | Stop reason: HOSPADM

## 2021-01-01 RX ORDER — SODIUM CHLORIDE 9 MG/ML
INJECTION, SOLUTION INTRAVENOUS CONTINUOUS
Status: ACTIVE | OUTPATIENT
Start: 2021-01-01 | End: 2021-01-01

## 2021-01-01 RX ORDER — IPRATROPIUM BROMIDE AND ALBUTEROL SULFATE 2.5; .5 MG/3ML; MG/3ML
3 SOLUTION RESPIRATORY (INHALATION)
Status: DISPENSED | OUTPATIENT
Start: 2021-01-01 | End: 2021-01-01

## 2021-01-01 RX ORDER — VANCOMYCIN HCL IN 5 % DEXTROSE 1G/250ML
20 PLASTIC BAG, INJECTION (ML) INTRAVENOUS ONCE
Status: COMPLETED | OUTPATIENT
Start: 2021-01-01 | End: 2021-01-01

## 2021-01-01 RX ORDER — ENOXAPARIN SODIUM 100 MG/ML
40 INJECTION SUBCUTANEOUS EVERY 24 HOURS
Status: DISCONTINUED | OUTPATIENT
Start: 2021-01-01 | End: 2021-01-01

## 2021-01-01 RX ORDER — CEFAZOLIN SODIUM 1 G/3ML
INJECTION, POWDER, FOR SOLUTION INTRAMUSCULAR; INTRAVENOUS
Status: DISCONTINUED | OUTPATIENT
Start: 2021-01-01 | End: 2021-01-01 | Stop reason: HOSPADM

## 2021-01-01 RX ORDER — LORAZEPAM 2 MG/ML
0.5 INJECTION INTRAMUSCULAR EVERY 30 MIN PRN
Status: DISCONTINUED | OUTPATIENT
Start: 2021-01-01 | End: 2021-01-01

## 2021-01-01 RX ORDER — LORAZEPAM 2 MG/ML
0.5 INJECTION INTRAMUSCULAR EVERY 30 MIN PRN
Status: DISCONTINUED | OUTPATIENT
Start: 2021-01-01 | End: 2021-01-01 | Stop reason: HOSPADM

## 2021-01-01 RX ORDER — NITROGLYCERIN 0.4 MG/1
0.4 TABLET SUBLINGUAL EVERY 5 MIN PRN
Status: DISCONTINUED | OUTPATIENT
Start: 2021-01-01 | End: 2021-01-01 | Stop reason: HOSPADM

## 2021-01-01 RX ORDER — METHYLPREDNISOLONE SOD SUCC 125 MG
125 VIAL (EA) INJECTION
Status: COMPLETED | OUTPATIENT
Start: 2021-01-01 | End: 2021-01-01

## 2021-01-01 RX ORDER — PREDNISONE 20 MG/1
40 TABLET ORAL DAILY
Status: DISCONTINUED | OUTPATIENT
Start: 2021-01-01 | End: 2021-01-01

## 2021-01-01 RX ORDER — OXYCODONE HYDROCHLORIDE 5 MG/1
5 TABLET ORAL EVERY 4 HOURS PRN
Status: DISCONTINUED | OUTPATIENT
Start: 2021-01-01 | End: 2021-01-01

## 2021-01-01 RX ORDER — IPRATROPIUM BROMIDE AND ALBUTEROL SULFATE 2.5; .5 MG/3ML; MG/3ML
SOLUTION RESPIRATORY (INHALATION)
Status: COMPLETED
Start: 2021-01-01 | End: 2021-01-01

## 2021-01-01 RX ORDER — SODIUM CHLORIDE 9 MG/ML
INJECTION, SOLUTION INTRAVENOUS
Status: DISCONTINUED | OUTPATIENT
Start: 2021-01-01 | End: 2021-01-01 | Stop reason: HOSPADM

## 2021-01-01 RX ORDER — ALBUTEROL SULFATE 2.5 MG/.5ML
10 SOLUTION RESPIRATORY (INHALATION) CONTINUOUS
Status: DISCONTINUED | OUTPATIENT
Start: 2021-01-01 | End: 2021-01-01

## 2021-01-01 RX ORDER — VANCOMYCIN HCL IN 5 % DEXTROSE 1G/250ML
1000 PLASTIC BAG, INJECTION (ML) INTRAVENOUS
Status: DISCONTINUED | OUTPATIENT
Start: 2021-01-01 | End: 2021-01-01

## 2021-01-01 RX ORDER — MORPHINE SULFATE 4 MG/ML
1 INJECTION, SOLUTION INTRAMUSCULAR; INTRAVENOUS ONCE
Status: COMPLETED | OUTPATIENT
Start: 2021-01-01 | End: 2021-01-01

## 2021-01-01 RX ORDER — PREDNISONE 20 MG/1
20 TABLET ORAL ONCE
Status: COMPLETED | OUTPATIENT
Start: 2021-01-01 | End: 2021-01-01

## 2021-01-01 RX ORDER — DIPHENHYDRAMINE HCL 50 MG
50 CAPSULE ORAL ONCE
Status: COMPLETED | OUTPATIENT
Start: 2021-01-01 | End: 2021-01-01

## 2021-01-01 RX ORDER — FAMOTIDINE 20 MG/1
20 TABLET, FILM COATED ORAL 2 TIMES DAILY
Status: DISCONTINUED | OUTPATIENT
Start: 2021-01-01 | End: 2021-01-01

## 2021-01-01 RX ORDER — ISOSORBIDE MONONITRATE 30 MG/1
240 TABLET, EXTENDED RELEASE ORAL DAILY
Status: DISCONTINUED | OUTPATIENT
Start: 2021-01-01 | End: 2021-01-01

## 2021-01-01 RX ORDER — HEPARIN SODIUM 1000 [USP'U]/ML
INJECTION, SOLUTION INTRAVENOUS; SUBCUTANEOUS
Status: DISCONTINUED | OUTPATIENT
Start: 2021-01-01 | End: 2021-01-01 | Stop reason: HOSPADM

## 2021-01-01 RX ORDER — ALBUTEROL SULFATE 2.5 MG/.5ML
2.5 SOLUTION RESPIRATORY (INHALATION) EVERY 4 HOURS PRN
Status: DISCONTINUED | OUTPATIENT
Start: 2021-01-01 | End: 2021-01-01

## 2021-01-01 RX ORDER — IPRATROPIUM BROMIDE AND ALBUTEROL SULFATE 2.5; .5 MG/3ML; MG/3ML
3 SOLUTION RESPIRATORY (INHALATION) ONCE
Status: COMPLETED | OUTPATIENT
Start: 2021-01-01 | End: 2021-01-01

## 2021-01-01 RX ORDER — CLOPIDOGREL BISULFATE 75 MG/1
TABLET ORAL
Qty: 30 TABLET | Refills: 11 | Status: SHIPPED | OUTPATIENT
Start: 2021-01-01

## 2021-01-01 RX ORDER — GABAPENTIN 100 MG/1
100 CAPSULE ORAL 2 TIMES DAILY
Status: DISCONTINUED | OUTPATIENT
Start: 2021-01-01 | End: 2021-01-01 | Stop reason: HOSPADM

## 2021-01-01 RX ORDER — ALPRAZOLAM 0.5 MG/1
1 TABLET ORAL 3 TIMES DAILY PRN
Status: DISCONTINUED | OUTPATIENT
Start: 2021-01-01 | End: 2021-01-01

## 2021-01-01 RX ORDER — SODIUM CHLORIDE 0.9 % (FLUSH) 0.9 %
10 SYRINGE (ML) INJECTION
Status: DISCONTINUED | OUTPATIENT
Start: 2021-01-01 | End: 2021-01-01 | Stop reason: HOSPADM

## 2021-01-01 RX ORDER — MORPHINE SULFATE 4 MG/ML
4 INJECTION, SOLUTION INTRAMUSCULAR; INTRAVENOUS
Status: COMPLETED | OUTPATIENT
Start: 2021-01-01 | End: 2021-01-01

## 2021-01-01 RX ORDER — SERTRALINE HYDROCHLORIDE 25 MG/1
25 TABLET, FILM COATED ORAL DAILY
Status: DISCONTINUED | OUTPATIENT
Start: 2021-01-01 | End: 2021-01-01

## 2021-01-01 RX ORDER — IPRATROPIUM BROMIDE AND ALBUTEROL SULFATE 2.5; .5 MG/3ML; MG/3ML
3 SOLUTION RESPIRATORY (INHALATION) ONCE
Status: DISCONTINUED | OUTPATIENT
Start: 2021-01-01 | End: 2021-01-01 | Stop reason: HOSPADM

## 2021-01-01 RX ORDER — ATORVASTATIN CALCIUM 40 MG/1
40 TABLET, FILM COATED ORAL NIGHTLY
Status: DISCONTINUED | OUTPATIENT
Start: 2021-01-01 | End: 2021-01-01

## 2021-01-01 RX ORDER — ALBUTEROL SULFATE 2.5 MG/.5ML
5 SOLUTION RESPIRATORY (INHALATION) EVERY 4 HOURS PRN
Status: DISCONTINUED | OUTPATIENT
Start: 2021-01-01 | End: 2021-01-01 | Stop reason: HOSPADM

## 2021-01-01 RX ORDER — HYDROXYUREA 500 MG/1
500 CAPSULE ORAL DAILY
Status: DISCONTINUED | OUTPATIENT
Start: 2021-01-01 | End: 2021-01-01

## 2021-01-01 RX ORDER — ASPIRIN 81 MG/1
81 TABLET ORAL DAILY
Status: DISCONTINUED | OUTPATIENT
Start: 2021-01-01 | End: 2021-01-01 | Stop reason: HOSPADM

## 2021-01-01 RX ORDER — HEPARIN SOD,PORCINE/0.9 % NACL 1000/500ML
INTRAVENOUS SOLUTION INTRAVENOUS
Status: DISCONTINUED | OUTPATIENT
Start: 2021-01-01 | End: 2021-01-01 | Stop reason: HOSPADM

## 2021-01-01 RX ORDER — MUPIROCIN 20 MG/G
OINTMENT TOPICAL 2 TIMES DAILY
Status: DISCONTINUED | OUTPATIENT
Start: 2021-01-01 | End: 2021-01-01

## 2021-01-01 RX ORDER — SODIUM CHLORIDE 9 MG/ML
INJECTION, SOLUTION INTRAVENOUS CONTINUOUS
Status: DISCONTINUED | OUTPATIENT
Start: 2021-01-01 | End: 2021-01-01

## 2021-01-01 RX ORDER — HYDRALAZINE HYDROCHLORIDE 20 MG/ML
10 INJECTION INTRAMUSCULAR; INTRAVENOUS EVERY 6 HOURS PRN
Status: DISCONTINUED | OUTPATIENT
Start: 2021-01-01 | End: 2021-01-01 | Stop reason: HOSPADM

## 2021-01-01 RX ORDER — ALBUTEROL SULFATE 2.5 MG/.5ML
10 SOLUTION RESPIRATORY (INHALATION) CONTINUOUS
Status: DISCONTINUED | OUTPATIENT
Start: 2021-01-01 | End: 2021-01-01 | Stop reason: HOSPADM

## 2021-01-01 RX ORDER — ASPIRIN 325 MG
325 TABLET ORAL
Status: COMPLETED | OUTPATIENT
Start: 2021-01-01 | End: 2021-01-01

## 2021-01-01 RX ORDER — NITROGLYCERIN 5 MG/ML
INJECTION, SOLUTION INTRAVENOUS
Status: DISCONTINUED | OUTPATIENT
Start: 2021-01-01 | End: 2021-01-01 | Stop reason: HOSPADM

## 2021-01-01 RX ORDER — LORAZEPAM 0.5 MG/1
1 TABLET ORAL EVERY 30 MIN PRN
Status: DISCONTINUED | OUTPATIENT
Start: 2021-01-01 | End: 2021-01-01

## 2021-01-01 RX ORDER — LEVOFLOXACIN 5 MG/ML
750 INJECTION, SOLUTION INTRAVENOUS
Status: COMPLETED | OUTPATIENT
Start: 2021-01-01 | End: 2021-01-01

## 2021-01-01 RX ORDER — MORPHINE SULFATE 4 MG/ML
2 INJECTION, SOLUTION INTRAMUSCULAR; INTRAVENOUS
Status: DISCONTINUED | OUTPATIENT
Start: 2021-01-01 | End: 2021-01-01 | Stop reason: HOSPADM

## 2021-01-01 RX ORDER — MORPHINE SULFATE 4 MG/ML
2 INJECTION, SOLUTION INTRAMUSCULAR; INTRAVENOUS EVERY 4 HOURS PRN
Status: DISCONTINUED | OUTPATIENT
Start: 2021-01-01 | End: 2021-01-01

## 2021-01-01 RX ORDER — LIDOCAINE HYDROCHLORIDE 20 MG/ML
INJECTION, SOLUTION INFILTRATION; PERINEURAL
Status: DISCONTINUED | OUTPATIENT
Start: 2021-01-01 | End: 2021-01-01 | Stop reason: HOSPADM

## 2021-01-01 RX ORDER — CLOPIDOGREL BISULFATE 75 MG/1
75 TABLET ORAL DAILY
Status: DISCONTINUED | OUTPATIENT
Start: 2021-01-01 | End: 2021-01-01 | Stop reason: HOSPADM

## 2021-01-01 RX ORDER — ALBUTEROL SULFATE 90 UG/1
2 AEROSOL, METERED RESPIRATORY (INHALATION) EVERY 4 HOURS PRN
Status: DISCONTINUED | OUTPATIENT
Start: 2021-01-01 | End: 2021-01-01

## 2021-01-01 RX ADMIN — GABAPENTIN 100 MG: 100 CAPSULE ORAL at 12:09

## 2021-01-01 RX ADMIN — IPRATROPIUM BROMIDE AND ALBUTEROL SULFATE 3 ML: .5; 2.5 SOLUTION RESPIRATORY (INHALATION) at 09:09

## 2021-01-01 RX ADMIN — FAMOTIDINE 20 MG: 20 TABLET ORAL at 09:09

## 2021-01-01 RX ADMIN — MORPHINE SULFATE 4 MG: 4 INJECTION, SOLUTION INTRAMUSCULAR; INTRAVENOUS at 06:10

## 2021-01-01 RX ADMIN — GABAPENTIN 100 MG: 100 CAPSULE ORAL at 08:09

## 2021-01-01 RX ADMIN — IPRATROPIUM BROMIDE AND ALBUTEROL SULFATE 3 ML: .5; 3 SOLUTION RESPIRATORY (INHALATION) at 05:09

## 2021-01-01 RX ADMIN — ISOSORBIDE MONONITRATE 240 MG: 30 TABLET, EXTENDED RELEASE ORAL at 08:10

## 2021-01-01 RX ADMIN — VANCOMYCIN HYDROCHLORIDE 1000 MG: 1 INJECTION, POWDER, LYOPHILIZED, FOR SOLUTION INTRAVENOUS at 09:09

## 2021-01-01 RX ADMIN — IPRATROPIUM BROMIDE AND ALBUTEROL SULFATE 3 ML: .5; 2.5 SOLUTION RESPIRATORY (INHALATION) at 08:09

## 2021-01-01 RX ADMIN — MORPHINE SULFATE 2 MG: 4 INJECTION, SOLUTION INTRAMUSCULAR; INTRAVENOUS at 11:10

## 2021-01-01 RX ADMIN — DIPHENHYDRAMINE HYDROCHLORIDE 50 MG: 50 CAPSULE ORAL at 07:01

## 2021-01-01 RX ADMIN — LEVOFLOXACIN 750 MG: 750 INJECTION, SOLUTION INTRAVENOUS at 09:09

## 2021-01-01 RX ADMIN — GABAPENTIN 100 MG: 100 CAPSULE ORAL at 09:09

## 2021-01-01 RX ADMIN — ENOXAPARIN SODIUM 40 MG: 40 INJECTION SUBCUTANEOUS at 04:09

## 2021-01-01 RX ADMIN — SERTRALINE HYDROCHLORIDE 25 MG: 25 TABLET ORAL at 03:09

## 2021-01-01 RX ADMIN — HYDROXYUREA 500 MG: 500 CAPSULE ORAL at 08:09

## 2021-01-01 RX ADMIN — SODIUM CHLORIDE 150 ML/HR: 0.9 INJECTION, SOLUTION INTRAVENOUS at 07:01

## 2021-01-01 RX ADMIN — IPRATROPIUM BROMIDE AND ALBUTEROL SULFATE 3 ML: .5; 3 SOLUTION RESPIRATORY (INHALATION) at 08:09

## 2021-01-01 RX ADMIN — LORAZEPAM 0.5 MG: 2 INJECTION INTRAMUSCULAR; INTRAVENOUS at 12:10

## 2021-01-01 RX ADMIN — VANCOMYCIN HYDROCHLORIDE 750 MG: 750 INJECTION, POWDER, LYOPHILIZED, FOR SOLUTION INTRAVENOUS at 05:09

## 2021-01-01 RX ADMIN — PREDNISONE 60 MG: 20 TABLET ORAL at 09:09

## 2021-01-01 RX ADMIN — ALPRAZOLAM 1 MG: 0.5 TABLET ORAL at 11:09

## 2021-01-01 RX ADMIN — FAMOTIDINE 20 MG: 20 TABLET ORAL at 08:10

## 2021-01-01 RX ADMIN — ALBUTEROL SULFATE 5 MG: 2.5 SOLUTION RESPIRATORY (INHALATION) at 04:09

## 2021-01-01 RX ADMIN — VANCOMYCIN HYDROCHLORIDE 1000 MG: 1 INJECTION, POWDER, LYOPHILIZED, FOR SOLUTION INTRAVENOUS at 08:09

## 2021-01-01 RX ADMIN — ENOXAPARIN SODIUM 40 MG: 40 INJECTION SUBCUTANEOUS at 05:09

## 2021-01-01 RX ADMIN — IPRATROPIUM BROMIDE AND ALBUTEROL SULFATE 3 ML: .5; 2.5 SOLUTION RESPIRATORY (INHALATION) at 12:09

## 2021-01-01 RX ADMIN — ACETAMINOPHEN 650 MG: 325 TABLET ORAL at 11:09

## 2021-01-01 RX ADMIN — CLOPIDOGREL BISULFATE 75 MG: 75 TABLET, FILM COATED ORAL at 08:09

## 2021-01-01 RX ADMIN — MORPHINE SULFATE 1 MG: 4 INJECTION, SOLUTION INTRAMUSCULAR; INTRAVENOUS at 03:10

## 2021-01-01 RX ADMIN — CLOPIDOGREL BISULFATE 75 MG: 75 TABLET, FILM COATED ORAL at 09:09

## 2021-01-01 RX ADMIN — HALOPERIDOL LACTATE 2 MG: 5 INJECTION, SOLUTION INTRAMUSCULAR at 12:10

## 2021-01-01 RX ADMIN — FAMOTIDINE 20 MG: 20 TABLET ORAL at 08:09

## 2021-01-01 RX ADMIN — SODIUM CHLORIDE 500 ML: 0.9 INJECTION, SOLUTION INTRAVENOUS at 08:09

## 2021-01-01 RX ADMIN — ALPRAZOLAM 1 MG: 0.5 TABLET ORAL at 03:09

## 2021-01-01 RX ADMIN — MUPIROCIN: 20 OINTMENT TOPICAL at 09:09

## 2021-01-01 RX ADMIN — ASPIRIN 81 MG: 81 TABLET, COATED ORAL at 09:09

## 2021-01-01 RX ADMIN — LORAZEPAM 0.5 MG: 2 INJECTION INTRAMUSCULAR; INTRAVENOUS at 11:10

## 2021-01-01 RX ADMIN — ASPIRIN 325 MG ORAL TABLET 325 MG: 325 PILL ORAL at 07:09

## 2021-01-01 RX ADMIN — ALBUTEROL SULFATE 10 MG: 2.5 SOLUTION RESPIRATORY (INHALATION) at 03:09

## 2021-01-01 RX ADMIN — LEVOFLOXACIN 750 MG: 750 INJECTION, SOLUTION INTRAVENOUS at 07:09

## 2021-01-01 RX ADMIN — HYDROXYUREA 500 MG: 500 CAPSULE ORAL at 09:09

## 2021-01-01 RX ADMIN — OXYCODONE 5 MG: 5 TABLET ORAL at 05:09

## 2021-01-01 RX ADMIN — SODIUM CHLORIDE: 0.9 INJECTION, SOLUTION INTRAVENOUS at 08:09

## 2021-01-01 RX ADMIN — ALPRAZOLAM 1 MG: 0.5 TABLET ORAL at 08:09

## 2021-01-01 RX ADMIN — ATORVASTATIN CALCIUM 40 MG: 40 TABLET, FILM COATED ORAL at 12:09

## 2021-01-01 RX ADMIN — MUPIROCIN: 20 OINTMENT TOPICAL at 08:09

## 2021-01-01 RX ADMIN — MORPHINE SULFATE 2 MG: 4 INJECTION, SOLUTION INTRAMUSCULAR; INTRAVENOUS at 03:10

## 2021-01-01 RX ADMIN — GABAPENTIN 100 MG: 100 CAPSULE ORAL at 08:10

## 2021-01-01 RX ADMIN — LORAZEPAM 0.5 MG: 2 INJECTION INTRAMUSCULAR; INTRAVENOUS at 07:10

## 2021-01-01 RX ADMIN — MORPHINE SULFATE 2 MG: 4 INJECTION, SOLUTION INTRAMUSCULAR; INTRAVENOUS at 07:10

## 2021-01-01 RX ADMIN — OXYCODONE 5 MG: 5 TABLET ORAL at 11:09

## 2021-01-01 RX ADMIN — VANCOMYCIN HYDROCHLORIDE 1000 MG: 1 INJECTION, POWDER, LYOPHILIZED, FOR SOLUTION INTRAVENOUS at 06:09

## 2021-01-01 RX ADMIN — MORPHINE SULFATE 4 MG: 4 INJECTION, SOLUTION INTRAMUSCULAR; INTRAVENOUS at 08:10

## 2021-01-01 RX ADMIN — ATORVASTATIN CALCIUM 40 MG: 40 TABLET, FILM COATED ORAL at 10:09

## 2021-01-01 RX ADMIN — METOPROLOL SUCCINATE 25 MG: 25 TABLET, EXTENDED RELEASE ORAL at 08:10

## 2021-01-01 RX ADMIN — ASPIRIN 81 MG: 81 TABLET, COATED ORAL at 08:10

## 2021-01-01 RX ADMIN — MORPHINE SULFATE 2 MG: 4 INJECTION, SOLUTION INTRAMUSCULAR; INTRAVENOUS at 09:10

## 2021-01-01 RX ADMIN — OXYCODONE 5 MG: 5 TABLET ORAL at 01:09

## 2021-01-01 RX ADMIN — LORAZEPAM 0.5 MG: 2 INJECTION INTRAMUSCULAR; INTRAVENOUS at 06:10

## 2021-01-01 RX ADMIN — SODIUM CHLORIDE 2000 ML: 0.9 INJECTION, SOLUTION INTRAVENOUS at 08:09

## 2021-01-01 RX ADMIN — MORPHINE SULFATE 2 MG: 4 INJECTION, SOLUTION INTRAMUSCULAR; INTRAVENOUS at 01:10

## 2021-01-01 RX ADMIN — LORAZEPAM 0.5 MG: 2 INJECTION INTRAMUSCULAR; INTRAVENOUS at 02:10

## 2021-01-01 RX ADMIN — PREDNISONE 40 MG: 20 TABLET ORAL at 08:09

## 2021-01-01 RX ADMIN — HYDROXYUREA 500 MG: 500 CAPSULE ORAL at 08:10

## 2021-01-01 RX ADMIN — MORPHINE SULFATE 2 MG: 4 INJECTION, SOLUTION INTRAMUSCULAR; INTRAVENOUS at 05:10

## 2021-01-01 RX ADMIN — GUAIFENESIN AND DEXTROMETHORPHAN 5 ML: 100; 10 SYRUP ORAL at 09:10

## 2021-01-01 RX ADMIN — MORPHINE SULFATE 2 MG: 4 INJECTION, SOLUTION INTRAMUSCULAR; INTRAVENOUS at 10:10

## 2021-01-01 RX ADMIN — VANCOMYCIN HYDROCHLORIDE 1000 MG: 1 INJECTION, POWDER, LYOPHILIZED, FOR SOLUTION INTRAVENOUS at 08:10

## 2021-01-01 RX ADMIN — ALPRAZOLAM 0.5 MG: 0.5 TABLET ORAL at 12:10

## 2021-01-01 RX ADMIN — MORPHINE SULFATE 4 MG: 4 INJECTION, SOLUTION INTRAMUSCULAR; INTRAVENOUS at 05:10

## 2021-01-01 RX ADMIN — ALBUTEROL SULFATE 2.5 MG: 2.5 SOLUTION RESPIRATORY (INHALATION) at 09:09

## 2021-01-01 RX ADMIN — LEVOFLOXACIN 750 MG: 750 INJECTION, SOLUTION INTRAVENOUS at 08:09

## 2021-01-01 RX ADMIN — OXYCODONE 5 MG: 5 TABLET ORAL at 06:09

## 2021-01-01 RX ADMIN — MUPIROCIN: 20 OINTMENT TOPICAL at 08:10

## 2021-01-01 RX ADMIN — ISOSORBIDE MONONITRATE 240 MG: 30 TABLET, EXTENDED RELEASE ORAL at 08:09

## 2021-01-01 RX ADMIN — ASPIRIN 81 MG: 81 TABLET, COATED ORAL at 08:09

## 2021-01-01 RX ADMIN — FAMOTIDINE 20 MG: 20 TABLET ORAL at 12:09

## 2021-01-01 RX ADMIN — HYDRALAZINE HYDROCHLORIDE 10 MG: 20 INJECTION, SOLUTION INTRAMUSCULAR; INTRAVENOUS at 05:10

## 2021-01-01 RX ADMIN — IPRATROPIUM BROMIDE AND ALBUTEROL SULFATE 3 ML: .5; 2.5 SOLUTION RESPIRATORY (INHALATION) at 07:09

## 2021-01-01 RX ADMIN — OXYCODONE 5 MG: 5 TABLET ORAL at 02:09

## 2021-01-01 RX ADMIN — MORPHINE SULFATE 4 MG: 4 INJECTION, SOLUTION INTRAMUSCULAR; INTRAVENOUS at 12:10

## 2021-01-01 RX ADMIN — ALBUTEROL SULFATE 5 MG: 2.5 SOLUTION RESPIRATORY (INHALATION) at 08:10

## 2021-01-01 RX ADMIN — METHYLPREDNISOLONE SODIUM SUCCINATE 125 MG: 125 INJECTION, POWDER, FOR SOLUTION INTRAMUSCULAR; INTRAVENOUS at 05:09

## 2021-01-01 RX ADMIN — ALPRAZOLAM 1 MG: 0.5 TABLET ORAL at 06:09

## 2021-01-01 RX ADMIN — IPRATROPIUM BROMIDE AND ALBUTEROL SULFATE 3 ML: .5; 2.5 SOLUTION RESPIRATORY (INHALATION) at 01:09

## 2021-01-01 RX ADMIN — IPRATROPIUM BROMIDE AND ALBUTEROL SULFATE 3 ML: .5; 3 SOLUTION RESPIRATORY (INHALATION) at 01:09

## 2021-01-01 RX ADMIN — PREDNISONE 20 MG: 20 TABLET ORAL at 02:09

## 2021-01-01 RX ADMIN — AMLODIPINE BESYLATE 10 MG: 5 TABLET ORAL at 08:10

## 2021-01-01 RX ADMIN — VANCOMYCIN HYDROCHLORIDE 750 MG: 750 INJECTION, POWDER, LYOPHILIZED, FOR SOLUTION INTRAVENOUS at 08:09

## 2021-01-01 RX ADMIN — METOPROLOL SUCCINATE 25 MG: 25 TABLET, EXTENDED RELEASE ORAL at 08:09

## 2021-01-01 RX ADMIN — IPRATROPIUM BROMIDE AND ALBUTEROL SULFATE 3 ML: .5; 2.5 SOLUTION RESPIRATORY (INHALATION) at 02:09

## 2021-01-01 RX ADMIN — LORAZEPAM 0.5 MG: 2 INJECTION INTRAMUSCULAR; INTRAVENOUS at 10:10

## 2021-01-01 RX ADMIN — FAMOTIDINE 20 MG: 20 TABLET ORAL at 10:09

## 2021-01-01 RX ADMIN — ATORVASTATIN CALCIUM 40 MG: 40 TABLET, FILM COATED ORAL at 09:09

## 2021-01-01 RX ADMIN — SERTRALINE HYDROCHLORIDE 25 MG: 25 TABLET ORAL at 08:10

## 2021-01-01 RX ADMIN — AMLODIPINE BESYLATE 10 MG: 5 TABLET ORAL at 08:09

## 2021-01-01 RX ADMIN — GUAIFENESIN AND DEXTROMETHORPHAN 5 ML: 100; 10 SYRUP ORAL at 08:09

## 2021-01-01 RX ADMIN — CLOPIDOGREL BISULFATE 75 MG: 75 TABLET, FILM COATED ORAL at 08:10

## 2021-01-01 RX ADMIN — ALPRAZOLAM 0.5 MG: 0.5 TABLET ORAL at 09:09

## 2021-01-01 RX ADMIN — ATORVASTATIN CALCIUM 40 MG: 40 TABLET, FILM COATED ORAL at 08:09

## 2021-01-01 RX ADMIN — OXYCODONE 5 MG: 5 TABLET ORAL at 09:10

## 2021-01-01 RX ADMIN — IPRATROPIUM BROMIDE AND ALBUTEROL SULFATE 3 ML: .5; 2.5 SOLUTION RESPIRATORY (INHALATION) at 03:09

## 2021-01-01 RX ADMIN — ACETAMINOPHEN 650 MG: 325 TABLET ORAL at 12:09

## 2021-01-01 RX ADMIN — MORPHINE SULFATE 2 MG: 4 INJECTION, SOLUTION INTRAMUSCULAR; INTRAVENOUS at 04:10

## 2021-01-01 RX ADMIN — PREDNISONE 60 MG: 20 TABLET ORAL at 08:10

## 2021-01-01 RX ADMIN — PREDNISONE 60 MG: 20 TABLET ORAL at 08:09

## 2021-01-01 RX ADMIN — GABAPENTIN 100 MG: 100 CAPSULE ORAL at 10:09

## 2021-01-05 PROBLEM — Z51.89 THERAPEUTIC PROCEDURE: Status: RESOLVED | Noted: 2017-05-09 | Resolved: 2021-01-01

## 2021-02-09 NOTE — PATIENT INSTRUCTIONS
"""Continue Artificial tears both eyes two - four times a day . """ Fall Prevention   Falls often occur due to slipping, tripping or losing your balance. Here are ways to reduce your risk of falling again.   Was there anything that caused your fall that can be fixed, removed or replaced?   Make your home safe by keeping walkways clear of objects you may trip over.   Use non-slip pads under rugs.   Do not walk in poorly lit areas.   Do not stand on chairs or wobbly ladders.   Use caution when reaching overhead or looking upward. This position can cause a loss of balance.   Be sure your shoes fit properly, have non-slip bottoms and are in good condition.   Be cautious when going up and down stairs, curbs, and when walking on uneven sidewalks.   If your balance is poor, consider using a cane or walker.   If your fall was related to alcohol use, stop or limit alcohol intake.   If your fall was related to use of sleeping medicines, talk to your doctor about this. You may need to reduce your dosage at bedtime if you awaken during the night to go to the bathroom.   To reduce the need for nighttime bathroom trips:   Avoid drinking fluids for several hours before going to bed   Empty your bladder before going to bed   Men can keep a urinal at the bedside  Stay as active as you can. Balance, flexibility, strength, and endurance all come from exercise. They all play a role in preventing falls. Ask your heathcare provider which types of activity are right for you.  © 2293-5184 [employer]. [address] [place] [address], [place] [address]. All rights reserved. This information is not intended as a substitute for professional medical care. Always follow your healthcare professional's instructions.

## 2021-02-23 NOTE — ASSESSMENT & PLAN NOTE
Chaz Mendez arrived to room # [id number]. Presented with: AICD placement  Mental Status: Patient is oriented and alert. Vitals:    02/23/21 1524   BP:    Pulse: 61   Resp: 20   Temp:    SpO2: 97%     Patient safety contract and falls prevention contract reviewed with patient Yes. Oriented Patient to room. Call light within reach. Yes.   Needs, issues or concerns expressed at this time: no.      Electronically signed by Kalyani Pineda RN on 2/23/2021 at 3:49 PM Followed by Dr. Welsh  WBC 35K  Polycythemia Vera - hct 41  Thrombocytosis - 772K

## 2021-09-26 PROBLEM — J44.1 COPD EXACERBATION: Status: ACTIVE | Noted: 2021-01-01

## 2021-09-26 PROBLEM — Z95.5 HISTORY OF PLACEMENT OF STENT IN LAD CORONARY ARTERY: Status: ACTIVE | Noted: 2021-01-01

## 2021-09-27 PROBLEM — J96.21 ACUTE ON CHRONIC RESPIRATORY FAILURE WITH HYPOXIA: Status: ACTIVE | Noted: 2021-01-01

## 2021-09-27 PROBLEM — J18.9 PNEUMONIA: Status: ACTIVE | Noted: 2021-01-01

## 2021-09-28 PROBLEM — Z71.89 COUNSELING REGARDING ADVANCED CARE PLANNING AND GOALS OF CARE: Status: ACTIVE | Noted: 2021-01-01

## 2021-09-29 PROBLEM — R78.81 BACTEREMIA: Status: ACTIVE | Noted: 2021-01-01

## 2021-09-30 PROBLEM — Z51.5 PALLIATIVE CARE ENCOUNTER: Status: ACTIVE | Noted: 2017-05-09

## 2021-10-01 PROBLEM — Z51.5 COMFORT MEASURES ONLY STATUS: Status: ACTIVE | Noted: 2021-01-01

## 2021-10-01 PROBLEM — R45.86 MOOD DISTURBANCE: Status: ACTIVE | Noted: 2021-01-01

## 2021-10-02 PROBLEM — I10 ESSENTIAL HYPERTENSION: Status: RESOLVED | Noted: 2017-05-31 | Resolved: 2021-01-01
